# Patient Record
Sex: FEMALE | Race: WHITE | NOT HISPANIC OR LATINO | Employment: UNEMPLOYED | URBAN - METROPOLITAN AREA
[De-identification: names, ages, dates, MRNs, and addresses within clinical notes are randomized per-mention and may not be internally consistent; named-entity substitution may affect disease eponyms.]

---

## 2017-02-13 ENCOUNTER — TRANSFERRED RECORDS (OUTPATIENT)
Dept: CARDIOLOGY | Facility: CLINIC | Age: 52
End: 2017-02-13

## 2017-02-14 ENCOUNTER — TRANSFERRED RECORDS (OUTPATIENT)
Dept: CARDIOLOGY | Facility: CLINIC | Age: 52
End: 2017-02-14

## 2017-03-27 ENCOUNTER — OFFICE VISIT (OUTPATIENT)
Dept: FAMILY MEDICINE | Facility: CLINIC | Age: 52
End: 2017-03-27
Payer: MEDICAID

## 2017-03-27 VITALS
OXYGEN SATURATION: 98 % | WEIGHT: 231.1 LBS | BODY MASS INDEX: 34.13 KG/M2 | TEMPERATURE: 97.8 F | SYSTOLIC BLOOD PRESSURE: 120 MMHG | HEART RATE: 68 BPM | DIASTOLIC BLOOD PRESSURE: 76 MMHG

## 2017-03-27 DIAGNOSIS — Z12.11 SCREENING FOR COLON CANCER: ICD-10-CM

## 2017-03-27 DIAGNOSIS — I50.9 ACUTE CONGESTIVE HEART FAILURE, UNSPECIFIED CONGESTIVE HEART FAILURE TYPE: Primary | ICD-10-CM

## 2017-03-27 DIAGNOSIS — Z12.31 ENCOUNTER FOR SCREENING MAMMOGRAM FOR BREAST CANCER: ICD-10-CM

## 2017-03-27 DIAGNOSIS — G47.33 OBSTRUCTIVE SLEEP APNEA SYNDROME: ICD-10-CM

## 2017-03-27 DIAGNOSIS — L98.9 CRACKING SKIN: ICD-10-CM

## 2017-03-27 DIAGNOSIS — I10 ESSENTIAL HYPERTENSION: ICD-10-CM

## 2017-03-27 PROBLEM — Z95.810 AUTOMATIC IMPLANTABLE CARDIOVERTER-DEFIBRILLATOR IN SITU: Status: ACTIVE | Noted: 2017-03-27

## 2017-03-27 PROBLEM — F31.9 BIPOLAR AFFECTIVE DISORDER (H): Status: ACTIVE | Noted: 2017-02-26

## 2017-03-27 PROBLEM — R73.9 BLOOD GLUCOSE ELEVATED: Status: ACTIVE | Noted: 2017-03-27

## 2017-03-27 PROBLEM — J44.9 CHRONIC OBSTRUCTIVE PULMONARY DISEASE (H): Status: ACTIVE | Noted: 2017-02-26

## 2017-03-27 PROBLEM — Z95.0 CARDIAC PACEMAKER IN SITU: Status: ACTIVE | Noted: 2017-03-27

## 2017-03-27 PROBLEM — S37.009A INJURY OF KIDNEY: Status: ACTIVE | Noted: 2017-03-27

## 2017-03-27 PROCEDURE — 80053 COMPREHEN METABOLIC PANEL: CPT | Performed by: PHYSICIAN ASSISTANT

## 2017-03-27 PROCEDURE — 36415 COLL VENOUS BLD VENIPUNCTURE: CPT | Performed by: PHYSICIAN ASSISTANT

## 2017-03-27 PROCEDURE — 99204 OFFICE O/P NEW MOD 45 MIN: CPT | Performed by: PHYSICIAN ASSISTANT

## 2017-03-27 RX ORDER — FLUTICASONE PROPIONATE 110 UG/1
2 AEROSOL, METERED RESPIRATORY (INHALATION) 2 TIMES DAILY
COMMUNITY
End: 2017-03-27

## 2017-03-27 RX ORDER — TRIAMCINOLONE ACETONIDE 1 MG/ML
LOTION TOPICAL 2 TIMES DAILY
Qty: 60 ML | Refills: 0 | Status: SHIPPED | OUTPATIENT
Start: 2017-03-27 | End: 2017-06-01

## 2017-03-27 RX ORDER — LISINOPRIL 10 MG/1
10 TABLET ORAL 2 TIMES DAILY
Qty: 60 TABLET | Refills: 2 | Status: SHIPPED | OUTPATIENT
Start: 2017-03-27 | End: 2017-06-01

## 2017-03-27 RX ORDER — ALBUTEROL SULFATE 90 UG/1
2 AEROSOL, METERED RESPIRATORY (INHALATION) EVERY 4 HOURS PRN
COMMUNITY
End: 2017-06-01

## 2017-03-27 RX ORDER — METOPROLOL SUCCINATE 25 MG/1
25 TABLET, EXTENDED RELEASE ORAL DAILY
Qty: 90 TABLET | Refills: 0 | COMMUNITY
Start: 2017-03-27 | End: 2017-05-11

## 2017-03-27 RX ORDER — FUROSEMIDE 20 MG
40 TABLET ORAL EVERY MORNING
Qty: 90 TABLET | Refills: 1 | Status: SHIPPED | OUTPATIENT
Start: 2017-03-27 | End: 2017-06-01

## 2017-03-27 RX ORDER — TRIAMCINOLONE ACETONIDE 1 MG/ML
LOTION TOPICAL 2 TIMES DAILY
COMMUNITY
End: 2017-03-27

## 2017-03-27 RX ORDER — SPIRONOLACTONE 25 MG/1
25 TABLET ORAL EVERY MORNING
Qty: 90 TABLET | Refills: 1 | Status: SHIPPED | OUTPATIENT
Start: 2017-03-27 | End: 2017-06-01

## 2017-03-27 NOTE — LETTER
[facility]  [address] [facility] [address]  Phone: [phone]    March 27, 2017        Gloria Guzman  [address] [address] [address]          To whom it may concern:    RE: Gloria Guzman    Patient was seen and treated today at our clinic. She is new to our clinic. I have reviewed her recent medical history which did cause hospitalization from 2/27/17 through 3/3/17. She is currently in the process of trying to get her records from [facility].     Please contact me for questions or concerns.      Sincerely,        Hugo Wells PA-C

## 2017-03-27 NOTE — MR AVS SNAPSHOT
After Visit Summary   3/27/2017    Gloria Guzman    MRN: [id number]           Patient Information     Date Of Birth          1965        Visit Information        Provider Department      3/27/2017 2:40 PM Hugo Wells PA-C [facility]        Today's Diagnoses     Acute congestive heart failure, unspecified congestive heart failure type (H)    -  1    Essential hypertension        Obstructive sleep apnea syndrome        Encounter for screening mammogram for breast cancer        Screening for colon cancer        Cracking skin           Follow-ups after your visit        Additional Services     CARDIOLOGY EVAL ADULT REFERRAL       Your provider has referred you to:  [facility]: [facility] [phone]   [email or web address]    Please be aware that coverage of these services is subject to the terms and limitations of your health insurance plan.  Call member services at your health plan with any benefit or coverage questions.      Type of Referral:  New Cardiology Consult    Timeframe requested:  Routine    Please bring the following to your appointment:  >>   Any x-rays, CTs or MRIs which have been performed.  Contact the facility where they were done to arrange for  prior to your scheduled appointment.    >>   List of current medications  >>   This referral request   >>   Any documents/labs given to you for this referral            Follow-Up with [facility]           GASTROENTEROLOGY ADULT REF PROCEDURE ONLY       Last Lab Result: Creatinine (mg/dL)       Date                     Value                 11/14/2010               0.69             ----------  Body mass index is 34.13 kg/(m^2).     Needed:  No  Language:  English    Patient will be contacted to schedule procedure.     Please be aware that coverage of these services is subject to the terms and limitations of  your health insurance plan.  Call member services at your health plan with any benefit or coverage questions.  Any procedures must be performed at a [facility] facility OR coordinated by your clinic's referral office.    Please bring the following with you to your appointment:    (1) Any X-Rays, CTs or MRIs which have been performed.  Contact the facility where they were done to arrange for  prior to your scheduled appointment.    (2) List of current medications   (3) This referral request   (4) Any documents/labs given to you for this referral            SLEEP EVALUATION & MANAGEMENT REFERRAL - ADULT       Please be aware that coverage of these services is subject to the terms and limitations of your health insurance plan.  Call member services at your health plan with any benefit or coverage questions.      Please bring the following to your appointment:    >>   List of current medications   >>   This referral request   >>   Any documents/labs given to you for this referral    [facility] [phone] (Age 18 and up)                  Future tests that were ordered for you today     Open Future Orders        Priority Expected Expires Ordered    Follow-Up with [facility] Routine  3/27/2018 3/27/2017    *MA Screening Digital Bilateral Routine  3/27/2018 3/27/2017    SLEEP EVALUATION & MANAGEMENT REFERRAL - ADULT Routine  3/27/2018 3/27/2017            Who to contact     If you have questions or need follow up information about today's clinic visit or your schedule please contact [facility] directly at [phone].  Normal or non-critical lab and imaging results will be communicated to you by MyChart, letter or phone within 4 business days after the clinic has received the results. If you do not hear from us within 7 days, please contact the clinic through MyChart or phone. If you have a critical or abnormal lab result, we will notify you by phone as soon as  "possible.  Submit refill requests through [email or web address] or call your pharmacy and they will forward the refill request to us. Please allow 3 business days for your refill to be completed.          Additional Information About Your Visit        [email or web address] Information     [email or web address] lets you send messages to your doctor, view your test results, renew your prescriptions, schedule appointments and more. To sign up, go to [email or web address] . Click on \"Log in\" on the left side of the screen, which will take you to the Welcome page. Then click on \"Sign up Now\" on the right side of the page.     You will be asked to enter the access code listed below, as well as some personal information. Please follow the directions to create your username and password.     Your access code is: [email or web address]  Expires: 2017  3:35 PM     Your access code will  in 90 days. If you need help or a new code, please call your [place] clinic or [phone].        Care EveryWhere ID     This is your Care EveryWhere ID. This could be used by other organizations to access your [place] medical records  [id number]        Your Vitals Were     Pulse Temperature Pulse Oximetry BMI (Body Mass Index)          68 97.8  F (36.6  C) (Oral) 98% 34.13 kg/m2         Blood Pressure from Last 3 Encounters:   17 120/76   10/17/13 164/90    Weight from Last 3 Encounters:   17 231 lb 1.6 oz (104.8 kg)   10/17/13 196 lb (88.9 kg)              We Performed the Following     CARDIOLOGY EVAL ADULT REFERRAL     Comprehensive metabolic panel     GASTROENTEROLOGY ADULT REF PROCEDURE ONLY          Today's Medication Changes          These changes are accurate as of: 3/27/17  3:35 PM.  If you have any questions, ask your nurse or doctor.               These medicines have changed or have updated prescriptions.        Dose/Directions    furosemide 20 MG tablet   Commonly known as:  LASIX   This may have changed:  medication strength   Used for:  Acute " congestive heart failure, unspecified congestive heart failure type (H)   Changed by:  Hugo Wells PA-C        Dose:  40 mg   Take 2 tablets (40 mg) by mouth every morning Reported on 3/27/2017   Quantity:  90 tablet   Refills:  1       lisinopril 10 MG tablet   Commonly known as:  PRINIVIL/ZESTRIL   This may have changed:  medication strength   Used for:  Essential hypertension   Changed by:  Hugo Wells PA-C        Dose:  10 mg   Take 1 tablet (10 mg) by mouth 2 times daily Reported on 3/27/2017   Quantity:  60 tablet   Refills:  2       metoprolol 25 MG 24 hr tablet   Commonly known as:  TOPROL-XL   Indication:  3 tabs twice per day   This may have changed:    - medication strength  - when to take this   Used for:  Essential hypertension, Acute congestive heart failure, unspecified congestive heart failure type (H)   Changed by:  Hugo Wells PA-C        Dose:  25 mg   Take 1 tablet (25 mg) by mouth daily Reported on 3/27/2017   Quantity:  90 tablet   Refills:  0         Stop taking these medicines if you haven't already. Please contact your care team if you have questions.     fluticasone 110 MCG/ACT Inhaler   Commonly known as:  FLOVENT HFA   Stopped by:  Hugo Wells PA-C                Where to get your medicines      These medications were sent to [facility] [address] [place] [address] [facility] AT [place] [address] & [facility]  [address] [facility] [address]     Phone:  [phone]     furosemide 20 MG tablet    lisinopril 10 MG tablet    spironolactone 25 MG tablet    triamcinolone 0.1 % lotion                Primary Care Provider Office Phone # Fax #    Hugo Wells PA-C [phone]       [facility] [address]  [facility] [address]        Thank you!     Thank you for choosing [facility]  for your care. Our goal is always to provide you with excellent care. Hearing back  from our patients is one way we can continue to improve our services. Please take a few minutes to complete the written survey that you may receive in the mail after your visit with us. Thank you!             Your Updated Medication List - Protect others around you: Learn how to safely use, store and throw away your medicines at www.disposemymeds.org.          This list is accurate as of: 3/27/17  3:35 PM.  Always use your most recent med list.                   Brand Name Dispense Instructions for use    albuterol 108 (90 BASE) MCG/ACT Inhaler    PROAIR HFA/PROVENTIL HFA/VENTOLIN HFA     Inhale 2 puffs into the lungs every 4 hours as needed for shortness of breath / dyspnea or wheezing Reported on 3/27/2017       AMOXICILLIN PO      Take 500 mg by mouth as needed (4 capsule 1 hour prior to procedure) Reported on 3/27/2017       furosemide 20 MG tablet    LASIX    90 tablet    Take 2 tablets (40 mg) by mouth every morning Reported on 3/27/2017       IBUPROFEN PO      Take 800 mg by mouth as needed for moderate pain Reported on 3/27/2017       lisinopril 10 MG tablet    PRINIVIL/ZESTRIL    60 tablet    Take 1 tablet (10 mg) by mouth 2 times daily Reported on 3/27/2017       metoprolol 25 MG 24 hr tablet    TOPROL-XL    90 tablet    Take 1 tablet (25 mg) by mouth daily Reported on 3/27/2017       oxyCODONE 15 MG IR tablet    ROXICODONE     Take by mouth every 4 hours as needed Reported on 3/27/2017       spironolactone 25 MG tablet    ALDACTONE    90 tablet    Take 1 tablet (25 mg) by mouth every morning Reported on 3/27/2017       triamcinolone 0.1 % lotion    KENALOG    60 mL    Apply topically 2 times daily Reported on 3/27/2017

## 2017-03-27 NOTE — NURSING NOTE
"Chief Complaint   Patient presents with     Recheck Medication       Initial /76  Pulse 68  Temp 97.8  F (36.6  C) (Oral)  Wt 231 lb 1.6 oz (104.8 kg)  SpO2 98%  BMI 34.13 kg/m2 Estimated body mass index is 34.13 kg/(m^2) as calculated from the following:    Height as of 10/17/13: 5' 9\" (1.753 m).    Weight as of this encounter: 231 lb 1.6 oz (104.8 kg).  Medication Reconciliation: complete   Liborio Elkins CMA        "

## 2017-03-27 NOTE — PROGRESS NOTES
SUBJECTIVE:                                                    Gloria Guzman is a 51 year old female who presents to clinic today for the following health issues:    Medication Followup of Ibuprofen, albuterol, amoxicillin, lisinopril, spironolactone, triamcinolone, metoprolol, furosemide,     Taking Medication as prescribed: yes    Side Effects:  None    Medication Helping Symptoms:  yes     -Patient presents to establish care  -New to this provider and this clinic    -Recently hospitalized at [facility] for acute on chronic CHF with a LVEF of 40%  -She is s/p pacemaker, ICD (1 lead) and believes it is recommended she get a 3 lead placed in the future  -Has diagnosis of MARV, does not utilize any CPAP  -She has additional comorbidities that include COPD, htn, bipolar and tobacco use  -She is on lisinopril, toprol xl, spironolactone and lasix for control  -[facility] recommended monitoring CHF remotely with SMILE vest vs cardioMEMS   -she has not followed with heart failure bridge clinic as recommended  -She was d/c'd on 3/2/17  -Since then she has had no concerns, felt well  -Here for medication refill    Problem list and histories reviewed & adjusted, as indicated.  Additional history: as documented    Patient Active Problem List   Diagnosis     Acute congestive heart failure (H)     Bipolar affective disorder (H)     Chronic obstructive pulmonary disease (H)     Hypertension     Obstructive sleep apnea syndrome     Arthralgia of shoulder     Injury of kidney     Past Surgical History:   Procedure Laterality Date     BACK SURGERY        SECTION       GALLBLADDER SURGERY       HERNIA REPAIR       JOINT REPLACEMTN, KNEE RT/LT  11/10    Joint Replacement knee LT     SURGICAL PATHOLOGY EXAM       TONSILLECTOMY         Social History   Substance Use Topics     Smoking status: Current Some Day Smoker     Smokeless tobacco: Never Used     Alcohol use No     Family History   Problem Relation Age  of Onset     Breast Cancer Maternal Grandmother      Breast Cancer Maternal Aunt      CANCER Father 42     lung      CANCER Maternal Grandfather      lung      CANCER Other      maternal cousin lung      Gallbladder Disease Mother      Gallbladder Disease Sister            Reviewed and updated as needed this visit by clinical staff  Tobacco  Allergies  Med Hx  Surg Hx  Fam Hx  Soc Hx      Reviewed and updated as needed this visit by Provider         ROS:  Constitutional, HEENT, cardiovascular, pulmonary, gi and gu systems are negative, except as otherwise noted.    OBJECTIVE:                                                    /76  Pulse 68  Temp 97.8  F (36.6  C) (Oral)  Wt 231 lb 1.6 oz (104.8 kg)  SpO2 98%  BMI 34.13 kg/m2  Body mass index is 34.13 kg/(m^2).  GENERAL: healthy, alert and no distress  NECK: no adenopathy and or distended venous return  RESP: stunted expiration, lungs clear to auscultation - no rales, rhonchi or wheezes  CV: regular rates and rhythm, normal S1 S2, no S3 or S4 and no murmur, click or rub  MS: no peripheral edema    Diagnostic Test Results:  Results for orders placed or performed in visit on 03/27/17   Comprehensive metabolic panel   Result Value Ref Range    Sodium 140 133 - 144 mmol/L    Potassium 4.5 3.4 - 5.3 mmol/L    Chloride 106 94 - 109 mmol/L    Carbon Dioxide 27 20 - 32 mmol/L    Anion Gap 7 3 - 14 mmol/L    Glucose 95 70 - 99 mg/dL    Urea Nitrogen 28 7 - 30 mg/dL    Creatinine 1.23 (H) 0.52 - 1.04 mg/dL    GFR Estimate 46 (L) >60 mL/min/1.7m2    GFR Estimate If Black 55 (L) >60 mL/min/1.7m2    Calcium 9.6 8.5 - 10.1 mg/dL    Bilirubin Total 0.4 0.2 - 1.3 mg/dL    Albumin 3.8 3.4 - 5.0 g/dL    Protein Total 8.0 6.8 - 8.8 g/dL    Alkaline Phosphatase 67 40 - 150 U/L    ALT 30 0 - 50 U/L    AST 20 0 - 45 U/L        ASSESSMENT/PLAN:                                                    1. Acute congestive heart failure, unspecified congestive heart failure type  (H)  Recurrent hx. She has not followed up with [facility] heart failure bridge clinic which is concerning. Reviewed the importance of this, and the risks. She has been weighing herself fortunately and has not noted changes. I will refill medications today to hopefully help prevent any break in therapy but she will absolutely need to establish with our cardiology group if going to be in [facility] network. I would also like her to work with [facility] clinic. She sounds motivated to do so today, and I have confirmed # to reach her at and added them to the file. Offered Care Coordination, for which she is a prime candidate but she declined - continue to recommend. Finally, reviewed with patient the risks she faces if continued non-compliance given her numerous hospitalizations. She acknowledged this and appears more motivated today.  - Comprehensive metabolic panel  - CARDIOLOGY EVAL ADULT REFERRAL  - metoprolol (TOPROL-XL) 25 MG 24 hr tablet; Take 1 tablet (25 mg) by mouth daily Reported on 3/27/2017  Dispense: 90 tablet; Refill: 0  - furosemide (LASIX) 20 MG tablet; Take 2 tablets (40 mg) by mouth every morning Reported on 3/27/2017  Dispense: 90 tablet; Refill: 1  - spironolactone (ALDACTONE) 25 MG tablet; Take 1 tablet (25 mg) by mouth every morning Reported on 3/27/2017  Dispense: 90 tablet; Refill: 1  - Follow-Up with [facility]; Future    2. Essential hypertension  Controlled today. Will need close monitoring of labs. I will refill today to hopefully help any breaks in therapy.   - CARDIOLOGY EVAL ADULT REFERRAL  - metoprolol (TOPROL-XL) 25 MG 24 hr tablet; Take 1 tablet (25 mg) by mouth daily Reported on 3/27/2017  Dispense: 90 tablet; Refill: 0  - lisinopril (PRINIVIL/ZESTRIL) 10 MG tablet; Take 1 tablet (10 mg) by mouth 2 times daily Reported on 3/27/2017  Dispense: 60 tablet; Refill: 2    3. Obstructive sleep apnea syndrome  Reviewed importance of control and impact on heart.   - SLEEP EVALUATION & MANAGEMENT REFERRAL -  ADULT; Future    4. Encounter for screening mammogram for breast cancer  - *MA Screening Digital Bilateral; Future    5. Screening for colon cancer  - GASTROENTEROLOGY ADULT REF PROCEDURE ONLY    6. Cracking skin  - triamcinolone (KENALOG) 0.1 % lotion; Apply topically 2 times daily Reported on 3/27/2017  Dispense: 60 mL; Refill: 0    Hugo Wells PA-C  [facility]

## 2017-03-28 LAB
ALBUMIN SERPL-MCNC: 3.8 G/DL (ref 3.4–5)
ALP SERPL-CCNC: 67 U/L (ref 40–150)
ALT SERPL W P-5'-P-CCNC: 30 U/L (ref 0–50)
ANION GAP SERPL CALCULATED.3IONS-SCNC: 7 MMOL/L (ref 3–14)
AST SERPL W P-5'-P-CCNC: 20 U/L (ref 0–45)
BILIRUB SERPL-MCNC: 0.4 MG/DL (ref 0.2–1.3)
BUN SERPL-MCNC: 28 MG/DL (ref 7–30)
CALCIUM SERPL-MCNC: 9.6 MG/DL (ref 8.5–10.1)
CHLORIDE SERPL-SCNC: 106 MMOL/L (ref 94–109)
CO2 SERPL-SCNC: 27 MMOL/L (ref 20–32)
CREAT SERPL-MCNC: 1.23 MG/DL (ref 0.52–1.04)
GFR SERPL CREATININE-BSD FRML MDRD: 46 ML/MIN/1.7M2
GLUCOSE SERPL-MCNC: 95 MG/DL (ref 70–99)
POTASSIUM SERPL-SCNC: 4.5 MMOL/L (ref 3.4–5.3)
PROT SERPL-MCNC: 8 G/DL (ref 6.8–8.8)
SODIUM SERPL-SCNC: 140 MMOL/L (ref 133–144)

## 2017-04-11 ENCOUNTER — TELEPHONE (OUTPATIENT)
Dept: GASTROENTEROLOGY | Facility: CLINIC | Age: 52
End: 2017-04-11

## 2017-04-11 NOTE — TELEPHONE ENCOUNTER
Third attempt to reach patient to schedule Colonoscopy. Not Scheduled at [facility].

## 2017-05-02 ENCOUNTER — PRE VISIT (OUTPATIENT)
Dept: CARDIOLOGY | Facility: CLINIC | Age: 52
End: 2017-05-02

## 2017-05-02 NOTE — TELEPHONE ENCOUNTER
Records requested from [facility] in preparation for OV w/ Dr. Coles 5/3/17. ZION Bosch RN

## 2017-05-03 ENCOUNTER — TELEPHONE (OUTPATIENT)
Dept: CARDIOLOGY | Facility: CLINIC | Age: 52
End: 2017-05-03
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
## 2017-05-05 ENCOUNTER — TELEPHONE (OUTPATIENT)
Dept: FAMILY MEDICINE | Facility: CLINIC | Age: 52
End: 2017-05-05

## 2017-05-05 NOTE — LETTER
May 5, 2017      Gloria HANSEN Megan  [address] [address] [address]        Dear Ms. Olivalaisha HANSEN Megan,    Our records show that you are currently due for screening for colon cancer and breast cancer screening.    Please call our clinic at [phone] to have these orders placed and we can assist you in scheduling these appointments.    You are also due for cervical cancer screening, please call our office at [phone] to make this appointment.    If you have already had these completed please contact our clinic so we can update your chart.     If you have further questions or concerns please feel free to contact us by calling our clinic at [phone].     Sincerely,     [facility]

## 2017-05-05 NOTE — TELEPHONE ENCOUNTER
Panel Management Review      Patient has the following on her problem list: None      Composite cancer screening  Chart review shows that this patient is due/due soon for the following Pap Smear, Mammogram and Colonoscopy  Summary:    Patient is due/failing the following:   COLONOSCOPY, MAMMOGRAM and PAP    Action needed:   Patient needs office visit for Pap/Colon and Mammo.    Type of outreach:    Sent letter.    Questions for provider review:    None                                                                                                                                    Liborio Elkins [facility]       Chart routed to Care Team .

## 2017-05-11 ENCOUNTER — OFFICE VISIT (OUTPATIENT)
Dept: CARDIOLOGY | Facility: CLINIC | Age: 52
End: 2017-05-11
Attending: PHYSICIAN ASSISTANT
Payer: MEDICAID

## 2017-05-11 VITALS
BODY MASS INDEX: 33.92 KG/M2 | WEIGHT: 229 LBS | HEART RATE: 80 BPM | DIASTOLIC BLOOD PRESSURE: 86 MMHG | HEIGHT: 69 IN | SYSTOLIC BLOOD PRESSURE: 120 MMHG

## 2017-05-11 DIAGNOSIS — I50.22 CHRONIC SYSTOLIC CONGESTIVE HEART FAILURE (H): Primary | ICD-10-CM

## 2017-05-11 DIAGNOSIS — Z95.810 ICD (IMPLANTABLE CARDIOVERTER-DEFIBRILLATOR) IN PLACE: ICD-10-CM

## 2017-05-11 DIAGNOSIS — I50.9 ACUTE CONGESTIVE HEART FAILURE, UNSPECIFIED CONGESTIVE HEART FAILURE TYPE: ICD-10-CM

## 2017-05-11 PROCEDURE — 99204 OFFICE O/P NEW MOD 45 MIN: CPT | Mod: 25 | Performed by: INTERNAL MEDICINE

## 2017-05-11 PROCEDURE — 99406 BEHAV CHNG SMOKING 3-10 MIN: CPT | Performed by: INTERNAL MEDICINE

## 2017-05-11 PROCEDURE — 93000 ELECTROCARDIOGRAM COMPLETE: CPT | Performed by: INTERNAL MEDICINE

## 2017-05-11 RX ORDER — CLONAZEPAM 0.5 MG/1
0.5 TABLET ORAL 2 TIMES DAILY PRN
COMMUNITY
End: 2019-02-02

## 2017-05-11 RX ORDER — METOPROLOL SUCCINATE 50 MG/1
50 TABLET, EXTENDED RELEASE ORAL DAILY
Qty: 90 TABLET | Refills: 3 | Status: SHIPPED | OUTPATIENT
Start: 2017-05-11 | End: 2017-11-15

## 2017-05-11 NOTE — LETTER
5/11/2017    Hugo eWlls PA-C  [address] [place] [address]    RE: Gloria Guzman       Dear Colleague,    I had the pleasure of seeing Gloria Guzman in the [facility].    REASON FOR CLINIC VISIT:  Establish cardiology care.      Ms. Guzman is a pleasant 51-year-old female with history of nonischemic cardiomyopathy which according to patient was diagnosed about 3 years ago, status post ICD for primary sudden cardiac death prevention, bipolar disorder, COPD, tobacco abuse, obstructive sleep apnea, hypertension and chronic kidney disease stage III.        Today, the patient is coming to establish cardiology care.  She is accompanied by a friend of hers.  In terms of cardiac history, I was able to obtain some history from Care Everywhere.  It looks like patient was diagnosed with cardiomyopathy, probably 3 years ago.  She had apparently normal coronary angiogram in 2014.  I see there is a cardiac MRI from 01/2015 that showed LVEF of 19%, severely enlarged LV 7.4 cm end-diastolic diameter, severely reduced RV systolic function with RVEF of 24% and moderately dilated RV.  There was no evidence of iron overload and on delayed enhancement, there was a focal area of hyper-enhancement seen in inferior septum at RV insertion point, sometimes seen in dilated cardiomyopathy.        Earlier this year, patient was admitted at [facility] in end of February with acute decompensated congestive heart failure.  It looks like she underwent about 30 pounds of diuresis.  Her discharge weight was 228 pounds.  It was felt that the reason for her decompensation was probably under diuresis on an outpatient basis.  She also has device interrogation at that time that showed a few episodes of NSVT, otherwise no shocks were reported.  She also had an echocardiogram at that time that showed LVEF of 40% with mild to moderate increase in LV size.  RV was mildly enlarged with  borderline reduced RV systolic function, moderately enlarged left atrium.  No significant valvular disease was seen.  LV end-diastolic dimension was measured at 5.9 cm.        The patient is today coming to clinic to establish cardiology care.  In the past, she has been seen at [facility].  The patient tells me that it has been several months, if not more than a year that she has seen a cardiologist last time.  She tells me that she is under a lot of social stress as she is going through a divorce and her ex- has sold her home pacemaker interrogation device.  She lives in [place] but is planning to move to [place].        Her weight today is 229 pounds- she take 60 mg of Lasix every morning.  Additionally, she is on 25 mg daily of Toprol-XL, 10 mg b.i.d. lisinopril and 25 mg daily of spironolactone.  I had a BMP from end of March that shows creatinine of 1.23, which looks like a stable creatinine for her.  It was around 1.25 at discharge from hospitalization.  Potassium was 4.5.        The patient tells me that she does not do any dedicated exercise, but she is up on her feet. She also has a 9-month-old dog and takes it for walks and with none of the physical activity she has noticed chest discomfort.  If she exerts hard, she does get short of breath which is stable and nonprogressive.  No orthopnea, no PND.  She does acknowledge that she has not been careful with salt intake, although she does not add salt per se to her food but she consumes canned foods.  She does not have a weighing scale.        Unfortunately, she has a longstanding smoking history since she was age 16 and she used to smoke 1-2 packs per day, but is now down to 5-10 cigarettes and very emphatically told me right away that she is not at all interested in quitting.  She has also been diagnosed with sleep apnea but tells me that she feels very claustrophobic with the facial mask and is not on any treatment for that.  No alcohol  consumption.  She does use marijuana.  There is no family history of known cardiomyopathy in first-degree relatives.      PHYSICAL EXAMINATION:   VITAL SIGNS:  Blood pressure 120/86, heart rate 80 and regular, weight 229 pounds, BMI 33.8.   GENERAL:  The patient appears pleasant, comfortable.   NECK:  Normal JVP, no bruit.   CARDIOVASCULAR SYSTEM:  S1, S2 normal, no murmur, rub or gallop.   RESPIRATORY SYSTEM:  Decreased bilateral air entry, no crackles.   ABDOMEN:  Soft, nontender.   EXTREMITIES:  No pitting pedal edema.   NEUROLOGICAL:  Alert, oriented x3.   PSYCHIATRIC:  Normal affect.   SKIN:  No obvious rash.   HEENT:  No pallor or icterus.      EKG done in the clinic was personally reviewed by me shows sinus rhythm with PVCs and septal Q-waves.     Outpatient Encounter Prescriptions as of 5/11/2017   Medication Sig Dispense Refill     clonazePAM (KLONOPIN) 0.5 MG tablet Take 0.5 mg by mouth 2 times daily as needed for anxiety       metoprolol (TOPROL-XL) 50 MG 24 hr tablet Take 1 tablet (50 mg) by mouth daily 90 tablet 3     IBUPROFEN PO Take 800 mg by mouth as needed for moderate pain Reported on 3/27/2017       [DISCONTINUED] AMOXICILLIN PO Take 500 mg by mouth as needed (4 capsule 1 hour prior to procedure) Reported on 3/27/2017       [DISCONTINUED] albuterol (PROAIR HFA/PROVENTIL HFA/VENTOLIN HFA) 108 (90 BASE) MCG/ACT Inhaler Inhale 2 puffs into the lungs every 4 hours as needed for shortness of breath / dyspnea or wheezing Reported on 3/27/2017       [DISCONTINUED] triamcinolone (KENALOG) 0.1 % lotion Apply topically 2 times daily Reported on 3/27/2017 60 mL 0     [DISCONTINUED] lisinopril (PRINIVIL/ZESTRIL) 10 MG tablet Take 1 tablet (10 mg) by mouth 2 times daily Reported on 3/27/2017 60 tablet 2     [DISCONTINUED] furosemide (LASIX) 20 MG tablet Take 2 tablets (40 mg) by mouth every morning Reported on 3/27/2017 90 tablet 1     [DISCONTINUED] spironolactone (ALDACTONE) 25 MG tablet Take 1 tablet (25  mg) by mouth every morning Reported on 3/27/2017 90 tablet 1     oxyCODONE (ROXICODONE) 15 MG immediate release tablet Take by mouth every 4 hours as needed Reported on 3/27/2017       [DISCONTINUED] metoprolol (TOPROL-XL) 25 MG 24 hr tablet Take 25 mg by mouth daily Reported on 3/27/2017 90 tablet 0     No facility-administered encounter medications on file as of 5/11/2017.       IMPRESSION AND PLAN:    A very delightful 51-year-old female with nonischemic cardiomyopathy with LVEF around 19% back in 2015, now improved to 40%.  Initially LV was severely dilated, but now with mildly dilated with borderline reduced RV systolic function now, but initially was severely reduced.  Other comorbidities of hypertension, COPD, tobacco abuse, obstructive sleep apnea, not on treatment, obesity.     Overall, cardiovascular status-wise patient appears compensated and euvolemic.  She is on appropriate cardiomyopathy medication of long-acting beta blocker, Toprol-XL, lisinopril, spironolactone and Lasix.  I recommend that we can increase Toprol-XL to 50 mg daily.     I had a long discussion with the patient regarding the importance of lifestyle changes and dietary changes, especially watching salt intake and not to consume more than 2 grams of sodium a day.  I also recommended her to have a weighing scale and weigh herself daily and if she notices more than 3 pounds of weight gain within a day to take an extra dose of 40 mg of Lasix that day.  I also recommend her to get enrolled in our Device Clinic.     I also talked at length about the importance of tobacco cessation and after a long discussion and initial reluctance, the patient did agree to try to quit tobacco.  I offered her nicotine replacement therapy with patches, which she declined.  She had I think tried Chantix in the past and does not want to try it again.  The patient tells me that she will probably try E-cigarettes to help quit tobacco.     At this time, given the  LVEF 40% and narrow QRS, I do not think she qualifies or needs CRT.     I am setting up a followup in about a month with an ODIN with the goal of further increase in beta blocker/lisinopril as tolerated.   1.  Nonischemic cardiomyopathy with initial LVEF on cardiac MRI is 19%, now improved to 40%, initially severely enlarged LV, but now mildly enlarged.  Clinically appears euvolemic and well compensated, NYHA class II, stage C.   2.  Tobacco abuse, unfortunately ongoing.   3.  COPD.   4.  Obstructive sleep apnea, not on treatment.   5.  Obesity.   6.  Hypertension.   7.  Chronic kidney disease, stage III.      RECOMMENDATIONS:   1.  Increase Toprol-XL to 50 mg daily.   2.  Continue lisinopril 10 mg b.i.d., spironolactone, Lasix.   3.  Salt restriction, daily weights.   4.  Enroll in Device Clinic.   5.  Tobacco cessation.   6.  Follow up with ODIN in 1 month with BMP.      Fifty-five minutes of total time was spent with more than 50% counseling and coordination of care.     Again, thank you for allowing me to participate in the care of your patient.      Sincerely,    Sharif Coles MD     [facility]

## 2017-05-11 NOTE — PROGRESS NOTES
HPI and Plan:   See dictation(#[id number])    Orders Placed This Encounter   Procedures     Basic metabolic panel     Follow-Up with Device Clinic     Follow-Up with Cardiac Advanced Practice Provider     EKG 12-lead complete w/read - Clinics       Orders Placed This Encounter   Medications     clonazePAM (KLONOPIN) 0.5 MG tablet     Sig: Take 0.5 mg by mouth 2 times daily as needed for anxiety     metoprolol (TOPROL-XL) 50 MG 24 hr tablet     Sig: Take 1 tablet (50 mg) by mouth daily     Dispense:  90 tablet     Refill:  3       Medications Discontinued During This Encounter   Medication Reason     metoprolol (TOPROL-XL) 25 MG 24 hr tablet          Encounter Diagnoses   Name Primary?     Acute congestive heart failure, unspecified congestive heart failure type (H)      Chronic systolic congestive heart failure (H) Yes     ICD (implantable cardioverter-defibrillator) in place        CURRENT MEDICATIONS:  Current Outpatient Prescriptions   Medication Sig Dispense Refill     clonazePAM (KLONOPIN) 0.5 MG tablet Take 0.5 mg by mouth 2 times daily as needed for anxiety       metoprolol (TOPROL-XL) 50 MG 24 hr tablet Take 1 tablet (50 mg) by mouth daily 90 tablet 3     AMOXICILLIN PO Take 500 mg by mouth as needed (4 capsule 1 hour prior to procedure) Reported on 3/27/2017       albuterol (PROAIR HFA/PROVENTIL HFA/VENTOLIN HFA) 108 (90 BASE) MCG/ACT Inhaler Inhale 2 puffs into the lungs every 4 hours as needed for shortness of breath / dyspnea or wheezing Reported on 3/27/2017       IBUPROFEN PO Take 800 mg by mouth as needed for moderate pain Reported on 3/27/2017       triamcinolone (KENALOG) 0.1 % lotion Apply topically 2 times daily Reported on 3/27/2017 60 mL 0     lisinopril (PRINIVIL/ZESTRIL) 10 MG tablet Take 1 tablet (10 mg) by mouth 2 times daily Reported on 3/27/2017 60 tablet 2     furosemide (LASIX) 20 MG tablet Take 2 tablets (40 mg) by mouth every morning Reported on 3/27/2017 90 tablet 1     spironolactone  (ALDACTONE) 25 MG tablet Take 1 tablet (25 mg) by mouth every morning Reported on 3/27/2017 90 tablet 1     oxyCODONE (ROXICODONE) 15 MG immediate release tablet Take by mouth every 4 hours as needed Reported on 3/27/2017       [DISCONTINUED] metoprolol (TOPROL-XL) 25 MG 24 hr tablet Take 25 mg by mouth daily Reported on 3/27/2017 90 tablet 0       ALLERGIES     Allergies   Allergen Reactions     Morphine Sulfate Itching       PAST MEDICAL HISTORY:  Past Medical History:   Diagnosis Date     Sleep apnea        PAST SURGICAL HISTORY:  Past Surgical History:   Procedure Laterality Date     BACK SURGERY        SECTION       GALLBLADDER SURGERY       HERNIA REPAIR       JOINT REPLACEMTN, KNEE RT/LT  11/10    Joint Replacement knee LT     SURGICAL PATHOLOGY EXAM       TONSILLECTOMY         FAMILY HISTORY:  Family History   Problem Relation Age of Onset     Breast Cancer Maternal Grandmother      Breast Cancer Maternal Aunt      CANCER Father 42     lung      CANCER Maternal Grandfather      lung      CANCER Other      maternal cousin lung      Gallbladder Disease Mother      Gallbladder Disease Sister        SOCIAL HISTORY:  Social History     Social History     Marital status: Single     Spouse name: N/A     Number of children: N/A     Years of education: N/A     Social History Main Topics     Smoking status: Current Some Day Smoker     Smokeless tobacco: Never Used     Alcohol use No     Drug use: No     Sexual activity: Not Asked     Other Topics Concern     None     Social History Narrative       Review of Systems:  Skin:  Negative       Eyes:  Negative      ENT:  Negative      Respiratory:  Positive for dyspnea on exertion stairs    Cardiovascular:    Positive for;palpitations;edema chest tightness on occ  Gastroenterology: Negative      Genitourinary:  Negative      Musculoskeletal:  Negative      Neurologic:  Negative      Psychiatric:  Positive for anxiety    Heme/Lymph/Imm:  Negative      Endocrine:   "Negative        Physical Exam:  Vitals: /86  Pulse 80  Ht 1.753 m (5' 9\")  Wt 103.9 kg (229 lb)  BMI 33.82 kg/m2    Constitutional:           Skin:           Head:           Eyes:           ENT:           Neck:           Chest:             Cardiac:                    Abdomen:           Vascular:                                          Extremities and Back:                 Neurological:                 CC  ARIANA BabbC  [facility]  [address] [place] [address]                  "

## 2017-05-11 NOTE — MR AVS SNAPSHOT
After Visit Summary   5/11/2017    Gloria Guzman    MRN: [id number]           Patient Information     Date Of Birth          1965        Visit Information        Provider Department      5/11/2017 2:15 PM Sharif Coles MD [facility]        Today's Diagnoses     Chronic systolic congestive heart failure (H)    -  1    Acute congestive heart failure, unspecified congestive heart failure type (H)        ICD (implantable cardioverter-defibrillator) in place           Follow-ups after your visit        Additional Services     Follow-Up with Device Clinic           Follow-Up with Cardiac Advanced Practice Provider                 Future tests that were ordered for you today     Open Future Orders        Priority Expected Expires Ordered    Basic metabolic panel Routine 6/10/2017 5/11/2018 5/11/2017    Follow-Up with Cardiac Advanced Practice Provider Routine 6/10/2017 5/11/2018 5/11/2017    Follow-Up with Device Clinic Routine 5/11/2017 5/11/2018 5/11/2017            Who to contact     If you have questions or need follow up information about today's clinic visit or your schedule please contact [facility] directly at [phone].  Normal or non-critical lab and imaging results will be communicated to you by [employer], letter or phone within 4 business days after the clinic has received the results. If you do not hear from us within 7 days, please contact the clinic through [phone] or phone. If you have a critical or abnormal lab result, we will notify you by phone as soon as possible.  Submit refill requests through [employer] or call your pharmacy and they will forward the refill request to us. Please allow 3 business days for your refill to be completed.          Additional Information About Your Visit        [employer] Information     [employer] lets you send messages to your doctor, view your test results, renew your  "prescriptions, schedule appointments and more. To sign up, go to [email or web address] . Click on \"Log in\" on the left side of the screen, which will take you to the Welcome page. Then click on \"Sign up Now\" on the right side of the page.     You will be asked to enter the access code listed below, as well as some personal information. Please follow the directions to create your username and password.     Your access code is: [email or web address]  Expires: 2017  3:35 PM     Your access code will  in 90 days. If you need help or a new code, please call your [place] clinic or [phone].        Care EveryWhere ID     This is your Care EveryWhere ID. This could be used by other organizations to access your [place] medical records  [id number]        Your Vitals Were     Pulse Height BMI (Body Mass Index)             80 1.753 m (5' 9\") 33.82 kg/m2          Blood Pressure from Last 3 Encounters:   17 120/86   17 120/76   10/17/13 164/90    Weight from Last 3 Encounters:   17 103.9 kg (229 lb)   17 104.8 kg (231 lb 1.6 oz)   10/17/13 88.9 kg (196 lb)              We Performed the Following     EKG 12-lead complete w/read - Clinics     Follow-Up with [facility]          Today's Medication Changes          These changes are accurate as of: 17  3:04 PM.  If you have any questions, ask your nurse or doctor.               These medicines have changed or have updated prescriptions.        Dose/Directions    metoprolol 50 MG 24 hr tablet   Commonly known as:  TOPROL-XL   This may have changed:    - medication strength  - how much to take  - additional instructions   Used for:  Chronic systolic congestive heart failure (H)   Changed by:  Sharif Coles MD        Dose:  50 mg   Take 1 tablet (50 mg) by mouth daily   Quantity:  90 tablet   Refills:  3            Where to get your medicines      These medications were sent to [place] [address] [place] [address] [facility] [address]     Phone:  [phone]     metoprolol 50 MG 24 hr tablet                Primary Care Provider    Physician No Ref-Primary       No address on file        Thank you!     Thank you for choosing [facility] [place]  for your care. Our goal is always to provide you with excellent care. Hearing back from our patients is one way we can continue to improve our services. Please take a few minutes to complete the written survey that you may receive in the mail after your visit with us. Thank you!             Your Updated Medication List - Protect others around you: Learn how to safely use, store and throw away your medicines at www.disposemymeds.org.          This list is accurate as of: 5/11/17  3:04 PM.  Always use your most recent med list.                   Brand Name Dispense Instructions for use    albuterol 108 (90 BASE) MCG/ACT Inhaler    PROAIR HFA/PROVENTIL HFA/VENTOLIN HFA     Inhale 2 puffs into the lungs every 4 hours as needed for shortness of breath / dyspnea or wheezing Reported on 3/27/2017       AMOXICILLIN PO      Take 500 mg by mouth as needed (4 capsule 1 hour prior to procedure) Reported on 3/27/2017       furosemide 20 MG tablet    LASIX    90 tablet    Take 2 tablets (40 mg) by mouth every morning Reported on 3/27/2017       IBUPROFEN PO      Take 800 mg by mouth as needed for moderate pain Reported on 3/27/2017       klonoPIN 0.5 MG tablet   Generic drug:  clonazePAM      Take 0.5 mg by mouth 2 times daily as needed for anxiety       lisinopril 10 MG tablet    PRINIVIL/ZESTRIL    60 tablet    Take 1 tablet (10 mg) by mouth 2 times daily Reported on 3/27/2017       metoprolol 50 MG 24 hr tablet    TOPROL-XL    90 tablet    Take 1 tablet (50 mg) by mouth daily       oxyCODONE 15 MG IR tablet    ROXICODONE     Take by mouth every 4 hours as needed Reported on 3/27/2017       spironolactone 25 MG tablet    ALDACTONE    90 tablet    Take  1 tablet (25 mg) by mouth every morning Reported on 3/27/2017       triamcinolone 0.1 % lotion    KENALOG    60 mL    Apply topically 2 times daily Reported on 3/27/2017

## 2017-05-12 NOTE — PROGRESS NOTES
REASON FOR CLINIC VISIT:  Establish cardiology care.      HISTORY OF PRESENT ILLNESS:  Ms. Guzman is a pleasant 51-year-old female with history of nonischemic cardiomyopathy which according to patient was diagnosed about 3 years ago, status post ICD for primary sudden cardiac death prevention, bipolar disorder, COPD, tobacco abuse, obstructive sleep apnea, hypertension and chronic kidney disease stage III.        Today, the patient is coming to establish cardiology care.  She is accompanied by a friend of hers.  In terms of cardiac history, I was able to obtain some history from Care Everywhere.  It looks like patient was diagnosed with cardiomyopathy, probably 3 years ago.  She had apparently normal coronary angiogram in 2014.  I see there is a cardiac MRI from 01/2015 that showed LVEF of 19%, severely enlarged LV 7.4 cm end-diastolic diameter, severely reduced RV systolic function with RVEF of 24% and moderately dilated RV.  There was no evidence of iron overload and on delayed enhancement, there was a focal area of hyper-enhancement seen in inferior septum at RV insertion point, sometimes seen in dilated cardiomyopathy.        Earlier this year, patient was admitted at [facility] in end of February with acute decompensated congestive heart failure.  It looks like she underwent about 30 pounds of diuresis.  Her discharge weight was 228 pounds.  It was felt that the reason for her decompensation was probably under diuresis on an outpatient basis.  She also has device interrogation at that time that showed a few episodes of NSVT, otherwise no shocks were reported.  She also had an echocardiogram at that time that showed LVEF of 40% with mild to moderate increase in LV size.  RV was mildly enlarged with borderline reduced RV systolic function, moderately enlarged left atrium.  No significant valvular disease was seen.  LV end-diastolic dimension was measured at 5.9 cm.        The patient is today  coming to clinic to establish cardiology care.  In the past, she has been seen at [facility].  The patient tells me that it has been several months, if not more than a year that she has seen a cardiologist last time.  She tells me that she is under a lot of social stress as she is going through a divorce and her ex- has sold her home pacemaker interrogation device.  She lives in [place] but is planning to move to [place].        Her weight today is 229 pounds- she take 60 mg of Lasix every morning.  Additionally, she is on 25 mg daily of Toprol-XL, 10 mg b.i.d. lisinopril and 25 mg daily of spironolactone.  I had a BMP from end of March that shows creatinine of 1.23, which looks like a stable creatinine for her.  It was around 1.25 at discharge from hospitalization.  Potassium was 4.5.        The patient tells me that she does not do any dedicated exercise, but she is up on her feet. She also has a 9-month-old dog and takes it for walks and with none of the physical activity she has noticed chest discomfort.  If she exerts hard, she does get short of breath which is stable and nonprogressive.  No orthopnea, no PND.  She does acknowledge that she has not been careful with salt intake, although she does not add salt per se to her food but she consumes canned foods.  She does not have a weighing scale.        Unfortunately, she has a longstanding smoking history since she was age 16 and she used to smoke 1-2 packs per day, but is now down to 5-10 cigarettes and very emphatically told me right away that she is not at all interested in quitting.  She has also been diagnosed with sleep apnea but tells me that she feels very claustrophobic with the facial mask and is not on any treatment for that.  No alcohol consumption.  She does use marijuana.  There is no family history of known cardiomyopathy in first-degree relatives.      PHYSICAL EXAMINATION:   VITAL SIGNS:  Blood pressure 120/86, heart rate 80  and regular, weight 229 pounds, BMI 33.8.   GENERAL:  The patient appears pleasant, comfortable.   NECK:  Normal JVP, no bruit.   CARDIOVASCULAR SYSTEM:  S1, S2 normal, no murmur, rub or gallop.   RESPIRATORY SYSTEM:  Decreased bilateral air entry, no crackles.   ABDOMEN:  Soft, nontender.   EXTREMITIES:  No pitting pedal edema.   NEUROLOGICAL:  Alert, oriented x3.   PSYCHIATRIC:  Normal affect.   SKIN:  No obvious rash.   HEENT:  No pallor or icterus.      EKG done in the clinic was personally reviewed by me shows sinus rhythm with PVCs and septal Q-waves.      IMPRESSION AND PLAN:    A very delightful 51-year-old female with nonischemic cardiomyopathy with LVEF around 19% back in 2015, now improved to 40%.  Initially LV was severely dilated, but now with mildly dilated with borderline reduced RV systolic function now, but initially was severely reduced.  Other comorbidities of hypertension, COPD, tobacco abuse, obstructive sleep apnea, not on treatment, obesity.     Overall, cardiovascular status-wise patient appears compensated and euvolemic.  She is on appropriate cardiomyopathy medication of long-acting beta blocker, Toprol-XL, lisinopril, spironolactone and Lasix.  I recommend that we can increase Toprol-XL to 50 mg daily.     I had a long discussion with the patient regarding the importance of lifestyle changes and dietary changes, especially watching salt intake and not to consume more than 2 grams of sodium a day.  I also recommended her to have a weighing scale and weigh herself daily and if she notices more than 3 pounds of weight gain within a day to take an extra dose of 40 mg of Lasix that day.  I also recommend her to get enrolled in our Device Clinic.     I also talked at length about the importance of tobacco cessation and after a long discussion and initial reluctance, the patient did agree to try to quit tobacco.  I offered her nicotine replacement therapy with patches, which she declined.  She  had I think tried Chantix in the past and does not want to try it again.  The patient tells me that she will probably try E-cigarettes to help quit tobacco.     At this time, given the LVEF 40% and narrow QRS, I do not think she qualifies or needs CRT.     I am setting up a followup in about a month with an ODIN with the goal of further increase in beta blocker/lisinopril as tolerated.   1.  Nonischemic cardiomyopathy with initial LVEF on cardiac MRI is 19%, now improved to 40%, initially severely enlarged LV, but now mildly enlarged.  Clinically appears euvolemic and well compensated, NYHA class II, stage C.   2.  Tobacco abuse, unfortunately ongoing.   3.  COPD.   4.  Obstructive sleep apnea, not on treatment.   5.  Obesity.   6.  Hypertension.   7.  Chronic kidney disease, stage III.      RECOMMENDATIONS:   1.  Increase Toprol-XL to 50 mg daily.   2.  Continue lisinopril 10 mg b.i.d., spironolactone, Lasix.   3.  Salt restriction, daily weights.   4.  Enroll in Device Clinic.   5.  Tobacco cessation.   6.  Follow up with ODIN in 1 month with BMP.      Fifty-five minutes of total time was spent with more than 50% counseling and coordination of care.         TONY KAY MD             D: 2017 15:22   T: 2017 13:29   MT: LISSET      Name:     DAVID WRIGHT   MRN:      [id number]        Account:      [id number]   :      1965           Service Date: 2017      Document: [id number]

## 2017-05-16 DIAGNOSIS — Z95.0 CARDIAC PACEMAKER IN SITU: Primary | ICD-10-CM

## 2017-05-16 RX ORDER — AMOXICILLIN 500 MG/1
2000 CAPSULE ORAL ONCE
Qty: 4 CAPSULE | Refills: 0 | Status: SHIPPED | OUTPATIENT
Start: 2017-05-16 | End: 2017-05-16

## 2017-05-16 NOTE — TELEPHONE ENCOUNTER
Patient calls.  Has a dental cleaning scheduled for tomorrow morning-states she may have a tooth pulled-asking to fill amoxicillin.    Has seen Fuentes Perez in  once in March.  Not prescribed by [facility] primary care provider in the past, unable to do RN protocol.    Huddled with Dr. Ballard due to time of day, ok to send prescription based on chart review,  Med sent.    Flavia Hall, RN  Message handled by Nurse Triage.

## 2017-05-19 ENCOUNTER — TELEPHONE (OUTPATIENT)
Dept: PEDIATRICS | Facility: CLINIC | Age: 52
End: 2017-05-19

## 2017-05-19 DIAGNOSIS — Z95.0 CARDIAC PACEMAKER IN SITU: Primary | ICD-10-CM

## 2017-05-19 RX ORDER — AMOXICILLIN 500 MG/1
2000 CAPSULE ORAL ONCE
Qty: 4 CAPSULE | Refills: 1 | Status: SHIPPED | OUTPATIENT
Start: 2017-05-19 | End: 2017-05-19

## 2017-05-19 NOTE — TELEPHONE ENCOUNTER
Patient is calling to ask for 2 additional refills of the Amoxicillin for dental procedures.   She got the first one, but will be needing 2 additional procedures, so needs more.  Please send these in if ok.  (signed by Dr. Ballard this week x 1)  Sue Rojas, RN  Triage Nurse

## 2017-05-19 NOTE — TELEPHONE ENCOUNTER
Pt's family member(male) calling to notify that pt is complaining of chest pain, tightness, severe SOB, lethargic, pale, trouble with breathing. All sx's started about an hour ago. Advised him to call 911 to get an ambulance to take her to ER. He agrees to call 911 right away.    Juani, RN  Triage Nurse

## 2017-06-01 ENCOUNTER — OFFICE VISIT (OUTPATIENT)
Dept: FAMILY MEDICINE | Facility: CLINIC | Age: 52
End: 2017-06-01
Payer: MEDICAID

## 2017-06-01 VITALS
SYSTOLIC BLOOD PRESSURE: 124 MMHG | WEIGHT: 229.8 LBS | TEMPERATURE: 97.5 F | DIASTOLIC BLOOD PRESSURE: 78 MMHG | HEART RATE: 73 BPM | HEIGHT: 69 IN | BODY MASS INDEX: 34.04 KG/M2 | OXYGEN SATURATION: 98 %

## 2017-06-01 DIAGNOSIS — L98.9 SKIN LESION: ICD-10-CM

## 2017-06-01 DIAGNOSIS — J44.9 CHRONIC OBSTRUCTIVE PULMONARY DISEASE, UNSPECIFIED COPD TYPE (H): Primary | ICD-10-CM

## 2017-06-01 DIAGNOSIS — Z12.11 SCREENING FOR COLON CANCER: ICD-10-CM

## 2017-06-01 DIAGNOSIS — L98.9 CRACKING SKIN: ICD-10-CM

## 2017-06-01 DIAGNOSIS — I10 ESSENTIAL HYPERTENSION: ICD-10-CM

## 2017-06-01 DIAGNOSIS — I50.9 ACUTE CONGESTIVE HEART FAILURE, UNSPECIFIED CONGESTIVE HEART FAILURE TYPE: ICD-10-CM

## 2017-06-01 PROCEDURE — 99214 OFFICE O/P EST MOD 30 MIN: CPT | Performed by: PHYSICIAN ASSISTANT

## 2017-06-01 RX ORDER — LISINOPRIL 10 MG/1
10 TABLET ORAL 2 TIMES DAILY
Qty: 180 TABLET | Refills: 1 | Status: SHIPPED | OUTPATIENT
Start: 2017-06-01 | End: 2017-11-15

## 2017-06-01 RX ORDER — ALBUTEROL SULFATE 90 UG/1
2 AEROSOL, METERED RESPIRATORY (INHALATION) EVERY 4 HOURS PRN
Qty: 1 INHALER | Refills: 2 | Status: SHIPPED | OUTPATIENT
Start: 2017-06-01 | End: 2018-01-25

## 2017-06-01 RX ORDER — TRIAMCINOLONE ACETONIDE 1 MG/ML
LOTION TOPICAL 2 TIMES DAILY
Qty: 60 ML | Refills: 1 | Status: SHIPPED | OUTPATIENT
Start: 2017-06-01 | End: 2018-01-25

## 2017-06-01 RX ORDER — FUROSEMIDE 20 MG
40 TABLET ORAL EVERY MORNING
Qty: 90 TABLET | Refills: 1 | Status: SHIPPED | OUTPATIENT
Start: 2017-06-01 | End: 2017-11-15

## 2017-06-01 RX ORDER — SPIRONOLACTONE 25 MG/1
25 TABLET ORAL EVERY MORNING
Qty: 90 TABLET | Refills: 1 | Status: SHIPPED | OUTPATIENT
Start: 2017-06-01 | End: 2017-11-15

## 2017-06-01 NOTE — PROGRESS NOTES
"  SUBJECTIVE:                                                    Gloria Guzman is a 51 year old female who presents to clinic today for the following health issues:    Medication Followup of  Albuterol, triamcinolone, lisinopril, furosemide spironolactone    Taking Medication as prescribed: yes    Side Effects:  None    Medication Helping Symptoms:  yes     -Patient presents for update/refills of medications  -Since her last visit she has seen cardiology, has an appt with [facility]   -Unfortunately she continues to smoke, but otherwise feels ok   -sometimes feels like she is \"overloaded with water, can be out of  breath\"  -She had her toprol increased to two tabs daily - feeling more fatigued since then  -She takes albuterol to aide in her breathing   -this is effective for short periods  -She has weighed herself daily; no gross fluctuation in weight  -Is trying to watch her salt intake   -she admits this has been difficult for her   -diet previously was quite subpar    -she has been unable to follow up with colonoscopy or mammogram, has not contacted sleep center    -she is also concerned about a lesion noted to her left frontal hairline, just over the left ear. She notes this lesion has gotten larger, is itchy and more bothersome    Problem list and histories reviewed & adjusted, as indicated.  Additional history: as documented    Patient Active Problem List   Diagnosis     Acute congestive heart failure (H)     Bipolar affective disorder (H)     Chronic obstructive pulmonary disease (H)     Hypertension     Obstructive sleep apnea syndrome     Arthralgia of shoulder     Injury of kidney     Cardiac pacemaker in situ     Automatic implantable cardioverter-defibrillator in situ     Blood glucose elevated     Past Surgical History:   Procedure Laterality Date     BACK SURGERY        SECTION       GALLBLADDER SURGERY       HERNIA REPAIR       JOINT REPLACEMTN, KNEE RT/LT  11/10    Joint Replacement knee " "LT     SURGICAL PATHOLOGY EXAM       TONSILLECTOMY         Social History   Substance Use Topics     Smoking status: Current Some Day Smoker     Smokeless tobacco: Never Used     Alcohol use No     Family History   Problem Relation Age of Onset     Breast Cancer Maternal Grandmother      Breast Cancer Maternal Aunt      CANCER Father 42     lung      CANCER Maternal Grandfather      lung      CANCER Other      maternal cousin lung      Gallbladder Disease Mother      Gallbladder Disease Sister            Reviewed and updated as needed this visit by clinical staff  Tobacco  Allergies  Med Hx  Surg Hx  Fam Hx  Soc Hx      Reviewed and updated as needed this visit by Provider         ROS:  Constitutional, HEENT, cardiovascular, pulmonary, gi and gu systems are negative, except as otherwise noted.    OBJECTIVE:                                                    /78  Pulse 73  Temp 97.5  F (36.4  C) (Oral)  Ht 5' 9\" (1.753 m)  Wt 229 lb 12.8 oz (104.2 kg)  SpO2 98%  BMI 33.94 kg/m2  Body mass index is 33.94 kg/(m^2).  GENERAL: healthy, alert and no distress  RESP: lungs clear to auscultation - no rales, rhonchi or wheezes  CV: regular rates and rhythm, normal S1 S2, no S3 or S4 and no murmur, click or rub  MS: no peripheral edema  PSYCH: affect normal/bright  SKIN: there is a larger, macular lesion of multi-brown coloration over the fronto-temporal (left) aspect of her scalp; along the heels are areas of dry, cracked and peeling skin    Diagnostic Test Results:  none      ASSESSMENT/PLAN:                                                    1. Cracking skin  Refilling. Productive so far  - triamcinolone (KENALOG) 0.1 % lotion; Apply topically 2 times daily  Dispense: 60 mL; Refill: 1    2. Acute congestive heart failure, unspecified congestive heart failure type (H)  To continue with cardiology/[facility]. She has been working on salt intake, a challenge for her. Smoking is as below. Her weight has been stable " and she hasnt needed increased dosing of lasix. For now, she has had some symptoms with the increased toprol but I expect these to improve over time with adherence. Continue present management. And follow up with [facility] later this month.  - spironolactone (ALDACTONE) 25 MG tablet; Take 1 tablet (25 mg) by mouth every morning Reported on 3/27/2017  Dispense: 90 tablet; Refill: 1  - furosemide (LASIX) 20 MG tablet; Take 2 tablets (40 mg) by mouth every morning Reported on 3/27/2017  Dispense: 90 tablet; Refill: 1    3. Essential hypertension  Controlled today. Continue present management.   - lisinopril (PRINIVIL/ZESTRIL) 10 MG tablet; Take 1 tablet (10 mg) by mouth 2 times daily Reported on 3/27/2017  Dispense: 180 tablet; Refill: 1    4. Chronic obstructive pulmonary disease, unspecified COPD type (H)  Refilling rescue - recommended we start getting her on some type of daily inhalation medication. She is hesitant today. We also reviewed the desperate need for smoking cessation. She is going to try e-cig and cold tky vs nicotine replacement.   - albuterol (PROAIR HFA/PROVENTIL HFA/VENTOLIN HFA) 108 (90 BASE) MCG/ACT Inhaler; Inhale 2 puffs into the lungs every 4 hours as needed for shortness of breath / dyspnea or wheezing Reported on 3/27/2017  Dispense: 1 Inhaler; Refill: 2    5. Screening for colon cancer  - GASTROENTEROLOGY ADULT REF PROCEDURE ONLY    6. Skin lesion  Given appearance, location and reported size change will have her follow up closely with derm  - DERMATOLOGY REFERRAL    Hugo Wells PA-C  [facility]

## 2017-06-01 NOTE — MR AVS SNAPSHOT
After Visit Summary   6/1/2017    Gloria Guzman    MRN: [id number]           Patient Information     Date Of Birth          1965        Visit Information        Provider Department      6/1/2017 1:00 PM Hugo Wells PA-C [facility]        Today's Diagnoses     Chronic obstructive pulmonary disease, unspecified COPD type (H)    -  1    Cracking skin        Acute congestive heart failure, unspecified congestive heart failure type (H)        Essential hypertension        Screening for colon cancer          Care Instructions    To schedule your mammogram at any of the [place] locations, call [phone].    [facility] [phone] (Age 18 and up)          Follow-ups after your visit        Additional Services     GASTROENTEROLOGY ADULT REF PROCEDURE ONLY       Last Lab Result: Creatinine (mg/dL)       Date                     Value                 03/27/2017               1.23 (H)         ----------  Body mass index is 33.94 kg/(m^2).     Needed:  No  Language:  English    Patient will be contacted to schedule procedure.     Please be aware that coverage of these services is subject to the terms and limitations of your health insurance plan.  Call member services at your health plan with any benefit or coverage questions.  Any procedures must be performed at a [place] facility OR coordinated by your clinic's referral office.    Please bring the following with you to your appointment:    (1) Any X-Rays, CTs or MRIs which have been performed.  Contact the facility where they were done to arrange for  prior to your scheduled appointment.    (2) List of current medications   (3) This referral request   (4) Any documents/labs given to you for this referral                  Your next 10 appointments already scheduled     Jun 07, 2017  9:20 AM CDT   LAB with [facility]   [facility] [place] ([facility]  "Clinics)    [address] [place], [place] [address]  [facility] [address] [phone]           Patient must bring picture ID.  Patient should be prepared to give a urine specimen  Please do not eat 10-12 hours before your appointment if you are coming in fasting for labs on lipids, cholesterol, or glucose (sugar).  Pregnant women should follow their Care Team instructions. Water with medications is okay. Do not drink coffee or other fluids.   If you have concerns about taking  your medications, please ask at office or if scheduling via Drill Cycle, send a message by clicking on Secure Messaging, Message Your Care Team.            Jun 07, 2017 10:10 AM CDT   [facility] Enrollment with ELLI Greer CNP   [facility] AT [place] ([facility] Clinics)    [address] [place] [id number] [phone]              Who to contact     If you have questions or need follow up information about today's clinic visit or your schedule please contact [facility] directly at [phone].  Normal or non-critical lab and imaging results will be communicated to you by Good [employer], letter or phone within 4 business days after the clinic has received the results. If you do not hear from us within 7 days, please contact the clinic through [employer] or phone. If you have a critical or abnormal lab result, we will notify you by phone as soon as possible.  Submit refill requests through Dr[employer] Cycle or call your pharmacy and they will forward the refill request to us. Please allow 3 business days for your refill to be completed.          Additional Information About Your Visit        Drill Cycle Information     Drill Cycle lets you send messages to your doctor, view your test results, renew your prescriptions, schedule appointments and more. To sign up, go to [email or web address] . Click on \"Log in\" on the left side of the screen, which will take you to the Welcome page. Then click on " "\"Sign up Now\" on the right side of the page.     You will be asked to enter the access code listed below, as well as some personal information. Please follow the directions to create your username and password.     Your access code is: [email or web address]  Expires: 2017  3:35 PM     Your access code will  in 90 days. If you need help or a new code, please call your [place] clinic or [phone].        Care EveryWhere ID     This is your Care EveryWhere ID. This could be used by other organizations to access your [place] medical records  [id number]        Your Vitals Were     Pulse Temperature Height Pulse Oximetry BMI (Body Mass Index)       73 97.5  F (36.4  C) (Oral) 5' 9\" (1.753 m) 98% 33.94 kg/m2        Blood Pressure from Last 3 Encounters:   17 124/78   17 120/86   17 120/76    Weight from Last 3 Encounters:   17 229 lb 12.8 oz (104.2 kg)   17 229 lb (103.9 kg)   17 231 lb 1.6 oz (104.8 kg)              We Performed the Following     GASTROENTEROLOGY ADULT REF PROCEDURE ONLY          Today's Medication Changes          These changes are accurate as of: 17  1:42 PM.  If you have any questions, ask your nurse or doctor.               These medicines have changed or have updated prescriptions.        Dose/Directions    triamcinolone 0.1 % lotion   Commonly known as:  KENALOG   This may have changed:  additional instructions   Used for:  Cracking skin   Changed by:  Hugo Wells PA-C        Apply topically 2 times daily   Quantity:  60 mL   Refills:  1            Where to get your medicines      These medications were sent to [facility] - [address]  [address] [address] [address]     Phone:  [phone]     albuterol 108 (90 BASE) MCG/ACT Inhaler    furosemide 20 MG tablet    lisinopril 10 MG tablet    spironolactone 25 MG tablet    triamcinolone 0.1 % lotion                Primary Care Provider    " Physician No Ref-Primary       No address on file        Thank you!     Thank you for choosing [facility]  for your care. Our goal is always to provide you with excellent care. Hearing back from our patients is one way we can continue to improve our services. Please take a few minutes to complete the written survey that you may receive in the mail after your visit with us. Thank you!             Your Updated Medication List - Protect others around you: Learn how to safely use, store and throw away your medicines at www.disposemymeds.org.          This list is accurate as of: 6/1/17  1:42 PM.  Always use your most recent med list.                   Brand Name Dispense Instructions for use    albuterol 108 (90 BASE) MCG/ACT Inhaler    PROAIR HFA/PROVENTIL HFA/VENTOLIN HFA    1 Inhaler    Inhale 2 puffs into the lungs every 4 hours as needed for shortness of breath / dyspnea or wheezing Reported on 3/27/2017       furosemide 20 MG tablet    LASIX    90 tablet    Take 2 tablets (40 mg) by mouth every morning Reported on 3/27/2017       IBUPROFEN PO      Take 800 mg by mouth as needed for moderate pain Reported on 3/27/2017       klonoPIN 0.5 MG tablet   Generic drug:  clonazePAM      Take 0.5 mg by mouth 2 times daily as needed for anxiety       lisinopril 10 MG tablet    PRINIVIL/ZESTRIL    180 tablet    Take 1 tablet (10 mg) by mouth 2 times daily Reported on 3/27/2017       metoprolol 50 MG 24 hr tablet    TOPROL-XL    90 tablet    Take 1 tablet (50 mg) by mouth daily       oxyCODONE 15 MG IR tablet    ROXICODONE     Take by mouth every 4 hours as needed Reported on 3/27/2017       spironolactone 25 MG tablet    ALDACTONE    90 tablet    Take 1 tablet (25 mg) by mouth every morning Reported on 3/27/2017       triamcinolone 0.1 % lotion    KENALOG    60 mL    Apply topically 2 times daily

## 2017-06-01 NOTE — NURSING NOTE
"Chief Complaint   Patient presents with     Recheck Medication       Initial /78  Pulse 73  Temp 97.5  F (36.4  C) (Oral)  Ht 5' 9\" (1.753 m)  Wt 229 lb 12.8 oz (104.2 kg)  SpO2 98%  BMI 33.94 kg/m2 Estimated body mass index is 33.94 kg/(m^2) as calculated from the following:    Height as of this encounter: 5' 9\" (1.753 m).    Weight as of this encounter: 229 lb 12.8 oz (104.2 kg).  Medication Reconciliation: complete   Liborio Elkins CMA        "

## 2017-06-01 NOTE — PATIENT INSTRUCTIONS
To schedule your mammogram at any of the [place] locations, call [phone].    [facility] [phone] (Age 18 and up)

## 2017-06-01 NOTE — LETTER
[facility]  [address] [address]  Phone: [phone]    June 1, 2017        Gloria Guzman  [address] [address]          To whom it may concern:    RE: Gloria Guzman    Patient was seen and treated today at our clinic. She has been placed on a severe salt restriction diet by her cardiologist (no more than 2g) daily. She also needs to limit packaged/canned foods and stick to heart healthy meals including high in fruits and vegetables. She is also in need of follow up with the cardiology [facility], needs to complete a colonoscopy, needs to get a Pap smear done and needs to have an appointment with the sleep study to investigate sleep apnea.    Please contact me for questions or concerns.      Sincerely,        Hugo Wells PA-C

## 2017-06-10 ENCOUNTER — HEALTH MAINTENANCE LETTER (OUTPATIENT)
Age: 52
End: 2017-06-10

## 2017-07-10 DIAGNOSIS — J44.9 CHRONIC OBSTRUCTIVE PULMONARY DISEASE, UNSPECIFIED COPD TYPE (H): ICD-10-CM

## 2017-07-11 NOTE — TELEPHONE ENCOUNTER
albuterol (PROAIR HFA/PROVENTIL HFA/VENTOLIN HFA) 108 (90 BASE) MCG/ACT Inhaler       Last Written Prescription Date: 6/1/17  Last Fill Quantity: 1, # refills: 2    Last Office Visit with G, P or [facility] prescribing provider:  6/1/17   Future Office Visit:       Date of Last Asthma Action Plan Letter:   There are no preventive care reminders to display for this patient.   Asthma Control Test: No flowsheet data found.    Date of Last Spirometry Test:   No results found for this or any previous visit.

## 2017-07-13 RX ORDER — ALBUTEROL SULFATE 90 UG/1
AEROSOL, METERED RESPIRATORY (INHALATION)
Qty: 8.5 G | Refills: 0 | OUTPATIENT
Start: 2017-07-13

## 2017-07-13 NOTE — TELEPHONE ENCOUNTER
"[facility] but this refill request is from [facility]. [facility], notified by them that pt picked up 1 inhaler from them then requested to transfer rx to [facility]. So, [facility] transferred the rest of the 2 inhalers to [facility] in [place] on 06/21/17.     Called [facility], they did receive the transferred rx for 2 inhalers on 6/21, but that rx \"got closed\" in pt's chart, unable to use that rx. Pharmacist couldn't tell who closed the rx for what reason. So, provided verbal order for 1 inhaler with 1 extra refill to pharmacist.     Notes from 06/01/17:  Chronic obstructive pulmonary disease, unspecified COPD type (H)  Refilling rescue - recommended we start getting her on some type of daily inhalation medication. She is hesitant today. We also reviewed the desperate need for smoking cessation. She is going to try e-cig and cold tky vs nicotine replacement.   - albuterol (PROAIR HFA/PROVENTIL HFA/VENTOLIN HFA) 108 (90 BASE) MCG/ACT Inhaler; Inhale 2 puffs into the lungs every 4 hours as needed for shortness of breath / dyspnea or wheezing Reported on 3/27/2017  Dispense: 1 Inhaler; Refill: 2    Juani RN  Triage Nurse          "

## 2017-08-08 DIAGNOSIS — I50.9 ACUTE CONGESTIVE HEART FAILURE, UNSPECIFIED CONGESTIVE HEART FAILURE TYPE: ICD-10-CM

## 2017-08-08 DIAGNOSIS — I10 ESSENTIAL HYPERTENSION: ICD-10-CM

## 2017-08-08 NOTE — TELEPHONE ENCOUNTER
Written 6/1/17    Disp Refills Start End LUIS ENRIQUE   lisinopril (PRINIVIL/ZESTRIL) 10 MG tablet 180 tablet 1 6/1/2017          furosemide (LASIX) 20 MG tablet      Last Written Prescription Date: 6/1/17  Last Fill Quantity: 90, # refills: 1  Last Office Visit with G, [facility] or [facility] prescribing provider: 6/1/17       Potassium   Date Value Ref Range Status   03/27/2017 4.5 3.4 - 5.3 mmol/L Final     Creatinine   Date Value Ref Range Status   03/27/2017 1.23 (H) 0.52 - 1.04 mg/dL Final     BP Readings from Last 3 Encounters:   06/01/17 124/78   05/11/17 120/86   03/27/17 120/76

## 2017-08-09 RX ORDER — LISINOPRIL 10 MG/1
TABLET ORAL
Qty: 60 TABLET | Refills: 0 | OUTPATIENT
Start: 2017-08-09

## 2017-08-09 RX ORDER — FUROSEMIDE 20 MG
TABLET ORAL
Qty: 90 TABLET | Refills: 0 | OUTPATIENT
Start: 2017-08-09

## 2017-08-11 ENCOUNTER — DOCUMENTATION ONLY (OUTPATIENT)
Dept: FAMILY MEDICINE | Facility: CLINIC | Age: 52
End: 2017-08-11

## 2017-08-11 NOTE — PROGRESS NOTES
Panel Management Review      Patient has the following on her problem list: None      Composite cancer screening  Chart review shows that this patient is due/due soon for the following Mammogram and Colonoscopy  Summary:    Patient is due/failing the following:   COLONOSCOPY and MAMMOGRAM    Action needed:   Patient needs office visit for Mammo/Colon.    Type of outreach:    Sent [employer] message.    Questions for provider review:    None                                                                                                                                    Liborio Elkins [facility]       Chart routed to Care Team .

## 2017-08-15 ENCOUNTER — DOCUMENTATION ONLY (OUTPATIENT)
Dept: CARDIOLOGY | Facility: CLINIC | Age: 52
End: 2017-08-15

## 2017-08-16 ENCOUNTER — TRANSFERRED RECORDS (OUTPATIENT)
Dept: HEALTH INFORMATION MANAGEMENT | Facility: CLINIC | Age: 52
End: 2017-08-16

## 2017-08-16 LAB
CHOLEST SERPL-MCNC: 156 MG/DL (ref 100–199)
HDLC SERPL-MCNC: 42 MG/DL
LDLC SERPL CALC-MCNC: 98 MG/DL
NONHDLC SERPL-MCNC: 114 MG/DL
TRIGL SERPL-MCNC: 80 MG/DL

## 2017-09-25 ENCOUNTER — TRANSFERRED RECORDS (OUTPATIENT)
Dept: HEALTH INFORMATION MANAGEMENT | Facility: CLINIC | Age: 52
End: 2017-09-25

## 2017-10-09 ENCOUNTER — TELEPHONE (OUTPATIENT)
Dept: CARDIOLOGY | Facility: CLINIC | Age: 52
End: 2017-10-09

## 2017-10-09 NOTE — TELEPHONE ENCOUNTER
[facility] is [facility]. Has an ICD/ is scheduled for OV with Alexandra Leslie CNP/CORE on 10-10-17, lab; device check scheduled for 10-12-17   Nurse states pt complained about a shocking sensation and is wondering if this is an emergent situation. She is doing well soon after.  She is going to court today. As long as she is doing well with no further episodes (unable to determine if it was a CV from device), she is OK. If this repeats, recommended they call 911.  OV for device check changed to 10-10-17 @ 4490 to establish in the device clinic. SueLangenbrunnerRN

## 2017-11-15 ENCOUNTER — OFFICE VISIT (OUTPATIENT)
Dept: FAMILY MEDICINE | Facility: CLINIC | Age: 52
End: 2017-11-15
Payer: MEDICAID

## 2017-11-15 VITALS
WEIGHT: 242.5 LBS | DIASTOLIC BLOOD PRESSURE: 100 MMHG | SYSTOLIC BLOOD PRESSURE: 168 MMHG | OXYGEN SATURATION: 100 % | BODY MASS INDEX: 35.81 KG/M2 | TEMPERATURE: 97.9 F | HEART RATE: 76 BPM

## 2017-11-15 DIAGNOSIS — I10 ESSENTIAL HYPERTENSION: ICD-10-CM

## 2017-11-15 DIAGNOSIS — I50.9 ACUTE CONGESTIVE HEART FAILURE, UNSPECIFIED CONGESTIVE HEART FAILURE TYPE: ICD-10-CM

## 2017-11-15 DIAGNOSIS — I50.22 CHRONIC SYSTOLIC CONGESTIVE HEART FAILURE (H): ICD-10-CM

## 2017-11-15 PROCEDURE — 93000 ELECTROCARDIOGRAM COMPLETE: CPT | Performed by: NURSE PRACTITIONER

## 2017-11-15 PROCEDURE — 99214 OFFICE O/P EST MOD 30 MIN: CPT | Performed by: NURSE PRACTITIONER

## 2017-11-15 RX ORDER — FUROSEMIDE 20 MG
40 TABLET ORAL EVERY MORNING
Qty: 60 TABLET | Refills: 0 | Status: SHIPPED | OUTPATIENT
Start: 2017-11-15 | End: 2018-01-25

## 2017-11-15 RX ORDER — LISINOPRIL 10 MG/1
10 TABLET ORAL 2 TIMES DAILY
Qty: 180 TABLET | Refills: 1 | Status: CANCELLED | OUTPATIENT
Start: 2017-11-15

## 2017-11-15 RX ORDER — SPIRONOLACTONE 25 MG/1
25 TABLET ORAL EVERY MORNING
Qty: 30 TABLET | Refills: 0 | Status: SHIPPED | OUTPATIENT
Start: 2017-11-15 | End: 2018-01-25

## 2017-11-15 RX ORDER — METOPROLOL SUCCINATE 50 MG/1
50 TABLET, EXTENDED RELEASE ORAL DAILY
Qty: 90 TABLET | Refills: 3 | Status: CANCELLED | OUTPATIENT
Start: 2017-11-15

## 2017-11-15 RX ORDER — METOPROLOL SUCCINATE 50 MG/1
50 TABLET, EXTENDED RELEASE ORAL DAILY
Qty: 30 TABLET | Refills: 0 | Status: SHIPPED | OUTPATIENT
Start: 2017-11-15 | End: 2018-01-25

## 2017-11-15 RX ORDER — FUROSEMIDE 20 MG
40 TABLET ORAL EVERY MORNING
Qty: 90 TABLET | Refills: 1 | Status: CANCELLED | OUTPATIENT
Start: 2017-11-15

## 2017-11-15 RX ORDER — LISINOPRIL 10 MG/1
10 TABLET ORAL 2 TIMES DAILY
Qty: 60 TABLET | Refills: 0 | Status: SHIPPED | OUTPATIENT
Start: 2017-11-15 | End: 2018-01-25

## 2017-11-15 NOTE — PROGRESS NOTES
"  SUBJECTIVE:   Gloria Guzman is a 51 year old female who presents to clinic today for the following health issues:      Medication Followup of all medication    Taking Medication as prescribed: Hasn't had medication for a few weeks    Side Effects:  None    Medication Helping Symptoms:  yes     Pt presents with requests for medication refills.  She has been out of medications for the past 3 weeks and symptomatic for the past 1 week.  She has a hx of nonischemic cardiomyopathy dx about 3 years ago, status post ICD.  She has had many social stressors which she reports have kept her from refilling her medications or seeking follow up.  She was last seen by cardiology in May of this year.  She was stable on her medications at that time.  She was seen at an outside hospital in September of this year for unrelated symptoms.  She reports sob today which she attributes to fluid overload after running out of her medications.  She denies any chest pain or palpitations.  She was not following a low salt diet, eating fast food.  She now has a home and feels she can start seeking regular care.  She did not think she needed to go to the ED today and refuses to be evaluated there.  She \"knows her body\" and if she needed to go she would've gone.    Problem list and histories reviewed & adjusted, as indicated.  Additional history: as documented    Patient Active Problem List   Diagnosis     Bipolar affective disorder (H)     Chronic obstructive pulmonary disease (H)     Hypertension     Obstructive sleep apnea syndrome     Arthralgia of shoulder     Injury of kidney     Cardiac pacemaker in situ     Automatic implantable cardioverter-defibrillator in situ     Blood glucose elevated     Nonischemic cardiomyopathy (H)     Chronic systolic CHF (congestive heart failure) (H)     Past Surgical History:   Procedure Laterality Date     BACK SURGERY        SECTION       GALLBLADDER SURGERY       HERNIA REPAIR       JOINT " REPLACEMTN, KNEE RT/LT  11/10    Joint Replacement knee LT     SURGICAL PATHOLOGY EXAM       TONSILLECTOMY         Social History   Substance Use Topics     Smoking status: Current Some Day Smoker     Smokeless tobacco: Never Used     Alcohol use No     Family History   Problem Relation Age of Onset     Breast Cancer Maternal Grandmother      Breast Cancer Maternal Aunt      CANCER Father 42     lung      CANCER Maternal Grandfather      lung      CANCER Other      maternal cousin lung      Gallbladder Disease Mother      Gallbladder Disease Sister              Reviewed and updated as needed this visit by clinical staff     Reviewed and updated as needed this visit by Provider         ROS:  SEE HPI.    OBJECTIVE:     BP (!) 168/100  Pulse 76  Temp 97.9  F (36.6  C) (Oral)  Wt 242 lb 8 oz (110 kg)  SpO2 100%  BMI 35.81 kg/m2  Body mass index is 35.81 kg/(m^2).  GENERAL: healthy, alert and no distress  NECK: no adenopathy, no asymmetry, masses, or scars and thyroid normal to palpation  RESP: lungs clear to auscultation - no rales, rhonchi or wheezes  CV: regular rate and rhythm, normal S1 S2, no S3 or S4, no murmur, click or rub, no peripheral edema and peripheral pulses strong  ABDOMEN: soft, nontender, no hepatosplenomegaly, no masses and bowel sounds normal  PSYCH: mentation appears normal, affect normal/bright    Diagnostic Test Results:  none     ASSESSMENT/PLAN:   1. Acute congestive heart failure, unspecified congestive heart failure type (H)  51 y.o. Female, hx of CHF, currently untreated due to social constraints.  I discussed with the pt in depth the risks of refusing treatment and eval in the ED where labs and other eval can be immediately reviewed.  She repeatedly refused to be seen in the ED.  I did talk with Dr. Coles, who did agree she was a good candidate for ED evaluation if it appeared she was symptomatic.    I explained this to her as well.  In the end, she verbalized understanding of the risks.   She agreed to do labs today, but even with repeated reminders, did not stop to have her lab work completed.  We did schedule her for follow up on Friday with her PCP for recheck as well.  She was agreeable to this.    She also stated that if she should note any worsening of symptoms, she would seek care in the ED.  - EKG 12-lead complete w/read - Clinics  - spironolactone (ALDACTONE) 25 MG tablet; Take 1 tablet (25 mg) by mouth every morning Reported on 3/27/2017  Dispense: 30 tablet; Refill: 0  - furosemide (LASIX) 20 MG tablet; Take 2 tablets (40 mg) by mouth every morning Reported on 3/27/2017  Dispense: 60 tablet; Refill: 0  - Comprehensive metabolic panel (BMP + Alb, Alk Phos, ALT, AST, Total. Bili, TP); Future    2. Essential hypertension  Poorly controlled today without medications.  Refilled now- of note, these refills were done assuming she would have a stat BMP completed this afternoon, which she did not.  I will try to reach her tomorrow to return for labs, but believe transportation will be an issue.  - lisinopril (PRINIVIL/ZESTRIL) 10 MG tablet; Take 1 tablet (10 mg) by mouth 2 times daily Reported on 3/27/2017  Dispense: 60 tablet; Refill: 0  - Comprehensive metabolic panel (BMP + Alb, Alk Phos, ALT, AST, Total. Bili, TP); Future    3. Chronic systolic congestive heart failure (H)  See above for current symptoms.  Previously stable on meds.    - metoprolol (TOPROL-XL) 50 MG 24 hr tablet; Take 1 tablet (50 mg) by mouth daily  Dispense: 30 tablet; Refill: 0  - Comprehensive metabolic panel (BMP + Alb, Alk Phos, ALT, AST, Total. Bili, TP); Future    ELLI Purdy Ra [facility]

## 2017-11-15 NOTE — MR AVS SNAPSHOT
After Visit Summary   11/15/2017    Gloria Guzman    MRN: [id number]           Patient Information     Date Of Birth          1965        Visit Information        Provider Department      11/15/2017 4:00 PM Jen Mcdermott Ra, APRN CNP [facility]        Today's Diagnoses     Acute congestive heart failure, unspecified congestive heart failure type (H)        Essential hypertension        Chronic systolic congestive heart failure (H)          Care Instructions    I have restarted your medications for you.  Remember, there is risk with this and I still recommend you be seen in the emergency room for labs and initial treatment.  Fuentes will see you back in 2 days for a recheck.  If symptoms change or worsen please call 9-1-1.          Follow-ups after your visit        Who to contact     If you have questions or need follow up information about today's clinic visit or your schedule please contact [facility] directly at [phone].  Normal or non-critical lab and imaging results will be communicated to you by [employer], letter or phone within 4 business days after the clinic has received the results. If you do not hear from us within 7 days, please contact the clinic through [employer] or phone. If you have a critical or abnormal lab result, we will notify you by phone as soon as possible.  Submit refill requests through [employer] or call your pharmacy and they will forward the refill request to us. Please allow 3 business days for your refill to be completed.          Additional Information About Your Visit        MyChart Information     [employer] gives you secure access to your electronic health record. If you see a primary care provider, you can also send messages to your care team and make appointments. If you have questions, please call your primary care clinic.  If you do not have a primary care provider, please call [phone] and they will assist you.        Care  EveryWhere ID     This is your Care EveryWhere ID. This could be used by other organizations to access your [place] medical records  [id number]        Your Vitals Were     Pulse Temperature Pulse Oximetry BMI (Body Mass Index)          76 97.9  F (36.6  C) (Oral) 100% 35.81 kg/m2         Blood Pressure from Last 3 Encounters:   11/15/17 (!) 168/100   06/01/17 124/78   05/11/17 120/86    Weight from Last 3 Encounters:   11/15/17 242 lb 8 oz (110 kg)   06/01/17 229 lb 12.8 oz (104.2 kg)   05/11/17 229 lb (103.9 kg)              We Performed the Following     Comprehensive metabolic panel (BMP + Alb, Alk Phos, ALT, AST, Total. Bili, TP)     EKG 12-lead complete w/read - Clinics          Where to get your medicines      [facility] [address] [place] [id number]    Hours:  24-hours Phone:  [phone]     furosemide 20 MG tablet    lisinopril 10 MG tablet    metoprolol 50 MG 24 hr tablet    spironolactone 25 MG tablet          Primary Care Provider Office Phone # Fax #    Hugo Wells PA-C [phone] [address] [facility] [address]        Equal Access to Services     [facility] FarmRICKY AH: Hadii aad ku hadasho Soomaali, waaxda luqadaha, qaybta kaalmada adeegyada, ashley villalba . So [facility] [phone].    ATENCIÓN: Si habla español, tiene a samano disposición servicios gratuitos de asistencia lingüística. Enrico al [phone].    We comply with applicable federal civil rights laws and [place] laws. We do not discriminate on the basis of race, color, national origin, age, disability, sex, sexual orientation, or gender identity.            Thank you!     Thank you for choosing [facility]  for your care. Our goal is always to provide you with excellent care. Hearing back from our patients is one way we can continue to  improve our services. Please take a few minutes to complete the written survey that you may receive in the mail after your visit with us. Thank you!             Your Updated Medication List - Protect others around you: Learn how to safely use, store and throw away your medicines at www.disposemymeds.org.          This list is accurate as of: 11/15/17  4:58 PM.  Always use your most recent med list.                   Brand Name Dispense Instructions for use Diagnosis    albuterol 108 (90 BASE) MCG/ACT Inhaler    PROAIR HFA/PROVENTIL HFA/VENTOLIN HFA    1 Inhaler    Inhale 2 puffs into the lungs every 4 hours as needed for shortness of breath / dyspnea or wheezing Reported on 3/27/2017    Chronic obstructive pulmonary disease, unspecified COPD type (H)       furosemide 20 MG tablet    LASIX    60 tablet    Take 2 tablets (40 mg) by mouth every morning Reported on 3/27/2017    Acute congestive heart failure, unspecified congestive heart failure type (H)       IBUPROFEN PO      Take 800 mg by mouth as needed for moderate pain Reported on 3/27/2017        klonoPIN 0.5 MG tablet   Generic drug:  clonazePAM      Take 0.5 mg by mouth 2 times daily as needed for anxiety        lisinopril 10 MG tablet    PRINIVIL/ZESTRIL    60 tablet    Take 1 tablet (10 mg) by mouth 2 times daily Reported on 3/27/2017    Essential hypertension       metoprolol 50 MG 24 hr tablet    TOPROL-XL    30 tablet    Take 1 tablet (50 mg) by mouth daily    Chronic systolic congestive heart failure (H)       oxyCODONE IR 15 MG tablet    ROXICODONE     Take by mouth every 4 hours as needed Reported on 3/27/2017        spironolactone 25 MG tablet    ALDACTONE    30 tablet    Take 1 tablet (25 mg) by mouth every morning Reported on 3/27/2017    Acute congestive heart failure, unspecified congestive heart failure type (H)       triamcinolone 0.1 % lotion    KENALOG    60 mL    Apply topically 2 times daily    Cracking skin

## 2017-11-15 NOTE — PATIENT INSTRUCTIONS
I have restarted your medications for you.  Remember, there is risk with this and I still recommend you be seen in the emergency room for labs and initial treatment.  Fuentes will see you back in 2 days for a recheck.  If symptoms change or worsen please call 9-1-1.

## 2017-11-15 NOTE — NURSING NOTE
"Chief Complaint   Patient presents with     Recheck Medication       Initial BP (!) 168/100  Pulse 76  Temp 97.9  F (36.6  C) (Oral)  Wt 242 lb 8 oz (110 kg)  SpO2 100%  BMI 35.81 kg/m2 Estimated body mass index is 35.81 kg/(m^2) as calculated from the following:    Height as of 6/1/17: 5' 9\" (1.753 m).    Weight as of this encounter: 242 lb 8 oz (110 kg).  Medication Reconciliation: complete   Jimena JAMES M.A.      "

## 2018-01-16 ENCOUNTER — TELEPHONE (OUTPATIENT)
Dept: FAMILY MEDICINE | Facility: CLINIC | Age: 53
End: 2018-01-16

## 2018-01-16 NOTE — TELEPHONE ENCOUNTER
"1/16/2018      Patient Communication Preferences indicate  Do not contact  and/or communication by \"Phone\" is not preferred. Call not required per Outreach team.          Outreach ,  Skyler Lozano    "

## 2018-01-24 NOTE — PROGRESS NOTES
"  SUBJECTIVE:   Gloria Guzman is a 52 year old female who presents to clinic today for the following health issues:    Chief Complaint   Patient presents with     [facility] Care     Health Maintenance     Mammo, Colon Cancer Screening, Tetanus, Lipid, CBC, Flu Shot, PAP, Hep C Screening, BMP, HF Action Plan      Letter Request     to get out of shelter       Patient presents to establish care and receive medication refills.  Patient was recently put out of her home by her ex  and would like to go into a shelter.  She has a h/o CHF, COPD, HTN, possibly depression.  Ex  has discarded all of her medications.    Problem list and histories reviewed & adjusted, as indicated.  Additional history: as documented    BP Readings from Last 3 Encounters:   01/25/18 (!) 146/96   11/15/17 (!) 168/100   06/01/17 124/78    Wt Readings from Last 3 Encounters:   01/25/18 242 lb 9.6 oz (110 kg)   11/15/17 242 lb 8 oz (110 kg)   06/01/17 229 lb 12.8 oz (104.2 kg)        Reviewed and updated as needed this visit by clinical staff  Tobacco  Allergies  Meds  Problems  Soc Hx      Reviewed and updated as needed this visit by Provider  Allergies  Meds  Problems         ROS:  Constitutional, HEENT, cardiovascular, pulmonary, gi and gu systems are negative, except as otherwise noted.    OBJECTIVE:     BP (!) 146/96  Pulse 89  Temp 97.4  F (36.3  C) (Oral)  Ht 5' 7.01\" (1.702 m)  Wt 242 lb 9.6 oz (110 kg)  SpO2 99%  BMI 37.99 kg/m2  Body mass index is 37.99 kg/(m^2).  GENERAL: healthy, alert and no distress  EYES: Eyes grossly normal to inspection, PERRL and conjunctivae and sclerae normal  HENT: ear canals and TM's normal, nose and mouth without ulcers or lesions  NECK: no adenopathy, no asymmetry, masses, or scars and thyroid normal to palpation  RESP: lungs clear to auscultation - no rales, rhonchi or wheezes  CV: regular rate and rhythm, normal S1 S2, no S3 or S4, no murmur, click or rub, no peripheral edema and " peripheral pulses strong  MS: no gross musculoskeletal defects noted, no edema  SKIN: no suspicious lesions or rashes  NEURO: Normal strength and tone, mentation intact and speech normal  PSYCH: mentation appears normal, affect normal/bright    ASSESSMENT/PLAN:     1. Acute congestive heart failure, unspecified congestive heart failure type (H)  - furosemide (LASIX) 20 MG tablet; Take 2 tablets (40 mg) by mouth every morning Reported on 3/27/2017  Dispense: 60 tablet; Refill: 3  - spironolactone (ALDACTONE) 25 MG tablet; Take 1 tablet (25 mg) by mouth every morning Reported on 3/27/2017  Dispense: 30 tablet; Refill: 3    2. Chronic obstructive pulmonary disease, unspecified COPD type (H)  - albuterol (PROAIR HFA/PROVENTIL HFA/VENTOLIN HFA) 108 (90 BASE) MCG/ACT Inhaler; Inhale 2 puffs into the lungs every 4 hours as needed for shortness of breath / dyspnea or wheezing Reported on 3/27/2017  Dispense: 1 Inhaler; Refill: 11    3. Chronic systolic congestive heart failure (H)  - metoprolol succinate (TOPROL-XL) 50 MG 24 hr tablet; Take 1 tablet (50 mg) by mouth daily  Dispense: 30 tablet; Refill: 3    4. Cracking skin  - triamcinolone (KENALOG) 0.1 % lotion; Apply topically 2 times daily  Dispense: 60 mL; Refill: 1    5. Essential hypertension  - lisinopril (PRINIVIL/ZESTRIL) 10 MG tablet; Take 1 tablet (10 mg) by mouth 2 times daily Reported on 3/27/2017  Dispense: 60 tablet; Refill: 3    6. Chronic bilateral low back pain, with sciatica presence unspecified  - ibuprofen (ADVIL/MOTRIN) 800 MG tablet; Take 1 tablet (800 mg) by mouth every 8 hours as needed for moderate pain  Dispense: 90 tablet; Refill: 1    7. Homeless  - CARE COORDINATION REFERRAL  - MENTAL HEALTH REFERRAL  - Adult; Outpatient Treatment; Individual/Couples/Family/Group Therapy/Health Psychology; [facility]: [facility] [phone]; We will contact you to schedule the appointment or please call with any questions    8. Stress  - MENTAL  HEALTH REFERRAL  - Adult; Outpatient Treatment; Individual/Couples/Family/Group Therapy/Health Psychology; G: [facility] [phone]; We will contact you to schedule the appointment or please call with any questions    Follow-up in 1 week for physical    Hudson Melendez MD  [facility]

## 2018-01-25 ENCOUNTER — OFFICE VISIT (OUTPATIENT)
Dept: FAMILY MEDICINE | Facility: CLINIC | Age: 53
End: 2018-01-25
Payer: MEDICAID

## 2018-01-25 VITALS
WEIGHT: 242.6 LBS | OXYGEN SATURATION: 99 % | DIASTOLIC BLOOD PRESSURE: 96 MMHG | HEIGHT: 67 IN | HEART RATE: 89 BPM | TEMPERATURE: 97.4 F | BODY MASS INDEX: 38.08 KG/M2 | SYSTOLIC BLOOD PRESSURE: 146 MMHG

## 2018-01-25 DIAGNOSIS — L98.9 CRACKING SKIN: ICD-10-CM

## 2018-01-25 DIAGNOSIS — I10 ESSENTIAL HYPERTENSION: ICD-10-CM

## 2018-01-25 DIAGNOSIS — J44.9 CHRONIC OBSTRUCTIVE PULMONARY DISEASE, UNSPECIFIED COPD TYPE (H): ICD-10-CM

## 2018-01-25 DIAGNOSIS — M54.5 CHRONIC BILATERAL LOW BACK PAIN, WITH SCIATICA PRESENCE UNSPECIFIED: Primary | ICD-10-CM

## 2018-01-25 DIAGNOSIS — F43.9 STRESS: ICD-10-CM

## 2018-01-25 DIAGNOSIS — Z59.00 HOMELESS: ICD-10-CM

## 2018-01-25 DIAGNOSIS — I50.9 ACUTE CONGESTIVE HEART FAILURE, UNSPECIFIED CONGESTIVE HEART FAILURE TYPE: ICD-10-CM

## 2018-01-25 DIAGNOSIS — I50.22 CHRONIC SYSTOLIC CONGESTIVE HEART FAILURE (H): ICD-10-CM

## 2018-01-25 DIAGNOSIS — G89.29 CHRONIC BILATERAL LOW BACK PAIN, WITH SCIATICA PRESENCE UNSPECIFIED: Primary | ICD-10-CM

## 2018-01-25 PROCEDURE — 99213 OFFICE O/P EST LOW 20 MIN: CPT | Performed by: FAMILY MEDICINE

## 2018-01-25 RX ORDER — LISINOPRIL 10 MG/1
10 TABLET ORAL 2 TIMES DAILY
Qty: 60 TABLET | Refills: 3 | Status: SHIPPED | OUTPATIENT
Start: 2018-01-25 | End: 2018-12-16

## 2018-01-25 RX ORDER — ALBUTEROL SULFATE 90 UG/1
2 AEROSOL, METERED RESPIRATORY (INHALATION) EVERY 4 HOURS PRN
Qty: 1 INHALER | Refills: 11 | Status: SHIPPED | OUTPATIENT
Start: 2018-01-25 | End: 2018-06-20

## 2018-01-25 RX ORDER — TRIAMCINOLONE ACETONIDE 1 MG/ML
LOTION TOPICAL 2 TIMES DAILY
Qty: 60 ML | Refills: 1 | Status: SHIPPED | OUTPATIENT
Start: 2018-01-25 | End: 2018-12-16

## 2018-01-25 RX ORDER — SPIRONOLACTONE 25 MG/1
25 TABLET ORAL EVERY MORNING
Qty: 30 TABLET | Refills: 3 | Status: SHIPPED | OUTPATIENT
Start: 2018-01-25 | End: 2018-12-16

## 2018-01-25 RX ORDER — METOPROLOL SUCCINATE 50 MG/1
50 TABLET, EXTENDED RELEASE ORAL DAILY
Qty: 30 TABLET | Refills: 3 | Status: SHIPPED | OUTPATIENT
Start: 2018-01-25 | End: 2018-12-16

## 2018-01-25 RX ORDER — IBUPROFEN 800 MG/1
800 TABLET, FILM COATED ORAL EVERY 8 HOURS PRN
Qty: 90 TABLET | Refills: 1 | Status: SHIPPED | OUTPATIENT
Start: 2018-01-25 | End: 2019-02-02

## 2018-01-25 RX ORDER — FUROSEMIDE 20 MG
40 TABLET ORAL EVERY MORNING
Qty: 60 TABLET | Refills: 3 | Status: SHIPPED | OUTPATIENT
Start: 2018-01-25 | End: 2018-12-16

## 2018-01-25 SDOH — ECONOMIC STABILITY - HOUSING INSECURITY: HOMELESSNESS UNSPECIFIED: Z59.00

## 2018-01-25 NOTE — PATIENT INSTRUCTIONS
[facility]    If you have any questions regarding to your visit please contact your care team:       Team Purple:   Clinic Hours Telephone Number   Dr. Velma Melendez   7am-7pm  Monday - Thursday   7am-5pm  Fridays  [phone]  (Appointment scheduling available 24/7)    Questions about your Visit?   Team Line:  [phone]   Urgent Care - [place] and [facility] - 11am-9pm Monday-Friday Saturday-Sunday- 9am-5pm   [place] - 5pm-9pm Monday-Friday Saturday-Sunday- 9am-5pm  [phone] - [facility]  [phone] - [place]       What options do I have for visits at the clinic other than the traditional office visit?  To expand how we care for you, many of our providers are utilizing electronic visits (e-visits) and telephone visits, when medically appropriate, for interactions with their patients rather than a visit in the clinic.   We also offer nurse visits for many medical concerns. Just like any other service, we will bill your insurance company for this type of visit based on time spent on the phone with your provider. Not all insurance companies cover these visits. Please check with your medical insurance if this type of visit is covered. You will be responsible for any charges that are not paid by your insurance.      E-visits via [employer]:  generally incur a $35.00 fee.  Telephone visits:  Time spent on the phone: *charged based on time that is spent on the phone in increments of 10 minutes. Estimated cost:   5-10 mins $30.00   11-20 mins. $59.00   21-30 mins. $85.00     Use [employer] (secure email communication and access to your chart) to send your primary care provider a message or make an appointment. Ask someone on your Team how to sign up for [employer].  For a Price Quote for your services, please call our Consumer Price Line at [phone].  As always, Thank you for trusting us with your health care  needs!    Marjorie Paredes MA

## 2018-01-25 NOTE — MR AVS SNAPSHOT
After Visit Summary   1/25/2018    Gloria Guzman    MRN: [id number]           Patient Information     Date Of Birth          1965        Visit Information        Provider Department      1/25/2018 11:00 AM Hudson Melendez MD [facility]        Today's Diagnoses     Chronic bilateral low back pain, with sciatica presence unspecified    -  1    Acute congestive heart failure, unspecified congestive heart failure type (H)        Chronic obstructive pulmonary disease, unspecified COPD type (H)        Chronic systolic congestive heart failure (H)        Cracking skin        Essential hypertension        Homeless        Stress          Care Instructions    [facility] [place]    If you have any questions regarding to your visit please contact your care team:       Team Purple:   Clinic Hours Telephone Number   Dr. Velma Melendez   7am-7pm  Monday - Thursday   7am-5pm  Fridays  [phone]  (Appointment scheduling available 24/7)    Questions about your Visit?   Team Line:  [phone]   Urgent Care - [place] and [place] - 11am-9pm Monday-Friday Saturday-Sunday- 9am-5pm   [place] - 5pm-9pm Monday-Friday Saturday-Sunday- 9am-5pm  [phone] - [place]   [phone] - [place]       What options do I have for visits at the clinic other than the traditional office visit?  To expand how we care for you, many of our providers are utilizing electronic visits (e-visits) and telephone visits, when medically appropriate, for interactions with their patients rather than a visit in the clinic.   We also offer nurse visits for many medical concerns. Just like any other service, we will bill your insurance company for this type of visit based on time spent on the phone with your provider. Not all insurance companies cover these visits. Please check with your medical insurance if this type of  visit is covered. You will be responsible for any charges that are not paid by your insurance.      E-visits via Leixirhart:  generally incur a $35.00 fee.  Telephone visits:  Time spent on the phone: *charged based on time that is spent on the phone in increments of 10 minutes. Estimated cost:   5-10 mins $30.00   11-20 mins. $59.00   21-30 mins. $85.00     Use Leixirhart (secure email communication and access to your chart) to send your primary care provider a message or make an appointment. Ask someone on your Team how to sign up for GonnaBe.  For a Price Quote for your services, please call our popexpert Line at [phone].  As always, Thank you for trusting us with your health care needs!    Marjorie Paredes MA            Follow-ups after your visit        Additional Services     CARE COORDINATION REFERRAL       Services are provided by a Care Coordinator for people with complex needs such as: medical, social, or financial troubles.  The Care Coordinator works with the patient and their Primary Care Provider to determine health goals, obtain resources, achieve outcomes, and develop care plans that help coordinate the patient's care.     Reason for Referral: Financial Support: Housing and Medication Affordability  and Resources for Transportation    Additional pertinent details:  Patient is recently homeless 2/2 divorce and needs assistance with transportation and housing.    Clinical Staff have discussed the Care Coordination Referral with the patient and/or caregiver: yes            MENTAL HEALTH REFERRAL  - Adult; Outpatient Treatment; Individual/Couples/Family/Group Therapy/Health Psychology; [facility]: [facility] [phone]; We will contact you to schedule the appointment or please call with any questions       All scheduling is subject to the client's specific insurance plan & benefits, provider/location availability, and provider clinical specialities.  Please arrive 15 minutes early for  "your first appointment and bring your completed paperwork.    Please be aware that coverage of these services is subject to the terms and limitations of your health insurance plan.  Call member services at your health plan with any benefit or coverage questions.                            Follow-up notes from your care team     Return in about 1 week (around 2/1/2018) for Physical Exam.      Who to contact     If you have questions or need follow up information about today's clinic visit or your schedule please contact [facility] directly at [phone].  Normal or non-critical lab and imaging results will be communicated to you by PlayHavenhart, letter or phone within 4 business days after the clinic has received the results. If you do not hear from us within 7 days, please contact the clinic through Bilims or phone. If you have a critical or abnormal lab result, we will notify you by phone as soon as possible.  Submit refill requests through Bilims or call your pharmacy and they will forward the refill request to us. Please allow 3 business days for your refill to be completed.          Additional Information About Your Visit        PlayHavenhart Information     Bilims gives you secure access to your electronic health record. If you see a primary care provider, you can also send messages to your care team and make appointments. If you have questions, please call your primary care clinic.  If you do not have a primary care provider, please call [phone] and they will assist you.        Care EveryWhere ID     This is your Care EveryWhere ID. This could be used by other organizations to access your [facility] medical records  [id number]        Your Vitals Were     Pulse Temperature Height Pulse Oximetry BMI (Body Mass Index)       89 97.4  F (36.3  C) (Oral) 5' 7.01\" (1.702 m) 99% 37.99 kg/m2        Blood Pressure from Last 3 Encounters:   01/25/18 (!) 146/96   11/15/17 (!) 168/100   06/01/17 124/78    " Weight from Last 3 Encounters:   01/25/18 242 lb 9.6 oz (110 kg)   11/15/17 242 lb 8 oz (110 kg)   06/01/17 229 lb 12.8 oz (104.2 kg)              We Performed the Following     CARE COORDINATION REFERRAL     MENTAL HEALTH REFERRAL  - Adult; Outpatient Treatment; Individual/Couples/Family/Group Therapy/Health Psychology; [facility]: [facility] [phone]; We will contact you to schedule the appointment or please call with any questions          Today's Medication Changes          These changes are accurate as of 1/25/18 12:08 PM.  If you have any questions, ask your nurse or doctor.               These medicines have changed or have updated prescriptions.        Dose/Directions    ibuprofen 800 MG tablet   Commonly known as:  ADVIL/MOTRIN   This may have changed:    - medication strength  - when to take this  - additional instructions   Used for:  Chronic bilateral low back pain, with sciatica presence unspecified   Changed by:  Hudson Hogan MD        Dose:  800 mg   Take 1 tablet (800 mg) by mouth every 8 hours as needed for moderate pain   Quantity:  90 tablet   Refills:  1            Where to get your medicines      These medications were sent to [facility] - [address]  [address], [place] [address]     Phone:  [phone]     albuterol 108 (90 BASE) MCG/ACT Inhaler    furosemide 20 MG tablet    ibuprofen 800 MG tablet    lisinopril 10 MG tablet    metoprolol succinate 50 MG 24 hr tablet    spironolactone 25 MG tablet    triamcinolone 0.1 % lotion                Primary Care Provider Office Phone # Fax #    Hugo Wells PA-C [phone] [address] [address]        Equal Access to Services     BEN WORKMAN : Hadii janet Durham, waaxda lualena, qaybta kaalmaxuan davidson, ashley parmar. So [facility] [phone].    ATENCIÓN: Cain ha  español, tiene a samano disposición servicios gratuitos de asistencia lingüística. Enrico mosley [phone].    We comply with applicable federal civil rights laws and [place] laws. We do not discriminate on the basis of race, color, national origin, age, disability, sex, sexual orientation, or gender identity.            Thank you!     Thank you for choosing [facility]  for your care. Our goal is always to provide you with excellent care. Hearing back from our patients is one way we can continue to improve our services. Please take a few minutes to complete the written survey that you may receive in the mail after your visit with us. Thank you!             Your Updated Medication List - Protect others around you: Learn how to safely use, store and throw away your medicines at www.disposemymeds.org.          This list is accurate as of 1/25/18 12:08 PM.  Always use your most recent med list.                   Brand Name Dispense Instructions for use Diagnosis    albuterol 108 (90 BASE) MCG/ACT Inhaler    PROAIR HFA/PROVENTIL HFA/VENTOLIN HFA    1 Inhaler    Inhale 2 puffs into the lungs every 4 hours as needed for shortness of breath / dyspnea or wheezing Reported on 3/27/2017    Chronic obstructive pulmonary disease, unspecified COPD type (H)       furosemide 20 MG tablet    LASIX    60 tablet    Take 2 tablets (40 mg) by mouth every morning Reported on 3/27/2017    Acute congestive heart failure, unspecified congestive heart failure type (H)       ibuprofen 800 MG tablet    ADVIL/MOTRIN    90 tablet    Take 1 tablet (800 mg) by mouth every 8 hours as needed for moderate pain    Chronic bilateral low back pain, with sciatica presence unspecified       klonoPIN 0.5 MG tablet   Generic drug:  clonazePAM      Take 0.5 mg by mouth 2 times daily as needed for anxiety        lisinopril 10 MG tablet    PRINIVIL/ZESTRIL    60 tablet    Take 1 tablet (10 mg) by mouth 2 times daily Reported on 3/27/2017    Essential  hypertension       metoprolol succinate 50 MG 24 hr tablet    TOPROL-XL    30 tablet    Take 1 tablet (50 mg) by mouth daily    Chronic systolic congestive heart failure (H)       oxyCODONE IR 15 MG tablet    ROXICODONE     Take by mouth every 4 hours as needed Reported on 3/27/2017        spironolactone 25 MG tablet    ALDACTONE    30 tablet    Take 1 tablet (25 mg) by mouth every morning Reported on 3/27/2017    Acute congestive heart failure, unspecified congestive heart failure type (H)       triamcinolone 0.1 % lotion    KENALOG    60 mL    Apply topically 2 times daily    Cracking skin

## 2018-01-30 ENCOUNTER — CARE COORDINATION (OUTPATIENT)
Dept: CARE COORDINATION | Facility: CLINIC | Age: 53
End: 2018-01-30

## 2018-01-30 NOTE — PROGRESS NOTES
Clinic Care Coordination Contact 1/30/18  [facility]/[place]-    Referral Source: PCP  Clinical Data: Care Coordinator Outreach to assist the pt with financial/housing/transportation resources  Outreach attempted x 1.  Left message on voicemail with call back information and requested return call.  Plan:  Care Coordinator will try to reach patient again in 3-5 business days.    I did send the pt a mychart message introducing myself and services.     Radha Johnston [facility]  Care Coordinator Social Work    [facility] [place]  [phone]  1/30/2018 9:23 AM

## 2018-02-07 ENCOUNTER — TELEPHONE (OUTPATIENT)
Dept: FAMILY MEDICINE | Facility: CLINIC | Age: 53
End: 2018-02-07

## 2018-02-07 DIAGNOSIS — Z29.89 NEED FOR SBE (SUBACUTE BACTERIAL ENDOCARDITIS) PROPHYLAXIS: Primary | ICD-10-CM

## 2018-02-07 NOTE — TELEPHONE ENCOUNTER
FYI: Patient has OV with Dr. Melendez tomorrow.     Patient is requesting antibiotic medication for dental appointments.     Please advise  Caitlyn Shields RN

## 2018-02-07 NOTE — TELEPHONE ENCOUNTER
Reason for Call:  Other prescription    Detailed comments: Patient states years ago she had knee surgery done and is requesting an antibiotic prior to 3 upcoming dental appointments. She would like a total of 6 pills for all 3 visits. Please call [phone] and ok to give verbal message to who answer's this line.     Phone Number Patient can be reached at: Other phone number: [phone]    Best Time: any    Can we leave a detailed message on this number? YES    Call taken on 2/7/2018 at 11:43 AM by Alice Morse

## 2018-02-08 ENCOUNTER — TRANSFERRED RECORDS (OUTPATIENT)
Dept: HEALTH INFORMATION MANAGEMENT | Facility: CLINIC | Age: 53
End: 2018-02-08

## 2018-02-08 RX ORDER — AMOXICILLIN 500 MG/1
2000 CAPSULE ORAL ONCE
Qty: 4 CAPSULE | Refills: 0 | Status: SHIPPED | OUTPATIENT
Start: 2018-02-08 | End: 2019-02-02

## 2018-02-08 NOTE — TELEPHONE ENCOUNTER
Patient requesting antibiotic prescription for dental visit today (she is at now possibly). last prescribed by Dr. Ballard for Cardiac pacemaker in situ on 5/19/17, per patient she was taking for knee surgery she had years ago.   Please advise on refill.   Nidhi Wall RN

## 2018-02-08 NOTE — TELEPHONE ENCOUNTER
Patient calling. The appointment was not cancelled. She has a dental appointment. Please call in asap. Her appointment is in an hour.

## 2018-02-08 NOTE — TELEPHONE ENCOUNTER
Reason for call:  Medication   If this is a refill request, has the caller requested the refill from the pharmacy already? Yes  Will the patient be using a [facility]? No  Name of the pharmacy and phone number for the current request: [facility] [address] [place], [place] - [address] AT [facility] [address]    Name of the medication requested: antibiotics for dental work see previous messages.    Other request: ASAP    Phone number to reach patient:  Cell number on file:    Telephone Information:   Mobile [phone]       Best Time:  ASAP    Can we leave a detailed message on this number?  YES

## 2018-02-12 NOTE — TELEPHONE ENCOUNTER
Detailed message left for patient with providers message as written.  Advised to call RN hotline with questions.   iNdhi Wall RN

## 2018-02-13 ENCOUNTER — CARE COORDINATION (OUTPATIENT)
Dept: CARE COORDINATION | Facility: CLINIC | Age: 53
End: 2018-02-13

## 2018-02-13 NOTE — PROGRESS NOTES
Clinic Care Coordination Contact 2/13/18  Care Team Conversations-SW    Received a phone call from the pt this am who states she is going through a divorce and is at risk of being homeless, she does not have transportation, no insurance, and is not employed as she is on SSDI. Pt states she has had MA in the past and for some reason she no longer has it. I encouraged her to contact [facility] [place] to inquire what the status of her insurance is.    Pt agrees to call this writer once she has connected with [facility].    Radha Johnston, [facility]  Care Coordinator Social Work    [facility] [place]  [phone]  2/13/2018 11:35 AM

## 2018-02-16 ENCOUNTER — OFFICE VISIT (OUTPATIENT)
Dept: FAMILY MEDICINE | Facility: CLINIC | Age: 53
End: 2018-02-16
Payer: MEDICAID

## 2018-02-16 VITALS
HEART RATE: 70 BPM | WEIGHT: 245 LBS | SYSTOLIC BLOOD PRESSURE: 132 MMHG | DIASTOLIC BLOOD PRESSURE: 86 MMHG | OXYGEN SATURATION: 97 % | BODY MASS INDEX: 39.37 KG/M2 | TEMPERATURE: 98.6 F | HEIGHT: 66 IN

## 2018-02-16 DIAGNOSIS — F31.9 BIPOLAR AFFECTIVE DISORDER, REMISSION STATUS UNSPECIFIED (H): ICD-10-CM

## 2018-02-16 DIAGNOSIS — Z12.31 VISIT FOR SCREENING MAMMOGRAM: ICD-10-CM

## 2018-02-16 DIAGNOSIS — Z12.4 SCREENING FOR MALIGNANT NEOPLASM OF CERVIX: ICD-10-CM

## 2018-02-16 DIAGNOSIS — Z11.59 NEED FOR HEPATITIS C SCREENING TEST: ICD-10-CM

## 2018-02-16 DIAGNOSIS — Z95.810 AUTOMATIC IMPLANTABLE CARDIOVERTER-DEFIBRILLATOR IN SITU: ICD-10-CM

## 2018-02-16 DIAGNOSIS — Z12.11 SCREEN FOR COLON CANCER: ICD-10-CM

## 2018-02-16 DIAGNOSIS — Z00.00 ROUTINE HISTORY AND PHYSICAL EXAMINATION OF ADULT: Primary | ICD-10-CM

## 2018-02-16 DIAGNOSIS — Z23 NEED FOR PROPHYLACTIC VACCINATION WITH TETANUS-DIPHTHERIA (TD): ICD-10-CM

## 2018-02-16 DIAGNOSIS — G89.29 CHRONIC BILATERAL LOW BACK PAIN, WITH SCIATICA PRESENCE UNSPECIFIED: ICD-10-CM

## 2018-02-16 DIAGNOSIS — M54.5 CHRONIC BILATERAL LOW BACK PAIN, WITH SCIATICA PRESENCE UNSPECIFIED: ICD-10-CM

## 2018-02-16 DIAGNOSIS — Z23 NEED FOR PROPHYLACTIC VACCINATION AND INOCULATION AGAINST INFLUENZA: ICD-10-CM

## 2018-02-16 DIAGNOSIS — Z95.0 CARDIAC PACEMAKER IN SITU: ICD-10-CM

## 2018-02-16 DIAGNOSIS — J44.9 CHRONIC OBSTRUCTIVE PULMONARY DISEASE, UNSPECIFIED COPD TYPE (H): ICD-10-CM

## 2018-02-16 PROCEDURE — 99396 PREV VISIT EST AGE 40-64: CPT | Performed by: FAMILY MEDICINE

## 2018-02-16 ASSESSMENT — PAIN SCALES - GENERAL: PAINLEVEL: EXTREME PAIN (9)

## 2018-02-16 NOTE — PATIENT INSTRUCTIONS
[facility]    If you have any questions regarding to your visit please contact your care team:       Team Purple:   Clinic Hours Telephone Number   Dr. Velma Melendez   7am-7pm  Monday - Thursday   7am-5pm  Fridays  [phone]  (Appointment scheduling available 24/7)    Questions about your Visit?   Team Line:  [phone]   Urgent Care - [place] and [facility] - 11am-9pm Monday-Friday Saturday-Sunday- 9am-5pm   [place] - 5pm-9pm Monday-Friday Saturday-Sunday- 9am-5pm  [phone] - [facility]  [phone] - [place]       What options do I have for visits at the clinic other than the traditional office visit?  To expand how we care for you, many of our providers are utilizing electronic visits (e-visits) and telephone visits, when medically appropriate, for interactions with their patients rather than a visit in the clinic.   We also offer nurse visits for many medical concerns. Just like any other service, we will bill your insurance company for this type of visit based on time spent on the phone with your provider. Not all insurance companies cover these visits. Please check with your medical insurance if this type of visit is covered. You will be responsible for any charges that are not paid by your insurance.      E-visits via Fundability:  generally incur a $35.00 fee.  Telephone visits:  Time spent on the phone: *charged based on time that is spent on the phone in increments of 10 minutes. Estimated cost:   5-10 mins $30.00   11-20 mins. $59.00   21-30 mins. $85.00     Use ideaTree - innovate | mentor | investhart (secure email communication and access to your chart) to send your primary care provider a message or make an appointment. Ask someone on your Team how to sign up for Fundability.  For a Price Quote for your services, please call our Consumer Price Line at [phone].  As always, Thank you for trusting us with your health care  needs!    Marjorie Paredes MA

## 2018-02-16 NOTE — Clinical Note
Please abstract the following data from this visit with this patient into the appropriate field in Epic:  Lipid panel completed 08/16/17 in care everywhere please abstract.

## 2018-02-16 NOTE — PROGRESS NOTES
SUBJECTIVE:   CC: Gloria Guzman is an 52 year old woman with Bipolar disorder, Cardiac defibrillator and Obesity who presents for preventive health visit.     Physical   Annual:     Getting at least 3 servings of Calcium per day::  NO    Bi-annual eye exam::  NO    Dental care twice a year::  NO    Sleep apnea or symptoms of sleep apnea::  Sleep apnea    Duration of exercise::  Less than 15 minutes    Taking medications regularly::  No    Additional concerns today::  YES            Concerns:   1. Chronic Back Pain: she is following with the Pain Clinic. She says still has pain and not able to do any exercise   2. She says no insurance and is expecting to be up in a few weeks and is paying cash today.    Smoking:   Has cut down from 1PPD to 1 pack every 2 weeks    Today's PHQ-2 Score:   PHQ-2 ( 1999 Pfizer) 2/16/2018   Q1: Little interest or pleasure in doing things 0   Q2: Feeling down, depressed or hopeless 1   PHQ-2 Score 1   Q1: Little interest or pleasure in doing things Not at all   Q2: Feeling down, depressed or hopeless Several days   PHQ-2 Score 1     Abuse: Current or Past(Physical, Sexual or Emotional)- No  Do you feel safe in your environment - Yes    Social History   Substance Use Topics     Smoking status: Current Some Day Smoker     Smokeless tobacco: Never Used     Alcohol use No     Alcohol Use 2/16/2018   If you drink alcohol, do you typically have greater than 3 drinks per day OR greater than 7 drinks per week?   No       Reviewed orders with patient.  Reviewed health maintenance and updated orders accordingly - Yes  Patient Active Problem List   Diagnosis     Bipolar affective disorder (H)     Chronic obstructive pulmonary disease (H)     Hypertension     Obstructive sleep apnea syndrome     Arthralgia of shoulder     Injury of kidney     Cardiac pacemaker in situ     Automatic implantable cardioverter-defibrillator in situ     Blood glucose elevated     Nonischemic cardiomyopathy (H)      "Chronic systolic CHF (congestive heart failure) (H)     Past Surgical History:   Procedure Laterality Date     BACK SURGERY        SECTION       GALLBLADDER SURGERY       HERNIA REPAIR       JOINT REPLACEMTN, KNEE RT/LT  11/10    Joint Replacement knee LT     SURGICAL PATHOLOGY EXAM       TONSILLECTOMY         Social History   Substance Use Topics     Smoking status: Current Some Day Smoker     Smokeless tobacco: Never Used     Alcohol use No     Family History   Problem Relation Age of Onset     Breast Cancer Maternal Grandmother      Breast Cancer Maternal Aunt      CANCER Father 42     lung      CANCER Maternal Grandfather      lung      CANCER Other      maternal cousin lung      Gallbladder Disease Mother      Gallbladder Disease Sister          Patient over age 50, mutual decision to screen reflected in health maintenance.    Pertinent mammograms are reviewed under the imaging tab.  History of abnormal Pap smear: NO - age 30-65 PAP every 5 years with negative HPV co-testing recommended    Reviewed and updated as needed this visit by clinical staff  Tobacco  Allergies  Meds         Review of Systems  C: NEGATIVE for fever, chills, change in weight  I: NEGATIVE for worrisome rashes, moles or lesions  E: NEGATIVE for vision changes or irritation  ENT: NEGATIVE for ear, mouth and throat problems  R: NEGATIVE for significant cough or SOB  B: NEGATIVE for masses, tenderness or discharge  CV: NEGATIVE for chest pain, palpitations or peripheral edema  GI: NEGATIVE for nausea, abdominal pain, heartburn, or change in bowel habits  : NEGATIVE for unusual urinary or vaginal symptoms. No vaginal bleeding.  M: POSITIVE for chronic low back pain.  N: NEGATIVE for weakness, dizziness or paresthesias  P: NEGATIVE for changes in mood or affect      OBJECTIVE:   /86  Pulse 70  Temp 98.6  F (37  C) (Oral)  Ht 5' 6.5\" (1.689 m)  Wt 245 lb (111.1 kg)  SpO2 97%  BMI 38.96 kg/m2  Physical Exam  GENERAL: " healthy, alert and no distress  EYES: Eyes grossly normal to inspection, PERRL and conjunctivae and sclerae normal  HENT: ear canals and TM's normal, nose and mouth without ulcers or lesions  NECK: no adenopathy and thyroid normal to palpation  RESP: lungs clear to auscultation - no rales, rhonchi or wheezes  BREAST: Deferred  CV: regular rate and rhythm, normal S1 S2, no S3 or S4, no murmur, click or rub, no peripheral edema   ABDOMEN: soft, obese, nontender, no masses and bowel sounds normal  MS: no gross musculoskeletal defects noted, legs puffy but no edema or tenderness.  SKIN: no suspicious lesions or rashes  NEURO: Normal strength and tone, mentation intact and speech normal  PSYCH: mentation appears a little erratic , affect normal.    ASSESSMENT/PLAN:   Gloria was seen today for physical and health maintenance.    Diagnoses and all orders for this visit:    Routine history and physical examination of adult        -   Discussed preventative health screening, she will have these completed once her insurance is out but will do blood work today.    Chronic bilateral low back pain, with sciatica presence unspecified       Following with the pain clinic.  -     CBC with platelets differential    BMI 38.0-38.9,adult       -    Counseled to make better food choices, exercise as tolerated, and lose weight.    Automatic implantable cardioverter-defibrillator in situ         Due to heart failure, stable at this time.  -     BASIC METABOLIC PANEL    Bipolar affective disorder, remission status unspecified (H)         -    Following with psychiatry    Chronic obstructive pulmonary disease, unspecified COPD type (H)         -     Using albuterol inhaler  Screen for colon cancer    Cardiac pacemaker in situ        -   For heart failure    Visit for screening mammogram  -     MA SCREENING DIGITAL BILAT - Future  (s+30); Future    Screening for malignant neoplasm of cervix    Need for hepatitis C screening test    Need for  "prophylactic vaccination and inoculation against influenza    Need for prophylactic vaccination with tetanus-diphtheria (TD)    Other orders  -     Cancel: Hepatitis C Screen Reflex to HCV RNA Quant and Genotype  -     Cancel: Lipid panel reflex to direct LDL Fasting    ; Future  -     Cancel: Fecal colorectal cancer screen FIT - Future (S+30); Future      COUNSELING:  Reviewed preventive health counseling, as reflected in patient instructions       Regular exercise       Healthy diet/nutrition       Immunizations    Declined: Influenza due to Concerns about side effects/safety               Colon cancer screening       Consider Hep C screening for patients born between 1945 and 1965         reports that she has been smoking.  She has never used smokeless tobacco.    Estimated body mass index is 38.96 kg/(m^2) as calculated from the following:    Height as of this encounter: 5' 6.5\" (1.689 m).    Weight as of this encounter: 245 lb (111.1 kg).   Weight management plan: Discussed healthy diet and exercise guidelines and patient will follow up in 12 months in clinic to re-evaluate.    Counseling Resources:  ATP IV Guidelines  Pooled Cohorts Equation Calculator  Breast Cancer Risk Calculator  FRAX Risk Assessment  ICSI Preventive Guidelines  Dietary Guidelines for Americans, 2010  USDA's MyPlate  ASA Prophylaxis  Lung CA Screening    Wayne Marie MD  [facility]  "

## 2018-02-16 NOTE — MR AVS SNAPSHOT
After Visit Summary   2/16/2018    Gloria Guzman    MRN: [id number]           Patient Information     Date Of Birth          1965        Visit Information        Provider Department      2/16/2018 11:40 AM Wayne Marie MD [facility]        Today's Diagnoses     Routine history and physical examination of adult    -  1    Chronic bilateral low back pain, with sciatica presence unspecified        BMI 38.0-38.9,adult        Automatic implantable cardioverter-defibrillator in situ        Bipolar affective disorder, remission status unspecified (H)        Chronic obstructive pulmonary disease, unspecified COPD type (H)        Screen for colon cancer        Cardiac pacemaker in situ        Visit for screening mammogram        Screening for malignant neoplasm of cervix        Need for hepatitis C screening test        Need for prophylactic vaccination and inoculation against influenza        Need for prophylactic vaccination with tetanus-diphtheria (TD)          Care Instructions    [facility]    If you have any questions regarding to your visit please contact your care team:       Team Purple:   Clinic Hours Telephone Number   Dr. Velma Melendez   7am-7pm  Monday - Thursday   7am-5pm  Fridays  [phone]  (Appointment scheduling available 24/7)    Questions about your Visit?   Team Line:  [phone]   Urgent Care - [place] - 11am-9pm Monday-Friday Saturday-Sunday- 9am-5pm   [place] - 5pm-9pm Monday-Friday Saturday-Sunday- 9am-5pm  [phone] - [place]   [phone] - [place]       What options do I have for visits at the clinic other than the traditional office visit?  To expand how we care for you, many of our providers are utilizing electronic visits (e-visits) and telephone visits, when medically appropriate, for interactions with their patients rather than  a visit in the clinic.   We also offer nurse visits for many medical concerns. Just like any other service, we will bill your insurance company for this type of visit based on time spent on the phone with your provider. Not all insurance companies cover these visits. Please check with your medical insurance if this type of visit is covered. You will be responsible for any charges that are not paid by your insurance.      E-visits via [employer]:  generally incur a $35.00 fee.  Telephone visits:  Time spent on the phone: *charged based on time that is spent on the phone in increments of 10 minutes. Estimated cost:   5-10 mins $30.00   11-20 mins. $59.00   21-30 mins. $85.00     Use [employer] (secure email communication and access to your chart) to send your primary care provider a message or make an appointment. Ask someone on your Team how to sign up for [employer].  For a Price Quote for your services, please call our Second Decimal Line at [phone].  As always, Thank you for trusting us with your health care needs!    Marjorie Paredes MA            Follow-ups after your visit        Future tests that were ordered for you today     Open Future Orders        Priority Expected Expires Ordered    MA SCREENING DIGITAL BILAT - Future  (s+30) Routine  2/14/2019 2/16/2018            Who to contact     If you have questions or need follow up information about today's clinic visit or your schedule please contact [facility] directly at [phone].  Normal or non-critical lab and imaging results will be communicated to you by [employer], letter or phone within 4 business days after the clinic has received the results. If you do not hear from us within 7 days, please contact the clinic through [employer] or phone. If you have a critical or abnormal lab result, we will notify you by phone as soon as possible.  Submit refill requests through [employer] or call your pharmacy and they will forward the refill request to us.  "Please allow 3 business days for your refill to be completed.          Additional Information About Your Visit        MyChart Information     [employer] gives you secure access to your electronic health record. If you see a primary care provider, you can also send messages to your care team and make appointments. If you have questions, please call your primary care clinic.  If you do not have a primary care provider, please call [phone] and they will assist you.        Care EveryWhere ID     This is your Care EveryWhere ID. This could be used by other organizations to access your [place] medical records  [id number]        Your Vitals Were     Pulse Temperature Height Pulse Oximetry BMI (Body Mass Index)       70 98.6  F (37  C) (Oral) 5' 6.5\" (1.689 m) 97% 38.96 kg/m2        Blood Pressure from Last 3 Encounters:   02/16/18 132/86   01/25/18 (!) 146/96   11/15/17 (!) 168/100    Weight from Last 3 Encounters:   02/16/18 245 lb (111.1 kg)   01/25/18 242 lb 9.6 oz (110 kg)   11/15/17 242 lb 8 oz (110 kg)              We Performed the Following     BASIC METABOLIC PANEL     CBC with platelets differential        Primary Care Provider Office Phone # Fax #    Hugo Wells PA-C [phone] [address] [facility] [address]        Equal Access to Services     BARBARA WORKMAN : Hadii aad ku hadasho Soomaali, waaxda luqadaha, qaybta kaalmada adeegyada, ashley villalba . So [facility] [phone].    ATENCIÓN: Si habla español, tiene a samano disposición servicios gratuitos de asistencia lingüística. Enrico al [phone].    We comply with applicable federal civil rights laws and [place] laws. We do not discriminate on the basis of race, color, national origin, age, disability, sex, sexual orientation, or gender identity.            Thank you!     Thank you for choosing [facility]  for your care. Our goal is always to provide you with excellent care. Hearing " back from our patients is one way we can continue to improve our services. Please take a few minutes to complete the written survey that you may receive in the mail after your visit with us. Thank you!             Your Updated Medication List - Protect others around you: Learn how to safely use, store and throw away your medicines at www.disposemymeds.org.          This list is accurate as of 2/16/18 12:55 PM.  Always use your most recent med list.                   Brand Name Dispense Instructions for use Diagnosis    albuterol 108 (90 BASE) MCG/ACT Inhaler    PROAIR HFA/PROVENTIL HFA/VENTOLIN HFA    1 Inhaler    Inhale 2 puffs into the lungs every 4 hours as needed for shortness of breath / dyspnea or wheezing Reported on 3/27/2017    Chronic obstructive pulmonary disease, unspecified COPD type (H)       furosemide 20 MG tablet    LASIX    60 tablet    Take 2 tablets (40 mg) by mouth every morning Reported on 3/27/2017    Acute congestive heart failure, unspecified congestive heart failure type (H)       ibuprofen 800 MG tablet    ADVIL/MOTRIN    90 tablet    Take 1 tablet (800 mg) by mouth every 8 hours as needed for moderate pain    Chronic bilateral low back pain, with sciatica presence unspecified       klonoPIN 0.5 MG tablet   Generic drug:  clonazePAM      Take 0.5 mg by mouth 2 times daily as needed for anxiety        lisinopril 10 MG tablet    PRINIVIL/ZESTRIL    60 tablet    Take 1 tablet (10 mg) by mouth 2 times daily Reported on 3/27/2017    Essential hypertension       metoprolol succinate 50 MG 24 hr tablet    TOPROL-XL    30 tablet    Take 1 tablet (50 mg) by mouth daily    Chronic systolic congestive heart failure (H)       oxyCODONE IR 15 MG tablet    ROXICODONE     Take by mouth every 4 hours as needed Reported on 3/27/2017        spironolactone 25 MG tablet    ALDACTONE    30 tablet    Take 1 tablet (25 mg) by mouth every morning Reported on 3/27/2017    Acute congestive heart failure,  unspecified congestive heart failure type (H)       triamcinolone 0.1 % lotion    KENALOG    60 mL    Apply topically 2 times daily    Cracking skin

## 2018-03-06 ENCOUNTER — TELEPHONE (OUTPATIENT)
Dept: FAMILY MEDICINE | Facility: CLINIC | Age: 53
End: 2018-03-06

## 2018-03-06 NOTE — PROGRESS NOTES
Clinic Care Coordination Contact 3/6/18  [facility]/Voicemail-    Referral Source: PCP  Clinical Data: Care Coordinator Outreach  Outreach attempted x 1.  Left message on voicemail with call back information and requested return call.  Plan:  Care Coordinator will try to reach patient again in 3-5 business days.    BRE Cline  Care Coordinator Social Work    [facility] [place] [facility] [place]  [phone]  3/6/2018 3:54 PM

## 2018-03-06 NOTE — TELEPHONE ENCOUNTER
Panel Management Review      Patient has the following on her problem list: None      Composite cancer screening  Chart review shows that this patient is due/due soon for the following Mammogram  Summary:    Patient is due/failing the following:   MAMMOGRAM    Action needed:   Patient needs office visit for mammogram.    Type of outreach:    Phone, spoke to patient.  Does not have insurance. She will follow up when she has insurance.    Questions for provider review:    None                                                                                                                                    Melia MAC (RT)RICKY     Chart routed to  .

## 2018-03-29 ENCOUNTER — PATIENT OUTREACH (OUTPATIENT)
Dept: CARE COORDINATION | Facility: CLINIC | Age: 53
End: 2018-03-29

## 2018-03-29 NOTE — PROGRESS NOTES
Clinic Care Coordination Contact 3/29/18  [facility]       Clinical Data: Care Coordinator Outreach  Outreach attempted x 2.  Could not leave message as her phone was out of order.   Plan: Care Coordinator send a [employer] message with care coordination introduction letter on 3/29/18. Care Coordinator will do no further outreaches at this time.    BRE Cline  Care Coordinator Social Work    [facility] [place] [facility] [place]  [phone]  3/29/2018 3:54 PM

## 2018-04-30 ENCOUNTER — TELEPHONE (OUTPATIENT)
Dept: FAMILY MEDICINE | Facility: CLINIC | Age: 53
End: 2018-04-30

## 2018-04-30 NOTE — TELEPHONE ENCOUNTER
Patient states that when she was in for her physical on 2/16/18 patient advised provider that her pain clinic wanted a CT scan done on her back for her back pain. No order seen in chart. Advised patient she may need to come back and see provider. Patient verbalized understanding.      Please advise.    Emerita Nicole RN - BC

## 2018-04-30 NOTE — TELEPHONE ENCOUNTER
Reason for Call:  Other call back    Detailed comments: Please contact patient in regards to orders for imaging; she thought she was supposed to have a MRI or CT scan for her back for her pain specialist. She has a pacemaker.    Phone Number Patient can be reached at: Other phone number:  [phone]*    Best Time: ASAP    Can we leave a detailed message on this number? YES    Call taken on 4/30/2018 at 4:26 PM by Laura Ortiz

## 2018-05-01 NOTE — TELEPHONE ENCOUNTER
Detailed VM left on patients phone regarding providers message below. Advised to call back with questions, [phone].    Caitlyn Shields RN

## 2018-05-01 NOTE — TELEPHONE ENCOUNTER
Patient following with pain clinic, since they are treating her for the pain they should order the CT/MRI to aid in the treatment if they think is necessary.

## 2018-05-23 ENCOUNTER — TELEPHONE (OUTPATIENT)
Dept: FAMILY MEDICINE | Facility: CLINIC | Age: 53
End: 2018-05-23

## 2018-05-23 NOTE — TELEPHONE ENCOUNTER
Reason for Call:  Other prescription    Detailed comments:  Patient calling. She has dental appointments coming up on Friday. She needs an antibiotic due to a knee replacement.     Call her and let know when done.     Phone Number Patient can be reached at: Home number on file [phone] (home)    Best Time:  Any     Can we leave a detailed message on this number? YES    Call taken on 5/23/2018 at 1:27 PM by Laura Olmstead

## 2018-05-24 NOTE — TELEPHONE ENCOUNTER
Reason for Call:  Other prescription    Detailed comments: patient calling to check the status of the medication request. Patient asking for 6 tabs. Patient will have a dental appt tomorrow, tues, and Wednesday. Please advise.    Phone Number Patient can be reached at: Other phone number:  [phone]    Best Time: any time    Can we leave a detailed message on this number? YES    Call taken on 5/24/2018 at 3:08 PM by Mandie Machuca

## 2018-05-25 ENCOUNTER — DOCUMENTATION ONLY (OUTPATIENT)
Dept: CARDIOLOGY | Facility: CLINIC | Age: 53
End: 2018-05-25

## 2018-05-25 NOTE — PROGRESS NOTES
"Patient lost to follow-up: Patient overdue for annual threshold. Sent out \"1 week letter\" today.     "

## 2018-05-29 RX ORDER — AMOXICILLIN 500 MG/1
2000 CAPSULE ORAL ONCE
Qty: 4 CAPSULE | Refills: 0 | Status: CANCELLED | OUTPATIENT
Start: 2018-05-29 | End: 2018-05-29

## 2018-06-27 ENCOUNTER — TELEPHONE (OUTPATIENT)
Dept: FAMILY MEDICINE | Facility: CLINIC | Age: 53
End: 2018-06-27

## 2018-06-27 NOTE — TELEPHONE ENCOUNTER
"Called patient as she indicated when attempting to make request to be seen patient states she is \"not doing well\".     Patient states she did not make any appointments except the one scheduled tomorrow with [facility]. Patient is wondering if ex- is accessing her My Chart - she will change her password.    Pt is coming in for medication f/u, blood work, and to discuss her water weight in feet, legs and belly. She is not having any more difficulty with breathing than she usually does. Patient is not in any immediate distress.     Keri SALGADO, Triage RN        "

## 2018-06-28 DIAGNOSIS — J44.9 CHRONIC OBSTRUCTIVE PULMONARY DISEASE, UNSPECIFIED COPD TYPE (H): ICD-10-CM

## 2018-06-28 RX ORDER — ALBUTEROL SULFATE 90 UG/1
AEROSOL, METERED RESPIRATORY (INHALATION)
Qty: 8.5 G | Refills: 3 | Status: SHIPPED | OUTPATIENT
Start: 2018-06-28 | End: 2018-12-16

## 2018-07-29 ENCOUNTER — E-VISIT (OUTPATIENT)
Dept: FAMILY MEDICINE | Facility: CLINIC | Age: 53
End: 2018-07-29

## 2018-07-29 DIAGNOSIS — Z53.9 ERRONEOUS ENCOUNTER--DISREGARD: Primary | ICD-10-CM

## 2018-07-30 ENCOUNTER — TELEPHONE (OUTPATIENT)
Dept: FAMILY MEDICINE | Facility: CLINIC | Age: 53
End: 2018-07-30

## 2018-07-30 DIAGNOSIS — J44.9 CHRONIC OBSTRUCTIVE PULMONARY DISEASE (H): Primary | ICD-10-CM

## 2018-07-30 DIAGNOSIS — F31.9 BIPOLAR AFFECTIVE DISORDER (H): ICD-10-CM

## 2018-07-30 NOTE — TELEPHONE ENCOUNTER
I am unable to close this without the patient being charged.    I do see I can route as CC'ed chart to another provider, routed to Dr Marie.    Alexandra Garcia RN  [facility]

## 2018-07-30 NOTE — PATIENT INSTRUCTIONS
Her request cannot be handled with an E visit.  She is has medical conditions and taking medications that require regular blood work. Her last kidney function over 1 year ago showed that her renal function was declining, rechecks have been skipped (and she is taking medications that I will consider high risk for her kidneys).      Creatinine 1.23 (H) 0.52 - 1.04 mg/dL Final 03/27/2017  3:48 PM 63   GFR Estimate 46 (L) >60 mL/min/1.7m2 Final 03/27/2017  3:48 PM 63   Comment:   Non  GFR Calc   GFR Estimate If Black 55 (L) >60 mL/min/1.7m2 Final 03/27/2017  3:48 PM 63     Check if care coordination can help her to enable her get blood work.

## 2018-07-30 NOTE — TELEPHONE ENCOUNTER
"  Appears patient has scheduled with a number of providers and then does not come in.    She was seen by Dr. Marie for routine visit 2/16/18.    Planned to route to Dr. Marie to address patient's MYChart request for \"E-Visit\" to refill her meds.    However, I don't see a way to route it.    I sent response to patient advising she request E-visit with Dr. Marie instead as Susanne has never seen her.  Plan to close E-visit without charge.    Alexandra Garcia RN  [facility]        "

## 2018-07-30 NOTE — TELEPHONE ENCOUNTER
Per E-visit documentation care coordination referral placed- patient does not have insurance and is requesting E-visit however she needs a visit in clinic. Patient notified via [employer] message in E-visit.   Nidhi Wall RN

## 2018-08-06 ENCOUNTER — PATIENT OUTREACH (OUTPATIENT)
Dept: CARE COORDINATION | Facility: CLINIC | Age: 53
End: 2018-08-06

## 2018-08-06 NOTE — LETTER
Health Care Home - Access Care Plan    About Me  Patient Name:  Gloria Guzman    YOB: 1965  Age:                             52 year old   Martina MRN:            [id number] Telephone Information:     Home Phone [phone]   Mobile [phone]   Home Phone Not on file.       Address:    [address] [place] [address] Email address:  [email or web address]      Emergency Contact(s)  Name Relationship Lgl Grd Work Phone Home Phone Mobile Phone   1. SANTIAGO RAMEY* Daughter  [phone]   2. NSC     [phone]              Health Maintenance:      My Access Plan  Medical Emergency 911   Questions or concerns during clinic hours Primary Clinic Line, I will call the clinic directly: [facility] [phone]   24 Hour Appointment Line [phone] or  [phone] [place] [phone]) (toll free)   24 Hour Nurse Line [phone] (toll free)   Questions or concerns outside clinic hours 24 Hour Appointment Line, I will call the after-hours on-call line:   [facility] [phone] or [phone]) (toll-free)   Preferred Urgent Care     Preferred Hospital     Preferred Pharmacy [facility] [address] [place] [address] [facility] [place] [address] & [facility]     Behavioral Health Crisis Line The National Suicide Prevention Lifeline at 1-187.356.9914 or 914     My Care Team Members  Patient Care Team       Relationship Specialty Notifications Start End    Hugo Wells PA-C PCP - General Physician Assistant  7/3/18     Phone: [phone] Fax: [phone]         [address] [address] [facility] [address]           My Medical and Care Information  Problem List   Patient Active Problem List   Diagnosis     Bipolar affective disorder (H)     Chronic obstructive pulmonary disease (H)     Hypertension     Obstructive sleep apnea syndrome     Arthralgia of shoulder     Injury of kidney     Cardiac  pacemaker in situ     Automatic implantable cardioverter-defibrillator in situ     Blood glucose elevated     Nonischemic cardiomyopathy (H)     Chronic systolic CHF (congestive heart failure) (H)      Current Medications and Allergies:  See printed Medication Report

## 2018-08-06 NOTE — LETTER
Dear Gloria,    I am a clinic care coordinator who works with Hugo Bobbyeleuterio Wells. I recently tried to call and was unable to reach you. I wanted to introduce myself and provide you with my contact information so that you can call me with questions or concerns about your health care. Below is a description of clinic care coordination and how I can further assist you.     The clinic care coordinator is a registered nurse and/or  who understand the health care system. The goal of clinic care coordination is to help you manage your health and improve access to the [facility] system in the most efficient manner. The registered nurse can assist you in meeting your health care goals by providing education, coordinating services, and strengthening the communication among your providers. The  can assist you with financial, behavioral, psychosocial, chemical dependency, counseling, and/or psychiatric resources.    Please feel free to contact me at [phone], with any questions or concerns. We at [facility] are focused on providing you with the highest-quality healthcare experience possible and that all starts with you.     Sincerely,     Radha Johnston [facility]   Clinic Care Coordinator  [phone]    Enclosed: I have enclosed a copy of a 24 Hour Access Plan. This has helpful phone numbers for you to call when needed. Please keep this in an easy to access place to use as needed.

## 2018-08-06 NOTE — PROGRESS NOTES
Clinic Care Coordination Contact 8/6/18  [facility]/Voicemail     Clinical Data: Care Coordinator Outreach to offer financial and insurance resources.   Outreach attempted x 1.  Left message on voicemail with call back information and requested return call.  Plan:  Care Coordinator will try to reach patient again in 3-5 business days.    BRE Cline  Care Coordinator Social Work    [facility] [place] [facility] [place]  [phone]  8/6/2018 4:52 PM

## 2018-08-10 NOTE — PROGRESS NOTES
Clinic Care Coordination Contact 8/10/18  [facility]       Clinical Data: Care Coordinator Outreach  Outreach attempted x 2. Phone not accepting calls at this time.  Plan: Care Coordinator mailed out care coordination introduction letter on 8/10/18. Care Coordinator will do no further outreaches at this time.    BRE Cline  Care Coordinator Social Work    [facility] [place] [facility] [place]  [phone]  8/10/2018 1:12 PM

## 2018-09-20 ENCOUNTER — TELEPHONE (OUTPATIENT)
Dept: FAMILY MEDICINE | Facility: CLINIC | Age: 53
End: 2018-09-20

## 2018-09-20 NOTE — TELEPHONE ENCOUNTER
Pierce from [facility] called. Requesting current med list. Nurse intake had been incomplete due to patient not remembering medications or when last taken. Current medications were due for renewal 5/2018.    Huddled with DN.    Patient is at facility for at least 2 months. Current /106. Patient has pace maker. Unknown if patient current with meds, so Pierce will contact on call physician at facility for instructions. Did give dosages on HTN and cardiac meds so physician will have that information.    Karol Farias RN

## 2018-11-08 ENCOUNTER — RADIANT APPOINTMENT (OUTPATIENT)
Dept: GENERAL RADIOLOGY | Facility: CLINIC | Age: 53
End: 2018-11-08
Attending: FAMILY MEDICINE
Payer: MEDICAID

## 2018-11-08 ENCOUNTER — OFFICE VISIT (OUTPATIENT)
Dept: FAMILY MEDICINE | Facility: CLINIC | Age: 53
End: 2018-11-08
Payer: MEDICAID

## 2018-11-08 VITALS
SYSTOLIC BLOOD PRESSURE: 164 MMHG | BODY MASS INDEX: 41.02 KG/M2 | DIASTOLIC BLOOD PRESSURE: 86 MMHG | TEMPERATURE: 97 F | WEIGHT: 258 LBS | OXYGEN SATURATION: 98 % | HEART RATE: 79 BPM

## 2018-11-08 DIAGNOSIS — M54.41 CHRONIC BILATERAL LOW BACK PAIN WITH BILATERAL SCIATICA: ICD-10-CM

## 2018-11-08 DIAGNOSIS — G89.29 CHRONIC BILATERAL LOW BACK PAIN WITH BILATERAL SCIATICA: Primary | ICD-10-CM

## 2018-11-08 DIAGNOSIS — K04.7 TOOTH ABSCESS: ICD-10-CM

## 2018-11-08 DIAGNOSIS — M54.41 CHRONIC BILATERAL LOW BACK PAIN WITH BILATERAL SCIATICA: Primary | ICD-10-CM

## 2018-11-08 DIAGNOSIS — M54.42 CHRONIC BILATERAL LOW BACK PAIN WITH BILATERAL SCIATICA: Primary | ICD-10-CM

## 2018-11-08 DIAGNOSIS — M54.42 CHRONIC BILATERAL LOW BACK PAIN WITH BILATERAL SCIATICA: ICD-10-CM

## 2018-11-08 DIAGNOSIS — K08.89 TOOTH PAIN: ICD-10-CM

## 2018-11-08 DIAGNOSIS — G89.29 CHRONIC BILATERAL LOW BACK PAIN WITH BILATERAL SCIATICA: ICD-10-CM

## 2018-11-08 PROCEDURE — 99214 OFFICE O/P EST MOD 30 MIN: CPT | Performed by: FAMILY MEDICINE

## 2018-11-08 PROCEDURE — 72100 X-RAY EXAM L-S SPINE 2/3 VWS: CPT | Mod: FY

## 2018-11-08 RX ORDER — CLINDAMYCIN HCL 150 MG
300 CAPSULE ORAL 3 TIMES DAILY
Qty: 60 CAPSULE | Refills: 0 | Status: SHIPPED | OUTPATIENT
Start: 2018-11-08 | End: 2019-02-02

## 2018-11-08 NOTE — MR AVS SNAPSHOT
After Visit Summary   11/8/2018    Gloria Guzman    MRN: [id number]           Patient Information     Date Of Birth          1965        Visit Information        Provider Department      11/8/2018 6:20 PM Riccardo Talley MD [facility]        Today's Diagnoses     Chronic bilateral low back pain with bilateral sciatica    -  1       Follow-ups after your visit        Follow-up notes from your care team     Return if symptoms worsen or fail to improve.      Future tests that were ordered for you today     Open Future Orders        Priority Expected Expires Ordered    XR Lumbar Spine 2/3 Views Routine 11/8/2018 11/8/2019 11/8/2018            Who to contact     If you have questions or need follow up information about today's clinic visit or your schedule please contact [facility] directly at [phone].  Normal or non-critical lab and imaging results will be communicated to you by MyChart, letter or phone within 4 business days after the clinic has received the results. If you do not hear from us within 7 days, please contact the clinic through [employer] or phone. If you have a critical or abnormal lab result, we will notify you by phone as soon as possible.  Submit refill requests through [facility] or call your pharmacy and they will forward the refill request to us. Please allow 3 business days for your refill to be completed.          Additional Information About Your Visit        MyChart Information     [facility] [facility] gives you secure access to your electronic health record. If you see a primary care provider, you can also send messages to your care team and make appointments. If you have questions, please call your primary care clinic.  If you do not have a primary care provider, please call [phone] and they will assist you.        Care EveryWhere ID     This is your Care EveryWhere ID. This could be used by other organizations to access your [place]  medical records  [id number]        Your Vitals Were     Pulse Temperature Pulse Oximetry BMI (Body Mass Index)          79 97  F (36.1  C) (Oral) 98% 41.02 kg/m2         Blood Pressure from Last 3 Encounters:   11/08/18 164/86   02/16/18 132/86   01/25/18 (!) 146/96    Weight from Last 3 Encounters:   11/08/18 258 lb (117 kg)   02/16/18 245 lb (111.1 kg)   01/25/18 242 lb 9.6 oz (110 kg)                 Today's Medication Changes          These changes are accurate as of 11/8/18  6:31 PM.  If you have any questions, ask your nurse or doctor.               Start taking these medicines.        Dose/Directions    clindamycin 150 MG capsule   Commonly known as:  CLEOCIN   Used for:  Chronic bilateral low back pain with bilateral sciatica   Started by:  Riccardo Talley MD        Dose:  300 mg   Take 2 capsules (300 mg) by mouth 3 times daily for 10 days   Quantity:  60 capsule   Refills:  0            Where to get your medicines      These medications were sent to [facility] [address] [facility] [address] AT [facility] [address]  [address] [facility] [address]     Phone:  [phone]     clindamycin 150 MG capsule                Primary Care Provider Office Phone # Fax #    Hugo Wells PA-C [phone] [address] [facility] [address]        Equal Access to Services     BEN WORKMAN AH: Hadii janet larseno Sodavida, waaxda luqadaha, qaybta kaalmada adeegyada, waxshanna janki holguin adeyuli parmar. So [facility] [phone].    ATENCIÓN: Si habla español, tiene a samano disposición servicios gratuitos de asistencia lingüística. Llame al [phone].    We comply with applicable federal civil rights laws and [place] laws. We do not discriminate on the basis of race, color, national origin, age, disability, sex, sexual orientation, or gender identity.            Thank you!     Thank you for choosing [facility]  for your  care. Our goal is always to provide you with excellent care. Hearing back from our patients is one way we can continue to improve our services. Please take a few minutes to complete the written survey that you may receive in the mail after your visit with us. Thank you!             Your Updated Medication List - Protect others around you: Learn how to safely use, store and throw away your medicines at www.disposemymeds.org.          This list is accurate as of 11/8/18  6:31 PM.  Always use your most recent med list.                   Brand Name Dispense Instructions for use Diagnosis    albuterol 108 (90 Base) MCG/ACT inhaler    PROAIR HFA    8.5 g    INHALE 2 PUFFS BY MOUTH EVERY 4 HOURS AS NEEDED FOR SHORTNESS OF BREATH AND WHEEZING    Chronic obstructive pulmonary disease, unspecified COPD type (H)       clindamycin 150 MG capsule    CLEOCIN    60 capsule    Take 2 capsules (300 mg) by mouth 3 times daily for 10 days    Chronic bilateral low back pain with bilateral sciatica       furosemide 20 MG tablet    LASIX    60 tablet    Take 2 tablets (40 mg) by mouth every morning Reported on 3/27/2017    Acute congestive heart failure, unspecified congestive heart failure type       ibuprofen 800 MG tablet    ADVIL/MOTRIN    90 tablet    Take 1 tablet (800 mg) by mouth every 8 hours as needed for moderate pain    Chronic bilateral low back pain, with sciatica presence unspecified       klonoPIN 0.5 MG tablet   Generic drug:  clonazePAM      Take 0.5 mg by mouth 2 times daily as needed for anxiety        lisinopril 10 MG tablet    PRINIVIL/ZESTRIL    60 tablet    Take 1 tablet (10 mg) by mouth 2 times daily Reported on 3/27/2017    Essential hypertension       metoprolol succinate 50 MG 24 hr tablet    TOPROL-XL    30 tablet    Take 1 tablet (50 mg) by mouth daily    Chronic systolic congestive heart failure (H)       oxyCODONE IR 15 MG tablet    ROXICODONE     Take by mouth every 4 hours as needed Reported on 3/27/2017         spironolactone 25 MG tablet    ALDACTONE    30 tablet    Take 1 tablet (25 mg) by mouth every morning Reported on 3/27/2017    Acute congestive heart failure, unspecified congestive heart failure type       triamcinolone 0.1 % lotion    KENALOG    60 mL    Apply topically 2 times daily    Cracking skin

## 2018-11-09 NOTE — PROGRESS NOTES
SUBJECTIVE:                                                    Gloria Guzman is a 52 year old female who presents to clinic today for the following health issues:  Patient comes in today reports she broke 1 of her teeth,  left lower area and now she is having pain, more swollen.  She reports she asked her dentist and he recomended her to get antibiotic and started before she could be seen next week.    She denies any fever has no chills no night sweats.    She also reports chronic low back pain she had surgery many years ago.  She does go to a pain clinic where she gets her pain medication.    She reports for the past few weeks she has been having increased pain in her lower back.  She has no weakness denies any numbness or tingling in her lower extremity.  She has no loss of urine or stool.  She reports her pain is been Costacurta, and have imaging to her back.    Denies any trauma or injury she has no fever no chills.  Problem list and histories reviewed & adjusted, as indicated.  Additional history: as documented    Patient Active Problem List   Diagnosis     Bipolar affective disorder (H)     Chronic obstructive pulmonary disease (H)     Hypertension     Obstructive sleep apnea syndrome     Arthralgia of shoulder     Injury of kidney     Cardiac pacemaker in situ     Automatic implantable cardioverter-defibrillator in situ     Blood glucose elevated     Nonischemic cardiomyopathy (H)     Chronic systolic CHF (congestive heart failure) (H)     Past Surgical History:   Procedure Laterality Date     BACK SURGERY        SECTION       GALLBLADDER SURGERY       HERNIA REPAIR       JOINT REPLACEMTN, KNEE RT/LT  11/10    Joint Replacement knee LT     SURGICAL PATHOLOGY EXAM       TONSILLECTOMY         Social History   Substance Use Topics     Smoking status: Current Some Day Smoker     Smokeless tobacco: Never Used     Alcohol use No     Family History   Problem Relation Age of Onset     Breast Cancer  Maternal Grandmother      Breast Cancer Maternal Aunt      Cancer Father 42     lung      Cancer Maternal Grandfather      lung      Cancer Other      maternal cousin lung      Gallbladder Disease Mother      Gallbladder Disease Sister          Current Outpatient Prescriptions   Medication Sig Dispense Refill     albuterol (PROAIR HFA) 108 (90 Base) MCG/ACT Inhaler INHALE 2 PUFFS BY MOUTH EVERY 4 HOURS AS NEEDED FOR SHORTNESS OF BREATH AND WHEEZING 8.5 g 3     clindamycin (CLEOCIN) 150 MG capsule Take 2 capsules (300 mg) by mouth 3 times daily for 10 days 60 capsule 0     clonazePAM (KLONOPIN) 0.5 MG tablet Take 0.5 mg by mouth 2 times daily as needed for anxiety       furosemide (LASIX) 20 MG tablet Take 2 tablets (40 mg) by mouth every morning Reported on 3/27/2017 60 tablet 3     ibuprofen (ADVIL/MOTRIN) 800 MG tablet Take 1 tablet (800 mg) by mouth every 8 hours as needed for moderate pain 90 tablet 1     lisinopril (PRINIVIL/ZESTRIL) 10 MG tablet Take 1 tablet (10 mg) by mouth 2 times daily Reported on 3/27/2017 60 tablet 3     metoprolol succinate (TOPROL-XL) 50 MG 24 hr tablet Take 1 tablet (50 mg) by mouth daily 30 tablet 3     oxyCODONE (ROXICODONE) 15 MG immediate release tablet Take by mouth every 4 hours as needed Reported on 3/27/2017       spironolactone (ALDACTONE) 25 MG tablet Take 1 tablet (25 mg) by mouth every morning Reported on 3/27/2017 30 tablet 3     triamcinolone (KENALOG) 0.1 % lotion Apply topically 2 times daily 60 mL 1     Allergies   Allergen Reactions     Cefaclor Rash     Other reaction(s): *Unknown     Morphine Hives and Itching     Morphine Sulfate Itching     Olanzapine Other (See Comments)     Varenicline Other (See Comments)       ROS:  Constitutional, HEENT, cardiovascular, pulmonary, gi and gu systems are negative, except as otherwise noted.    OBJECTIVE:     /86 (BP Location: Right arm, Patient Position: Chair, Cuff Size: Adult Large)  Pulse 79  Temp 97  F (36.1  C)  (Oral)  Wt 258 lb (117 kg)  SpO2 98%  BMI 41.02 kg/m2  Body mass index is 41.02 kg/(m^2).  GENERAL: healthy, alert and no distress  HENT: oropharynx clear, oral mucous membranes moist and left lower tooth, broken tooth, with inflamed and swelling gums.  BACK: no CVA tenderness, no paralumbar tenderness  Comprehensive back pain exam:  Tenderness of lower lumbar region, Pain limits the following motions: full flexion and extension, Lower extremity strength functional and equal on both sides and Straight leg raise negative bilaterally  PSYCH: mentation appears normal, affect normal/bright    Diagnostic Test Results:  XR lumbar: pending    ASSESSMENT/PLAN:       ICD-10-CM    1. Chronic bilateral low back pain with bilateral sciatica M54.42 XR Lumbar Spine 2/3 Views    M54.41 clindamycin (CLEOCIN) 150 MG capsule    G89.29    2. Tooth pain K08.89    3. Tooth abscess K04.7 XR Lumbar Spine 2/3 Views     clindamycin (CLEOCIN) 150 MG capsule   For her tooth pain and abscess she was given a prescription for clindamycin use with food.  I did discuss with her possible side effect of the medication.  She also was encouraged to take a probiotic if she starts having diarrhea.    She will follow-up with a dentist next week.    2.  Chronic low back pain with increased pain for the past few weeks with no other findings.  We will obtain x-ray to her lumbar region.    Regular exercise  See Patient Instructions  There are no Patient Instructions on file for this visit.    Riccardo Talley MD  [facility]

## 2018-11-16 ENCOUNTER — TELEPHONE (OUTPATIENT)
Dept: FAMILY MEDICINE | Facility: CLINIC | Age: 53
End: 2018-11-16

## 2018-11-16 NOTE — TELEPHONE ENCOUNTER
Reason for Call:  Other Please fax XRAY to pain clinic from 11/8/18.     Detailed comments: Patient was seen for Xray on 11/8/18 and just called the pain clinic where she is headed and they did not receive this and she can not be seen without this xray and her appointment is shortly.    Phone Number Patient can be reached at: Home number on file [phone] (home)    Best Time: ASAP    Can we leave a detailed message on this number? YES     The pain clinic's #is [phone] on [facility]       Thank you!  Constance BOBO  Patient Representative  [facility]    Call taken on 11/16/2018 at 3:10 PM by Constance Foote

## 2018-12-03 ENCOUNTER — TELEPHONE (OUTPATIENT)
Dept: FAMILY MEDICINE | Facility: CLINIC | Age: 53
End: 2018-12-03

## 2018-12-03 NOTE — LETTER
December 3, 2018          Gloria Guzman,  [address] [facility] [address]        Dear Gloria Guzman      Monitoring and managing your preventative and chronic health conditions are very important to us. Our records indicate that you have not scheduled for Appointment with your provider, Pap Smear, Mammogram, Colonoscopy, Fasting blood work and Tdap  which was recommended by Wayne Arriaga.      If you have received your health care elsewhere, please call the clinic so the information can be documented in your chart.    Please call [phone] or message us through your [employer] account to schedule an appointment or provide information for your chart.     Feel free to contact us if you have any questions or concerns!    I look forward to seeing you and working with you on your health care needs.     Sincerely,       Wayne Marie/ Osmin Armenta

## 2018-12-04 NOTE — TELEPHONE ENCOUNTER
Panel Management Review    Patient has the following on her problem list:     Hypertension   Last three blood pressure readings:  BP Readings from Last 3 Encounters:   11/08/18 164/86   02/16/18 132/86   01/25/18 (!) 146/96     Blood pressure: FAILED    HTN Guidelines:  Age 18-59 BP range:  Less than 140/90  Age 60-85 with Diabetes:  Less than 140/90  Age 60-85 without Diabetes:  less than 150/90     COPD  Diagnosis date: 12/14/2012   Is this diagnosis new within the last year?   NO   Was spirometry completed?  NO      Composite cancer screening  Chart review shows that this patient is due/due soon for the following Pap Smear, Mammogram and Colonoscopy  Summary:    Patient is due/failing the following:   BP CHECK, COLONOSCOPY, FOLLOW UP, LDL, MAMMOGRAM, PAP and SPIROMETRY    Action needed:   Routed to provider for review.    Type of outreach:    Sent letter.    Questions for provider review:    None                                                                                                                                  Osimn Armenta     Chart routed to Care Team

## 2018-12-16 ENCOUNTER — MYC REFILL (OUTPATIENT)
Dept: FAMILY MEDICINE | Facility: CLINIC | Age: 53
End: 2018-12-16

## 2018-12-16 DIAGNOSIS — I50.9 ACUTE CONGESTIVE HEART FAILURE, UNSPECIFIED CONGESTIVE HEART FAILURE TYPE: ICD-10-CM

## 2018-12-16 DIAGNOSIS — I50.22 CHRONIC SYSTOLIC CONGESTIVE HEART FAILURE (H): ICD-10-CM

## 2018-12-16 DIAGNOSIS — I10 ESSENTIAL HYPERTENSION: ICD-10-CM

## 2018-12-16 DIAGNOSIS — J44.9 CHRONIC OBSTRUCTIVE PULMONARY DISEASE, UNSPECIFIED COPD TYPE (H): ICD-10-CM

## 2018-12-16 RX ORDER — LISINOPRIL 10 MG/1
10 TABLET ORAL 2 TIMES DAILY
Qty: 60 TABLET | Refills: 3 | Status: CANCELLED | OUTPATIENT
Start: 2018-12-16

## 2018-12-16 RX ORDER — METOPROLOL SUCCINATE 50 MG/1
50 TABLET, EXTENDED RELEASE ORAL DAILY
Qty: 30 TABLET | Refills: 3 | Status: CANCELLED | OUTPATIENT
Start: 2018-12-16

## 2018-12-16 RX ORDER — SPIRONOLACTONE 25 MG/1
25 TABLET ORAL EVERY MORNING
Qty: 30 TABLET | Refills: 3 | Status: CANCELLED | OUTPATIENT
Start: 2018-12-16

## 2018-12-16 RX ORDER — FUROSEMIDE 20 MG
40 TABLET ORAL EVERY MORNING
Qty: 60 TABLET | Refills: 3 | Status: CANCELLED | OUTPATIENT
Start: 2018-12-16

## 2018-12-17 RX ORDER — FUROSEMIDE 20 MG
40 TABLET ORAL EVERY MORNING
Qty: 60 TABLET | Refills: 3 | Status: CANCELLED | OUTPATIENT
Start: 2018-12-17

## 2018-12-18 RX ORDER — ALBUTEROL SULFATE 90 UG/1
AEROSOL, METERED RESPIRATORY (INHALATION)
Qty: 8.5 G | Refills: 0 | Status: ON HOLD | OUTPATIENT
Start: 2018-12-18 | End: 2019-02-08

## 2018-12-18 NOTE — TELEPHONE ENCOUNTER
"Albuterol  LRF 6/28/18  LOV 6/1/17 with Fuentes Wells - has no showed several times    Requested Prescriptions   Pending Prescriptions Disp Refills     albuterol (PROAIR HFA) 108 (90 Base) MCG/ACT inhaler 8.5 g 3     Sig: INHALE 2 PUFFS BY MOUTH EVERY 4 HOURS AS NEEDED FOR SHORTNESS OF BREATH AND WHEEZING    Asthma Maintenance Inhalers - Anticholinergics Failed - 12/18/2018  2:34 PM       Failed - Recent (12 mo) or future (30 days) visit within the authorizing provider's specialty    Patient had office visit in the last 12 months or has a visit in the next 30 days with authorizing provider or within the authorizing provider's specialty.  See \"Patient Info\" tab in inbasket, or \"Choose Columns\" in Meds & Orders section of the refill encounter.             Passed - Patient is age 12 years or older        Pt has no showed Fuentes Wells several times, lov was 6/1/17, will forward to Dr. Marie at [facility] to see if he will fill this for the pt.    "

## 2018-12-26 ENCOUNTER — TELEPHONE (OUTPATIENT)
Dept: FAMILY MEDICINE | Facility: CLINIC | Age: 53
End: 2018-12-26

## 2018-12-26 NOTE — LETTER
December 26, 2018          Gloria Guzman,  [address] [facility] [address]          Dear Gloria Guzman      Monitoring and managing your preventative and chronic health conditions are very important to us. Our records indicate that you have not scheduled for a Physical, Pap Smear, Mammogram and Colonoscopy  which was recommended by Dr. Marie      If you have received your health care elsewhere, please call the clinic so the information can be documented in your chart.    Please call [phone] or message us through your [employer] account to schedule an appointment or provide information for your chart.     Feel free to contact us if you have any questions or concerns!    I look forward to seeing you and working with you on your health care needs.     Sincerely,       Your [place] Care Team/HV

## 2018-12-26 NOTE — TELEPHONE ENCOUNTER
Panel Management Review      Patient has the following on her problem list:     Hypertension   Last three blood pressure readings:  BP Readings from Last 3 Encounters:   11/08/18 164/86   02/16/18 132/86   01/25/18 (!) 146/96     Blood pressure: Passed    HTN Guidelines:  Age 18-59 BP range:  Less than 140/90  Age 60-85 with Diabetes:  Less than 140/90  Age 60-85 without Diabetes:  less than 150/90      Composite cancer screening  Chart review shows that this patient is due/due soon for the following Pap Smear, Mammogram and Colonoscopy  Summary:    Patient is due/failing the following:   COLONOSCOPY, MAMMOGRAM and PAP    Action needed:   Patient needs office visit for Physical. and Patient needs referral/order: Mammo and colonoscopy    Type of outreach:    Sent letter.    Questions for provider review:    None                                                                                                                                    Yazmin Garcia CMA on 12/26/2018 at 11:29 AM       Chart routed to NONE .

## 2019-01-18 ENCOUNTER — TELEPHONE (OUTPATIENT)
Dept: FAMILY MEDICINE | Facility: CLINIC | Age: 54
End: 2019-01-18

## 2019-01-18 NOTE — LETTER
January 18, 2019          Gloria Guzman,  [address] [facility] [address]        Dear Gloria Guzman      Monitoring and managing your preventative and chronic health conditions are very important to us. Our records indicate that you have not scheduled for Appointment with your provider, Annual Female Exam, Pap Smear, Mammogram, Colonoscopy and Fasting blood work  which was recommended by Wayne Arriaga.      If you have received your health care elsewhere, please call the clinic so the information can be documented in your chart.    Please call [phone] or message us through your [employer] account to schedule an appointment or provide information for your chart.     Feel free to contact us if you have any questions or concerns!    I look forward to seeing you and working with you on your health care needs.     Sincerely,       Your [facility] Team/ Osmin Armenta

## 2019-01-18 NOTE — TELEPHONE ENCOUNTER
Panel Management Review    Patient has the following on her problem list: None      Composite cancer screening  Chart review shows that this patient is due/due soon for the following Pap Smear, Mammogram and Colonoscopy  Summary:    Patient is due/failing the following:   COLONOSCOPY, FOLLOW UP, LDL, MAMMOGRAM, PAP and PHYSICAL    Action needed:   Routed to provider for review.    Type of outreach:    Sent letter.    Questions for provider review:    None                                                                                                                            Osmin Armenta     Chart routed to Care Team.

## 2019-02-02 ENCOUNTER — HOSPITAL ENCOUNTER (INPATIENT)
Facility: CLINIC | Age: 54
LOS: 6 days | Discharge: HOME | End: 2019-02-08
Attending: FAMILY MEDICINE | Admitting: INTERNAL MEDICINE
Payer: COMMERCIAL

## 2019-02-02 ENCOUNTER — APPOINTMENT (OUTPATIENT)
Dept: GENERAL RADIOLOGY | Facility: CLINIC | Age: 54
End: 2019-02-02
Payer: COMMERCIAL

## 2019-02-02 DIAGNOSIS — I10 ESSENTIAL HYPERTENSION: ICD-10-CM

## 2019-02-02 DIAGNOSIS — I50.23 ACUTE ON CHRONIC SYSTOLIC CONGESTIVE HEART FAILURE (H): ICD-10-CM

## 2019-02-02 DIAGNOSIS — I50.22 CHRONIC SYSTOLIC CONGESTIVE HEART FAILURE (H): ICD-10-CM

## 2019-02-02 DIAGNOSIS — J44.9 CHRONIC OBSTRUCTIVE PULMONARY DISEASE, UNSPECIFIED COPD TYPE (H): ICD-10-CM

## 2019-02-02 DIAGNOSIS — R06.02 SOB (SHORTNESS OF BREATH): ICD-10-CM

## 2019-02-02 DIAGNOSIS — G89.4 CHRONIC PAIN SYNDROME: Primary | ICD-10-CM

## 2019-02-02 PROBLEM — I50.9 CHF (CONGESTIVE HEART FAILURE) (H): Status: ACTIVE | Noted: 2019-02-02

## 2019-02-02 LAB
ALBUMIN SERPL-MCNC: 3.3 G/DL (ref 3.4–5)
ALP SERPL-CCNC: 94 U/L (ref 40–150)
ALT SERPL W P-5'-P-CCNC: 43 U/L (ref 0–50)
ANION GAP SERPL CALCULATED.3IONS-SCNC: 9 MMOL/L (ref 3–14)
APTT PPP: 27 SEC (ref 22–37)
AST SERPL W P-5'-P-CCNC: 32 U/L (ref 0–45)
BASOPHILS # BLD AUTO: 0 10E9/L (ref 0–0.2)
BASOPHILS NFR BLD AUTO: 0.3 %
BILIRUB SERPL-MCNC: 0.8 MG/DL (ref 0.2–1.3)
BUN SERPL-MCNC: 41 MG/DL (ref 7–30)
CALCIUM SERPL-MCNC: 8.8 MG/DL (ref 8.5–10.1)
CHLORIDE SERPL-SCNC: 107 MMOL/L (ref 94–109)
CO2 SERPL-SCNC: 24 MMOL/L (ref 20–32)
CREAT SERPL-MCNC: 1.18 MG/DL (ref 0.52–1.04)
D DIMER PPP FEU-MCNC: 0.5 UG/ML FEU (ref 0–0.5)
DIFFERENTIAL METHOD BLD: ABNORMAL
EOSINOPHIL # BLD AUTO: 0.1 10E9/L (ref 0–0.7)
EOSINOPHIL NFR BLD AUTO: 0.5 %
ERYTHROCYTE [DISTWIDTH] IN BLOOD BY AUTOMATED COUNT: 14.1 % (ref 10–15)
GFR SERPL CREATININE-BSD FRML MDRD: 52 ML/MIN/{1.73_M2}
GLUCOSE SERPL-MCNC: 129 MG/DL (ref 70–99)
HCT VFR BLD AUTO: 39.3 % (ref 35–47)
HGB BLD-MCNC: 12 G/DL (ref 11.7–15.7)
IMM GRANULOCYTES # BLD: 0 10E9/L (ref 0–0.4)
IMM GRANULOCYTES NFR BLD: 0.3 %
INR PPP: 1.36 (ref 0.86–1.14)
LACTATE BLD-SCNC: 1.7 MMOL/L (ref 0.7–2)
LYMPHOCYTES # BLD AUTO: 1.9 10E9/L (ref 0.8–5.3)
LYMPHOCYTES NFR BLD AUTO: 15.6 %
MCH RBC QN AUTO: 26.3 PG (ref 26.5–33)
MCHC RBC AUTO-ENTMCNC: 30.5 G/DL (ref 31.5–36.5)
MCV RBC AUTO: 86 FL (ref 78–100)
MONOCYTES # BLD AUTO: 0.9 10E9/L (ref 0–1.3)
MONOCYTES NFR BLD AUTO: 7.5 %
NEUTROPHILS # BLD AUTO: 9 10E9/L (ref 1.6–8.3)
NEUTROPHILS NFR BLD AUTO: 75.8 %
NRBC # BLD AUTO: 0 10*3/UL
NRBC BLD AUTO-RTO: 0 /100
NT-PROBNP SERPL-MCNC: ABNORMAL PG/ML (ref 0–900)
PLATELET # BLD AUTO: 322 10E9/L (ref 150–450)
POTASSIUM SERPL-SCNC: 4.3 MMOL/L (ref 3.4–5.3)
PROT SERPL-MCNC: 7.8 G/DL (ref 6.8–8.8)
RBC # BLD AUTO: 4.57 10E12/L (ref 3.8–5.2)
SODIUM SERPL-SCNC: 140 MMOL/L (ref 133–144)
TROPONIN I SERPL-MCNC: 0.02 UG/L (ref 0–0.04)
TROPONIN I SERPL-MCNC: 0.05 UG/L (ref 0–0.04)
WBC # BLD AUTO: 11.9 10E9/L (ref 4–11)

## 2019-02-02 PROCEDURE — 85730 THROMBOPLASTIN TIME PARTIAL: CPT | Performed by: FAMILY MEDICINE

## 2019-02-02 PROCEDURE — 83605 ASSAY OF LACTIC ACID: CPT

## 2019-02-02 PROCEDURE — 84484 ASSAY OF TROPONIN QUANT: CPT | Performed by: STUDENT IN AN ORGANIZED HEALTH CARE EDUCATION/TRAINING PROGRAM

## 2019-02-02 PROCEDURE — 85379 FIBRIN DEGRADATION QUANT: CPT | Performed by: FAMILY MEDICINE

## 2019-02-02 PROCEDURE — 21400000 ZZH R&B CCU UMMC

## 2019-02-02 PROCEDURE — 25000132 ZZH RX MED GY IP 250 OP 250 PS 637: Performed by: STUDENT IN AN ORGANIZED HEALTH CARE EDUCATION/TRAINING PROGRAM

## 2019-02-02 PROCEDURE — 85610 PROTHROMBIN TIME: CPT | Performed by: FAMILY MEDICINE

## 2019-02-02 PROCEDURE — 25000128 H RX IP 250 OP 636: Performed by: STUDENT IN AN ORGANIZED HEALTH CARE EDUCATION/TRAINING PROGRAM

## 2019-02-02 PROCEDURE — 93005 ELECTROCARDIOGRAM TRACING: CPT | Performed by: FAMILY MEDICINE

## 2019-02-02 PROCEDURE — 99223 1ST HOSP IP/OBS HIGH 75: CPT | Mod: AI | Performed by: INTERNAL MEDICINE

## 2019-02-02 PROCEDURE — 83880 ASSAY OF NATRIURETIC PEPTIDE: CPT | Performed by: FAMILY MEDICINE

## 2019-02-02 PROCEDURE — 93010 ELECTROCARDIOGRAM REPORT: CPT | Mod: Z6 | Performed by: FAMILY MEDICINE

## 2019-02-02 PROCEDURE — 83605 ASSAY OF LACTIC ACID: CPT | Performed by: INTERNAL MEDICINE

## 2019-02-02 PROCEDURE — 93010 ELECTROCARDIOGRAM REPORT: CPT | Performed by: INTERNAL MEDICINE

## 2019-02-02 PROCEDURE — 99285 EMERGENCY DEPT VISIT HI MDM: CPT | Mod: 25 | Performed by: FAMILY MEDICINE

## 2019-02-02 PROCEDURE — 40000802 ZZH SITE CHECK

## 2019-02-02 PROCEDURE — 85025 COMPLETE CBC W/AUTO DIFF WBC: CPT | Performed by: FAMILY MEDICINE

## 2019-02-02 PROCEDURE — 40000141 ZZH STATISTIC PERIPHERAL IV START W/O US GUIDANCE

## 2019-02-02 PROCEDURE — 84484 ASSAY OF TROPONIN QUANT: CPT | Performed by: FAMILY MEDICINE

## 2019-02-02 PROCEDURE — 80053 COMPREHEN METABOLIC PANEL: CPT | Performed by: FAMILY MEDICINE

## 2019-02-02 PROCEDURE — 25000125 ZZHC RX 250: Performed by: STUDENT IN AN ORGANIZED HEALTH CARE EDUCATION/TRAINING PROGRAM

## 2019-02-02 PROCEDURE — 93005 ELECTROCARDIOGRAM TRACING: CPT

## 2019-02-02 PROCEDURE — 71046 X-RAY EXAM CHEST 2 VIEWS: CPT

## 2019-02-02 RX ORDER — ACETAMINOPHEN 325 MG/1
650 TABLET ORAL EVERY 4 HOURS PRN
Status: DISCONTINUED | OUTPATIENT
Start: 2019-02-02 | End: 2019-02-08 | Stop reason: HOSPADM

## 2019-02-02 RX ORDER — LISINOPRIL 20 MG/1
40 TABLET ORAL DAILY
Status: DISCONTINUED | OUTPATIENT
Start: 2019-02-02 | End: 2019-02-02

## 2019-02-02 RX ORDER — METOPROLOL SUCCINATE 50 MG/1
50 TABLET, EXTENDED RELEASE ORAL DAILY
Status: DISCONTINUED | OUTPATIENT
Start: 2019-02-02 | End: 2019-02-02

## 2019-02-02 RX ORDER — METOPROLOL SUCCINATE 50 MG/1
50 TABLET, EXTENDED RELEASE ORAL DAILY
Status: DISCONTINUED | OUTPATIENT
Start: 2019-02-03 | End: 2019-02-03

## 2019-02-02 RX ORDER — LISINOPRIL 20 MG/1
20 TABLET ORAL DAILY
Status: DISCONTINUED | OUTPATIENT
Start: 2019-02-02 | End: 2019-02-08 | Stop reason: HOSPADM

## 2019-02-02 RX ORDER — IBUPROFEN 200 MG
TABLET ORAL
COMMUNITY
End: 2019-02-02

## 2019-02-02 RX ORDER — ALBUTEROL SULFATE 90 UG/1
2 AEROSOL, METERED RESPIRATORY (INHALATION) EVERY 4 HOURS PRN
Status: DISCONTINUED | OUTPATIENT
Start: 2019-02-02 | End: 2019-02-08 | Stop reason: HOSPADM

## 2019-02-02 RX ORDER — ONDANSETRON 2 MG/ML
4 INJECTION INTRAMUSCULAR; INTRAVENOUS EVERY 6 HOURS PRN
Status: DISCONTINUED | OUTPATIENT
Start: 2019-02-02 | End: 2019-02-08 | Stop reason: HOSPADM

## 2019-02-02 RX ORDER — FUROSEMIDE 40 MG
40 TABLET ORAL 2 TIMES DAILY
Status: ON HOLD | COMMUNITY
End: 2019-02-08

## 2019-02-02 RX ORDER — GABAPENTIN 400 MG/1
400 CAPSULE ORAL 3 TIMES DAILY PRN
Status: ON HOLD | COMMUNITY
End: 2019-02-08

## 2019-02-02 RX ORDER — FUROSEMIDE 10 MG/ML
40 INJECTION INTRAMUSCULAR; INTRAVENOUS 2 TIMES DAILY WITH MEALS
Status: DISCONTINUED | OUTPATIENT
Start: 2019-02-02 | End: 2019-02-03

## 2019-02-02 RX ORDER — LISINOPRIL 20 MG/1
20 TABLET ORAL DAILY
Status: DISCONTINUED | OUTPATIENT
Start: 2019-02-02 | End: 2019-02-02

## 2019-02-02 RX ORDER — LISINOPRIL 10 MG/1
10 TABLET ORAL 2 TIMES DAILY
Status: DISCONTINUED | OUTPATIENT
Start: 2019-02-02 | End: 2019-02-02

## 2019-02-02 RX ORDER — SPIRONOLACTONE 25 MG/1
25 TABLET ORAL EVERY MORNING
Status: DISCONTINUED | OUTPATIENT
Start: 2019-02-03 | End: 2019-02-08 | Stop reason: HOSPADM

## 2019-02-02 RX ORDER — OXYCODONE HYDROCHLORIDE 15 MG/1
15 TABLET ORAL 3 TIMES DAILY PRN
Status: ON HOLD | COMMUNITY
End: 2019-02-08

## 2019-02-02 RX ADMIN — FUROSEMIDE 40 MG: 10 INJECTION, SOLUTION INTRAVENOUS at 21:06

## 2019-02-02 RX ADMIN — ACETAMINOPHEN 650 MG: 325 TABLET, FILM COATED ORAL at 20:58

## 2019-02-02 RX ADMIN — LISINOPRIL 20 MG: 20 TABLET ORAL at 18:07

## 2019-02-02 RX ADMIN — FUROSEMIDE 40 MG: 10 INJECTION, SOLUTION INTRAVENOUS at 15:58

## 2019-02-02 RX ADMIN — LIDOCAINE HYDROCHLORIDE 30 ML: 20 SOLUTION ORAL; TOPICAL at 22:01

## 2019-02-02 RX ADMIN — ONDANSETRON HYDROCHLORIDE 4 MG: 2 INJECTION INTRAMUSCULAR; INTRAVENOUS at 20:12

## 2019-02-02 RX ADMIN — METOPROLOL SUCCINATE 50 MG: 50 TABLET, EXTENDED RELEASE ORAL at 15:58

## 2019-02-02 ASSESSMENT — ACTIVITIES OF DAILY LIVING (ADL)
NUMBER_OF_TIMES_PATIENT_HAS_FALLEN_WITHIN_LAST_SIX_MONTHS: 2
RETIRED_EATING: 0-->INDEPENDENT
COGNITION: 0 - NO COGNITION ISSUES REPORTED
FALL_HISTORY_WITHIN_LAST_SIX_MONTHS: YES
WHICH_OF_THE_ABOVE_FUNCTIONAL_RISKS_HAD_A_RECENT_ONSET_OR_CHANGE?: FALL HISTORY
SWALLOWING: 0-->SWALLOWS FOODS/LIQUIDS WITHOUT DIFFICULTY
TRANSFERRING: 0-->INDEPENDENT
DRESS: 0-->INDEPENDENT
RETIRED_COMMUNICATION: 0-->UNDERSTANDS/COMMUNICATES WITHOUT DIFFICULTY
BATHING: 0-->INDEPENDENT
ADLS_ACUITY_SCORE: 17
TOILETING: 0-->INDEPENDENT
AMBULATION: 0-->INDEPENDENT
ADLS_ACUITY_SCORE: 12

## 2019-02-02 ASSESSMENT — PAIN DESCRIPTION - DESCRIPTORS
DESCRIPTORS: HEADACHE
DESCRIPTORS: SHARP

## 2019-02-02 ASSESSMENT — ENCOUNTER SYMPTOMS
SHORTNESS OF BREATH: 1
CHEST TIGHTNESS: 1
FACIAL SWELLING: 1

## 2019-02-02 NOTE — PHARMACY-ADMISSION MEDICATION HISTORY
"Admission medication history for the February 2, 2019 admission is complete.     Interview sources:  Patient, Care Everywhere- [facility] ([phone])    Reliability of source:  Good- patient was a moderate historian of her medications stating medication name, dosing instructions, and approximate last dose.  Patient unsure of medication strength.    Medication compliance: Moderate- patient reports missing a dose of her medications \"a lot\", approximately 2-3 times per week.     Changes made to PTA medication list (reason)  Added: The patient is currently taking the following medications not previously listed:  Gabapentin 400 mg capsule  Oxycodone 15 mg IR tablet    Deleted: The patient is no longer taking the following medications:  Triamcinolone 0.1% external lotion -apply topically 2 times daily    Changed: Per Care Everywhere, the following medication have been updated to include changes in medication strength:  Furosemide 20 mg tablet - take 2 tablets (40 mg) by mouth daily -------changed to------>  Furosemide 40 mg tablet - take 1 tablet (40 mg) by mouth two times daily.       Medication history information:   This medication history was completed at the patients bedside in the Adult Emergency Department ([facility]).  Patient reports a medication allergy to cefaclor (rash), morphine (itching), olanzapine (feel funny), varenicline (unknown).  Patient was recently discharged from [facility] following hospital admission 12/25/18 - 12/27/18.  Pharmacy discharge note reports patient had not filled her cardiac medication in approximately one year.     The patient was provided the following new prescriptions at discharge from [facility]:   Furosemide 40 mg tablet -- take 1 tablet (40 mg) by mouth two times daily  Spironolactone 25 mg tablet -- take 1 tablet (25 mg) by mouth daily  Metoprolol succinate 50 mg SR tablet -- take 1 tablet (50 mg) by mouth daily  Nicotine 2 mg gum -- chew 2-5 pieces of gum " daily as needed for craving  Lisinopril 20 mg tablet -- take 1 tablet (20 mg) by mouth daily    Per [facility], patient recently filled the following medications:  Gabapentin 400 mg tablet -- take 1 tablet by mouth three times daily as needed------last filled a quantity of 90 tablets on 1/11/19  Oxycodone 15 mg IR tablet --take 1 tablet by mouth three times daily as needed -----last filled a quantity of 105 tablets on 1/11/19  Furosemide 20 mg tablet -- take 1 tablet by mouth twice daily ----------------------------last filled a quantity of 60 tablets on 12/25/18  Spironolactone 25 mg tablet -- take 1 tablet by mouth daily -------------------------------last filled a quantity of 30 tablets on 12/25/18  Metoprolol succinate 50 mg SR tablet -- take 1 tablet by mouth daily  -------------------never picked up  Lisinopril 10 mg tablet -- take 1 tablet by mouth two times daily--------------------------last filled 11/2017    Patient states she has medications from both [facility] and her discharge from [facility] at home.  Per patient, she is going through a divorce and her  has been taking her medications away from her.  Patient states she has been taking medications from both sets of prescriptions at home, depending on which medication bottles she has access to.      --Total daily dose of medication for spironolactone, metoprolol, and lisinopril is the same for the prescriptions provided by [facility] and [facility].  Dosing instructions between the two pharmacies differ:  EX.  Lisinopril 10 mg tablet - take 1 tablet by mouth twice daily  versus Lisinopril 20 mg tablet - take 1 tablet by mouth daily.  --For the furosemide, total daily dose of furosemide differs between [facility] (40 mg/day) and [facility] (80 mg/day). Per patient, she has been taking 40 mg of furosemide daily in the morning.       Additionally:  --Patient reports she has not taken any prescription or OTC medication today.   --Patient  states she is not using smoking cessation products at home.  Per patient, she is currently smoking 1 pack of cigarettes per week.   --Patient currently takes ibuprofen 200 mg tablets at home.  Patient reports she takes 2957-1315 mg by mouth 1-2 times daily as needed, approximately 4-5 times per week.    The patient denies currently taking any additional prescription, OTC or herbal medications at home.     Prior to Admission medications    Medication Sig Last Dose Taking? Auth Provider   furosemide (LASIX) 40 MG tablet Take 40 mg by mouth 2 times daily 2/1/2019 at AM Yes Unknown, Entered By History   lisinopril (PRINIVIL/ZESTRIL) 10 MG tablet Take 1 tablet (10 mg) by mouth 2 times daily Reported on 3/27/2017 2/1/2019 at PM Yes Hudson Hogan MD   metoprolol succinate ER (TOPROL-XL) 50 MG 24 hr tablet Take 1 tablet (50 mg) by mouth daily 2/1/2019 at AM Yes Hudson Hogan MD   oxyCODONE IR (ROXICODONE) 15 MG tablet Take 15 mg by mouth 3 times daily as needed for severe pain Past Week at PRN Yes Unknown, Entered By History   spironolactone (ALDACTONE) 25 MG tablet Take 1 tablet (25 mg) by mouth every morning Reported on 3/27/2017 2/1/2019 at AM Yes Hudson Hogan MD   albuterol (PROAIR HFA) 108 (90 Base) MCG/ACT inhaler INHALE 2 PUFFS BY MOUTH EVERY 4 HOURS AS NEEDED FOR SHORTNESS OF BREATH AND WHEEZING More than a month at Not using.  Velma Nguyen MD   gabapentin (NEURONTIN) 400 MG capsule Take 400 mg by mouth 3 times daily as needed More than a month at PRN  Unknown, Entered By History       Time spent: 75 minutes    Medication history completed by: Indigo Packer, Pharmacy Intern

## 2019-02-02 NOTE — H&P
[facility], [place]    History and Physical - Cardiology 1 Service        Date of Admission:  2/2/2019    Assessment & Plan    Gloria Guzman is a 53 y.o. female with a history of chronic systolic CHF, NICM s/p ICD, hypertension, bipolar disorder, COPD, chronic back pain, nicotine use disorder, MARV (does not use CPAP) who was admitted on 2/2/2019 after presenting to the [facility] [place] [place] ED with shortness of breath.    # Acute on chronic congestive heart failure  # Nonischemic cardiomyopathy EF 30-35% s/p ICD  Had similar admission to [facility] on 12/26/2018 for being off medications for ~ 1 month (Lasix 40 mg daily, lisinopril, spironolactone, Lopressor). Discharged after diuresis. Since then, she has been compliant with medications, however, presented again with SOB and orthopnea. D-dimer 0.5. BNP 48022 (2984 on 12/2018), troponin 0.046-> 0.024. EKG sinus, PVCs, nonspecific T wave changes. Appears fluid overloaded on exam (JVD+, bilateral leg swelling+, orthopnea, weight gain). 270 lb today (258 lb in 11/2018). No clear DW. (229 lb in 2017). Last echocardiogram 12/2018 with EF 30-35%. Most likely SOB is secondary to CHF exacerbation due to likely diet non compliance. PE is not likely.  -Telemetry  -IV Lasix 40 mg BID (hold PTA lasix 40 mg PO BID)  -Daily weight, Strict I&Os  -2 g sodium, 2 L fluid restriction  -Continue PTA  Lisinopril 20 mg, spironolactone 25 mg, and metoprolol succinate 50 mg qday, will titrate up    # COPD   No wheezing per exam  - Continue PTA inhalers albuterol    # Lumbago  Assessment: History of prior lumbar fusion  -Tylenol    # Bipolar disorder   No medication    # Nicotine use disorder   Smokes 2 cigarettes/day  - Nicotien patch    # MARV (obstructive sleep apnea)   Not on CPAP    Living situation prior to this admission: Getting divorce and living in a car. Reported she rented a room.     Diet: Fluid restriction 2000 ML FLUID  2 Gram Sodium Diet    DVT Prophylaxis:  Pneumatic Compression Devices  Knott Catheter: not present  Code Status: FULL    Disposition Plan   Expected discharge: 2 - 3 days, recommended to prior living arrangement once adequate diuresis.  Entered: Richelle Hilliard MD 02/02/2019, 5:34 PM     The patient's care will be discussed in AM with  Attending Physician, Dr. Meraz.    Richelle Hilliard MD  Cardiology 1 Service  [facility], [place]  Pager: [phone]  ______________________________________________________________________    Chief Complaint   SOB    History is obtained from the patient    History of Present Illness    Golria Guzman is a 53 y.o. female with a history of chronic systolic CHF, non- ischemic cardiomyopathy, hypertension, bipolar disorder, COPD, chronic back pain, nicotine use disorder, MARV (does not use CPAP) who presented to ED on 2/2/19.    The patient is currently going through a divorce and has lost her health insurance. She reports that she had not been able to take any of her medications for over one month prior to previous admission (12/2018). She was hospitalized to [facility] and diuresed with Lasix 40 mg IV BID with good response. About the last 1-2 weeks, she had worsening shortness of breath, chest tightness (feels like she cannot inhale enough air), orthopnea, and productive cough with white sputum. She also reports facial swelling and abdominal tightness. Her symptoms continued to persist which prompted a visit to the [facility] ED.     In the ED, BP was 122/95, HR 75, on room air. EKG showed sinus rhythm with PVC, nonspecific T wave abnormality. Labs were remarkable for BNP 15480, troponin 0.046, and D-dimer 0.5. Admitted for further evaluation and treatment.     On admission, She reported constipation (last BM was 2 days ago). She has not been urinating as much as she usually does. She otherwise denies any fever, chill, headache, dizziness, palpitation, chest pain, abdominal pain, vomiting, melena,  hematochezia, hematemesis, dysphagia, recent fall, upper and lower extremity weakness/numbness, or syncopal episode. She reports normal oral intake. No weight loss. Recent admission to [facility] 18-    Review of Systems    The 10 point Review of Systems is negative other than noted in the HPI or here.     Past Medical History    I have reviewed this patient's medical history and updated it with pertinent information if needed.   Past Medical History:   Diagnosis Date     Chronic systolic CHF (congestive heart failure) (H)      Nonischemic cardiomyopathy (H)     EF 19% by CMRI     Sleep apnea       Past Surgical History   I have reviewed this patient's surgical history and updated it with pertinent information if needed.  Past Surgical History:   Procedure Laterality Date     BACK SURGERY        SECTION       GALLBLADDER SURGERY       HERNIA REPAIR       JOINT REPLACEMTN, KNEE RT/LT  11/10    Joint Replacement knee LT     SURGICAL PATHOLOGY EXAM       TONSILLECTOMY        Social History   I have reviewed this patient's social history and updated it with pertinent information if needed. Gloria Guzman  reports that she has been smoking.  she has never used smokeless tobacco. She reports that she does not drink alcohol or use drugs.    Family History   I have reviewed this patient's family history and updated it with pertinent information if needed.   Family History   Problem Relation Age of Onset     Breast Cancer Maternal Grandmother      Breast Cancer Maternal Aunt      Cancer Father 42        lung      Cancer Maternal Grandfather         lung      Cancer Other         maternal cousin lung      Gallbladder Disease Mother      Gallbladder Disease Sister      Prior to Admission Medications   Prior to Admission Medications   Prescriptions Last Dose Informant Patient Reported? Taking?   albuterol (PROAIR HFA) 108 (90 Base) MCG/ACT inhaler More than a month at Not using.  No No   Sig: INHALE 2 PUFFS BY MOUTH  EVERY 4 HOURS AS NEEDED FOR SHORTNESS OF BREATH AND WHEEZING   furosemide (LASIX) 40 MG tablet 2/1/2019 at AM  Yes Yes   Sig: Take 40 mg by mouth 2 times daily   gabapentin (NEURONTIN) 400 MG capsule More than a month at PRN  Yes No   Sig: Take 400 mg by mouth 3 times daily as needed   lisinopril (PRINIVIL/ZESTRIL) 10 MG tablet 2/1/2019 at PM  No Yes   Sig: Take 1 tablet (10 mg) by mouth 2 times daily Reported on 3/27/2017   metoprolol succinate ER (TOPROL-XL) 50 MG 24 hr tablet 2/1/2019 at AM  No Yes   Sig: Take 1 tablet (50 mg) by mouth daily   oxyCODONE IR (ROXICODONE) 15 MG tablet Past Week at PRN  Yes Yes   Sig: Take 15 mg by mouth 3 times daily as needed for severe pain   spironolactone (ALDACTONE) 25 MG tablet 2/1/2019 at AM  No Yes   Sig: Take 1 tablet (25 mg) by mouth every morning Reported on 3/27/2017      Facility-Administered Medications: None     Allergies   Allergies   Allergen Reactions     Cefaclor Rash     Other reaction(s): *Unknown     Morphine Hives and Itching     Morphine Sulfate Itching     Olanzapine Other (See Comments)     Varenicline Other (See Comments)     Physical Exam   Vital Signs: Temp: 97.4  F (36.3  C) Temp src: Oral BP: (!) 148/104 Pulse: 100 Heart Rate: 100 Resp: 20 SpO2: 100 % O2 Device: Nasal cannula Oxygen Delivery: 3 LPM  Weight: 270 lbs 0 oz  General: Awake, alert, in non-obvious distress. Sitting up in bed.   Neck: Supple, JVD+. No carotid bruits.   Lungs: Clear to auscultation bilaterally. No crackles.   Cardiovascular: Pulse of maximal impulse not displaced. S1--S2. No S3 or S4. No murmurs, clicks or gallops.   Abdomen: Soft, non-tender, no organomegaly or ascites.  : No CVA tenderness  Extremities: no cyanosis, clubbing. Bilateral edema.     Data   Data reviewed today: I reviewed all medications, new labs and imaging results over the last 24 hours. I personally reviewed the EKG tracing showing sinus rhythm. No ST-T changes. Non specific T wave changes.    CXR  ICD  placed, cardiomegaly    TTE 12/2018  Final Impressions:   1. Moderately increased LV size, mildly increased wall thickness, moderately severely reduced global systolic function with an estimated EF of 30 - 35%.   2. Moderately enlarged left atrium.   3. Moderately enlarged right atrium.   4. Grade 2 pattern of LV diastolic filling.   5. Right ventricular cavity size is mildly enlarged, global systolic RV function is mildly reduced.   6. The mitral valve is normal, mild mitral regurgitation.   7. Increased left atrial pressure.   8. Mildly increased estimated pulmonary pressures by tricuspid regurgitation velocity and right atrial pressure (38 mmHg plus RAP).    Angiogram: none    MPI: no ischemia (2017)    ICD 2015  DEVICE DATA   Medtronic: Model Evera XT VR [id number] Implant Date   1/30/2015  LEAD DATA    RV Lead:  Medtronic: Model [id number] Implant Date   1/30/2015    Recent Labs   Lab 02/02/19  1500 02/02/19  1031   WBC  --  11.9*   HGB  --  12.0   MCV  --  86   PLT  --  322   INR  --  1.36*   NA  --  140   POTASSIUM  --  4.3   CHLORIDE  --  107   CO2  --  24   BUN  --  41*   CR  --  1.18*   ANIONGAP  --  9   MADDISON  --  8.8   GLC  --  129*   ALBUMIN  --  3.3*   PROTTOTAL  --  7.8   BILITOTAL  --  0.8   ALKPHOS  --  94   ALT  --  43   AST  --  32   TROPI 0.024 0.046*     Recent Results (from the past 24 hour(s))   XR Chest 2 Views    Narrative    CHEST TWO VIEWS   2/2/2019 11:15 AM     HISTORY: Shortness of breath.    COMPARISON: None.      Impression    IMPRESSION: Mild cardiomegaly. Unipolar implanted cardiac device in  the usual position. Pulmonary vasculature does not appear distended.  No focal infiltrates or evidence of pleural fluid.    MILES CANO MD

## 2019-02-02 NOTE — ED NOTES
IV placed and labs drawn. Pt c/o pain in the feet. Took socks off and found that the pt has redness to both feet and pt has been homeless. Provider notified of possible frost bite to feet.

## 2019-02-02 NOTE — ED NOTES
[facility], [place]   ED Nurse to Floor Handoff     Gloria Guzman is a 53 year old female who speaks English and lives alone,  in a shelter  They arrived in the ED by car from home    ED Chief Complaint: Shortness of Breath (Feels she's gained water weight due to CHF,  difficulty with breathing)    ED Dx;   Final diagnoses:   Acute on chronic systolic congestive heart failure (H)   Chronic obstructive pulmonary disease, unspecified COPD type (H)   SOB (shortness of breath)         Needed?: No    Allergies:   Allergies   Allergen Reactions     Cefaclor Rash     Other reaction(s): *Unknown     Morphine Hives and Itching     Morphine Sulfate Itching     Olanzapine Other (See Comments)     Varenicline Other (See Comments)   .  Past Medical Hx:   Past Medical History:   Diagnosis Date     Chronic systolic CHF (congestive heart failure) (H)      Nonischemic cardiomyopathy (H)     EF 19% by CMRI     Sleep apnea       Baseline Mental status: WDL  Current Mental Status changes: at basesline    Infection present or suspected this encounter: no  Sepsis suspected: No  Isolation type: No active isolations     Activity level - Baseline/Home:  Independent  Activity Level - Current:   Independent    Bariatric equipment needed?: No    In the ED these meds were given: Medications - No data to display    Drips running?  No    Home pump  No    Current LDAs       Labs results:   Labs Ordered and Resulted from Time of ED Arrival Up to the Time of Departure from the ED   CBC WITH PLATELETS DIFFERENTIAL - Abnormal; Notable for the following components:       Result Value    WBC 11.9 (*)     MCH 26.3 (*)     MCHC 30.5 (*)     Absolute Neutrophil 9.0 (*)     All other components within normal limits   COMPREHENSIVE METABOLIC PANEL - Abnormal; Notable for the following components:    Glucose 129 (*)     Urea Nitrogen 41 (*)     Creatinine 1.18 (*)     GFR Estimate 52 (*)     GFR Estimate If Black 60  (*)     Albumin 3.3 (*)     All other components within normal limits   TROPONIN I - Abnormal; Notable for the following components:    Troponin I ES 0.046 (*)     All other components within normal limits   NT PROBNP INPATIENT - Abnormal; Notable for the following components:    N-Terminal Pro BNP Inpatient 18,264 (*)     All other components within normal limits   INR - Abnormal; Notable for the following components:    INR 1.36 (*)     All other components within normal limits   D DIMER QUANTITATIVE   PARTIAL THROMBOPLASTIN TIME   TROPONIN I   MAY SALINE LOCK IV       Imaging Studies:   Recent Results (from the past 24 hour(s))   XR Chest 2 Views    Narrative    CHEST TWO VIEWS   2/2/2019 11:15 AM     HISTORY: Shortness of breath.    COMPARISON: None.      Impression    IMPRESSION: Mild cardiomegaly. Unipolar implanted cardiac device in  the usual position. Pulmonary vasculature does not appear distended.  No focal infiltrates or evidence of pleural fluid.    MILES CANO MD       Recent vital signs:   BP (!) 122/95   Temp 97.5  F (36.4  C) (Oral)   Resp 20   Wt 122.5 kg (270 lb)   SpO2 100%   BMI 42.93 kg/m      Cardiac Rhythm: Normal Sinus  Pt needs tele? Yes  Skin/wound Issues: None    Code Status: Full Code    Pain control: good    Nausea control: good    Abnormal labs/tests/findings requiring intervention: bnp 18,264    Family present during ED course? No   Family Comments/Social Situation comments: Pt states that she is homeless    Tasks needing completion: None    Hudson Abarca RN  asc-- ed  [phone] [place] [facility]  [phone] [facility] ED

## 2019-02-02 NOTE — ED PROVIDER NOTES
History     Chief Complaint   Patient presents with     Shortness of Breath     Feels she's gained water weight due to CHF,  difficulty with breathing     The history is provided by the patient.     Gloria Guzman is a 53 year old female with a history of chronic systolic CHF, ischemic cardiomyopathy, hypertension, bipolar disorder, COPD, chronic back pain, nicotine use disorder, MARV (does not use CPAP) who was recently admitted to [facility] on 12/26/2018 due to one week of shortness of breath, wheezing, chest tightness and dry cough after not being able to take her home medications for one month. She presents to the ED with complaints of difficulty breathing and increased edema for 2 weeks.     Today, her symptoms have worsened. The patient states her heart medications and Lasix is not working. She states she is retaining fluid and is not urinating as often. She states her face, tongue, throat, stomach is swollen. She denies chest pain, however, reports chest tightness. She denies history of asthma. The patient has a history of homelessness.    During her admission on 12/26/18, she had a negative chest x-ray and a chest CT that showed no evidence of PE or pneumonia. She also had an ECHO, results below.    ECHO, 12/26/18:  Final Impressions:   1. Moderately increased LV size, mildly increased wall thickness, moderately severely reduced global systolic function with an estimated EF of 30 - 35%.   2. Moderately enlarged left atrium.   3. Moderately enlarged right atrium.   4. Grade 2 pattern of LV diastolic filling.   5. Right ventricular cavity size is mildly enlarged, global systolic RV function is mildly reduced.   6. The mitral valve is normal, mild mitral regurgitation.   7. Increased left atrial pressure.   8. Mildly increased estimated pulmonary pressures by tricuspid regurgitation velocity and right atrial pressure (38 mmHg plus RAP).        I have reviewed the Medications, Allergies, Past Medical and Surgical  History, and Social History in the [employer] system.  Past Medical History:   Diagnosis Date     Chronic systolic CHF (congestive heart failure) (H)      Nonischemic cardiomyopathy (H)     EF 19% by CMRI     Sleep apnea        Past Surgical History:   Procedure Laterality Date     BACK SURGERY        SECTION       GALLBLADDER SURGERY       HERNIA REPAIR       JOINT REPLACEMTN, KNEE RT/LT  11/10    Joint Replacement knee LT     SURGICAL PATHOLOGY EXAM       TONSILLECTOMY         Family History   Problem Relation Age of Onset     Breast Cancer Maternal Grandmother      Breast Cancer Maternal Aunt      Cancer Father 42        lung      Cancer Maternal Grandfather         lung      Cancer Other         maternal cousin lung      Gallbladder Disease Mother      Gallbladder Disease Sister        Social History     Tobacco Use     Smoking status: Current Some Day Smoker     Smokeless tobacco: Never Used   Substance Use Topics     Alcohol use: No       Current Facility-Administered Medications   Medication     acetaminophen (TYLENOL) tablet 650 mg     albuterol (PROAIR HFA/PROVENTIL HFA/VENTOLIN HFA) 108 (90 Base) MCG/ACT inhaler 2 puff     Continuing ACE inhibitor/ARB/ARNI from home medication list OR ACE inhibitor/ARB/ARNI order already placed during this visit     Continuing beta blocker from home medication list OR beta blocker order already placed during this visit     furosemide (LASIX) injection 40 mg     gabapentin (NEURONTIN) capsule 400 mg     HOLD nitroGLYcerin IF     lisinopril (PRINIVIL/ZESTRIL) tablet 20 mg     metoprolol succinate ER (TOPROL-XL) 24 hr tablet 75 mg     ondansetron (ZOFRAN) injection 4 mg     spironolactone (ALDACTONE) tablet 25 mg        Allergies   Allergen Reactions     Cefaclor Rash     Other reaction(s): *Unknown     Morphine Hives and Itching     Morphine Sulfate Itching     Olanzapine Other (See Comments)     Varenicline Other (See Comments)       Review of Systems   HENT: Positive for  facial swelling.    Respiratory: Positive for chest tightness and shortness of breath.    Cardiovascular: Positive for leg swelling. Negative for chest pain.   Genitourinary: Positive for decreased urine volume.   All other systems reviewed and are negative.      Physical Exam   BP: (!) 122/95  Pulse: 94  Heart Rate: 93  Temp: 97.5  F (36.4  C)  Resp: 20  Weight: 122.5 kg (270 lb)  SpO2: 100 %      Physical Exam   Constitutional: She appears distressed.   HENT:   Head: Atraumatic.   Mouth/Throat: Oropharynx is clear and moist. No oropharyngeal exudate.   Eyes: Pupils are equal, round, and reactive to light. No scleral icterus.   Neck: Normal range of motion. No tracheal deviation present.   Cardiovascular: Normal heart sounds and intact distal pulses.   No murmur heard.  Pulmonary/Chest: No respiratory distress. She has rhonchi in the right lower field and the left lower field.   Abdominal: Soft. Bowel sounds are normal. There is no tenderness.   Musculoskeletal: She exhibits no edema or tenderness.   Lymphadenopathy:     She has no cervical adenopathy.   Skin: Skin is warm. No rash noted. She is not diaphoretic.       ED Course        Procedures         EKG Interpretation:      Interpreted by Carlos Lord  Time reviewed: on arrival  Symptoms at time of EKG: sob   Rhythm: normal sinus   Rate: normal  Axis: normal  Ectopy: none  Conduction: normal  ST Segments/ T Waves: No ST-T wave changes  Q Waves: none  Comparison to prior: Unchanged    Clinical Impression: normal EKG          Critical Care time:  none             Labs Ordered and Resulted from Time of ED Arrival Up to the Time of Departure from the ED   CBC WITH PLATELETS DIFFERENTIAL - Abnormal; Notable for the following components:       Result Value    WBC 11.9 (*)     MCH 26.3 (*)     MCHC 30.5 (*)     Absolute Neutrophil 9.0 (*)     All other components within normal limits   COMPREHENSIVE METABOLIC PANEL - Abnormal; Notable for the following  components:    Glucose 129 (*)     Urea Nitrogen 41 (*)     Creatinine 1.18 (*)     GFR Estimate 52 (*)     GFR Estimate If Black 60 (*)     Albumin 3.3 (*)     All other components within normal limits   TROPONIN I - Abnormal; Notable for the following components:    Troponin I ES 0.046 (*)     All other components within normal limits   NT PROBNP INPATIENT - Abnormal; Notable for the following components:    N-Terminal Pro BNP Inpatient 18,264 (*)     All other components within normal limits   INR - Abnormal; Notable for the following components:    INR 1.36 (*)     All other components within normal limits   D DIMER QUANTITATIVE   PARTIAL THROMBOPLASTIN TIME   TROPONIN I   MAY SALINE LOCK IV            Assessments & Plan (with Medical Decision Making)         I have reviewed the nursing notes.    I have reviewed the findings, diagnosis, plan and need for follow up with the patient.    Patient with acute on chronic systolic congestive heart failure with history of COPD as well increasing shortness of breath I discussed the case at length with the cardiology staff and at this time will be admitting to cardiology service to help manage and sort out what the cause of her respiratory symptoms there was not any apparent evidence of infection on chest x-ray she does not have systemic signs of infection however this must be considered as well I note a significantly elevated BNP and also a slightly elevated creatinine so she will be somewhat difficult to diuresis without causing further renal problems.    Final diagnoses:   Acute on chronic systolic congestive heart failure (H)   Chronic obstructive pulmonary disease, unspecified COPD type (H)   SOB (shortness of breath)     IFlor, am serving as a trained medical scribe to document services personally performed by Carlos Lord MD, based on the provider's statements to me.   ICarlos MD, was physically present and have reviewed and verified  the accuracy of this note documented by Flor Menjivar.    2/2/2019   [facility], [place], EMERGENCY DEPARTMENT     Carlos Lord MD  02/03/19 2279

## 2019-02-02 NOTE — PROGRESS NOTES
"Pt admitted to 6C from ER for CHF exacerbation. Pt reports she feels she is \"30 pounds full of fluid\"; primarily in \"her abdomen and face/neck\".  PMHx includes ICM, HTN, bipolar, COPD, MARV, current smoker, chronic backpain.  Pt states she has left her spouse and is currently living in her car. She reports bottom of her feet are \"very painful, cracked and red\" from possible frostbite due to extreme temps recently. Pt noted to have a couple cracks in bilat heels; R>L; skin cool temp and pink-reddish coloring. Sween cream applied and heels lifted off mattress.   Pt states she has not taken any medication & that \"her  threw her meds away\". Social work consult placed.   Appears to have laryngitis and reports her voice has been quite hoarse for about a week. Harsh, nonproductive cough.  Plan for diuresis and monitor labs.  "

## 2019-02-03 LAB
ANION GAP SERPL CALCULATED.3IONS-SCNC: 10 MMOL/L (ref 3–14)
BUN SERPL-MCNC: 42 MG/DL (ref 7–30)
CALCIUM SERPL-MCNC: 8.4 MG/DL (ref 8.5–10.1)
CHLORIDE SERPL-SCNC: 105 MMOL/L (ref 94–109)
CHOLEST SERPL-MCNC: 72 MG/DL
CO2 SERPL-SCNC: 23 MMOL/L (ref 20–32)
CREAT SERPL-MCNC: 1.14 MG/DL (ref 0.52–1.04)
GFR SERPL CREATININE-BSD FRML MDRD: 54 ML/MIN/{1.73_M2}
GLUCOSE SERPL-MCNC: 94 MG/DL (ref 70–99)
HDLC SERPL-MCNC: 24 MG/DL
INTERPRETATION ECG - MUSE: NORMAL
LDLC SERPL CALC-MCNC: 35 MG/DL
MAGNESIUM SERPL-MCNC: 2.2 MG/DL (ref 1.6–2.3)
NONHDLC SERPL-MCNC: 48 MG/DL
POTASSIUM SERPL-SCNC: 3.7 MMOL/L (ref 3.4–5.3)
SODIUM SERPL-SCNC: 139 MMOL/L (ref 133–144)
TRIGL SERPL-MCNC: 62 MG/DL
URATE SERPL-MCNC: 9.2 MG/DL (ref 2.6–6)

## 2019-02-03 PROCEDURE — 25000125 ZZHC RX 250: Performed by: STUDENT IN AN ORGANIZED HEALTH CARE EDUCATION/TRAINING PROGRAM

## 2019-02-03 PROCEDURE — 25000128 H RX IP 250 OP 636: Performed by: STUDENT IN AN ORGANIZED HEALTH CARE EDUCATION/TRAINING PROGRAM

## 2019-02-03 PROCEDURE — 25000132 ZZH RX MED GY IP 250 OP 250 PS 637: Performed by: STUDENT IN AN ORGANIZED HEALTH CARE EDUCATION/TRAINING PROGRAM

## 2019-02-03 PROCEDURE — 80061 LIPID PANEL: CPT | Performed by: STUDENT IN AN ORGANIZED HEALTH CARE EDUCATION/TRAINING PROGRAM

## 2019-02-03 PROCEDURE — 99222 1ST HOSP IP/OBS MODERATE 55: CPT | Mod: GC | Performed by: INTERNAL MEDICINE

## 2019-02-03 PROCEDURE — 21400000 ZZH R&B CCU UMMC

## 2019-02-03 PROCEDURE — 84550 ASSAY OF BLOOD/URIC ACID: CPT | Performed by: STUDENT IN AN ORGANIZED HEALTH CARE EDUCATION/TRAINING PROGRAM

## 2019-02-03 PROCEDURE — 36415 COLL VENOUS BLD VENIPUNCTURE: CPT | Performed by: STUDENT IN AN ORGANIZED HEALTH CARE EDUCATION/TRAINING PROGRAM

## 2019-02-03 PROCEDURE — 40000556 ZZH STATISTIC PERIPHERAL IV START W US GUIDANCE

## 2019-02-03 PROCEDURE — 80048 BASIC METABOLIC PNL TOTAL CA: CPT | Performed by: STUDENT IN AN ORGANIZED HEALTH CARE EDUCATION/TRAINING PROGRAM

## 2019-02-03 PROCEDURE — 83735 ASSAY OF MAGNESIUM: CPT | Performed by: STUDENT IN AN ORGANIZED HEALTH CARE EDUCATION/TRAINING PROGRAM

## 2019-02-03 RX ORDER — FUROSEMIDE 10 MG/ML
40 INJECTION INTRAMUSCULAR; INTRAVENOUS ONCE
Status: COMPLETED | OUTPATIENT
Start: 2019-02-03 | End: 2019-02-03

## 2019-02-03 RX ORDER — FUROSEMIDE 10 MG/ML
40 INJECTION INTRAMUSCULAR; INTRAVENOUS
Status: DISCONTINUED | OUTPATIENT
Start: 2019-02-03 | End: 2019-02-05

## 2019-02-03 RX ADMIN — FUROSEMIDE 40 MG: 10 INJECTION, SOLUTION INTRAVENOUS at 16:17

## 2019-02-03 RX ADMIN — LISINOPRIL 20 MG: 20 TABLET ORAL at 08:35

## 2019-02-03 RX ADMIN — FUROSEMIDE 40 MG: 10 INJECTION, SOLUTION INTRAVENOUS at 08:35

## 2019-02-03 RX ADMIN — LIDOCAINE HYDROCHLORIDE 30 ML: 20 SOLUTION ORAL; TOPICAL at 17:07

## 2019-02-03 RX ADMIN — ONDANSETRON HYDROCHLORIDE 4 MG: 2 INJECTION INTRAMUSCULAR; INTRAVENOUS at 11:45

## 2019-02-03 RX ADMIN — METOPROLOL SUCCINATE 75 MG: 50 TABLET, EXTENDED RELEASE ORAL at 08:35

## 2019-02-03 RX ADMIN — FUROSEMIDE 40 MG: 10 INJECTION, SOLUTION INTRAVENOUS at 18:54

## 2019-02-03 RX ADMIN — SPIRONOLACTONE 25 MG: 25 TABLET ORAL at 08:35

## 2019-02-03 ASSESSMENT — ACTIVITIES OF DAILY LIVING (ADL)
ADLS_ACUITY_SCORE: 12
ADLS_ACUITY_SCORE: 13
ADLS_ACUITY_SCORE: 13
ADLS_ACUITY_SCORE: 12

## 2019-02-03 NOTE — PROGRESS NOTES
Cardiology 1 Progress Note ; February 3, 2019   Hospital Day #1; Gloria Guzman  ===================================================  Assessment & Plan ; Hospital Day #1  Gloria Guzman is a 53 y.o. female with a history of chronic systolic CHF, NICM s/p ICD, hypertension, bipolar disorder, COPD, chronic back pain, nicotine use disorder, MARV (does not use CPAP) who was admitted on 2/2/2019 with shortness of breath secondary to CHF exacerbation.    Changes today  Continue Lasix 40 mg IV BID today, might need additional dose  Increase metoprolol succinate 50 mg to 75 mg  Net neg - 1300   - 2 lb from yerterday    # Acute on chronic congestive heart failure  # Nonischemic cardiomyopathy (dilated cardiomyopathy) EF 30-35% s/p ICD  Had admission to [facility] on 12/26/2018 for CHF exacerbation due to being off medications for ~ 1 month (Lasix 40 mg daily, lisinopril, spironolactone, Lopressor). Discharged after diuresis. Since then, she has been compliant with medications, however, presented again with SOB and orthopnea. D-dimer 0.5. BNP 10271 (2984 on 12/2018), troponin 0.046-> 0.024. EKG sinus, PVCs, nonspecific T wave changes. Appears fluid overloaded on exam (JVD+, bilateral leg swelling+, orthopnea, weight gain). 270 lb today (258 lb in 11/2018). No clear DW. (229 lb in 2017). Last echocardiogram 12/2018 with EF 30-35%. Most likely SOB is secondary to CHF exacerbation. PE is not likely.   -Telemetry  -IV Lasix 40 mg BID (hold PTA lasix 40 mg PO BID)  -Daily weight, Strict I&Os  -2 g sodium, 2 L fluid restriction  -Continue PTA  Lisinopril 20 mg, spironolactone 25 mg, and metoprolol succinate 50 mg qday    # COPD   No wheezing per exam  - Continue PTA inhalers albuterol    # Lumbago  Assessment: History of prior lumbar fusion  -Tylenol    # Bipolar disorder   No medication    # Nicotine use disorder   Smokes 2 cigarettes/day  - Nicotine patch    # MARV (obstructive sleep apnea)   Not on CPAP    Diet: Fluid restriction 2000  ML FLUID  2 Gram Sodium Diet    DVT Prophylaxis: Pneumatic Compression Devices  Knott Catheter: not present  Code Status: FULL     Patient was seen and discussed with Dr. Meraz.  Richelle Hilliard MD PhD  Internal Medicine PGY-2  [phone]  =====================================================  Physical Examination  Objective:  /77 (BP Location: Right arm)   Pulse 72   Temp 98.3  F (36.8  C) (Oral)   Resp 18   Wt 121.7 kg (268 lb 4.8 oz)   SpO2 96%   BMI 42.66 kg/m    Temp (24hrs), Av.6  F (36.4  C), Min:96.5  F (35.8  C), Max:98.3  F (36.8  C)    General: Awake, alert, in non-obvious distress. Lying in bed.  Neck: Supple, JVD+. No carotid bruits.   Lungs: Clear to auscultation bilaterally. No crackles.   Cardiovascular: Pulse of maximal impulse not displaced. S1--S2. No S3 or S4. No murmurs, clicks or gallops.   Abdomen: Soft, non-tender, no organomegaly or ascites.  : No CVA tenderness  Extremities: no cyanosis, clubbing. Bilateral edema.   =====================================================  Interval history:  No acute events overnight  Feeling good.  ==================================================    Vitals:    19 0015 19 0420 19 0710 19 1100   BP: 116/87 120/77 108/73 98/71   BP Location: Right arm Right arm Right arm Right arm   Pulse:       Resp: 18 18 20 20   Temp: 98.1  F (36.7  C) 98.3  F (36.8  C) 97.4  F (36.3  C) 97.4  F (36.3  C)   TempSrc: Oral Oral Oral Oral   SpO2: 97% 96% 95% 94%   Weight:         Vitals:    19 0952 19 1432 19 1800   Weight: 122.5 kg (270 lb) 122.5 kg (270 lb) 121.7 kg (268 lb 4.8 oz)     I/O last 3 completed shifts:  In: 1250 [P.O.:1250]  Out: 2550 [Urine:1950; Other:600]    Labs:  CMP  Recent Labs   Lab 19  0646 19  1031    140   POTASSIUM 3.7 4.3   CHLORIDE 105 107   CO2 23 24   ANIONGAP 10 9   GLC 94 129*   BUN 42* 41*   CR 1.14* 1.18*   GFRESTIMATED 54* 52*   GFRESTBLACK 63 60*   MADDISON 8.4* 8.8   MAG  2.2  --    PROTTOTAL  --  7.8   ALBUMIN  --  3.3*   BILITOTAL  --  0.8   ALKPHOS  --  94   AST  --  32   ALT  --  43     CBC  Recent Labs   Lab 02/02/19  1031   WBC 11.9*   RBC 4.57   HGB 12.0   HCT 39.3   MCV 86   MCH 26.3*   MCHC 30.5*   RDW 14.1        INR  Recent Labs   Lab 02/02/19  1031   INR 1.36*     Lab Results   Component Value Date    TROPI 0.024 02/02/2019    TROPI 0.046 (H) 02/02/2019     Recent Labs   Lab Test 08/16/17   CHOL 156   HDL 42   LDL 98   TRIG 80     Intake/Output  Intake/Output Summary (Last 24 hours) at 2/3/2019 0707  Last data filed at 2/3/2019 0400  Gross per 24 hour   Intake 1250 ml   Output 2550 ml   Net -1300 ml

## 2019-02-03 NOTE — PLAN OF CARE
6C/OT: Cx. OT eval attempted. Pt was previously throwing up and still feels ill. Is requesting to be seen tomorrow. Will reschedule per POC.

## 2019-02-03 NOTE — PROGRESS NOTES
"Continues w/diuresis today. Lasix 40mg IV bid. Weight down approx 4 lbs. Only voiding fair amt from med.  Had sudden episode of large emesis again late morning, and again smaller emesis this meir. Pt reports \"it just hits me without warning\". Given zofran w/relief in a.m.  Reporting worsening heartburn sensation tonight similar to last evening & given GI cocktail again which had provided good relief yesterday.  Reluctant to participate in any activity other than getting up to commode as she reports her feet are still very tender & she \"wants to catch up on sleep\".   Declined repeated offers for assistance with shower, cares. She stated \"people will be bringing my shampoo and stuff\" but no visitors have been here yet today.  Needs reminders about fluid restrictions and encouraging popsicles when pt asks for cups of water.  Using aquaK heating pad for chronic backpain.    "

## 2019-02-03 NOTE — PLAN OF CARE
D: Pt admitted 2/2 with SOB, HF exacerbation. Hx NICM s/p ICD, HTN, bipolar disorder, COPD, chronic back pain, MARV.  I: Monitored vitals and assessed pt status. PRN tylenol, maalox, zofran. IV lasix 40mg x1.  A: A0x4. VSS on RA; pt occasionally wearing 2LPM for comfort, but sats in high 90s on RA. SR on tele. Tele tech reported few runs of alternating junctional escape bts and PVCs, longest 23 bts; cards cross-cover notified via text page. Pt endorsed nausea in evening after large episode of emesis, addressed with PRN med. Following emesis pt endorsed reflux, relieved by PRN med; stat EKG also obtained. Feet red/pink bilaterally with scabbed cracks on heels. Voiding adequately. BM x1. Up ind to bedside commode, SBA otherwise. Pt's friend Giuliano visited in evening with pt's permission, appeared supportive. Pt slept comfortably between cares, making needs known.  P: Continue to monitor and report changes/concerns to Cards 1.

## 2019-02-03 NOTE — CONSULTS
Electrophysiology Consultation Note   EP Attending: MD Bob.   Reason for consultation: ? Need for CRT.   Provider requesting consultation: .  Date of Service: 2/3/2019      HPI:   Gloria Guzman is a 53 y.o. female with a history of chronic systolic CHF, NICM s/p ICD, hypertension, bipolar disorder, COPD, chronic back pain, nicotine use disorder, MARV (does not use CPAP) who was admitted on 2019 after presenting to the [facility] ED with shortness of breath. She is noncompliant to fluid restriction and this might have been responsible for her decompensation.  She was admitted recently in 2018 for ADHF due to noncompliance with medications.  EP was consulted for opinion regarding need for CRT given her LV dysfunction.      Past Medical History:   Past Medical History:   Diagnosis Date     Chronic systolic CHF (congestive heart failure) (H)      Nonischemic cardiomyopathy (H)     EF 19% by CMRI     Sleep apnea      Past Surgical History:   Past Surgical History:   Procedure Laterality Date     BACK SURGERY        SECTION       GALLBLADDER SURGERY       HERNIA REPAIR       JOINT REPLACEMTN, KNEE RT/LT  11/10    Joint Replacement knee LT     SURGICAL PATHOLOGY EXAM       TONSILLECTOMY       Allergies: Per MAR     Allergies   Allergen Reactions     Cefaclor Rash     Other reaction(s): *Unknown     Morphine Hives and Itching     Morphine Sulfate Itching     Olanzapine Other (See Comments)     Varenicline Other (See Comments)     Medications:   Per MAR current outpatient cardiovascular medications include:   Medications Prior to Admission   Medication Sig Dispense Refill Last Dose     furosemide (LASIX) 40 MG tablet Take 40 mg by mouth 2 times daily   2019 at AM     lisinopril (PRINIVIL/ZESTRIL) 10 MG tablet Take 1 tablet (10 mg) by mouth 2 times daily Reported on 3/27/2017 60 tablet 0 2019 at PM     metoprolol succinate ER (TOPROL-XL) 50 MG 24 hr tablet Take 1 tablet (50 mg)  by mouth daily 30 tablet 0 2/1/2019 at AM     oxyCODONE IR (ROXICODONE) 15 MG tablet Take 15 mg by mouth 3 times daily as needed for severe pain   Past Week at PRN     spironolactone (ALDACTONE) 25 MG tablet Take 1 tablet (25 mg) by mouth every morning Reported on 3/27/2017 30 tablet 0 2/1/2019 at AM     albuterol (PROAIR HFA) 108 (90 Base) MCG/ACT inhaler INHALE 2 PUFFS BY MOUTH EVERY 4 HOURS AS NEEDED FOR SHORTNESS OF BREATH AND WHEEZING 8.5 g 0 More than a month at Not using.     gabapentin (NEURONTIN) 400 MG capsule Take 400 mg by mouth 3 times daily as needed   More than a month at PRN     No current outpatient medications on file.     Current Facility-Administered Medications   Medication Dose Route Frequency     furosemide  40 mg Intravenous BID w/meals     lisinopril  20 mg Oral Daily     metoprolol succinate ER  75 mg Oral Daily     spironolactone  25 mg Oral QAM     Family History:   Family History   Problem Relation Age of Onset     Breast Cancer Maternal Grandmother      Breast Cancer Maternal Aunt      Cancer Father 42        lung      Cancer Maternal Grandfather         lung      Cancer Other         maternal cousin lung      Gallbladder Disease Mother      Gallbladder Disease Sister      Social History:   Social History     Tobacco Use     Smoking status: Current Some Day Smoker     Smokeless tobacco: Never Used   Substance Use Topics     Alcohol use: No         Physical Examination:   VITALS: BP 98/71 (BP Location: Right arm)   Pulse 72   Temp 97.4  F (36.3  C) (Oral)   Resp 20   Wt 121.7 kg (268 lb 4.8 oz)   SpO2 94%   BMI 42.66 kg/m      GENERAL APPEARANCE: Alert, conscious and co-operative.  Neck: JVD present.  CHEST: Vesicular breath sounds, no crepitations.  CARDIOVASCULAR: S1S2, Reg, No m/r/g.   ABDOMEN: BS+, soft, No pulsatile masses or bruits.     Data:   Labs:  BMP  Recent Labs   Lab 02/03/19  0646 02/02/19  1031    140   POTASSIUM 3.7 4.3   CHLORIDE 105 107   MADDISON 8.4* 8.8   CO2  23 24   BUN 42* 41*   CR 1.14* 1.18*   GLC 94 129*     CBC  Recent Labs   Lab 02/02/19  1031   WBC 11.9*   RBC 4.57   HGB 12.0   HCT 39.3   MCV 86   MCH 26.3*   MCHC 30.5*   RDW 14.1        INR  Recent Labs   Lab 02/02/19  1031   INR 1.36*     No results found for: CKTOTAL, CKMB, TROPN  Cholesterol (mg/dL)   Date Value   02/03/2019 72   08/16/2017 156     HDL Cholesterol (mg/dL)   Date Value   02/03/2019 24 (L)   08/16/2017 42     LDL Cholesterol Calculated (mg/dL)   Date Value   02/03/2019 35   08/16/2017 98     EKG: Normal sinus rhythm with occasional PVCs  ECHO:   No results found for this or any previous visit (from the past 4320 hour(s)).    Assessment:   Gloria Guzman is a 53 y.o. female with a history of chronic systolic CHF, NICM s/p ICD, hypertension, bipolar disorder, COPD, chronic back pain, nicotine use disorder, MARV (does not use CPAP) who was admitted on 2/2/2019 after presenting to the [facility] ED with decompensated heart failure likely due to noncompliance with fluid restriction.   EP was consulted for opinion regarding candidacy for upgrade to CRT. Her EKG shows a normal sinus rhythm with normal QRS axis and a QRS duration of 100 msec - this suggests that she does not have considerable LV dyssynchrony and therefore will not benefit from CRT.  EP Recommendations:  1) Unlikely to benefit from CRT. Optimise heart failure treatment.    The patient states understanding and is agreeable with plan.   Thank you for allowing us to participate in the care of this patient.     The patient was discussed w/ Dr. Morales.  The above note reflects our joint plan.    Brett robb MD  EP fellow  [phone]    EP STAFF NOTE  Patient seen and examined and management plan personally reviewed with the patient. I agree with the note above by the CV/EP fellow.  Alexsander Morales MD [facility]  Cardiology - Electrophysiology

## 2019-02-03 NOTE — PROGRESS NOTES
CLINICAL NUTRITION SERVICES    Reason for Assessment:  Low-sodium (2 g/day) nutrition education, received consult.     Diet History:  Pt states she is aware of the need to limit her sodium intake. Has not had formal nutrition education. Does not add salt to her foods as a result. Per pt, liked to consume pickle juice in the past and is aware that it is high in sodium. Pt knows she is on a fluid restriction and states this is difficult for her as she generally consumes a lot of fluid.     Nutrition Diagnosis:  Food- and nutrition-related knowledge deficit r/t knowledge deficit of low-sodium diet AEB pt report of no previous formal low-sodium nutrition education.    Nutrition Prescription/Recs:  Continue low-sodium diet, fluid restriction.     Interventions:  Nutrition Education  1. Provided verbal instruction on a heart-healthy diet with emphasis on low-sodium meal planning. Gave ideas of high sodium foods/beverages to avoid and low-sodium foods to choose. Showed label reading. Explained fluid restriction and gave tips for dealing with thirst.   2. Provided the following handouts: How to Read Nutrition Labels, Low-Sodium Foods and Drinks, Tips for a Low-Sodium Diet, Seasoning Your Foods Without Adding Salt, and Managing Fluid Restriction.      Goals:    Pt will verbalize at least five high sodium foods and the importance of avoiding added salt to foods for cooking or seasoning foods.     Follow-up:   Patient to ask any further nutrition-related questions before discharge. In addition, pt may request outpatient RD appointment.     Karol Dave, MS, RD, LD, [facility]   Pgr: [phone]  Saturday/Sunday Pgr:  [phone]
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
## 2019-02-04 ENCOUNTER — APPOINTMENT (OUTPATIENT)
Dept: OCCUPATIONAL THERAPY | Facility: CLINIC | Age: 54
End: 2019-02-04
Payer: COMMERCIAL

## 2019-02-04 LAB
ANION GAP SERPL CALCULATED.3IONS-SCNC: 8 MMOL/L (ref 3–14)
BUN SERPL-MCNC: 41 MG/DL (ref 7–30)
CALCIUM SERPL-MCNC: 8.1 MG/DL (ref 8.5–10.1)
CHLORIDE SERPL-SCNC: 105 MMOL/L (ref 94–109)
CO2 SERPL-SCNC: 25 MMOL/L (ref 20–32)
CREAT SERPL-MCNC: 1.33 MG/DL (ref 0.52–1.04)
GFR SERPL CREATININE-BSD FRML MDRD: 45 ML/MIN/{1.73_M2}
GLUCOSE SERPL-MCNC: 129 MG/DL (ref 70–99)
INTERPRETATION ECG - MUSE: NORMAL
INTERPRETATION ECG - MUSE: NORMAL
MAGNESIUM SERPL-MCNC: 2.3 MG/DL (ref 1.6–2.3)
POTASSIUM SERPL-SCNC: 3.5 MMOL/L (ref 3.4–5.3)
SODIUM SERPL-SCNC: 138 MMOL/L (ref 133–144)

## 2019-02-04 PROCEDURE — 25000125 ZZHC RX 250: Performed by: STUDENT IN AN ORGANIZED HEALTH CARE EDUCATION/TRAINING PROGRAM

## 2019-02-04 PROCEDURE — 99207 ZZC NO CHARGE LOS: CPT | Performed by: INTERNAL MEDICINE

## 2019-02-04 PROCEDURE — 36415 COLL VENOUS BLD VENIPUNCTURE: CPT | Performed by: STUDENT IN AN ORGANIZED HEALTH CARE EDUCATION/TRAINING PROGRAM

## 2019-02-04 PROCEDURE — 25000132 ZZH RX MED GY IP 250 OP 250 PS 637: Performed by: STUDENT IN AN ORGANIZED HEALTH CARE EDUCATION/TRAINING PROGRAM

## 2019-02-04 PROCEDURE — 25000128 H RX IP 250 OP 636: Performed by: STUDENT IN AN ORGANIZED HEALTH CARE EDUCATION/TRAINING PROGRAM

## 2019-02-04 PROCEDURE — 97535 SELF CARE MNGMENT TRAINING: CPT | Mod: GO

## 2019-02-04 PROCEDURE — 97165 OT EVAL LOW COMPLEX 30 MIN: CPT | Mod: GO

## 2019-02-04 PROCEDURE — 83735 ASSAY OF MAGNESIUM: CPT | Performed by: STUDENT IN AN ORGANIZED HEALTH CARE EDUCATION/TRAINING PROGRAM

## 2019-02-04 PROCEDURE — 40000133 ZZH STATISTIC OT WARD VISIT

## 2019-02-04 PROCEDURE — 21400000 ZZH R&B CCU UMMC

## 2019-02-04 PROCEDURE — 25000132 ZZH RX MED GY IP 250 OP 250 PS 637: Performed by: HOSPITALIST

## 2019-02-04 PROCEDURE — 99233 SBSQ HOSP IP/OBS HIGH 50: CPT | Mod: GC | Performed by: INTERNAL MEDICINE

## 2019-02-04 PROCEDURE — 80048 BASIC METABOLIC PNL TOTAL CA: CPT | Performed by: STUDENT IN AN ORGANIZED HEALTH CARE EDUCATION/TRAINING PROGRAM

## 2019-02-04 RX ORDER — LANOLIN ALCOHOL/MO/W.PET/CERES
3 CREAM (GRAM) TOPICAL
Status: DISCONTINUED | OUTPATIENT
Start: 2019-02-04 | End: 2019-02-08 | Stop reason: HOSPADM

## 2019-02-04 RX ORDER — POTASSIUM CHLORIDE 1.5 G/1.58G
40 POWDER, FOR SOLUTION ORAL ONCE
Status: COMPLETED | OUTPATIENT
Start: 2019-02-04 | End: 2019-02-04

## 2019-02-04 RX ORDER — FUROSEMIDE 10 MG/ML
40 INJECTION INTRAMUSCULAR; INTRAVENOUS ONCE
Status: COMPLETED | OUTPATIENT
Start: 2019-02-04 | End: 2019-02-04

## 2019-02-04 RX ADMIN — LIDOCAINE HYDROCHLORIDE 5 ML: 20 SOLUTION ORAL; TOPICAL at 19:28

## 2019-02-04 RX ADMIN — FUROSEMIDE 40 MG: 10 INJECTION, SOLUTION INTRAVENOUS at 16:02

## 2019-02-04 RX ADMIN — METOPROLOL SUCCINATE 75 MG: 50 TABLET, EXTENDED RELEASE ORAL at 07:38

## 2019-02-04 RX ADMIN — LIDOCAINE HYDROCHLORIDE 30 ML: 20 SOLUTION ORAL; TOPICAL at 13:21

## 2019-02-04 RX ADMIN — ACETAMINOPHEN 650 MG: 325 TABLET, FILM COATED ORAL at 02:25

## 2019-02-04 RX ADMIN — FUROSEMIDE 40 MG: 10 INJECTION, SOLUTION INTRAVENOUS at 07:37

## 2019-02-04 RX ADMIN — FUROSEMIDE 40 MG: 10 INJECTION, SOLUTION INTRAVENOUS at 18:33

## 2019-02-04 RX ADMIN — LISINOPRIL 20 MG: 20 TABLET ORAL at 07:38

## 2019-02-04 RX ADMIN — POTASSIUM CHLORIDE 40 MEQ: 1.5 POWDER, FOR SOLUTION ORAL at 10:35

## 2019-02-04 RX ADMIN — SPIRONOLACTONE 25 MG: 25 TABLET ORAL at 07:39

## 2019-02-04 RX ADMIN — MELATONIN 3 MG: 3 TAB ORAL at 22:40

## 2019-02-04 ASSESSMENT — ACTIVITIES OF DAILY LIVING (ADL)
IADL_COMMENTS: PT WAS PREVIOUSLY INDEPENDENT WITH IADL COMPLETION
ADLS_ACUITY_SCORE: 13
ADLS_ACUITY_SCORE: 13
ADLS_ACUITY_SCORE: 12
ADLS_ACUITY_SCORE: 13
ADLS_ACUITY_SCORE: 13
PREVIOUS_RESPONSIBILITIES: DRIVING;FINANCES;MEDICATION MANAGEMENT;SHOPPING;LAUNDRY;HOUSEKEEPING;MEAL PREP
ADLS_ACUITY_SCORE: 13

## 2019-02-04 ASSESSMENT — PAIN DESCRIPTION - DESCRIPTORS
DESCRIPTORS: CONSTANT
DESCRIPTORS: ACHING

## 2019-02-04 NOTE — PLAN OF CARE
D: Pt admitted 2/2 with SOB, HF exacerbation. Hx NICM s/p ICD, HTN, bipolar disorder, COPD, chronic back pain, MARV.  I: Monitored vitals and assessed pt status. IV lasix 40mg x1 with fair uop. PRN tylenol.  A: A0x4. VSS on RA. SB/SR on tele. Afebrile. Generalized aching addressed with PRN med and heating pad with relief reported. Pt denied nausea, heartburn, dizziness, SOB overnight. 2L FR reinforced. Up ind to bedside commode. Pt has been tearful intermittently. Pt slept between cares, making needs known.  P: Continue to monitor and report changes/concerns to Cards 1.

## 2019-02-04 NOTE — PROGRESS NOTES
Attending Addendum:    I have seen and examined patient with Dr. Brown and agree with his assessment/plans below.    Briefly, 52 yo with ischemic cardiomyopathy, ICD, recently admitted to [facility] with CHF exacerbation. Had not been taking medications, has been living in her car,  ?substance abuse. Admitted with shortness of breath. Weight 263 lbs, discharge weight from [facility] ~ 250 lbs.   Echo 12/2018: EF 30-35%.  Meds: lasiv 40 mg IV bid, lisinopril 20 every day, metoprolol succinate 75 every day, spironolactone 25 every day  Labs: cr 1.33 (up from 1.14), troponins 0.046 > 0.024,, BNP 18,000  EKG: sinus with PACs, PRWP, no ischemic changes  CXR: CM, single chamber ICD left  On Exam: /70, HR 53, she is lying in bed, flat, comfortable, RRR, no significant edema    Assessment/plans:  Acute on chronic systolic CHF. Likely from medical noncompliance. Will resume all meds, continue to diuresis with IV lasix 40 bid, monitor daily weights, follow creatinine. Would aim for 10 lbs off. Social work needed.    Jessiac Lynch MD        [facility], [place]    Progress Note - Cardiology 1 Service        Date of Admission:  2/2/2019    Assessment & Plan   Gloria Guzman is a 54 y/o female with a history of chronic systolic CHF, NICM s/p ICD, hypertension, bipolar disorder, COPD, chronic back pain, nicotine use disorder, MARV (does not use CPAP) who was admitted on 2/2/2019 with shortness of breath secondary to CHF exacerbation.     Changes today  - Continue Lasix 40 mg IV BID today  - Net neg - 2595cc  - Weight down to 263lbs (presumptive dry weight is 250lbs per Care Everywhere records)  - SW consult - patient would like treatment for chemical dependency (methamphetamines)         -- No insurance on file, [facility] to discuss with patient         -- Wants Rule 25 and discharge to an inpatient CD facility if possible  - Potassium supplementation    # Acute on chronic congestive heart  failure  # Nonischemic cardiomyopathy (dilated cardiomyopathy) EF 30-35% s/p ICD  Had admission to [facility] on 12/26/2018 for CHF exacerbation due to being off medications for ~ 1 month (Lasix 40 mg daily, lisinopril, spironolactone, Lopressor). Discharged after diuresis. Since then, she has been compliant with medications, however, presented again with SOB and orthopnea. D-dimer 0.5. BNP 67209 (2984 on 12/2018), troponin 0.046-> 0.024. EKG sinus, PVCs, nonspecific T wave changes. Appears fluid overloaded on exam (JVD+, bilateral leg swelling+, orthopnea, weight gain). 270 lb today (258 lb in 11/2018). No clear DW. (229 lb in 2017). Last echocardiogram 12/2018 with EF 30-35%. Most likely SOB is secondary to CHF exacerbation. PE is not likely.   - Telemetry  - IV Lasix 40 mg BID (hold PTA lasix 40 mg PO BID)  - Daily weight, Strict I&Os  - 2 g sodium, 2 L fluid restriction  - Continue PTA  Lisinopril 20 mg, spironolactone 25 mg, and metoprolol succinate 75 mg (up from 50 mg) qday    # COPD   No wheezing per exam  - Continue PTA inhalers albuterol    # Lumbago  Assessment: History of prior lumbar fusion  - Tylenol    # Bipolar disorder   No medication    # Nicotine use disorder   Smokes 2 cigarettes/day  - Nicotine patch    # MARV (obstructive sleep apnea)   Not on CPAP     Diet: Fluid restriction 2000 ML FLUID  2 Gram Sodium Diet    DVT Prophylaxis: Pneumatic Compression Devices  Knott Catheter: not present  Code Status: FULL    Disposition Plan   Expected discharge: 2 - 3 days, recommended to chemical dependency program if able once adequate diuresis performed and patient's symptoms improve with weight decrease close to dry weight.  Entered: Akash Brown 02/04/2019, 11:16 AM       The patient's care was discussed with the Attending Physician, Dr. Jessica Lynch.    Akash Brown  Cardiology 1 Service  [facility], [place]  Pager: [phone]  Please see sticky note for cross cover  information  ______________________________________________________________________    Interval History   SPRING; feels slightly better but with some persisting SOB  Nursing notes and interval data reviewed    Data reviewed today: I reviewed all medications, new labs and imaging results over the last 24 hours.    Physical Exam   Vital Signs: Temp: 97.5  F (36.4  C) Temp src: Oral BP: (!) 124/95   Heart Rate: 86 Resp: 20 SpO2: 97 % O2 Device: None (Room air)    Weight: 263 lbs 9.6 oz    General: WD/WN F. AAOx4, NAD. Lying in bed.  HEENT: NC/AT, EOMI, OP clear, uvula midline, No cervical LAD  Neck: Supple, JVD+. No carotid bruits.   CV: RRR, S1--S2. No S3 or S4. No murmurs, clicks or gallops.   Pulm: CTAB with slight bilateral basilar crackle   Abdomen: Soft, non-tender, no organomegaly or ascites.  : No CVA tenderness  MSK: MAR, no cyanosis, clubbing. Trace peripheral ext edema  Neuro: CNII-XII intact bilaterally  Psych: Appropriate mood and affect     Data   Recent Labs   Lab 02/04/19  0608 02/03/19  0646 02/02/19  1500 02/02/19  1031   WBC  --   --   --  11.9*   HGB  --   --   --  12.0   MCV  --   --   --  86   PLT  --   --   --  322   INR  --   --   --  1.36*    139  --  140   POTASSIUM 3.5 3.7  --  4.3   CHLORIDE 105 105  --  107   CO2 25 23  --  24   BUN 41* 42*  --  41*   CR 1.33* 1.14*  --  1.18*   ANIONGAP 8 10  --  9   MADDISON 8.1* 8.4*  --  8.8   * 94  --  129*   ALBUMIN  --   --   --  3.3*   PROTTOTAL  --   --   --  7.8   BILITOTAL  --   --   --  0.8   ALKPHOS  --   --   --  94   ALT  --   --   --  43   AST  --   --   --  32   TROPI  --   --  0.024 0.046*     No results found for this or any previous visit (from the past 24 hour(s)).

## 2019-02-04 NOTE — PLAN OF CARE
OT 6C  Discharge Planner OT   Patient plan for discharge: pt hopeful to discharge to a treatment facility  Current status: Eval complete, treatment indicated. SBA supine<>EOB. SBA sit<>stand. SBA donning bilateral socks while seated EOB. Gave pt CR folder and provided education on contents. Pt declining further therapy or ambulation today stating she wants to wait until she gets her shoes.   Barriers to return to prior living situation: fatigue, SOB, deconditioning, acute medical needs  Recommendations for discharge: Return to prior living arrangement with OP CR  Rationale for recommendations: Pt is primarily independent with ADL completion, however would benefit from continued skilled therapy to increase activity tolerance and exercise endurance.       Entered by: Neha Santana 02/04/2019 10:41 AM
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
## 2019-02-04 NOTE — PROGRESS NOTES
02/04/19 1000   Quick Adds   Type of Visit Initial Occupational Therapy Evaluation   Living Environment   Lives With alone   Living Arrangements homeless   Home Accessibility no concerns   Transportation Anticipated car, drives self   Living Environment Comment Pt getting a divorce from her spouse and has been living in her car.    Self-Care   Usual Activity Tolerance moderate   Current Activity Tolerance fair   Regular Exercise No   Equipment Currently Used at Home none   Activity/Exercise/Self-Care Comment Pt was previously independent with ADL completion   Functional Level   Ambulation 0-->independent   Transferring 0-->independent   Toileting 0-->independent   Bathing 0-->independent   Dressing 0-->independent   Eating 0-->independent   Communication 0-->understands/communicates without difficulty   Cognition 0 - no cognition issues reported   Fall history within last six months no   Number of times patient has fallen within last six months 0   Which of the above functional risks had a recent onset or change? ambulation       Present no   Language english   General Information   Onset of Illness/Injury or Date of Surgery - Date 02/02/19   Referring Physician Yamileth Hilliard MD   Patient/Family Goals Statement To go to a treatment facility   Additional Occupational Profile Info/Pertinent History of Current Problem Gloria Guzman is a 53 y.o. female with a history of chronic systolic CHF, NICM s/p ICD, hypertension, bipolar disorder, COPD, chronic back pain, nicotine use disorder, MARV (does not use CPAP) who was admitted on 2/2/2019 after presenting [facility] the [facility] ED with shortness of breath.   Heart Disease Risk Factors Lack of physical activity;Overweight;Stress;Medical history   General Observations Pt is pleasant and agreeable to therapy   General Info Comments Activityl up ad lesly   Cognitive Status Examination   Orientation orientation to person, place and time   Level of  Consciousness alert   Follows Commands (Cognition) WNL   Memory intact   Attention No deficits were identified   Organization/Problem Solving No deficits were identified   Executive Function No deficits were identified   Cognitive Comment Pt is alert, oriented and generally appropriate in conversation. Will continue to monitor   Visual Perception   Visual Perception No deficits were identified   Sensory Examination   Sensory Comments Pt has bilateral feet numbness and tingling   Integumentary/Edema   Integumentary/Edema Comments Pt has edema in abdomin and face   Range of Motion (ROM)   ROM Comment BUE ROM WFL   Strength   Strength Comments BUE grossly 5/5, however generalized weakness   Hand Strength   Hand Strength Comments Bilateral  strength WNL   Mobility   Bed Mobility Bed mobility skill: Supine to sit;Bed mobility skill: Sit to supine   Bed Mobility Skill: Sit to Supine   Level of [place]: Sit/Supine stand-by assist   Physical Assist/Nonphysical Assist: Sit/Supine supervision   Bed Mobility Skill: Supine to Sit   Level of Wetzel: Supine/Sit stand-by assist   Physical Assist/Nonphysical Assist: Supine/Sit supervision   Transfer Skill: Sit to Stand   Level of Wetzel: Sit/Stand stand-by assist   Physical Assist/Nonphysical Assist: Sit/Stand supervision   Transfer Skill: Sit to Stand full weight-bearing   Lower Body Dressing   Level of Wetzel: Dress Lower Body stand-by assist   Physical Assist/Nonphysical Assist: Dress Lower Body supervision   Instrumental Activities of Daily Living (IADL)   Previous Responsibilities driving;finances;medication management;shopping;laundry;housekeeping;meal prep   IADL Comments Pt was previously independent with IADL completion   Activities of Daily Living Analysis   Impairments Contributing to Impaired Activities of Daily Living strength decreased   General Therapy Interventions   Planned Therapy Interventions ADL retraining;IADL  "retraining;strengthening;home program guidelines;progressive activity/exercise   Clinical Impression   Criteria for Skilled Therapeutic Interventions Met yes, treatment indicated   OT Diagnosis decreased activity tolerance and exercise endurance   Influenced by the following impairments weakness, SOB, fatigue, deconditioning   Assessment of Occupational Performance 1-3 Performance Deficits   Identified Performance Deficits functional mobility, shower, dressing, g/h   Clinical Decision Making (Complexity) Low complexity   Therapy Frequency 5 times/wk   Predicted Duration of Therapy Intervention (days/wks) 2/15/19   Anticipated Equipment Needs at Discharge other (see comments)  (TBD)   Anticipated Discharge Disposition Home   Risks and Benefits of Treatment have been explained. Yes   Patient, Family & other staff in agreement with plan of care Yes   [employer] \"6 Clicks\"   2016, Trustees of [employer], under license to [employer].  All rights reserved.   6 Clicks Short Forms Daily Activity Inpatient Short Form   [employer]  \"6 Clicks\" Daily Activity Inpatient Short Form   1. Putting on and taking off regular lower body clothing? 4 - None   2. Bathing (including washing, rinsing, drying)? 3 - A Little   3. Toileting, which includes using toilet, bedpan or urinal? 4 - None   4. Putting on and taking off regular upper body clothing? 4 - None   5. Taking care of personal grooming such as brushing teeth? 4 - None   6. Eating meals? 4 - None   Daily Activity Raw Score (Score out of 24.Lower scores equate to lower levels of function) 23   Total Evaluation Time   Total Evaluation Time (Minutes) 5     "

## 2019-02-04 NOTE — PLAN OF CARE
D: Pt admitted 2/2 with SOB, HF exacerbation. Hx NICM s/p ICD, HTN, bipolar disorder, COPD, chronic back pain, MARV.  I: Monitored vitals and assessed pt status. IV lasix 40mg x1 with fair uop. Pt c/o mouth soreness (on side of tongue and hoarse voice--team updated, they think this will improve with diuresis. Pt also c/o heartburn, MD notified--waiting for PRN order.   A: A0x4. VSS on RA-- 2L O2 NC placed for pt comfort. SB/SR on tele.  Pt denied nausea, heartburn, dizziness, SOB overnight. 2L FR reinforced. Up ind to bedside commode. Pt has been tearful intermittently. Pt took shower today, worked with therapy (although wants to wait for shoes from friend before ambulating outside room).  P: Continue to monitor and report changes/concerns to Cards 1.

## 2019-02-05 LAB
ANION GAP SERPL CALCULATED.3IONS-SCNC: 10 MMOL/L (ref 3–14)
ANION GAP SERPL CALCULATED.3IONS-SCNC: 7 MMOL/L (ref 3–14)
BUN SERPL-MCNC: 36 MG/DL (ref 7–30)
BUN SERPL-MCNC: 39 MG/DL (ref 7–30)
CALCIUM SERPL-MCNC: 8.3 MG/DL (ref 8.5–10.1)
CALCIUM SERPL-MCNC: 8.6 MG/DL (ref 8.5–10.1)
CHLORIDE SERPL-SCNC: 103 MMOL/L (ref 94–109)
CHLORIDE SERPL-SCNC: 105 MMOL/L (ref 94–109)
CO2 SERPL-SCNC: 24 MMOL/L (ref 20–32)
CO2 SERPL-SCNC: 30 MMOL/L (ref 20–32)
CREAT SERPL-MCNC: 1.17 MG/DL (ref 0.52–1.04)
CREAT SERPL-MCNC: 1.18 MG/DL (ref 0.52–1.04)
ERYTHROCYTE [DISTWIDTH] IN BLOOD BY AUTOMATED COUNT: 14 % (ref 10–15)
FLUAV+FLUBV RNA SPEC QL NAA+PROBE: NEGATIVE
FLUAV+FLUBV RNA SPEC QL NAA+PROBE: NEGATIVE
GFR SERPL CREATININE-BSD FRML MDRD: 52 ML/MIN/{1.73_M2}
GFR SERPL CREATININE-BSD FRML MDRD: 52 ML/MIN/{1.73_M2}
GLUCOSE SERPL-MCNC: 113 MG/DL (ref 70–99)
GLUCOSE SERPL-MCNC: 97 MG/DL (ref 70–99)
HCT VFR BLD AUTO: 42.4 % (ref 35–47)
HGB BLD-MCNC: 12.8 G/DL (ref 11.7–15.7)
MAGNESIUM SERPL-MCNC: 2 MG/DL (ref 1.6–2.3)
MAGNESIUM SERPL-MCNC: 2.3 MG/DL (ref 1.6–2.3)
MCH RBC QN AUTO: 26.2 PG (ref 26.5–33)
MCHC RBC AUTO-ENTMCNC: 30.2 G/DL (ref 31.5–36.5)
MCV RBC AUTO: 87 FL (ref 78–100)
PLATELET # BLD AUTO: 287 10E9/L (ref 150–450)
POTASSIUM SERPL-SCNC: 3.7 MMOL/L (ref 3.4–5.3)
POTASSIUM SERPL-SCNC: 3.9 MMOL/L (ref 3.4–5.3)
RBC # BLD AUTO: 4.88 10E12/L (ref 3.8–5.2)
RSV RNA SPEC NAA+PROBE: NEGATIVE
SODIUM SERPL-SCNC: 139 MMOL/L (ref 133–144)
SODIUM SERPL-SCNC: 140 MMOL/L (ref 133–144)
SPECIMEN SOURCE: NORMAL
WBC # BLD AUTO: 10.3 10E9/L (ref 4–11)

## 2019-02-05 PROCEDURE — 83735 ASSAY OF MAGNESIUM: CPT | Performed by: HOSPITALIST

## 2019-02-05 PROCEDURE — 21400000 ZZH R&B CCU UMMC

## 2019-02-05 PROCEDURE — 25000132 ZZH RX MED GY IP 250 OP 250 PS 637: Performed by: HOSPITALIST

## 2019-02-05 PROCEDURE — 25000128 H RX IP 250 OP 636: Performed by: HOSPITALIST

## 2019-02-05 PROCEDURE — 25000128 H RX IP 250 OP 636: Performed by: STUDENT IN AN ORGANIZED HEALTH CARE EDUCATION/TRAINING PROGRAM

## 2019-02-05 PROCEDURE — 25000132 ZZH RX MED GY IP 250 OP 250 PS 637: Performed by: STUDENT IN AN ORGANIZED HEALTH CARE EDUCATION/TRAINING PROGRAM

## 2019-02-05 PROCEDURE — 25000125 ZZHC RX 250: Performed by: STUDENT IN AN ORGANIZED HEALTH CARE EDUCATION/TRAINING PROGRAM

## 2019-02-05 PROCEDURE — 36415 COLL VENOUS BLD VENIPUNCTURE: CPT | Performed by: STUDENT IN AN ORGANIZED HEALTH CARE EDUCATION/TRAINING PROGRAM

## 2019-02-05 PROCEDURE — 87631 RESP VIRUS 3-5 TARGETS: CPT | Performed by: INTERNAL MEDICINE

## 2019-02-05 PROCEDURE — 99233 SBSQ HOSP IP/OBS HIGH 50: CPT | Mod: GC | Performed by: INTERNAL MEDICINE

## 2019-02-05 PROCEDURE — 85027 COMPLETE CBC AUTOMATED: CPT | Performed by: STUDENT IN AN ORGANIZED HEALTH CARE EDUCATION/TRAINING PROGRAM

## 2019-02-05 PROCEDURE — 83735 ASSAY OF MAGNESIUM: CPT | Performed by: STUDENT IN AN ORGANIZED HEALTH CARE EDUCATION/TRAINING PROGRAM

## 2019-02-05 PROCEDURE — 80048 BASIC METABOLIC PNL TOTAL CA: CPT | Performed by: STUDENT IN AN ORGANIZED HEALTH CARE EDUCATION/TRAINING PROGRAM

## 2019-02-05 PROCEDURE — 80048 BASIC METABOLIC PNL TOTAL CA: CPT | Performed by: HOSPITALIST

## 2019-02-05 PROCEDURE — 36415 COLL VENOUS BLD VENIPUNCTURE: CPT | Performed by: HOSPITALIST

## 2019-02-05 RX ORDER — POTASSIUM CHLORIDE 1.5 G/1.58G
20-40 POWDER, FOR SOLUTION ORAL
Status: DISCONTINUED | OUTPATIENT
Start: 2019-02-05 | End: 2019-02-08 | Stop reason: HOSPADM

## 2019-02-05 RX ORDER — POTASSIUM CHLORIDE 29.8 MG/ML
20 INJECTION INTRAVENOUS
Status: DISCONTINUED | OUTPATIENT
Start: 2019-02-05 | End: 2019-02-08 | Stop reason: HOSPADM

## 2019-02-05 RX ORDER — POTASSIUM CL/LIDO/0.9 % NACL 10MEQ/0.1L
10 INTRAVENOUS SOLUTION, PIGGYBACK (ML) INTRAVENOUS
Status: DISCONTINUED | OUTPATIENT
Start: 2019-02-05 | End: 2019-02-08 | Stop reason: HOSPADM

## 2019-02-05 RX ORDER — FUROSEMIDE 10 MG/ML
80 INJECTION INTRAMUSCULAR; INTRAVENOUS
Status: DISCONTINUED | OUTPATIENT
Start: 2019-02-05 | End: 2019-02-08

## 2019-02-05 RX ORDER — MAGNESIUM SULFATE HEPTAHYDRATE 40 MG/ML
4 INJECTION, SOLUTION INTRAVENOUS EVERY 4 HOURS PRN
Status: DISCONTINUED | OUTPATIENT
Start: 2019-02-05 | End: 2019-02-08 | Stop reason: HOSPADM

## 2019-02-05 RX ORDER — MAGNESIUM SULFATE HEPTAHYDRATE 40 MG/ML
2 INJECTION, SOLUTION INTRAVENOUS DAILY PRN
Status: DISCONTINUED | OUTPATIENT
Start: 2019-02-05 | End: 2019-02-08 | Stop reason: HOSPADM

## 2019-02-05 RX ORDER — POTASSIUM CHLORIDE 750 MG/1
20-40 TABLET, EXTENDED RELEASE ORAL
Status: DISCONTINUED | OUTPATIENT
Start: 2019-02-05 | End: 2019-02-08 | Stop reason: HOSPADM

## 2019-02-05 RX ORDER — FUROSEMIDE 10 MG/ML
40 INJECTION INTRAMUSCULAR; INTRAVENOUS ONCE
Status: COMPLETED | OUTPATIENT
Start: 2019-02-05 | End: 2019-02-05

## 2019-02-05 RX ORDER — POTASSIUM CHLORIDE 7.45 MG/ML
10 INJECTION INTRAVENOUS
Status: DISCONTINUED | OUTPATIENT
Start: 2019-02-05 | End: 2019-02-08 | Stop reason: HOSPADM

## 2019-02-05 RX ADMIN — FUROSEMIDE 80 MG: 10 INJECTION, SOLUTION INTRAVENOUS at 18:17

## 2019-02-05 RX ADMIN — ACETAMINOPHEN 650 MG: 325 TABLET, FILM COATED ORAL at 01:55

## 2019-02-05 RX ADMIN — FUROSEMIDE 40 MG: 10 INJECTION, SOLUTION INTRAVENOUS at 08:02

## 2019-02-05 RX ADMIN — LISINOPRIL 20 MG: 20 TABLET ORAL at 08:08

## 2019-02-05 RX ADMIN — POTASSIUM CHLORIDE 20 MEQ: 750 TABLET, EXTENDED RELEASE ORAL at 20:24

## 2019-02-05 RX ADMIN — LIDOCAINE HYDROCHLORIDE 5 ML: 20 SOLUTION ORAL; TOPICAL at 02:00

## 2019-02-05 RX ADMIN — BENZOCAINE AND MENTHOL 1 LOZENGE: 15; 3.6 LOZENGE ORAL at 16:41

## 2019-02-05 RX ADMIN — POTASSIUM CHLORIDE 20 MEQ: 750 TABLET, EXTENDED RELEASE ORAL at 11:10

## 2019-02-05 RX ADMIN — FUROSEMIDE 80 MG: 10 INJECTION, SOLUTION INTRAVENOUS at 13:06

## 2019-02-05 RX ADMIN — FUROSEMIDE 40 MG: 10 INJECTION, SOLUTION INTRAVENOUS at 10:37

## 2019-02-05 RX ADMIN — BENZOCAINE AND MENTHOL 1 LOZENGE: 15; 3.6 LOZENGE ORAL at 08:58

## 2019-02-05 RX ADMIN — SPIRONOLACTONE 25 MG: 25 TABLET ORAL at 08:08

## 2019-02-05 RX ADMIN — MAGNESIUM SULFATE HEPTAHYDRATE 2 G: 40 INJECTION, SOLUTION INTRAVENOUS at 20:24

## 2019-02-05 RX ADMIN — ONDANSETRON HYDROCHLORIDE 4 MG: 2 INJECTION INTRAMUSCULAR; INTRAVENOUS at 08:58

## 2019-02-05 RX ADMIN — METOPROLOL SUCCINATE 75 MG: 50 TABLET, EXTENDED RELEASE ORAL at 08:08

## 2019-02-05 RX ADMIN — ALBUTEROL SULFATE 2 PUFF: 90 AEROSOL, METERED RESPIRATORY (INHALATION) at 08:47

## 2019-02-05 RX ADMIN — ACETAMINOPHEN 650 MG: 325 TABLET, FILM COATED ORAL at 18:26

## 2019-02-05 RX ADMIN — ALBUTEROL SULFATE 2 PUFF: 90 AEROSOL, METERED RESPIRATORY (INHALATION) at 13:14

## 2019-02-05 RX ADMIN — BENZOCAINE AND MENTHOL 1 LOZENGE: 15; 3.6 LOZENGE ORAL at 13:14

## 2019-02-05 ASSESSMENT — PAIN DESCRIPTION - DESCRIPTORS: DESCRIPTORS: SORE

## 2019-02-05 ASSESSMENT — ACTIVITIES OF DAILY LIVING (ADL)
ADLS_ACUITY_SCORE: 12
ADLS_ACUITY_SCORE: 13

## 2019-02-05 NOTE — PROVIDER NOTIFICATION
"Pt c/o painful, bilateral tounge sores. Verbalized she has been \"waiting all day\" for something for the pain. Called, Cards 1 MD, ascom [phone]. Await orders.  "
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
## 2019-02-05 NOTE — PLAN OF CARE
D-Pt with history of systolic CHF, NICM admitted on 02/02/19 with shortness of breath. BNP on admission 18,264. Frequent cough. Reports shortness of breath. Became frustrated around 2200. Verbalizes concern about her dog (someone is taking care of him), frustration with being in the hospital. Requesting ambien.  I-Diuresing with IV lasix. Received extra dose this shift per order. Obtained order for melatonin from cardiology cross cover MD, Cecilia Bowling. Provided lavender essential oil inhaler to promote relaxation.  A-Voided 250cc since receiving extra dose of lasix. Total uo this shift 750 ml.  P-Continue with current poc. Monitor fluid status and labs.

## 2019-02-05 NOTE — PLAN OF CARE
OT 6C. Cancel. Upon AM attempt, pt declining therapy stating she isn't feeling well and has been nauseas. Will reschedule per POC.

## 2019-02-05 NOTE — PLAN OF CARE
D/I/A:  Pt here with HF exacerbation, hx NICM s/p ICD, HTN, bipolar, COPD, chronic back pain, MARV, meth use.  Pt with c/o mouth and generalized pain overnight-tylenol and lidocaine mouth swabs with some relief.  Intermittent wheezes.    P:  Discharge pending fluid status.  SW following to help with placement in an inpt treatment facility.

## 2019-02-06 ENCOUNTER — APPOINTMENT (OUTPATIENT)
Dept: OCCUPATIONAL THERAPY | Facility: CLINIC | Age: 54
End: 2019-02-06
Payer: COMMERCIAL

## 2019-02-06 LAB
ANION GAP SERPL CALCULATED.3IONS-SCNC: 8 MMOL/L (ref 3–14)
ANION GAP SERPL CALCULATED.3IONS-SCNC: 8 MMOL/L (ref 3–14)
BUN SERPL-MCNC: 31 MG/DL (ref 7–30)
BUN SERPL-MCNC: 31 MG/DL (ref 7–30)
CALCIUM SERPL-MCNC: 8 MG/DL (ref 8.5–10.1)
CALCIUM SERPL-MCNC: 8.4 MG/DL (ref 8.5–10.1)
CHLORIDE SERPL-SCNC: 101 MMOL/L (ref 94–109)
CHLORIDE SERPL-SCNC: 103 MMOL/L (ref 94–109)
CO2 SERPL-SCNC: 28 MMOL/L (ref 20–32)
CO2 SERPL-SCNC: 30 MMOL/L (ref 20–32)
CREAT SERPL-MCNC: 1.11 MG/DL (ref 0.52–1.04)
CREAT SERPL-MCNC: 1.13 MG/DL (ref 0.52–1.04)
GFR SERPL CREATININE-BSD FRML MDRD: 55 ML/MIN/{1.73_M2}
GFR SERPL CREATININE-BSD FRML MDRD: 56 ML/MIN/{1.73_M2}
GLUCOSE SERPL-MCNC: 114 MG/DL (ref 70–99)
GLUCOSE SERPL-MCNC: 114 MG/DL (ref 70–99)
MAGNESIUM SERPL-MCNC: 2.4 MG/DL (ref 1.6–2.3)
POTASSIUM SERPL-SCNC: 3.4 MMOL/L (ref 3.4–5.3)
POTASSIUM SERPL-SCNC: 4.1 MMOL/L (ref 3.4–5.3)
SODIUM SERPL-SCNC: 139 MMOL/L (ref 133–144)
SODIUM SERPL-SCNC: 140 MMOL/L (ref 133–144)

## 2019-02-06 PROCEDURE — 25000132 ZZH RX MED GY IP 250 OP 250 PS 637: Performed by: STUDENT IN AN ORGANIZED HEALTH CARE EDUCATION/TRAINING PROGRAM

## 2019-02-06 PROCEDURE — 83735 ASSAY OF MAGNESIUM: CPT | Performed by: STUDENT IN AN ORGANIZED HEALTH CARE EDUCATION/TRAINING PROGRAM

## 2019-02-06 PROCEDURE — 97110 THERAPEUTIC EXERCISES: CPT | Mod: GO

## 2019-02-06 PROCEDURE — 80048 BASIC METABOLIC PNL TOTAL CA: CPT | Performed by: STUDENT IN AN ORGANIZED HEALTH CARE EDUCATION/TRAINING PROGRAM

## 2019-02-06 PROCEDURE — 25000128 H RX IP 250 OP 636: Performed by: HOSPITALIST

## 2019-02-06 PROCEDURE — 21400000 ZZH R&B CCU UMMC

## 2019-02-06 PROCEDURE — 25000128 H RX IP 250 OP 636: Performed by: STUDENT IN AN ORGANIZED HEALTH CARE EDUCATION/TRAINING PROGRAM

## 2019-02-06 PROCEDURE — 25000132 ZZH RX MED GY IP 250 OP 250 PS 637: Performed by: HOSPITALIST

## 2019-02-06 PROCEDURE — 40000133 ZZH STATISTIC OT WARD VISIT

## 2019-02-06 PROCEDURE — 36415 COLL VENOUS BLD VENIPUNCTURE: CPT | Performed by: HOSPITALIST

## 2019-02-06 PROCEDURE — 99233 SBSQ HOSP IP/OBS HIGH 50: CPT | Mod: GC | Performed by: INTERNAL MEDICINE

## 2019-02-06 PROCEDURE — 80048 BASIC METABOLIC PNL TOTAL CA: CPT | Performed by: HOSPITALIST

## 2019-02-06 PROCEDURE — 97535 SELF CARE MNGMENT TRAINING: CPT | Mod: GO

## 2019-02-06 PROCEDURE — 36415 COLL VENOUS BLD VENIPUNCTURE: CPT | Performed by: STUDENT IN AN ORGANIZED HEALTH CARE EDUCATION/TRAINING PROGRAM

## 2019-02-06 RX ADMIN — FUROSEMIDE 80 MG: 10 INJECTION, SOLUTION INTRAVENOUS at 08:28

## 2019-02-06 RX ADMIN — ACETAMINOPHEN 650 MG: 325 TABLET, FILM COATED ORAL at 18:36

## 2019-02-06 RX ADMIN — FUROSEMIDE 80 MG: 10 INJECTION, SOLUTION INTRAVENOUS at 13:02

## 2019-02-06 RX ADMIN — MELATONIN 3 MG: 3 TAB ORAL at 20:10

## 2019-02-06 RX ADMIN — LISINOPRIL 20 MG: 20 TABLET ORAL at 08:28

## 2019-02-06 RX ADMIN — ONDANSETRON HYDROCHLORIDE 4 MG: 2 INJECTION INTRAMUSCULAR; INTRAVENOUS at 11:37

## 2019-02-06 RX ADMIN — SPIRONOLACTONE 25 MG: 25 TABLET ORAL at 08:28

## 2019-02-06 RX ADMIN — METOPROLOL SUCCINATE 75 MG: 50 TABLET, EXTENDED RELEASE ORAL at 08:28

## 2019-02-06 RX ADMIN — POTASSIUM CHLORIDE 20 MEQ: 750 TABLET, EXTENDED RELEASE ORAL at 08:29

## 2019-02-06 RX ADMIN — FUROSEMIDE 80 MG: 10 INJECTION, SOLUTION INTRAVENOUS at 17:21

## 2019-02-06 ASSESSMENT — ACTIVITIES OF DAILY LIVING (ADL)
ADLS_ACUITY_SCORE: 12

## 2019-02-06 NOTE — PLAN OF CARE
D: Admitted 2/2 for HF exacerbation. PMH: NICM s/p ICD, HTN, bipolar, COPD, chronic back pain, meth/nicotene abuse, MARV    I: Monitored vitals and assessed patient status. Continues to diurese on 80 mg IV lasix TID.  PRN: tylenol    A: VSS. A0x4. SR/terese. Afebrile. Urinating adequately. Up independently.    P: Anticipate discharge to chem dep treatment when medically stable. Continue to assess and monitor, notify Cards 1 with concerns.    Hours cared for: [phone]

## 2019-02-06 NOTE — PLAN OF CARE
VSS on RA. Denies pain. 1x episode emesis after eating lunch. Labile behavior (agitated, sob, itchiness, tearful and yells out she wants to leave, pleasant to this RN throughout and apologizing for mood, which has been short lasted), patient describes episodes as panic attacks, team notified. Continues with IV diuresing (lasix) with good UO. Cards 1 primary, will continue POC.

## 2019-02-06 NOTE — PLAN OF CARE
Vss.  Monitor shows sb-sr.  Voiding well.  Weight down this am.  Slept.  Droplet precautions discontinued as rsv and flu were negative.  Will monitor and notify md of changes or issues.

## 2019-02-06 NOTE — PLAN OF CARE
Discharge Planner OT   Patient plan for discharge: Not discussed this session.  Current status: Pt supine inclined in bed upon arrival. Pt required SBA and vc's for functional transfers and ambulation ~650ft with few standing rest breaks. Pt completed 12 minutes of Nustep exercise with fair tolerance. VSS.   Barriers to return to prior living situation: Decreased endurance/strength, Fatigue, SOB.   Recommendations for discharge: home/chem dep. with OP CR  Rationale for recommendations: Pt will benefit from continued therapy to maximize cardiovascular endurance/strength.        Entered by: Akash Levine 02/06/2019 4:53 PM

## 2019-02-06 NOTE — PROGRESS NOTES
"Social Work Services Progress Note     Hospital Day: 3  Date of Initial Social Work Evaluation:  2/3/19  Collaborated with:  [facility] team     Data: Pt is a 53 year old female being followed by [facility] for discharge planning to inpatient chemical health treatment.     Intervention: SW received call [facility] team did not get pt's referral for placement.  SW re-sent referral packet for a mobile assessment.  SW will also try to assist with starting chemical health placement referrals.  Determination will be dependent on assessment outcome.    Addendum: ELMER received a call from  Don.  Don states that pt does not qualify at this time for a chemical health assessment as she looks \"too complex\" medically and her psychosocial situation is a concern for getting the screening done.  ELMER plans to call [facility] to appeal this decision as pt is medically stable and warrants a mobile assessment.     - Referrals in Process:    [facility] Rule 25/Chemical Health assessment  PH: [phone]  F: [phone]     Assessment: Pt in agreement with a chemical health assessment.     Plan:    Anticipated Disposition: Facility:  Rehabilitation facility for chemical health as indicated by .    Barriers to d/c plan: medical stability, chemical health assessment.    Follow Up: SW to follow for discharge planning.     MARVA Romero, APSW  6C Unit   Phone: [phone]  Pager: [phone]  Unit: [phone]      "
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
## 2019-02-07 ENCOUNTER — APPOINTMENT (OUTPATIENT)
Dept: OCCUPATIONAL THERAPY | Facility: CLINIC | Age: 54
End: 2019-02-07
Payer: COMMERCIAL

## 2019-02-07 LAB
ANION GAP SERPL CALCULATED.3IONS-SCNC: 5 MMOL/L (ref 3–14)
ANION GAP SERPL CALCULATED.3IONS-SCNC: 7 MMOL/L (ref 3–14)
BUN SERPL-MCNC: 31 MG/DL (ref 7–30)
BUN SERPL-MCNC: 34 MG/DL (ref 7–30)
CALCIUM SERPL-MCNC: 8.4 MG/DL (ref 8.5–10.1)
CALCIUM SERPL-MCNC: 8.8 MG/DL (ref 8.5–10.1)
CHLORIDE SERPL-SCNC: 101 MMOL/L (ref 94–109)
CHLORIDE SERPL-SCNC: 103 MMOL/L (ref 94–109)
CO2 SERPL-SCNC: 32 MMOL/L (ref 20–32)
CO2 SERPL-SCNC: 32 MMOL/L (ref 20–32)
CREAT SERPL-MCNC: 1.08 MG/DL (ref 0.52–1.04)
CREAT SERPL-MCNC: 1.12 MG/DL (ref 0.52–1.04)
GFR SERPL CREATININE-BSD FRML MDRD: 55 ML/MIN/{1.73_M2}
GFR SERPL CREATININE-BSD FRML MDRD: 58 ML/MIN/{1.73_M2}
GLUCOSE SERPL-MCNC: 106 MG/DL (ref 70–99)
GLUCOSE SERPL-MCNC: 122 MG/DL (ref 70–99)
MAGNESIUM SERPL-MCNC: 2.2 MG/DL (ref 1.6–2.3)
POTASSIUM SERPL-SCNC: 3.6 MMOL/L (ref 3.4–5.3)
POTASSIUM SERPL-SCNC: 3.6 MMOL/L (ref 3.4–5.3)
SODIUM SERPL-SCNC: 139 MMOL/L (ref 133–144)
SODIUM SERPL-SCNC: 141 MMOL/L (ref 133–144)

## 2019-02-07 PROCEDURE — 21400000 ZZH R&B CCU UMMC

## 2019-02-07 PROCEDURE — 97110 THERAPEUTIC EXERCISES: CPT | Mod: GO

## 2019-02-07 PROCEDURE — 36415 COLL VENOUS BLD VENIPUNCTURE: CPT | Performed by: STUDENT IN AN ORGANIZED HEALTH CARE EDUCATION/TRAINING PROGRAM

## 2019-02-07 PROCEDURE — 40000133 ZZH STATISTIC OT WARD VISIT

## 2019-02-07 PROCEDURE — 25000132 ZZH RX MED GY IP 250 OP 250 PS 637: Performed by: STUDENT IN AN ORGANIZED HEALTH CARE EDUCATION/TRAINING PROGRAM

## 2019-02-07 PROCEDURE — 80048 BASIC METABOLIC PNL TOTAL CA: CPT | Performed by: HOSPITALIST

## 2019-02-07 PROCEDURE — 25000128 H RX IP 250 OP 636: Performed by: HOSPITALIST

## 2019-02-07 PROCEDURE — 80048 BASIC METABOLIC PNL TOTAL CA: CPT | Performed by: STUDENT IN AN ORGANIZED HEALTH CARE EDUCATION/TRAINING PROGRAM

## 2019-02-07 PROCEDURE — 83735 ASSAY OF MAGNESIUM: CPT | Performed by: STUDENT IN AN ORGANIZED HEALTH CARE EDUCATION/TRAINING PROGRAM

## 2019-02-07 PROCEDURE — 25000132 ZZH RX MED GY IP 250 OP 250 PS 637: Performed by: HOSPITALIST

## 2019-02-07 PROCEDURE — 99233 SBSQ HOSP IP/OBS HIGH 50: CPT | Mod: GC | Performed by: INTERNAL MEDICINE

## 2019-02-07 RX ADMIN — SPIRONOLACTONE 25 MG: 25 TABLET ORAL at 09:31

## 2019-02-07 RX ADMIN — FUROSEMIDE 80 MG: 10 INJECTION, SOLUTION INTRAVENOUS at 09:30

## 2019-02-07 RX ADMIN — POTASSIUM CHLORIDE 20 MEQ: 750 TABLET, EXTENDED RELEASE ORAL at 20:08

## 2019-02-07 RX ADMIN — POTASSIUM CHLORIDE 20 MEQ: 750 TABLET, EXTENDED RELEASE ORAL at 09:32

## 2019-02-07 RX ADMIN — FUROSEMIDE 80 MG: 10 INJECTION, SOLUTION INTRAVENOUS at 18:35

## 2019-02-07 RX ADMIN — SALINE NASAL SPRAY 1 SPRAY: 1.5 SOLUTION NASAL at 20:08

## 2019-02-07 RX ADMIN — SALINE NASAL SPRAY 1 SPRAY: 1.5 SOLUTION NASAL at 14:14

## 2019-02-07 RX ADMIN — SALINE NASAL SPRAY 1 SPRAY: 1.5 SOLUTION NASAL at 18:35

## 2019-02-07 RX ADMIN — MELATONIN 3 MG: 3 TAB ORAL at 21:49

## 2019-02-07 RX ADMIN — LISINOPRIL 20 MG: 20 TABLET ORAL at 09:33

## 2019-02-07 RX ADMIN — METOPROLOL SUCCINATE 75 MG: 50 TABLET, EXTENDED RELEASE ORAL at 09:32

## 2019-02-07 RX ADMIN — FUROSEMIDE 80 MG: 10 INJECTION, SOLUTION INTRAVENOUS at 11:37

## 2019-02-07 ASSESSMENT — ACTIVITIES OF DAILY LIVING (ADL)
ADLS_ACUITY_SCORE: 13
ADLS_ACUITY_SCORE: 12
ADLS_ACUITY_SCORE: 13
ADLS_ACUITY_SCORE: 13

## 2019-02-07 ASSESSMENT — PAIN DESCRIPTION - DESCRIPTORS
DESCRIPTORS: ACHING;SORE
DESCRIPTORS: ACHING;SORE
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
## 2019-02-07 NOTE — PLAN OF CARE
AVSS.  A&Ox4.  Denies discomfort/SOB.  Up independently to void(see I&O).  SB/SR.  Son at bedside.  Pt stable, slept in NAD.  Continue to follow fluid status/response to diuretics/labs.  Anticipate discharge to Rehab/treatment facility once placement determined.  SW following.  Notify Cards 1 team with any issues/concerns.

## 2019-02-07 NOTE — PLAN OF CARE
"OT/6C:   Discharge Planner OT   Patient plan for discharge: Did not discuss.   Current status: Patient ambulating ~650 feet around unit with SBA. Reporting increased pain with ambulation due to \"cracks\" on heels. Attempting Nu-step to facilitate increased aerobic activity tolerance, but patient becoming impatient with inability to find comfortable position for heels and requesting to discontinue. Pre-activity: /92, HR 63. Post-activity: /79, HR 64.   Barriers to return to prior living situation: Medical Status, Activity Tolerance  Recommendations for discharge: Home/Chem dependency with OP CR.   Rationale for recommendations: Patient would benefit from continued inpatient therapies to progress aerobic activity tolerance for ADLs.        Entered by: Maria Alejandra Uriostegui 02/07/2019 4:13 PM       "

## 2019-02-08 VITALS
WEIGHT: 250.1 LBS | OXYGEN SATURATION: 97 % | TEMPERATURE: 97.6 F | RESPIRATION RATE: 18 BRPM | BODY MASS INDEX: 39.77 KG/M2 | SYSTOLIC BLOOD PRESSURE: 108 MMHG | DIASTOLIC BLOOD PRESSURE: 75 MMHG | HEART RATE: 72 BPM

## 2019-02-08 PROBLEM — G89.29 CHRONIC PAIN: Status: ACTIVE | Noted: 2019-02-08

## 2019-02-08 LAB
ANION GAP SERPL CALCULATED.3IONS-SCNC: 6 MMOL/L (ref 3–14)
BUN SERPL-MCNC: 31 MG/DL (ref 7–30)
CALCIUM SERPL-MCNC: 9.2 MG/DL (ref 8.5–10.1)
CHLORIDE SERPL-SCNC: 102 MMOL/L (ref 94–109)
CO2 SERPL-SCNC: 31 MMOL/L (ref 20–32)
CREAT SERPL-MCNC: 1.15 MG/DL (ref 0.52–1.04)
GFR SERPL CREATININE-BSD FRML MDRD: 53 ML/MIN/{1.73_M2}
GLUCOSE SERPL-MCNC: 113 MG/DL (ref 70–99)
MAGNESIUM SERPL-MCNC: 2.4 MG/DL (ref 1.6–2.3)
POTASSIUM SERPL-SCNC: 4 MMOL/L (ref 3.4–5.3)
SODIUM SERPL-SCNC: 139 MMOL/L (ref 133–144)

## 2019-02-08 PROCEDURE — 80048 BASIC METABOLIC PNL TOTAL CA: CPT | Performed by: STUDENT IN AN ORGANIZED HEALTH CARE EDUCATION/TRAINING PROGRAM

## 2019-02-08 PROCEDURE — 83735 ASSAY OF MAGNESIUM: CPT | Performed by: STUDENT IN AN ORGANIZED HEALTH CARE EDUCATION/TRAINING PROGRAM

## 2019-02-08 PROCEDURE — 25000132 ZZH RX MED GY IP 250 OP 250 PS 637: Performed by: PHYSICIAN ASSISTANT

## 2019-02-08 PROCEDURE — 99238 HOSP IP/OBS DSCHRG MGMT 30/<: CPT | Mod: GC | Performed by: INTERNAL MEDICINE

## 2019-02-08 PROCEDURE — 25000132 ZZH RX MED GY IP 250 OP 250 PS 637: Performed by: STUDENT IN AN ORGANIZED HEALTH CARE EDUCATION/TRAINING PROGRAM

## 2019-02-08 PROCEDURE — 36415 COLL VENOUS BLD VENIPUNCTURE: CPT | Performed by: STUDENT IN AN ORGANIZED HEALTH CARE EDUCATION/TRAINING PROGRAM

## 2019-02-08 PROCEDURE — 25000128 H RX IP 250 OP 636: Performed by: HOSPITALIST

## 2019-02-08 RX ORDER — SPIRONOLACTONE 25 MG/1
25 TABLET ORAL EVERY MORNING
Qty: 30 TABLET | Refills: 1 | Status: SHIPPED | OUTPATIENT
Start: 2019-02-09 | End: 2019-06-13

## 2019-02-08 RX ORDER — ALBUTEROL SULFATE 90 UG/1
AEROSOL, METERED RESPIRATORY (INHALATION)
Qty: 8.5 G | Refills: 1 | Status: SHIPPED | OUTPATIENT
Start: 2019-02-08 | End: 2019-05-07

## 2019-02-08 RX ORDER — FUROSEMIDE 40 MG
40 TABLET ORAL
Qty: 60 TABLET | Refills: 1 | Status: SHIPPED | OUTPATIENT
Start: 2019-02-08 | End: 2019-02-27

## 2019-02-08 RX ORDER — METOPROLOL SUCCINATE 25 MG/1
75 TABLET, EXTENDED RELEASE ORAL DAILY
Qty: 90 TABLET | Refills: 1 | Status: SHIPPED | OUTPATIENT
Start: 2019-02-09 | End: 2019-06-10

## 2019-02-08 RX ORDER — FUROSEMIDE 40 MG
40 TABLET ORAL
Status: DISCONTINUED | OUTPATIENT
Start: 2019-02-08 | End: 2019-02-08 | Stop reason: HOSPADM

## 2019-02-08 RX ORDER — LISINOPRIL 20 MG/1
20 TABLET ORAL DAILY
Qty: 30 TABLET | Refills: 1 | Status: SHIPPED | OUTPATIENT
Start: 2019-02-09 | End: 2019-07-26

## 2019-02-08 RX ORDER — GABAPENTIN 400 MG/1
400 CAPSULE ORAL 3 TIMES DAILY PRN
Qty: 30 CAPSULE | Refills: 1 | Status: SHIPPED | OUTPATIENT
Start: 2019-02-08 | End: 2019-02-27

## 2019-02-08 RX ADMIN — SPIRONOLACTONE 25 MG: 25 TABLET ORAL at 09:12

## 2019-02-08 RX ADMIN — FUROSEMIDE 40 MG: 40 TABLET ORAL at 10:30

## 2019-02-08 RX ADMIN — FUROSEMIDE 80 MG: 10 INJECTION, SOLUTION INTRAVENOUS at 09:12

## 2019-02-08 RX ADMIN — METOPROLOL SUCCINATE 75 MG: 50 TABLET, EXTENDED RELEASE ORAL at 09:12

## 2019-02-08 RX ADMIN — LISINOPRIL 20 MG: 20 TABLET ORAL at 09:12

## 2019-02-08 ASSESSMENT — ACTIVITIES OF DAILY LIVING (ADL)
ADLS_ACUITY_SCORE: 13
ADLS_ACUITY_SCORE: 12

## 2019-02-08 NOTE — PLAN OF CARE
"D/A/I:  Patient A&O x4, denied palpitations, dizziness, and nausea.  Had MARQUEZ, lung sounds clear to auscultation, no abnormal heart sounds noted.  Had hoarse voice and wanted fluids, was reminded of fluid restriction and given indication of allowance periodically during day.  Saline nasal spray ordered and used by patient to decrease dry sinuses.  Had +1 edema in ankles, was given scheduled furosemide.  Patient had significant urine output, see I&O flowsheet.  Had cracks in skin, see PCS, utilized Sween cream to aid in moisturization.  Patient refused to wear identification and allergy bracelets, stated that they itched her skin and she would take them off immediately if they were placed on her.  In sinus rhythm with occasional PACs and PVCs, HR 60s-70s, SBP 120s-130s, SaO2 97-98% on room air.  Potassium replaced per protocol.  Reported chronic low back pain, but declined interventions.  Ambulated in room independently with steady gait.  Son stayed in room, at times sleeping in same bed as patient.  Son took patient off unit several times during shift, admitted that patient smoked during that time, usually \"only a few puffs, then she puts out the cigarette.\"  Offered patient nicotine gum and/or patches, which she declined.  She stated that she is slowly smoking less and less.    P:  Continue to monitor pain, VS, heart rhythm, skin integrity, fluid status, cardiac and respiratory status.  Notify care team of changes in patient condition or other concerns.  Expected to discharge to [facility] rehabilitation facility once placement is confirmed.  "

## 2019-02-08 NOTE — PROGRESS NOTES
DISCHARGE                     2/8/19  ----------------------------------------------------------------------------  Discharged to: home  Via: Automobile  Accompanied by: significant other    Discharge Instructions: Reviewed post-hospital discharge diet, activity, and medication instructions, as well as reasons to contact MD (ie. Shortness of breath, chest pain, etc). Pt verbalized understanding.    Prescriptions: Rx's were filled at [facility], per pt's request; medication list reviewed, questions answered, list sent with pt.    Follow Up Appointments: Patient aware of upcoming appointment with [facility] on 2/12/19 at 10 am, as set up by ELMER, for assessment for chemical dependency treatment, which patient is motivated to take part in. Patient also has a follow up appointment with the pain clinic later today.    Belongings: All sent with pt  IV: removed  Telemetry: taken off    Pt verbalized understanding of discharge instructions and all questions were answered. Patient is ready for discharge.    Discharge Paperwork: Signed, copied, and sent home with patient.       Radha Jurado RN  Cardiology

## 2019-02-08 NOTE — PLAN OF CARE
[id number]: VSS on RA. Admitted for CHF exacerbation.   Neuro: A&Ox4. No deficits.  Cardiac: NSR, HR 60s. Denies chest pain.  Resp: RA, denies SOB.  GI/: Voiding adequate amounts, No BM.  Diet/Appetite: 2gm Na diet with 2L FR, education given on evenings. Good appetite.  Skin: No deficits.  Access: L) PIV SL  Drains: none  Activity: up ad lesly  Pain: denies    Will continue with plan of care and notify MD of any changes.

## 2019-02-08 NOTE — CONSULTS
"53 year old female presenting with CHF exacerbation. CORE consult requested. Gloria is currently admitted with CHF    Gloria was provided with a CHF book.  I reviewed the importance of daily weights, 2 gm sodium diet, 2L fluid restriction and compliance with medications upon hospital discharge.  CORE contact phone numbers provided and patient is encouraged to call with any questions or concerns, including any weight gain or loss of 2 or more pounds in 24 hours or 5 or more pounds in 1 week.      Appointment made in CORE clinic on  at 4:30.  I will follow-up with the patient at that time, sooner if needed.  Thank you for the consult.    Tamika Doyle RN  Cardiology Care Coordinator - [facility]  Questions and [phone]  First press #1 for the [place] and then press #3 for \"Medical Advise\" to reach us Cardiology Nurses.   "
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
## 2019-02-08 NOTE — DISCHARGE INSTRUCTIONS
You are scheduled for a chemical health assessment at [facility] on Tuesday February 12th at 10:00 am.  Please check in at [address] [facility] [address] [place] [address].

## 2019-02-08 NOTE — PROGRESS NOTES
"Social Work Services Progress Note     Hospital Day: 5  Date of Initial Social Work Evaluation:  2/3/19  Collaborated with:  [facility] team - Mobile  [place].     Data: Pt is a 53 year old female being followed by SW for discharge planning to her car as pt is needing to discharge today.     Intervention: SW received message from nursing team that the pt is needing to discharge today as she has a pain clinic appointment and a social work appointment for her SSDI application.      SW called [place] mobile  to discuss pt's denial for the assessment.  SW was told the 's review yesterday was incorrect and they will be following up with pt to ensure she gets an assessment and access to treatment.  Director confirmed that because pt admits to use, there is no \"threshold\" or \"frequency of use\" that bars a person from receiving an assessment for treatment.    SW made an appointment for pt with [facility] on Tuesday February 12th at 10:00 am.  SW apologized to pt that her assessment was delayed.  Pt tearful and thankful for getting the referral as she was afraid she would not get help.     - Referrals in Process:    [facility] Rule 25/Chemical Health assessment  PH: [phone]  F: [phone]     Assessment: Pt very anxious and tearful when told she was getting an appointment with [facility].  Pt was anxious about not getting help and was thankful for getting an outpatient appointment so quickly.  Pt states she is very motivated for treatment and has a friend who will bring her to her assessment appointment.  Appointment was added to pt's discharge orders.     Plan:    Anticipated Disposition: Home with services    Barriers to d/c plan: None    Follow Up: No other SW needs identified.  Will send hand off to PCP clinic to ensure pt had access to chemical health services.     MARVA Romero, APSW  [address] Unit   Phone: [phone]  Pager: " [phone]  Unit: [phone]

## 2019-02-08 NOTE — PLAN OF CARE
Occupational Therapy Discharge Summary    Reason for therapy discharge:    Discharged to home with outpatient therapy.    Progress towards therapy goal(s). [facility] for goal details.  Goals partially met.  Barriers to achieving goals:   discharge from facility.    Therapy recommendation(s):    Continued therapy is recommended.  Rationale/Recommendations:  Recommend OP CR Phase II to increase activity tolerance and exercise endurance.

## 2019-02-11 ENCOUNTER — PATIENT OUTREACH (OUTPATIENT)
Dept: CARE COORDINATION | Facility: CLINIC | Age: 54
End: 2019-02-11

## 2019-02-11 ENCOUNTER — DOCUMENTATION ONLY (OUTPATIENT)
Dept: CARE COORDINATION | Facility: CLINIC | Age: 54
End: 2019-02-11

## 2019-02-11 ASSESSMENT — ACTIVITIES OF DAILY LIVING (ADL): DEPENDENT_IADLS:: INDEPENDENT
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
## 2019-02-12 ENCOUNTER — TELEPHONE (OUTPATIENT)
Dept: FAMILY MEDICINE | Facility: CLINIC | Age: 54
End: 2019-02-12

## 2019-02-22 DIAGNOSIS — I42.8 NONISCHEMIC CARDIOMYOPATHY (H): Primary | ICD-10-CM

## 2019-02-24 ASSESSMENT — ENCOUNTER SYMPTOMS
COUGH: 1
COUGH DISTURBING SLEEP: 1
POLYPHAGIA: 0
SYNCOPE: 0
CHILLS: 1
DECREASED APPETITE: 0
SPUTUM PRODUCTION: 1
HYPERTENSION: 1
MUSCLE WEAKNESS: 0
BACK PAIN: 1
HOARSE VOICE: 1
SINUS PAIN: 1
HEMOPTYSIS: 0
HYPOTENSION: 0
MYALGIAS: 1
SLEEP DISTURBANCES DUE TO BREATHING: 0
MUSCLE CRAMPS: 1
ARTHRALGIAS: 0
NECK MASS: 0
STIFFNESS: 0
WEIGHT LOSS: 0
SNORES LOUDLY: 0
POSTURAL DYSPNEA: 1
LEG PAIN: 0
SHORTNESS OF BREATH: 1
FATIGUE: 1
SORE THROAT: 0
HALLUCINATIONS: 0
WEIGHT GAIN: 1
DYSPNEA ON EXERTION: 0
ORTHOPNEA: 1
SINUS CONGESTION: 1
EXERCISE INTOLERANCE: 0
ALTERED TEMPERATURE REGULATION: 1
SMELL DISTURBANCE: 0
LIGHT-HEADEDNESS: 0
INCREASED ENERGY: 1
TASTE DISTURBANCE: 0
NIGHT SWEATS: 1
PALPITATIONS: 1
WHEEZING: 0
JOINT SWELLING: 0
FEVER: 0
NECK PAIN: 1
TROUBLE SWALLOWING: 0
POLYDIPSIA: 1

## 2019-02-26 ENCOUNTER — TELEPHONE (OUTPATIENT)
Dept: FAMILY MEDICINE | Facility: CLINIC | Age: 54
End: 2019-02-26

## 2019-02-26 NOTE — TELEPHONE ENCOUNTER
Panel Management Review      Patient has the following on her problem list:     Hypertension   Last three blood pressure readings:  BP Readings from Last 3 Encounters:   02/08/19 108/75   11/08/18 164/86   02/16/18 132/86     Blood pressure: Passed    HTN Guidelines:  Age 18-59 BP range:  Less than 140/90  Age 60-85 with Diabetes:  Less than 140/90  Age 60-85 without Diabetes:  less than 150/90      Composite cancer screening  Chart review shows that this patient is due/due soon for the following Pap Smear, Mammogram and Colonoscopy  Summary:    Patient is due/failing the following:   COLONOSCOPY, MAMMOGRAM, PAP and PHYSICAL    Action needed:   Patient needs office visit for physical.    Type of outreach:    Sent [employer] message.    Questions for provider review:    None                                                                                                                                    Shireen Pickett MA       Chart routed to none .

## 2019-02-27 ENCOUNTER — OFFICE VISIT (OUTPATIENT)
Dept: CARDIOLOGY | Facility: CLINIC | Age: 54
End: 2019-02-27
Attending: NURSE PRACTITIONER
Payer: COMMERCIAL

## 2019-02-27 VITALS
DIASTOLIC BLOOD PRESSURE: 79 MMHG | HEIGHT: 69 IN | SYSTOLIC BLOOD PRESSURE: 121 MMHG | HEART RATE: 70 BPM | WEIGHT: 256.4 LBS | OXYGEN SATURATION: 98 % | BODY MASS INDEX: 37.98 KG/M2

## 2019-02-27 DIAGNOSIS — Z91.148 NONCOMPLIANCE WITH MEDICATION REGIMEN: ICD-10-CM

## 2019-02-27 DIAGNOSIS — F19.10 POLYSUBSTANCE ABUSE (H): ICD-10-CM

## 2019-02-27 DIAGNOSIS — Z91.119 DIETARY NONCOMPLIANCE: ICD-10-CM

## 2019-02-27 DIAGNOSIS — I42.8 NONISCHEMIC CARDIOMYOPATHY (H): ICD-10-CM

## 2019-02-27 DIAGNOSIS — M54.9 CHRONIC BILATERAL BACK PAIN, UNSPECIFIED BACK LOCATION: ICD-10-CM

## 2019-02-27 DIAGNOSIS — F31.12 BIPOLAR AFFECTIVE DISORDER, CURRENTLY MANIC, MODERATE (H): ICD-10-CM

## 2019-02-27 DIAGNOSIS — I50.23 ACUTE ON CHRONIC SYSTOLIC CONGESTIVE HEART FAILURE (H): Primary | ICD-10-CM

## 2019-02-27 DIAGNOSIS — G47.33 OSA (OBSTRUCTIVE SLEEP APNEA): ICD-10-CM

## 2019-02-27 DIAGNOSIS — I42.8 NICM (NONISCHEMIC CARDIOMYOPATHY) (H): ICD-10-CM

## 2019-02-27 DIAGNOSIS — G89.29 CHRONIC BILATERAL BACK PAIN, UNSPECIFIED BACK LOCATION: ICD-10-CM

## 2019-02-27 LAB
ANION GAP SERPL CALCULATED.3IONS-SCNC: 9 MMOL/L (ref 3–14)
BUN SERPL-MCNC: 69 MG/DL (ref 7–30)
CALCIUM SERPL-MCNC: 8.8 MG/DL (ref 8.5–10.1)
CHLORIDE SERPL-SCNC: 107 MMOL/L (ref 94–109)
CO2 SERPL-SCNC: 21 MMOL/L (ref 20–32)
CREAT SERPL-MCNC: 1.57 MG/DL (ref 0.52–1.04)
GFR SERPL CREATININE-BSD FRML MDRD: 37 ML/MIN/{1.73_M2}
GLUCOSE SERPL-MCNC: 94 MG/DL (ref 70–99)
NT-PROBNP SERPL-MCNC: 6585 PG/ML (ref 0–125)
POTASSIUM SERPL-SCNC: 4.6 MMOL/L (ref 3.4–5.3)
SODIUM SERPL-SCNC: 137 MMOL/L (ref 133–144)

## 2019-02-27 PROCEDURE — 36415 COLL VENOUS BLD VENIPUNCTURE: CPT | Performed by: NURSE PRACTITIONER

## 2019-02-27 PROCEDURE — G0463 HOSPITAL OUTPT CLINIC VISIT: HCPCS | Mod: ZF

## 2019-02-27 PROCEDURE — 83880 ASSAY OF NATRIURETIC PEPTIDE: CPT | Performed by: NURSE PRACTITIONER

## 2019-02-27 PROCEDURE — 80048 BASIC METABOLIC PNL TOTAL CA: CPT | Performed by: NURSE PRACTITIONER

## 2019-02-27 PROCEDURE — 99215 OFFICE O/P EST HI 40 MIN: CPT | Mod: ZP | Performed by: NURSE PRACTITIONER

## 2019-02-27 PROCEDURE — 25000132 ZZH RX MED GY IP 250 OP 250 PS 637: Mod: ZF | Performed by: NURSE PRACTITIONER

## 2019-02-27 RX ORDER — TORSEMIDE 20 MG/1
20 TABLET ORAL 2 TIMES DAILY
Qty: 60 TABLET | Refills: 3 | Status: SHIPPED | OUTPATIENT
Start: 2019-02-27 | End: 2019-10-27

## 2019-02-27 RX ORDER — METOLAZONE 2.5 MG/1
2.5 TABLET ORAL ONCE
Status: COMPLETED | OUTPATIENT
Start: 2019-02-27 | End: 2019-02-27

## 2019-02-27 RX ADMIN — METOLAZONE 2.5 MG: 2.5 TABLET ORAL at 17:28

## 2019-02-27 ASSESSMENT — MIFFLIN-ST. JEOR: SCORE: 1822.4

## 2019-02-27 ASSESSMENT — PAIN SCALES - GENERAL: PAINLEVEL: EXTREME PAIN (8)

## 2019-02-27 NOTE — NURSING NOTE
Diet: Patient instructed regarding a heart healthy diet, including discussion of reduced fat and sodium intake. Patient demonstrated understanding of this information and agreed to call with further questions or concerns.  Labs: Patient was given results of the laboratory testing obtained today. Patient was instructed to return for the next laboratory testing in 2 weeks. Patient demonstrated understanding of this information and agreed to call with further questions or concerns.   New Medication: Patient was educated regarding newly prescribed medication, including discussion of  the indication, administration, side effects, and when to report to MD or RN. Patient demonstrated understanding of this information and agreed to call with further questions or concerns.  Return Appointment: Patient given instructions regarding scheduling next clinic visit. Patient demonstrated understanding of this information and agreed to call with further questions or concerns.  Medication Change: Patient was educated regarding prescribed medication change, including discussion of the indication, administration, side effects, and when to report to MD or RN. Patient demonstrated understanding of this information and agreed to call with further questions or concerns.  Patient stated she understood all health information given and agreed to call with further questions or concerns.   Patient was given 2.5 mg Metolazone in clinic. We reviewed indications and side effects.   Brittany Guillen RN

## 2019-02-27 NOTE — PROGRESS NOTES
HPI: Gloria is a 53 year old female with a past medical history of NICM with an EF of 30-35%, HTN, bipolar disorder, multiple hospitalizations for acute decompensated HF due to medication noncompliance, (last hospitalization at [facility] in December), nicotine abuse, marijuana abuse, COPD, chronic back pain, and MARV (noncompliant with CPAP), who returns for a post hospitalization clinic visit.     She was hospitalized from 2/2/19-2/8/19 for an acute heart failure exacerbation.  She was diuresed with IV Lasix three times daily.  She lost 17 lbs. Admit weight was 270 lbs.  Her discharge weight was 253 lbs.     Since her hospitalization, she feels better, but is starting to retain fluid.  She is able to walk 1/2 block before getting SOB. She doesn't feel that her lasix is working anymore.  She is noticing more abdominal distension and isn't urinating as much.  She isn't following her dietary or fluid restrictions.  She doesn't have a scale and hasn't been weighing herself.  She knows she has gained weight because her pants are tight. I asked what her weights were prior, and she emphatically stated that we are not to discuss her weights out loud. She is extremely overwhelmed and was tearful in clinic because she can't understand why her heart is bad, per her report.     She denies SOB at rest.  She has been taking Ibuprofen by the handfuls due to uncontrolled back pain.  Her primary MD retired, and she hasn't established care with a new doctor.  She doesn't want narcotics for her pain and doesn't want to have another surgery but admits that she can't live with this pain anymore.     Currently, she admits to liking all drugs, but only smoking cigarettes and marijuana.  She denies alcohol abuse.  When I asked her if she was doing meth, she admitted she likes meth and cocaine, but wasn't using currently.  She then asked if I knew of anyone who was selling cocaine she could buy from, for which I replied no.    She is not  being treated for her bipolar disorder because she doesn't like how she feels on drugs.  She feels emotionless and numb, which she doesn't care for  She admits that she has been manic lately and participates in repetitious activity like painting to help her focus and calm down.     She doesn't like hospitals and states that she won't come to frequent appointments and would like to streamline her care to a couple of providers, or she will be noncompliant and not show up.     PAST MEDICAL HISTORY:  Past Medical History:   Diagnosis Date     Bipolar affective disorder, current episode moderate (H)      Chronic back pain      Chronic systolic CHF (congestive heart failure) (H)      COPD (chronic obstructive pulmonary disease) (H)      ETOH abuse      HTN (hypertension)      Marijuana abuse      Nonischemic cardiomyopathy (H)     EF 19% by CMRI     Obesity      Polysubstance abuse (H)     tobacco, previous cocaine, meth, and alcohol, and marijuana     Sleep apnea      Tobacco abuse        FAMILY HISTORY:  Family History   Problem Relation Age of Onset     Breast Cancer Maternal Grandmother      Breast Cancer Maternal Aunt      Cancer Father 42        lung      Cancer Maternal Grandfather         lung      Cancer Other         maternal cousin lung      Gallbladder Disease Mother      Gallbladder Disease Sister        SOCIAL HISTORY:  Social History     Socioeconomic History     Marital status: Legally      Spouse name:  Not on file     Number of children: Not on file     Years of education: Not on file     Highest education level: Not on file   Occupational History     Not on file   Social Needs     Financial resource strain: Not on file     Food insecurity:     Worry: Not on file     Inability: Not on file     Transportation needs:     Medical: Not on file     Non-medical: Not on file   Tobacco Use     Smoking status: Current Some Day Smoker     Smokeless tobacco: Never Used   Substance and Sexual Activity      Alcohol use: No     Drug use: No     Sexual activity: No   Lifestyle     Physical activity:     Days per week: Not on file     Minutes per session: Not on file     Stress: Not on file   Relationships     Social connections:     Talks on phone: Not on file     Gets together: Not on file     Attends Christian service: Not on file     Active member of club or organization: Not on file     Attends meetings of clubs or organizations: Not on file     Relationship status: Not on file     Intimate partner violence:     Fear of current or ex partner: Not on file     Emotionally abused: Not on file     Physically abused: Not on file     Forced sexual activity: Not on file   Other Topics Concern     Parent/sibling w/ CABG, MI or angioplasty before 65F 55M? Not Asked   Social History Narrative     Not on file       CURRENT MEDICATIONS:  Current Outpatient Medications   Medication Sig Dispense Refill     albuterol (PROAIR HFA) 108 (90 Base) MCG/ACT inhaler INHALE 2 PUFFS BY MOUTH EVERY 4 HOURS AS NEEDED FOR SHORTNESS OF BREATH AND WHEEZING 8.5 g 1     furosemide (LASIX) 40 MG tablet Take 1 tablet (40 mg) by mouth 2 times daily 60 tablet 1     gabapentin (NEURONTIN) 400 MG capsule Take 1 capsule (400 mg) by mouth 3 times daily as needed (pain) 30 capsule 1     lisinopril (PRINIVIL/ZESTRIL) 20 MG tablet Take 1 tablet (20 mg) by mouth daily 30 tablet 1     metoprolol succinate ER (TOPROL-XL) 25 MG 24 hr tablet Take 3 tablets (75 mg) by mouth daily 90 tablet 1     spironolactone (ALDACTONE) 25 MG tablet Take 1 tablet (25 mg) by mouth every morning 30 tablet 1       ROS:  Answers for HPI/ROS submitted by the patient on 2/24/2019   General Symptoms: Yes  Skin Symptoms: No  HENT Symptoms: Yes  EYE SYMPTOMS: No  HEART SYMPTOMS: Yes  LUNG SYMPTOMS: Yes  INTESTINAL SYMPTOMS: No  URINARY SYMPTOMS: No  GYNECOLOGIC SYMPTOMS: No  BREAST SYMPTOMS: No  SKELETAL SYMPTOMS: Yes  BLOOD SYMPTOMS: No  NERVOUS SYSTEM SYMPTOMS: No  MENTAL HEALTH  "SYMPTOMS: No  Fever: No  Loss of appetite: No  Weight loss: No  Weight gain: Yes  Fatigue: Yes  Night sweats: Yes  Chills: Yes  Increased stress: Yes  Excessive hunger: No  Excessive thirst: Yes  Feeling hot or cold when others believe the temperature is normal: Yes  Loss of height: No  Post-operative complications: No  Surgical site pain: No  Hallucinations: No  Change in or Loss of Energy: Yes  Hyperactivity: No  Confusion: No  Ear pain: Yes  Ear discharge: No  Hearing loss: No  Tinnitus: No  Nosebleeds: No  Congestion: Yes  Sinus pain: Yes  Trouble swallowing: No   Voice hoarseness: Yes  Mouth sores: Yes  Sore throat: No  Tooth pain: No  Gum tenderness: No  Bleeding gums: No  Change in taste: No  Change in sense of smell: No  Dry mouth: Yes  Hearing aid used: No  Neck lump: No  Cough: Yes  Sputum or phlegm: Yes  Coughing up blood: No  Difficulty breating or shortness of breath: Yes  Snoring: No  Wheezing: No  Difficulty breathing on exertion: No  Nighttime Cough: Yes  Difficulty breathing when lying flat: Yes  Chest pain or pressure: No  Fast or irregular heartbeat: Yes  Pain in legs with walking: No  Trouble breathing while lying down: Yes  Fingers or toes appear blue: No  High blood pressure: Yes  Low blood pressure: No  Fainting: No  Murmurs: No  Pacemaker: Yes  Varicose veins: No  Edema or swelling: No  Wake up at night with shortness of breath: No  Light-headedness: No  Exercise intolerance: No  Back pain: Yes  Muscle aches: Yes  Neck pain: Yes  Swollen joints: No  Joint pain: No  Bone pain: Yes  Muscle cramps: Yes  Muscle weakness: No  Joint stiffness: No  Bone fracture: No    EXAM:  /79 (BP Location: Right arm, Patient Position: Chair, Cuff Size: Adult Large)   Pulse 70   Ht 1.753 m (5' 9\")   Wt 116.3 kg (256 lb 6.4 oz)   SpO2 98%   BMI 37.86 kg/m  :  General: alert, teary eyed at times throughout interview.  Appears agitated.     HEENT: normocephalic, atraumatic, anicteric sclera, EOMI, mucosa " moist, no cyanosis.   Neck: neck supple.  No adenopathy, masses, or carotid bruits.  JVP 12-14 cm.   Heart: regular rhythm, normal S1/S2, no murmur, gallop, rub.  Precordium quiet with normal PMI.     Lungs: clear, no rales, ronchi, or wheezing.  No accessory muscle use, respirations unlabored.   Abdomen: Distended, non-tender, bowel sounds present, no hepatomegaly  Extremities: No pitting edema.   No cyanosis.  Extremities warm, 2+ pedal pulses.   Neurological: alert and oriented x 3.  Accelerated speech and affect consistent with manic phase.     Skin:  No ecchymoses, rashes, or clubbing.  Several tattoos noted.    Labs:  CBC RESULTS:  Lab Results   Component Value Date    WBC 10.3 02/05/2019    RBC 4.88 02/05/2019    HGB 12.8 02/05/2019    HCT 42.4 02/05/2019    MCV 87 02/05/2019    MCH 26.2 (L) 02/05/2019    MCHC 30.2 (L) 02/05/2019    RDW 14.0 02/05/2019     02/05/2019       CMP RESULTS:  Lab Results   Component Value Date     02/08/2019    POTASSIUM 4.0 02/08/2019    CHLORIDE 102 02/08/2019    CO2 31 02/08/2019    ANIONGAP 6 02/08/2019     (H) 02/08/2019    BUN 31 (H) 02/08/2019    CR 1.15 (H) 02/08/2019    GFRESTIMATED 53 (L) 02/08/2019    GFRESTBLACK 62 02/08/2019    MADDISON 9.2 02/08/2019    BILITOTAL 0.8 02/02/2019    ALBUMIN 3.3 (L) 02/02/2019    ALKPHOS 94 02/02/2019    ALT 43 02/02/2019    AST 32 02/02/2019        INR RESULTS:  Lab Results   Component Value Date    INR 1.36 (H) 02/02/2019       No components found for: CK  Lab Results   Component Value Date    MAG 2.4 (H) 02/08/2019     No results found for: NTBNP  @BRIEFLABR (dig)@    Most recent echocardiogram 12/26/18 [facility]:  Final Impressions:   1. Moderately increased LV size, mildly increased wall thickness, moderately severely reduced global systolic function with an estimated EF of 30 - 35%.   2. Moderately enlarged left atrium.   3. Moderately enlarged right atrium.   4. Grade 2 pattern of LV diastolic filling.   5. Right  ventricular cavity size is mildly enlarged, global systolic RV function is mildly reduced.   6. The mitral valve is normal, mild mitral regurgitation.   7. Increased left atrial pressure.   8. Mildly increased estimated pulmonary pressures by tricuspid regurgitation velocity and right atrial pressure (38 mmHg plus RAP).    Comparison  Compared to prior exam report of 2/27/17:  The left ventricular function has decreased. LV size is increased.    Assessment and Plan:    In summary, Gloria is a 53 year old female with NICM who appears hypervolemic today.  I have given her a dose of Metolazone 2.5 mg in addition to discontinuing her Lasix and starting Torsemide 20 mg twice daily.      We went through a typical day's diet which was high in salt.  (pancakes, guillermo, hot dogs, chips). She loves to eat guillermo daily and cooks most of her food in guillermo grease. I recommended a dietician referral which she declined. I recommended that she stay within her 2 Liters of fluid and 2000 mg of sodium and asked her to balance high sodium foods with lower sodium food options to start.      In regards to her back pain, I suggested she re-establish care with a primary MD.  I put in a referral for her.  She would like to consolidate her appointments as much as possible to ensure compliance.  She will repeat labs and return to [facility] within two weeks.  We will try and arrange her primary MD appointment at that time as well.    She has agreed to using [employer] to track her weights closely.  She is also requesting a handicap sticker, which I agreed to sign and give to her today.     1.  Chronic systolic heart failure secondary to NICM.  Stage C  NYHA Class IIIA  ACEi/ARB: yes, lisinopril 20 mg daily.   BB: yes, Toprol XL 75 mg daily.   Aldosterone antagonist: yes, spironolactone 25 mg daily.   SCD prophylaxis: ICD  Fluid status: hypervolemic  Anticoagulation: none.  Antiplatelet:  ASA dose   Sleep apnea: Documented MARV.  Refuses CPAP.  NSAID  use:  Contraindicated.  Avoid use. Discussed with patient at length today.  Remote Monitoring: set up with [facility]ocom once she has stable housing.     2.  Polysubstance abuse: Doesn't appear motivated to abstain at this time. Not using meth, cocaine, or alcohol.  Using tobacco and marijuana. I did review the effects meth, cocaine, and alcohol have on heart function.  I recommended refraining, but she wasn't interested in doing at this time.  It is unclear whether or not she enrolled in chem dep.  She had an appt scheduled for 2/12.  I didn't approach the subject today as I was trying to establish rapport with her, but will readdress at subsequent visits.     3.  HTN:  Controlled at 121/79.  Continue Lisinopril and Toprol XL.    4.  Uncontrolled back pain:  Using marijuana currently. Recommend to establish care with primary MD and consider pain clinic referral.     5. Bipolar Disorder:  Untreated.  Appears manic today, which she confirmed she was.  Recommend follow up with primary MD for symptom management.  She isn't interested in any prescription medications at this time as she likes herself this way. Boyfriend, on the other hand would prefer that she takes something to level her out.    5.  Medication and dietary noncompliance:  Reinforced importance of complying with recommendations and consequences of consuming over 2 gm of sodium daily.  Declined dietician consult.  Continue to assess at subsequent visit.  Try and simplify regimen to ensure adherence.     6.  Follow-up:  Establish care with Primary MD.  Consider pain referral.  Will defer to primary MD.  [facility] clinic in 1-2 weeks with labs prior.     >45 minutes spent face to face with patient with >50% in counseling, education, and coordination of care        Amber CALLOWAY, CNP  [facility]

## 2019-02-27 NOTE — PATIENT INSTRUCTIONS
"You were seen today in the Cardiovascular Clinic at the [facility].     Cardiology Providers you saw during your visit: Amber CALLOWAY CNP      Follow up and medication changes:    1. Schedule follow up to establish care with a new primary care doctor - call [phone] to schedule. Referral has been placed.    2. STOP taking Lasix.   3. START taking Torsemide 20 mg two times a day  4. Given one time dose of Metolazone 2.5 mg (power diuretic) in clinic.   5. We will set you up with a remote monitoring scale called [employer]  6. Return to clinic in 2 weeks.       Results for DAVID WRIGHT (MRN [id number]) as of 2019 17:04   Ref. Range 2019 16:20   Sodium Latest Ref Range: 133 - 144 mmol/L 137   Potassium Latest Ref Range: 3.4 - 5.3 mmol/L 4.6   Chloride Latest Ref Range: 94 - 109 mmol/L 107   Carbon Dioxide Latest Ref Range: 20 - 32 mmol/L 21   Urea Nitrogen Latest Ref Range: 7 - 30 mg/dL 69 (H)   Creatinine Latest Ref Range: 0.52 - 1.04 mg/dL 1.57 (H)   GFR Estimate Latest Ref Range: >60 mL/min/1.73_m2 37 (L)   GFR Estimate If Black Latest Ref Range: >60 mL/min/1.73_m2 43 (L)   Calcium Latest Ref Range: 8.5 - 10.1 mg/dL 8.8   Anion Gap Latest Ref Range: 3 - 14 mmol/L 9   N-Terminal Pro Bnp Latest Ref Range: 0 - 125 pg/mL 6,585 (H)   Glucose Latest Ref Range: 70 - 99 mg/dL 94         Please limit your fluid intake to 2 L (68 ounces) daily.  2 Liters a day = 8.5 cups, or 68 ounces.  Please limit your salt intake to 2 grams a day or less.     If you gain 2# in 24 hours or 5# in one week call Brittany Guillen RN so we can adjust your medications as needed over the phone.     Please feel free to call me with any questions or concerns.       Brittany Guillen RN CHFN  [facility]  Cardiology Care Coordinator-Heart Failure Clinic     Questions and [phone].   First press #1 for the University and then press #3 for \"Medical Questions\" to reach us " Cardiology Nurses.      On Call Cardiologist for after hours or on weekends: [phone]   option #4 and ask to speak to the on-call Cardiologist. Inform them you are a [facility]/heart failure patient at the [facility].           If you need a medication refill please contact your pharmacy.  Please allow 3 business days for your refill to be completed.  _______________________________________________________  [facility] Cardiomyopathy, Optimization, Rehabilitation, Education   The [facility]IN[facility] is a heart failure specialty clinic within the [facility] where you will work with specialized nurse practitioners dedicated to helping patients with heart failure carefully adjust medications, receive education, and learn who and when to call if symptoms develop. They specialize in helping you better understand your condition, slow the progression of your disease, improve the length and quality of your life, help you detect future heart problems before they become life threatening, and avoid hospitalizations.  As always, thank you for trusting us with your health care needs!

## 2019-02-27 NOTE — LETTER
2/27/2019      RE: Gloria Guzman  [address] [address]       Dear Colleague,    Thank you for the opportunity to participate in the care of your patient, Gloria Guzman, at the [facility]. Please see a copy of my visit note below.    HPI: Gloria is a 53 year old female with a past medical history of NICM with an EF of 30-35%, HTN, bipolar disorder, multiple hospitalizations for acute decompensated HF due to medication noncompliance, (last hospitalization at [facility] in December), nicotine abuse, marijuana abuse, COPD, chronic back pain, and MARV (noncompliant with CPAP), who returns for a post hospitalization clinic visit.     She was hospitalized from 2/2/19-2/8/19 for an acute heart failure exacerbation.  She was diuresed with IV Lasix three times daily.  She lost 17 lbs. Admit weight was 270 lbs.  Her discharge weight was 253 lbs.     Since her hospitalization, she feels better, but is starting to retain fluid.  She is able to walk 1/2 block before getting SOB. She doesn't feel that her lasix is working anymore.  She is noticing more abdominal distension and isn't urinating as much.  She isn't following her dietary or fluid restrictions.  She doesn't have a scale and hasn't been weighing herself.  She knows she has gained weight because her pants are tight. I asked what her weights were prior, and she emphatically stated that we are not to discuss her weights out loud. She is extremely overwhelmed and was tearful in clinic because she can't understand why her heart is bad, per her report.     She denies SOB at rest.  She has been taking Ibuprofen by the handfuls due to uncontrolled back pain.  Her primary MD retired, and she hasn't established care with a new doctor.  She doesn't want narcotics for her pain and doesn't want to have another surgery but admits that she can't live with this pain anymore.     Currently, she admits to  liking all drugs, but only smoking cigarettes and marijuana.  She denies alcohol abuse.  When I asked her if she was doing meth, she admitted she likes meth and cocaine, but wasn't using currently.  She then asked if I knew of anyone who was selling cocaine she could buy from, for which I replied no.    She is not being treated for her bipolar disorder because she doesn't like how she feels on drugs.  She feels emotionless and numb, which she doesn't care for  She admits that she has been manic lately and participates in repetitious activity like painting to help her focus and calm down.     She doesn't like hospitals and states that she won't come to frequent appointments and would like to streamline her care to a couple of providers, or she will be noncompliant and not show up.     PAST MEDICAL HISTORY:  Past Medical History:   Diagnosis Date     Bipolar affective disorder, current episode moderate (H)      Chronic back pain      Chronic systolic CHF (congestive heart failure) (H)      COPD (chronic obstructive pulmonary disease) (H)      ETOH abuse      HTN (hypertension)      Marijuana abuse      Nonischemic cardiomyopathy (H)     EF 19% by CMRI     Obesity      Polysubstance abuse (H)     tobacco, previous cocaine, meth, and alcohol, and marijuana     Sleep apnea      Tobacco abuse        FAMILY HISTORY:  Family History   Problem Relation Age of Onset     Breast Cancer Maternal Grandmother      Breast Cancer Maternal Aunt      Cancer Father 42        lung      Cancer Maternal Grandfather         lung      Cancer Other         maternal cousin lung      Gallbladder Disease Mother      Gallbladder Disease Sister        SOCIAL HISTORY:  Social History     Socioeconomic History     Marital status: Legally      Spouse name:  Not on file     Number of children: Not on file     Years of education: Not on file     Highest education level: Not on file   Occupational History     Not on file   Social Needs      Financial resource strain: Not on file     Food insecurity:     Worry: Not on file     Inability: Not on file     Transportation needs:     Medical: Not on file     Non-medical: Not on file   Tobacco Use     Smoking status: Current Some Day Smoker     Smokeless tobacco: Never Used   Substance and Sexual Activity     Alcohol use: No     Drug use: No     Sexual activity: No   Lifestyle     Physical activity:     Days per week: Not on file     Minutes per session: Not on file     Stress: Not on file   Relationships     Social connections:     Talks on phone: Not on file     Gets together: Not on file     Attends [employer]ism service: Not on file     Active member of club or organization: Not on file     Attends meetings of clubs or organizations: Not on file     Relationship status: Not on file     Intimate partner violence:     Fear of current or ex partner: Not on file     Emotionally abused: Not on file     Physically abused: Not on file     Forced sexual activity: Not on file   Other Topics Concern     Parent/sibling w/ CABG, MI or angioplasty before 65F 55M? Not Asked   Social History Narrative     Not on file       CURRENT MEDICATIONS:  Current Outpatient Medications   Medication Sig Dispense Refill     albuterol (PROAIR HFA) 108 (90 Base) MCG/ACT inhaler INHALE 2 PUFFS BY MOUTH EVERY 4 HOURS AS NEEDED FOR SHORTNESS OF BREATH AND WHEEZING 8.5 g 1     furosemide (LASIX) 40 MG tablet Take 1 tablet (40 mg) by mouth 2 times daily 60 tablet 1     gabapentin (NEURONTIN) 400 MG capsule Take 1 capsule (400 mg) by mouth 3 times daily as needed (pain) 30 capsule 1     lisinopril (PRINIVIL/ZESTRIL) 20 MG tablet Take 1 tablet (20 mg) by mouth daily 30 tablet 1     metoprolol succinate ER (TOPROL-XL) 25 MG 24 hr tablet Take 3 tablets (75 mg) by mouth daily 90 tablet 1     spironolactone (ALDACTONE) 25 MG tablet Take 1 tablet (25 mg) by mouth every morning 30 tablet 1       EXAM:  /79 (BP Location: Right arm, Patient  "Position: Chair, Cuff Size: Adult Large)   Pulse 70   Ht 1.753 m (5' 9\")   Wt 116.3 kg (256 lb 6.4 oz)   SpO2 98%   BMI 37.86 kg/m   :  General: alert, teary eyed at times throughout interview.  Appears agitated.     HEENT: normocephalic, atraumatic, anicteric sclera, EOMI, mucosa moist, no cyanosis.   Neck: neck supple.  No adenopathy, masses, or carotid bruits.  JVP 12-14 cm.   Heart: regular rhythm, normal S1/S2, no murmur, gallop, rub.  Precordium quiet with normal PMI.     Lungs: clear, no rales, ronchi, or wheezing.  No accessory muscle use, respirations unlabored.   Abdomen: Distended, non-tender, bowel sounds present, no hepatomegaly  Extremities: No pitting edema.   No cyanosis.  Extremities warm, 2+ pedal pulses.   Neurological: alert and oriented x 3.  Accelerated speech and affect consistent with manic phase.     Skin:  No ecchymoses, rashes, or clubbing.  Several tattoos noted.    Labs:  CBC RESULTS:  Lab Results   Component Value Date    WBC 10.3 02/05/2019    RBC 4.88 02/05/2019    HGB 12.8 02/05/2019    HCT 42.4 02/05/2019    MCV 87 02/05/2019    MCH 26.2 (L) 02/05/2019    MCHC 30.2 (L) 02/05/2019    RDW 14.0 02/05/2019     02/05/2019       CMP RESULTS:  Lab Results   Component Value Date     02/08/2019    POTASSIUM 4.0 02/08/2019    CHLORIDE 102 02/08/2019    CO2 31 02/08/2019    ANIONGAP 6 02/08/2019     (H) 02/08/2019    BUN 31 (H) 02/08/2019    CR 1.15 (H) 02/08/2019    GFRESTIMATED 53 (L) 02/08/2019    GFRESTBLACK 62 02/08/2019    MADDISON 9.2 02/08/2019    BILITOTAL 0.8 02/02/2019    ALBUMIN 3.3 (L) 02/02/2019    ALKPHOS 94 02/02/2019    ALT 43 02/02/2019    AST 32 02/02/2019        INR RESULTS:  Lab Results   Component Value Date    INR 1.36 (H) 02/02/2019       No components found for: CK  Lab Results   Component Value Date    MAG 2.4 (H) 02/08/2019     No results found for: SHALINI  @BHARGAV (dig)@    Most recent echocardiogram 12/26/18 [facility]:  Final Impressions:   1. " Moderately increased LV size, mildly increased wall thickness, moderately severely reduced global systolic function with an estimated EF of 30 - 35%.   2. Moderately enlarged left atrium.   3. Moderately enlarged right atrium.   4. Grade 2 pattern of LV diastolic filling.   5. Right ventricular cavity size is mildly enlarged, global systolic RV function is mildly reduced.   6. The mitral valve is normal, mild mitral regurgitation.   7. Increased left atrial pressure.   8. Mildly increased estimated pulmonary pressures by tricuspid regurgitation velocity and right atrial pressure (38 mmHg plus RAP).    Comparison  Compared to prior exam report of 2/27/17:  The left ventricular function has decreased. LV size is increased.    Assessment and Plan:    In summary, Gloria is a 53 year old female with NICM who appears hypervolemic today.  I have given her a dose of Metolazone 2.5 mg in addition to discontinuing her Lasix and starting Torsemide 20 mg twice daily.      We went through a typical day's diet which was high in salt.  (pancakes, guillermo, hot dogs, chips). She loves to eat guillermo daily and cooks most of her food in guillermo grease. I recommended a dietician referral which she declined. I recommended that she stay within her 2 Liters of fluid and 2000 mg of sodium and asked her to balance high sodium foods with lower sodium food options to start.      In regards to her back pain, I suggested she re-establish care with a primary MD.  I put in a referral for her.  She would like to consolidate her appointments as much as possible to ensure compliance.  She will repeat labs and return to [facility] within two weeks.  We will try and arrange her primary MD appointment at that time as well.    She has agreed to using [employer] to track her weights closely.  She is also requesting a handicap sticker, which I agreed to sign and give to her today.     1.  Chronic systolic heart failure secondary to NICM.  Stage C  NYHA Class  IIIA  ACEi/ARB: yes, lisinopril 20 mg daily.   BB: yes, Toprol XL 75 mg daily.   Aldosterone antagonist: yes, spironolactone 25 mg daily.   SCD prophylaxis: ICD  Fluid status: hypervolemic  Anticoagulation: none.  Antiplatelet:  ASA dose   Sleep apnea: Documented MARV.  Refuses CPAP.  NSAID use:  Contraindicated.  Avoid use. Discussed with patient at length today.  Remote Monitoring: set up with [facility] once she has stable housing.     2.  Polysubstance abuse: Doesn't appear motivated to abstain at this time. Not using meth, cocaine, or alcohol.  Using tobacco and marijuana. I did review the effects meth, cocaine, and alcohol have on heart function.  I recommended refraining, but she wasn't interested in doing at this time.  It is unclear whether or not she enrolled in [employer] dep.  She had an appt scheduled for 2/12.  I didn't approach the subject today as I was trying to establish rapport with her, but will readdress at subsequent visits.     3.  HTN:  Controlled at 121/79.  Continue Lisinopril and Toprol XL.    4.  Uncontrolled back pain:  Using marijuana currently. Recommend to establish care with primary MD and consider pain clinic referral.     5. Bipolar Disorder:  Untreated.  Appears manic today, which she confirmed she was.  Recommend follow up with primary MD for symptom management.  She isn't interested in any prescription medications at this time as she likes herself this way. Boyfriend, on the other hand would prefer that she takes something to level her out.    5.  Medication and dietary noncompliance:  Reinforced importance of complying with recommendations and consequences of consuming over 2 gm of sodium daily.  Declined dietician consult.  Continue to assess at subsequent visit.  Try and simplify regimen to ensure adherence.     6.  Follow-up:  Establish care with Primary MD.  Consider pain referral.  Will defer to primary MD.  [facility] clinic in 1-2 weeks with labs prior.     >45 minutes spent face to  face with patient with >50% in counseling, education, and coordination of care    Amber CALLOWAY, ROBIN  [facility]

## 2019-02-28 ENCOUNTER — HOSPITAL ENCOUNTER (EMERGENCY)
Facility: CLINIC | Age: 54
Discharge: HOME | End: 2019-02-28
Attending: EMERGENCY MEDICINE | Admitting: EMERGENCY MEDICINE
Payer: COMMERCIAL

## 2019-02-28 VITALS
WEIGHT: 256 LBS | BODY MASS INDEX: 38.8 KG/M2 | OXYGEN SATURATION: 98 % | HEART RATE: 69 BPM | DIASTOLIC BLOOD PRESSURE: 103 MMHG | HEIGHT: 68 IN | SYSTOLIC BLOOD PRESSURE: 163 MMHG

## 2019-02-28 LAB — INTERPRETATION ECG - MUSE: NORMAL

## 2019-02-28 PROCEDURE — 40000268 ZZH STATISTIC NO CHARGES: Performed by: EMERGENCY MEDICINE

## 2019-02-28 PROCEDURE — 93005 ELECTROCARDIOGRAM TRACING: CPT

## 2019-02-28 PROCEDURE — 99283 EMERGENCY DEPT VISIT LOW MDM: CPT

## 2019-02-28 ASSESSMENT — MIFFLIN-ST. JEOR: SCORE: 1804.71

## 2019-02-28 NOTE — ED TRIAGE NOTES
Pt. BIBA after being pulled over by  on way to hospital. New script for torsemide written today by [facility] team. Pt reports SOB, dyspnea, chest pressure, and incontinent after taking med.   Pt. Anxious and tearful upon arrival  Hr 80s   124 systolic   99% on RA   Expiratory wheezes noted  Recent admission for pneumonia   HX COPD and CHF

## 2019-02-28 NOTE — ED NOTES
Bed: ED03  Expected date:   Expected time:   Means of arrival:   Comments:  H 417  53F  Chest Pain/SOB

## 2019-02-28 NOTE — ED NOTES
"Pt. Eloped from ED around 0045. ED MD notified. Pt. Anxious, tearful, inconsolable, slurring words, and labile mood during encounter. Pt. Reports that she needs to go get her dog from the police and isnt sure where her \"friend of a friend\" is that drove her to the ED. I asked the patient to remain in the room while got the MD so we could check her out for her SOB and CP. When I returned to the room patient had left and was let out of the ED and ran out of the hospital per security. Pt. Did NOT have an IV access. [facility] police notified of situation and will follow up if needed.   "

## 2019-02-28 NOTE — ED PROVIDER NOTES
[facility]ERGENCY DEPARTMENT ([facility])  19    History     Chief Complaint   Patient presents with     Shortness of Breath     Chest Pain     HPI  Gloria Guzman is a 53 year old female with a past medical history significant for chronic systolic CHF, NICM s/p ICD, hypertension, bipolar disorder, COPD, chronic back pain, nicotine use disorder, MARV (does not use CPAP) who presents today to the Emergency Department with shortness of breath and chest pain.     Per chart review patient was last hospitalized here from 19-19 with shortness of breath secondary to CHF exacerbation. Patient was diagnosed with acute on chronic congestive heart failure and nonischemic cardiomyopathy. Patient was also scheduled for a chemical health assessment.    I have reviewed the Medications, Allergies, Past Medical and Surgical History, and Social History in the [employer]elligent [employer]print[employer] system.    PAST MEDICAL HISTORY:   Past Medical History:   Diagnosis Date     Anemia      Bipolar affective disorder, current episode moderate (H)      Chronic back pain      Chronic systolic CHF (congestive heart failure) (H)      COPD (chronic obstructive pulmonary disease) (H)      ETOH abuse      GERD (gastroesophageal reflux disease)      Homeless      HTN (hypertension)      Marijuana abuse      Nonischemic cardiomyopathy (H)     EF 19% by CMRI     Obesity      Polysubstance abuse (H)     tobacco, previous cocaine, meth, and alcohol, and marijuana     Sleep apnea      Tobacco abuse        PAST SURGICAL HISTORY:   Past Surgical History:   Procedure Laterality Date     BACK SURGERY        SECTION       GALLBLADDER SURGERY       HERNIA REPAIR       JOINT REPLACEMTN, KNEE RT/LT  11/10    Joint Replacement knee LT     SURGICAL PATHOLOGY EXAM       TONSILLECTOMY         FAMILY HISTORY:   Family History   Problem Relation Age of Onset     Breast Cancer Maternal Grandmother      Breast Cancer Maternal Aunt      Cancer Father 42        lung   "    Cancer Maternal Grandfather         lung      Cancer Other         maternal cousin lung      Gallbladder Disease Mother      Gallbladder Disease Sister        SOCIAL HISTORY:   Social History     Tobacco Use     Smoking status: Current Some Day Smoker     Packs/day: 0.50     Smokeless tobacco: Never Used   Substance Use Topics     Alcohol use: No         Review of Systems   unable    Physical Exam   BP: (!) 163/103  Pulse: 69  Temp: (Pt. refused)  Height: 172.7 cm (5' 8\")  Weight: 116.1 kg (256 lb)  SpO2: 99 %      unable    ED Course        Procedures         EKG at 21:42. Shortness of breath and chest pain at time of ECG.  ECG interpretted by Randy Velazquez MD within 10 minutes of production  NSR at 67 bpm. Left axis deviation.  Normal sinus rhythm. Septal infract, age undetermined. ST & T wave abnormality, consider anterolateral ischemia.  Interpretation: Abnormal ECG       Labs Ordered and Resulted from Time of ED Arrival Up to the Time of Departure from the ED - No data to display         Assessments & Plan (with Medical Decision Making)   Unfortunately, shortly after arrival, the patient eloped.  I never spoke or examined the patient     Per nursing she was feeling panicky and concerned that the police today had taken her dog.  She changed into her clothing and walked out of the ED unnoticed.  PD called to report this.  She had no IV in place.     I have reviewed the nursing notes.    I have reviewed the findings, diagnosis, plan and need for follow up with the patient.       Medication List      There are no discharge medications for this visit.         Final diagnoses:   None     Jonnie HILL, am serving as a trained medical scribe to document services personally performed by Randy Velazquez MD, based on the provider's statements to me.   Randy HILL MD, was physically present and have reviewed and verified the accuracy of this note documented by Jonnie Franklin.    2/28/2019   [facility], [place], EMERGENCY " DEPARTMENT     Marcus, Randy Jones MD  02/28/19 0109

## 2019-03-08 DIAGNOSIS — I50.22 CHRONIC SYSTOLIC CHF (CONGESTIVE HEART FAILURE) (H): Primary | ICD-10-CM

## 2019-03-11 ENCOUNTER — TELEPHONE (OUTPATIENT)
Dept: FAMILY MEDICINE | Facility: CLINIC | Age: 54
End: 2019-03-11

## 2019-03-11 NOTE — TELEPHONE ENCOUNTER
Panel Management Review      Patient has the following on her problem list:     Hypertension   Last three blood pressure readings:  BP Readings from Last 3 Encounters:   02/28/19 (!) 163/103   02/27/19 121/79   02/08/19 108/75     Blood pressure: MONITOR    HTN Guidelines:  Age 18-59 BP range:  Less than 140/90  Age 60-85 with Diabetes:  Less than 140/90  Age 60-85 without Diabetes:  less than 150/90      Composite cancer screening  Chart review shows that this patient is due/due soon for the following Pap Smear, Mammogram and Colonoscopy  Summary:    Patient is due/failing the following:   COLONOSCOPY, MAMMOGRAM and PAP    Action needed:   Patient needs office visit for PAP. and Patient needs referral/order: MAMMO and COLONOSCOPY    Type of outreach:    Sent [employer] message.    Questions for provider review:    None                                                                                                                                    Yazmin Garcia CMA on 3/11/2019 at 5:08 PM       Chart routed to none .

## 2019-04-09 ENCOUNTER — TRANSFERRED RECORDS (OUTPATIENT)
Dept: HEALTH INFORMATION MANAGEMENT | Facility: CLINIC | Age: 54
End: 2019-04-09

## 2019-04-09 ENCOUNTER — MEDICAL CORRESPONDENCE (OUTPATIENT)
Dept: HEALTH INFORMATION MANAGEMENT | Facility: CLINIC | Age: 54
End: 2019-04-09

## 2019-05-07 ENCOUNTER — BEH TREATMENT PLAN (OUTPATIENT)
Dept: BEHAVIORAL HEALTH | Facility: CLINIC | Age: 54
End: 2019-05-07
Attending: FAMILY MEDICINE

## 2019-05-07 ENCOUNTER — HOSPITAL ENCOUNTER (OUTPATIENT)
Dept: BEHAVIORAL HEALTH | Facility: CLINIC | Age: 54
End: 2019-05-07
Attending: FAMILY MEDICINE
Payer: COMMERCIAL

## 2019-05-07 VITALS
WEIGHT: 250 LBS | TEMPERATURE: 97.6 F | DIASTOLIC BLOOD PRESSURE: 99 MMHG | HEART RATE: 73 BPM | BODY MASS INDEX: 37.03 KG/M2 | HEIGHT: 69 IN | SYSTOLIC BLOOD PRESSURE: 126 MMHG

## 2019-05-07 DIAGNOSIS — F19.20 CHEMICAL DEPENDENCY (H): ICD-10-CM

## 2019-05-07 PROCEDURE — 40000007 ZZH STATISTIC ADULT CD FACE TO FACE-NO CHRG

## 2019-05-07 PROCEDURE — 10020000 ZZH LODGING PLUS FACILITY CHARGE ADULT

## 2019-05-07 RX ORDER — LANOLIN ALCOHOL/MO/W.PET/CERES
3 CREAM (GRAM) TOPICAL
COMMUNITY
End: 2020-03-27

## 2019-05-07 RX ORDER — FAMOTIDINE 10 MG
10 TABLET ORAL 2 TIMES DAILY PRN
COMMUNITY
End: 2019-05-17

## 2019-05-07 RX ORDER — OMEGA-3 FATTY ACIDS/FISH OIL 300-1000MG
200 CAPSULE ORAL EVERY 4 HOURS PRN
COMMUNITY
End: 2020-01-09

## 2019-05-07 RX ORDER — BENZOCAINE/MENTHOL 6 MG-10 MG
LOZENGE MUCOUS MEMBRANE 2 TIMES DAILY PRN
COMMUNITY

## 2019-05-07 RX ORDER — ACETAMINOPHEN 160 MG
TABLET,DISINTEGRATING ORAL PRN
COMMUNITY
End: 2019-05-17

## 2019-05-07 RX ORDER — AMITRIPTYLINE HYDROCHLORIDE 150 MG/1
150 TABLET ORAL AT BEDTIME
COMMUNITY
End: 2019-05-17

## 2019-05-07 RX ORDER — TETRAHYDROZOLINE HCL 0.05 %
1 DROPS OPHTHALMIC (EYE) 3 TIMES DAILY
COMMUNITY
End: 2020-03-27

## 2019-05-07 RX ORDER — MENTHOL
LOZENGE MUCOUS MEMBRANE DAILY PRN
COMMUNITY
End: 2020-03-27

## 2019-05-07 RX ORDER — ACETAMINOPHEN 325 MG/1
325-650 TABLET ORAL EVERY 4 HOURS PRN
COMMUNITY
End: 2020-01-09

## 2019-05-07 RX ORDER — FUROSEMIDE 40 MG
40 TABLET ORAL 2 TIMES DAILY
COMMUNITY
End: 2019-07-26

## 2019-05-07 RX ORDER — MULTIPLE VITAMINS W/ MINERALS TAB 9MG-400MCG
1 TAB ORAL DAILY
COMMUNITY
End: 2020-03-27

## 2019-05-07 RX ORDER — GABAPENTIN 400 MG/1
400 CAPSULE ORAL 3 TIMES DAILY
COMMUNITY
End: 2019-05-17

## 2019-05-07 RX ORDER — MAGNESIUM HYDROXIDE/ALUMINUM HYDROXICE/SIMETHICONE 120; 1200; 1200 MG/30ML; MG/30ML; MG/30ML
30 SUSPENSION ORAL EVERY 6 HOURS PRN
COMMUNITY
End: 2020-03-27

## 2019-05-07 RX ORDER — AMOXICILLIN 250 MG
2 CAPSULE ORAL DAILY PRN
COMMUNITY
End: 2020-03-27

## 2019-05-07 RX ORDER — LORATADINE 10 MG/1
10 TABLET ORAL DAILY PRN
COMMUNITY
End: 2020-03-27

## 2019-05-07 ASSESSMENT — ANXIETY QUESTIONNAIRES
2. NOT BEING ABLE TO STOP OR CONTROL WORRYING: NOT AT ALL
4. TROUBLE RELAXING: NOT AT ALL
3. WORRYING TOO MUCH ABOUT DIFFERENT THINGS: NOT AT ALL
6. BECOMING EASILY ANNOYED OR IRRITABLE: SEVERAL DAYS
5. BEING SO RESTLESS THAT IT IS HARD TO SIT STILL: NOT AT ALL
7. FEELING AFRAID AS IF SOMETHING AWFUL MIGHT HAPPEN: NOT AT ALL
GAD7 TOTAL SCORE: 1
1. FEELING NERVOUS, ANXIOUS, OR ON EDGE: NOT AT ALL

## 2019-05-07 ASSESSMENT — PATIENT HEALTH QUESTIONNAIRE - PHQ9: SUM OF ALL RESPONSES TO PHQ QUESTIONS 1-9: 2

## 2019-05-07 ASSESSMENT — PAIN SCALES - GENERAL: PAINLEVEL: MODERATE PAIN (4)

## 2019-05-07 ASSESSMENT — MIFFLIN-ST. JEOR: SCORE: 1793.37

## 2019-05-07 NOTE — PROGRESS NOTES
Name: Gloria HANSEN Winter  Date: 5/7/2019  Medical Record: [id number]    Envelope Number: [id number]    List of Contents (List each item separately in new row):   Aleve tabs; Metoprolol 50 mg tabs; Furosemide 20 mg tabs; 9 Advil-sinus tabs    Admission:  I am responsible for any personal items that are not sent to the safe or pharmacy.  [facility] is not responsible for loss, theft or damage of any property in my possession.      Patient Signature:  ___________________________________________       Date/Time:__________________________    Staff Signature: __________________________________       Date/Time:__________________________    2nd Staff person, if patient is unable/unwilling to sign:      __________________________________________________________       Date/Time: __________________________      Discharge:  [facility] has returned all of my personal belongings:    Patient Signature: ________________________________________     Date/Time: ____________________________________    Staff Signature: ______________________________________     Date/Time:_____________________________________
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
## 2019-05-07 NOTE — PROGRESS NOTES
Initial Services Plan        Service Initiation Date: 5/7/2019    Immediate health and/or safety concerns: No    Identify health and safety concern(s) below and include plan to address:    None Identified    Client issues to be addressed in the first treatment sessions:     Fear of being with strangers  Fear of adjusting to roommate or different environment  Fear of the unknown because she had never been through a substance abuse treatment program before   She would like to learn more about addiction  She would like to work on developing sober coping skills and long-term sober maintenance skills    Treatment suggestions for client during the time between intake (admit date) and completion of the individual treatment plan:     Look for a sober support network, i.e. 12 step, [facility], [facility], etc  Tour the treatment center or outpatient clinic  Introduce yourself to your treatment group. Spend time getting to know your peers  Review your patient or client handbook  Begin working on your treatment goal list    Completed by: GALEN Mena  Date completed: 5/7/2019 at 9:05 AM
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
## 2019-05-07 NOTE — PROGRESS NOTES
Name: Gloria HANSEN Winter  Date: 5/7/2019  Medical Record: [id number]    Envelope Number: [id number]    List of Contents (List each item separately in new row):   Ibuprofen 200mg     Admission:  I am responsible for any personal items that are not sent to the safe or pharmacy.  [facility] is not responsible for loss, theft or damage of any property in my possession.      Patient Signature:  ___________________________________________       Date/Time:__________________________    Staff Signature: __________________________________       Date/Time:__________________________    2nd Staff person, if patient is unable/unwilling to sign:      __________________________________________________________       Date/Time: __________________________      Discharge:  [facility] has returned all of my personal belongings:    Patient Signature: ________________________________________     Date/Time: ____________________________________    Staff Signature: ______________________________________     Date/Time:_____________________________________

## 2019-05-07 NOTE — PROGRESS NOTES
"[facility] Nursing Health Assessment      Vital signs:     BP (!) 126/99 (BP Location: Right arm)   Pulse 73   Temp 97.6  F (36.4  C)   Ht 1.753 m (5' 9\")   Wt 113.4 kg (250 lb)   BMI 36.92 kg/m        Direct admission    Counselor: Taryn  Drug of Choice: Methamphetamine  Last use: 5/3/2019  Home clinic/MD: [facility], Heart doctor, [facility]  Psychiatrist/therapist: none    Medical history/current conditions: Congestive heart failure Pacemaker, internal defibrillator, knee replacement, back surgery, degenerative disc syndrome, sleep apnea    H&P Screen:  H&P within the last 90 days: Yes.  Date: March 2019 Location: [facility]      Mental Health diagnosis: Bipolar, not currently taking any meds  Medication compliant?: yes  Recent sucidal thoughts? no     When? n/a  Current thought of self-harm? no    Plan? n/a    Pain assessment:   Pt. Experiencing pain at this time?  Yes.  Rating on 0-10 scale: (1-10 scale): 6.  Location: back pain  Chronic  Result of: Degenerative disc disease.       Nursing Assessment Summary:  Patient is on diuretics and can develop fluid overload.  Has required IV lasix at home clinic at times    On-going nursing intervention required?   No    Acute care visit recommended: no       "

## 2019-05-08 ENCOUNTER — HOSPITAL ENCOUNTER (OUTPATIENT)
Dept: BEHAVIORAL HEALTH | Facility: CLINIC | Age: 54
End: 2019-05-08
Attending: FAMILY MEDICINE
Payer: COMMERCIAL

## 2019-05-08 PROCEDURE — 10020000 ZZH LODGING PLUS FACILITY CHARGE ADULT

## 2019-05-08 PROCEDURE — H2035 A/D TX PROGRAM, PER HOUR: HCPCS | Mod: HQ

## 2019-05-08 ASSESSMENT — ANXIETY QUESTIONNAIRES: GAD7 TOTAL SCORE: 1

## 2019-05-08 NOTE — PROGRESS NOTES
Name: Gloria HANSEN Winter  Date: 5/8/2019  Medical Record: [id number]    Envelope Number: [id number]    List of Contents (List each item separately in new row):     Deep sea nasal moisturizing spray FOR DESTRUCTION     Admission:  I am responsible for any personal items that are not sent to the safe or pharmacy.  [facility] is not responsible for loss, theft or damage of any property in my possession.      Patient Signature:  ___________________________________________       Date/Time:__________________________    Staff Signature: __________________________________       Date/Time:__________________________    2nd Staff person, if patient is unable/unwilling to sign:      __________________________________________________________       Date/Time: __________________________      Discharge:  [facility] has returned all of my personal belongings:    Patient Signature: ________________________________________     Date/Time: ____________________________________    Staff Signature: ______________________________________     Date/Time:_____________________________________

## 2019-05-09 ENCOUNTER — HOSPITAL ENCOUNTER (OUTPATIENT)
Dept: BEHAVIORAL HEALTH | Facility: CLINIC | Age: 54
End: 2019-05-09
Attending: FAMILY MEDICINE
Payer: COMMERCIAL

## 2019-05-09 LAB — AMPHETAMINES UR QL SCN: POSITIVE

## 2019-05-09 PROCEDURE — 10020000 ZZH LODGING PLUS FACILITY CHARGE ADULT

## 2019-05-09 PROCEDURE — H2035 A/D TX PROGRAM, PER HOUR: HCPCS | Mod: HQ

## 2019-05-09 PROCEDURE — 80307 DRUG TEST PRSMV CHEM ANLYZR: CPT | Performed by: FAMILY MEDICINE

## 2019-05-09 PROCEDURE — H2035 A/D TX PROGRAM, PER HOUR: HCPCS
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
## 2019-05-10 ENCOUNTER — TELEPHONE (OUTPATIENT)
Dept: FAMILY MEDICINE | Facility: CLINIC | Age: 54
End: 2019-05-10

## 2019-05-10 ENCOUNTER — HOSPITAL ENCOUNTER (OUTPATIENT)
Dept: BEHAVIORAL HEALTH | Facility: CLINIC | Age: 54
End: 2019-05-10
Attending: FAMILY MEDICINE
Payer: COMMERCIAL

## 2019-05-10 PROCEDURE — 10020000 ZZH LODGING PLUS FACILITY CHARGE ADULT

## 2019-05-10 PROCEDURE — H2035 A/D TX PROGRAM, PER HOUR: HCPCS

## 2019-05-10 PROCEDURE — H2035 A/D TX PROGRAM, PER HOUR: HCPCS | Mod: HQ
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
## 2019-05-10 NOTE — TELEPHONE ENCOUNTER
Ok I will try to do these requests during her establish care appointment with me, will look then if it's addiction or pain she is needing or perhaps both.     Thanks  Sofia CALLOWAY CNP

## 2019-05-10 NOTE — TELEPHONE ENCOUNTER
Sofia,    It appears to be both from the conversation with Catalina  I believe we have had other pt that are primary here and in lodging plus  Not sure on that protocol    Maddison Hunt RN   [facility]

## 2019-05-10 NOTE — TELEPHONE ENCOUNTER
Just to clarify pt is needing addiction or pain referral? I believe Mary Mccallum is pain only, will route to her to verify this.     Also just to clarify, pt is establishing care here with [facility]? Otherwise people should be seen in [facility] if in LP until they are out.     Thanks  Sofia CALLOWAY CNP

## 2019-05-10 NOTE — PROGRESS NOTES
Patient:  Gloria Guzman    Date: May 10, 2019    Comprehensive Assessment UPDATE        Comprehensive Summary Update and Review  Counselor met with patient on 5/10/2019 and reviewed the Comprehensive Assessment.    The following updates have been made:    Dimension 2: Pt has change in current medical condition: pace maker, defibrillator, and water retention which can become dangerous for patient's health. Patient reports if this happens she needs to go to the ER. Patient reports that she needs to weigh herself daily due to this condition to ensure she is not retaining water. Patient reports that she is taking medications to manage this condition. Patient shared that she must limit her water intake and chews ice as a substitute.    Dimension 3: Patient reports that she is uncertain if she has Bipolar I or II, reports that she is not taking any medications for her mental health condition, and that she hossein with her mood swings by doing arts/crafts and baking. Patient reports that she has a service dog for emotional health that she would like to come visit in the hospital.     Dimension 6:  Pt's living situation has changed and patient is now living with her boyfriend who is 8 months sober.     Kelsey Potts MA, [facility]  5/10/2019

## 2019-05-11 ENCOUNTER — HOSPITAL ENCOUNTER (OUTPATIENT)
Dept: BEHAVIORAL HEALTH | Facility: CLINIC | Age: 54
End: 2019-05-11
Attending: FAMILY MEDICINE
Payer: COMMERCIAL

## 2019-05-11 PROCEDURE — H2035 A/D TX PROGRAM, PER HOUR: HCPCS

## 2019-05-11 PROCEDURE — 10020000 ZZH LODGING PLUS FACILITY CHARGE ADULT

## 2019-05-12 ENCOUNTER — HOSPITAL ENCOUNTER (OUTPATIENT)
Dept: BEHAVIORAL HEALTH | Facility: CLINIC | Age: 54
End: 2019-05-12
Attending: FAMILY MEDICINE
Payer: COMMERCIAL

## 2019-05-12 PROCEDURE — 10020000 ZZH LODGING PLUS FACILITY CHARGE ADULT

## 2019-05-12 PROCEDURE — H2035 A/D TX PROGRAM, PER HOUR: HCPCS

## 2019-05-13 ENCOUNTER — OFFICE VISIT (OUTPATIENT)
Dept: FAMILY MEDICINE | Facility: CLINIC | Age: 54
End: 2019-05-13
Payer: COMMERCIAL

## 2019-05-13 ENCOUNTER — HOSPITAL ENCOUNTER (OUTPATIENT)
Dept: BEHAVIORAL HEALTH | Facility: CLINIC | Age: 54
End: 2019-05-13
Attending: FAMILY MEDICINE
Payer: COMMERCIAL

## 2019-05-13 ENCOUNTER — OFFICE VISIT (OUTPATIENT)
Dept: ADDICTION MEDICINE | Facility: CLINIC | Age: 54
End: 2019-05-13
Payer: COMMERCIAL

## 2019-05-13 VITALS
TEMPERATURE: 97 F | HEART RATE: 85 BPM | OXYGEN SATURATION: 98 % | SYSTOLIC BLOOD PRESSURE: 138 MMHG | BODY MASS INDEX: 37.3 KG/M2 | RESPIRATION RATE: 26 BRPM | DIASTOLIC BLOOD PRESSURE: 84 MMHG | WEIGHT: 252.6 LBS

## 2019-05-13 VITALS
TEMPERATURE: 98.3 F | WEIGHT: 252 LBS | RESPIRATION RATE: 20 BRPM | HEART RATE: 84 BPM | DIASTOLIC BLOOD PRESSURE: 88 MMHG | SYSTOLIC BLOOD PRESSURE: 138 MMHG | BODY MASS INDEX: 37.21 KG/M2 | OXYGEN SATURATION: 98 %

## 2019-05-13 DIAGNOSIS — G89.29 CHRONIC MIDLINE LOW BACK PAIN WITH LEFT-SIDED SCIATICA: ICD-10-CM

## 2019-05-13 DIAGNOSIS — Z12.31 VISIT FOR SCREENING MAMMOGRAM: ICD-10-CM

## 2019-05-13 DIAGNOSIS — M54.42 CHRONIC MIDLINE LOW BACK PAIN WITH LEFT-SIDED SCIATICA: ICD-10-CM

## 2019-05-13 DIAGNOSIS — Z12.11 SCREEN FOR COLON CANCER: ICD-10-CM

## 2019-05-13 DIAGNOSIS — F11.20 UNCOMPLICATED OPIOID DEPENDENCE (H): Primary | ICD-10-CM

## 2019-05-13 DIAGNOSIS — E66.01 MORBID OBESITY (H): ICD-10-CM

## 2019-05-13 DIAGNOSIS — F19.20 CHEMICAL DEPENDENCY (H): Primary | ICD-10-CM

## 2019-05-13 DIAGNOSIS — M51.369 DDD (DEGENERATIVE DISC DISEASE), LUMBAR: ICD-10-CM

## 2019-05-13 DIAGNOSIS — Z79.899 ENCOUNTER FOR LONG-TERM (CURRENT) USE OF HIGH-RISK MEDICATION: ICD-10-CM

## 2019-05-13 PROBLEM — I16.0 HYPERTENSIVE URGENCY: Status: ACTIVE | Noted: 2017-08-15

## 2019-05-13 PROBLEM — D64.9 ANEMIA: Status: ACTIVE | Noted: 2018-12-26

## 2019-05-13 PROCEDURE — H2035 A/D TX PROGRAM, PER HOUR: HCPCS | Mod: HQ

## 2019-05-13 PROCEDURE — 10020000 ZZH LODGING PLUS FACILITY CHARGE ADULT

## 2019-05-13 PROCEDURE — 99214 OFFICE O/P EST MOD 30 MIN: CPT | Performed by: NURSE PRACTITIONER

## 2019-05-13 PROCEDURE — H2035 A/D TX PROGRAM, PER HOUR: HCPCS

## 2019-05-13 PROCEDURE — 99214 OFFICE O/P EST MOD 30 MIN: CPT | Performed by: ANESTHESIOLOGY

## 2019-05-13 RX ORDER — ACETAMINOPHEN 500 MG
1000 TABLET ORAL 3 TIMES DAILY
Qty: 90 TABLET | Refills: 0 | Status: SHIPPED | OUTPATIENT
Start: 2019-05-13 | End: 2020-01-20

## 2019-05-13 RX ORDER — ALBUTEROL SULFATE 90 UG/1
AEROSOL, METERED RESPIRATORY (INHALATION)
Refills: 0 | COMMUNITY
Start: 2018-05-22 | End: 2021-09-02

## 2019-05-13 RX ORDER — OXYCODONE HYDROCHLORIDE 15 MG/1
TABLET ORAL
Refills: 0 | COMMUNITY
Start: 2019-05-03 | End: 2019-05-17

## 2019-05-13 RX ORDER — LISINOPRIL 20 MG/1
TABLET ORAL
Refills: 0 | COMMUNITY
Start: 2019-04-29 | End: 2019-05-17

## 2019-05-13 RX ORDER — PREDNISONE 20 MG/1
40 TABLET ORAL DAILY
Qty: 10 TABLET | Refills: 0 | Status: SHIPPED | OUTPATIENT
Start: 2019-05-13 | End: 2019-07-26

## 2019-05-13 RX ORDER — BUPRENORPHINE AND NALOXONE 8; 2 MG/1; MG/1
1 FILM, SOLUBLE BUCCAL; SUBLINGUAL DAILY
Qty: 15 FILM | Refills: 0 | Status: SHIPPED | OUTPATIENT
Start: 2019-05-13 | End: 2019-07-26

## 2019-05-13 RX ORDER — GABAPENTIN 300 MG/1
CAPSULE ORAL
Refills: 4 | COMMUNITY
Start: 2018-08-03 | End: 2019-05-17

## 2019-05-13 RX ORDER — ACETAMINOPHEN 500 MG
1000 TABLET ORAL 3 TIMES DAILY
Qty: 90 TABLET | Refills: 0 | Status: SHIPPED | OUTPATIENT
Start: 2019-05-13 | End: 2019-05-13

## 2019-05-13 NOTE — PROGRESS NOTES
Met with pt regarding program attendance and speaking to staff. Pt is aware she is not to speak to staff in this manner and offered to apologize to staff. LP evening staff notified.    Met with pt re: wanting to leave after completing 21 days. She reports it is written in our literature. Pt was asked to provide document. Discussed her level of participation would determine type of discharge after 21 days. Pt given initial assignments, staff to complete tx plan.

## 2019-05-13 NOTE — TELEPHONE ENCOUNTER
Please let LP RN know of plan/ update:   you can let her know I referred to pain clinic, increased tylenol and three times a day scheduled  I referred to Ortho to decide on plan for further assessment and if pt should use walker vs other assist, she could walk on her own here but cane or walker might be helpful, also they will need to clear for PT to start either pool or pain PT.     Pt said RN needs to help her set up appointments, she also wants to get her colonoscopy and mammo done while here if possible, our coordinator to assist as needed-Paola said can help with this just SHAHZAD    Let me know if care coordination referral is needed     Thanks   Sofia CALLOWAY CNP

## 2019-05-13 NOTE — PROGRESS NOTES
"SUBJECTIVE:  Gloria Guzman, a 53 year old female, here today for an appointment to establish care and to discuss the following issues:    1. Pain -back and knees bilaterally  Previous surgeries, Bent titanium rods and screws in her back.   Pain 9/10, Hot all the time.   Tylenol  650 mg prn is not cutting it,   Has been on Oxy for 20 years   Being seen at [facility].  Wants to see if she can get something until she gets out of treatment.     Bent rods and was told needs repeat surgery to loosen screws, reports she \"first had surgery back in the day when they  you and I don't want any more, somehow the rds were bent even though titanium\"     Had Xrays not too long ago  pacemeayesenia and cleo, Cannot have MRI     Pain specialist stopped going to the one on [facility] where she was just getting opioids, although she did feel they helped. Recovery for meth, \"never on street corner getting more oxys or anything\" didn't feel she abused them, then reports she did still have pain while on these     LP RN requesting pt to start PT and get a walker since she is using her wheelchair to walk behind right now. Pt says she is able to walk okay but just helps her feel stable    LUMBAR SPINE TWO TO THREE VIEWS   11/8/2018 6:51 PM      HISTORY: Low back pain.     COMPARISON: None.     FINDINGS: Moderate lumbar curve concave to the left. Plates and  pedicle screws in place bilaterally from L4 through the sacrum and  artificial discs at L4-5 and lumbosacral levels. Moderate degenerative  narrowing of L2-3 and L3-4. Nothing clearly acute. Surgical clips  right upper quadrant.                                                                      IMPRESSION: Postoperative and degenerative changes in the lumbar spine  as described. Nothing acute.     COLTEN DOYLE MD    HPI:    Where was your previous physician's office?: [facility]  Physician's Name: Dr Saucedo  Last physical: A few months ago            Past " Medical History:   Diagnosis Date     Anemia      Arthritis      Bipolar affective disorder, current episode moderate (H)      Chronic back pain      Chronic systolic CHF (congestive heart failure) (H)      COPD (chronic obstructive pulmonary disease) (H)      COPD (chronic obstructive pulmonary disease) (H)      ETOH abuse      GERD (gastroesophageal reflux disease)      H/O heart failure      History of posttraumatic stress disorder (PTSD)      Homeless      HTN (hypertension)      Marijuana abuse      Nonischemic cardiomyopathy (H)     EF 19% by [facility]     Obesity      Polysubstance abuse (H)     tobacco, previous cocaine, meth, and alcohol, and marijuana     Sleep apnea      Tobacco abuse        Past Surgical History:   Procedure Laterality Date     BACK SURGERY       CARDIAC SURGERY        SECTION       GALLBLADDER SURGERY       HERNIA REPAIR       JOINT REPLACEMTN, KNEE RT/LT  11/10    Joint Replacement knee LT     SURGICAL PATHOLOGY EXAM       TONSILLECTOMY         Family History   Problem Relation Age of Onset     Breast Cancer Maternal Grandmother      Bipolar Disorder Maternal Grandmother      Substance Abuse Maternal Grandmother      Breast Cancer Maternal Aunt      Cancer Father 42        lung      Substance Abuse Father      Cancer Maternal Grandfather         lung      Bipolar Disorder Maternal Grandfather      Substance Abuse Maternal Grandfather      Cancer Other         maternal cousin lung      Gallbladder Disease Mother      Depression Mother      Bipolar Disorder Mother      Substance Abuse Mother      Gallbladder Disease Sister      Substance Abuse Sister      Substance Abuse Brother        Social History     Tobacco Use     Smoking status: Current Every Day Smoker     Packs/day: 0.50     Types: Cigarettes     Smokeless tobacco: Never Used   Substance Use Topics     Alcohol use: No       ROS:  Constitutional, skin, respiratory, cardiac, GI, , MSK, neuro, allergy, endo and psych  are  normal except as otherwise noted.     OBJECTIVE:    /84   Pulse 85   Temp 97  F (36.1  C) (Oral)   Resp 26   Wt 114.6 kg (252 lb 9.6 oz)   SpO2 98%   BMI 37.30 kg/m      EXAM:  GENERAL: healthy, obese, alert and no distress  NECK: no adenopathy, no asymmetry, masses, or scars and thyroid normal to palpation  RESP: lungs clear to auscultation - no rales, rhonchi or wheezes  CV: regular rate and rhythm, normal S1 S2, no S3 or S4, no murmur, click or rub, no peripheral edema and peripheral pulses strong  ABDOMEN: soft, nontender, no hepatosplenomegaly, no masses and bowel sounds normal  MS: walking with slightly antalgic gait, lower extremities inverted slightly on right and pt appears deconditioned, no masses of knees or effusion and stable joints slightly tender generlized with full rom, muslces appear atrophied quads and overall slightly weak  SKIN: no suspicious lesions or rashes  NEURO: Normal strength and tone, sensory exam grossly normal and mentation intact  Comprehensive back pain exam:  Tenderness of midline low back, paralumbar muslces bilaterally, and left ciataic, Range of motion not limited by pain, Unable to complete strength testing due to knee and balance issues, limited exam due to pain but able to do most things including walking , Lower extremity reflexes within normal limits bilaterally and Lower extremity sensation normal and equal on both sides   PSYCH: mentation appears normal, affect normal/bright    ASSESSMENT/PLAN:    ICD-10-CM    1. Chemical dependency (H) F19.20    2. DDD (degenerative disc disease), lumbar M51.36 ORTHO  REFERRAL     acetaminophen (TYLENOL) 500 MG tablet     DISCONTINUED: acetaminophen (TYLENOL) 500 MG tablet   3. Chronic midline low back pain with left-sided sciatica M54.42 ORTHO  REFERRAL    G89.29 predniSONE (DELTASONE) 20 MG tablet     acetaminophen (TYLENOL) 500 MG tablet     DISCONTINUED: acetaminophen (TYLENOL) 500 MG tablet   4. Morbid  obesity (H) E66.01    5. Screen for colon cancer Z12.11 GASTROENTEROLOGY ADULT REF PROCEDURE ONLY   6. Visit for screening mammogram Z12.31 MA SCREENING DIGITAL BILAT - Future  (s+30)    LP pt new to establish care today  Complex health history as noted above, here to discuss back and knee pain ongoing, history of arthritis and obesity with history of joint replacements and multiple back surgeries with hardware. Pt reports she at one point was told joseph and screws need to be fixed/adjusted and she is not wanting more surgery surgery.   Today increased tylenol to three times a day scheduled and pt has tolerated short course steroid burst will do pred, notify if any concerns    Pt in treatment for meth, previously on opioids, referred to pain clinic.     Referred to ortho to consult please, will need PT once Ortho clears pt to start this, pt will ask if walker would be best amb assist, will request care coordination to help with pt in LP needing specialty care    Return for health maintenance exam and come fasting. Paola coordinating with LP RN for scheduling of preventive colonoscopy and mammo    Consider [facility]       Sofia Posey APRN CNP

## 2019-05-13 NOTE — PROGRESS NOTES
Writer heard patient loudly yelling on the phone in women's lounge. As soon as writer walked into the lounge, patient hung up the phone. Patient became tearful and reported she was worried about her dog not being taken care of. Patient was asked to be mindful of her tone and volume while on the phone as it is disruptive to the unit. Patient apologized. Will continue to monitor for behaviors.    GALEN Atkinson

## 2019-05-13 NOTE — PROGRESS NOTES
[facility] Consultation    Date of visit: 5/13/2019    Reason for consultation:    Gloria Guzman is a 53 year old female who is seen in consultation today at the request of [facility] for addiction to OPIOIDS/ALCOHOL/METHAMPHETAMINE. This is a triage evaluation to determine if she is a candidate for medication assisted treatment and to make interim recommendations to the referring clinician.      Review of [place] Prescription Monitoring Program (): Today I have also reviewed the patient's history of controlled substance use, as provided by [place] licensed pharmacies and prescriber dispensers. YES,  Oxycodone and Gabapentin all prescribed by one provider over the last year.       SUBJECTIVE:                                                    Gloria Guzman is a 53 year old female who presents to clinic today for the following health issues:      - RULE 24 DONE 4/16/2019 - She is in treatment now for methamphetamine addiction and gets out on the 28th.  She plans to do intensive OP treatment after this.   - She lives with her boyfriend who has been clean for one year and will go back and live there.   - Chronic low back pain that radiates down the back of her left leg.  She has had surgery and has rods and hardware in her back. 15 years ago.  She has not seen a neurosurgeon/ortho surgeon for years.    - She is s/p left foot surgery in 2010 and has chronic pain in that foot  - She is s/p left knee TKA in 2013 and she now has pain in her right knee.  She does not have pain in the left now.   - Established care with Sofia Posey CNP  - Has been on Oxycodone for 20 years for chronic low back pain - prescribed by Alli Osborn at [facility] [place]. [facility] He is a pain doc (anesthesiologist).  She has been seeing him for 5 years.   - OXYCODONE 15MG 3-4 TIMES DAILY.  Highest dose was 15mg 6 times/day.  She has been off Oxycodone now for 9 days.  She had  withdrawal but no longer feels sick.   - She cannot take Gabapentin because she sleeps when she is on it. She has tried Elavil in the past and she does not tolerate this either.   - She has tried injections prior to her surgery but they were not helpful  - She cannot have an MRI because she has a pacemaker and defribrillator in place - 5 years.  She does not know when she last had a CT scan.   - She states she has never abused her opioids.       MEDICATIONS FOR PAIN:   GABAPENTIN 400MG TID  TYLENOL PRN  ADVIL PRN  MELATONIN 3MG at bedtime     Status since detox/subuxone initiation:      Patient has been: struggling.      Suboxone induction - TODAY   Intensity:     There has been: moderate craving.      Suboxone Dose: not on yet  Progression of Symptoms:     Cues to use and relapse triggers: none    Recovery program has been: active.  Accompanying Signs & Symptoms:    Side Effects: not on yet.   Sobriety:     Status: no use since last visit.     Drug Screen: obtained.    Precipitating factors:    Triggers have been: non-existent.   Alleviating factors:    Contact with sponsor has been: no sponsor.     Family and support system has been: neutral.       CD ASSESMENT:   DRUG USE HX: see RULE 25      HISTORY OF OVERDOSE-denies    OPIOID USE DISORDER - CRITERIA  she admits to taking larger amounts than initially intended  she admits to unsuccessful efforts to cut down or control use  she denies spending a great deal of time in activities necessary to obtain, use and recover from opioids  she denies cravings or a strong desire to use   shedenies failure to fulfill obligations at work, school or home  she denies continued use despite negative consequences  she denies giving up important activities to use  she denies use in situations in which it is physically dangerous  she admits to tolerance    Gloria Guzman has experienced the following withdrawal symptoms in the past:     sweating, shakes/tremors, agitation, problems  with sleep, muscle aches, sweating, nausea/vomitting, diarrhea, loss of appetite, and goose bumps.     Gloria Guzman currently denies any withdrawal symptoms    Gloria Guzman meets more than 2 of the above criteria thus meeting the diagnosis for Opioid Use Disorder - Mild    PMHX -CHF, obesity, COPD, defibrillator  FAMILY HISTORY -postitive for addiction  MENTAL HEALTH HISTORY -anxiety and depression    Problem list and histories reviewed & adjusted, as indicated.  Additional history: as documented    Patient Active Problem List   Diagnosis     Bipolar affective disorder (H)     Chronic obstructive pulmonary disease (H)     Hypertension     Obstructive sleep apnea syndrome     Arthralgia of shoulder     Injury of kidney     Cardiac pacemaker in situ     Automatic implantable cardioverter-defibrillator in situ     Blood glucose elevated     Nonischemic cardiomyopathy (H)     Chronic systolic CHF (congestive heart failure) (H)     CHF (congestive heart failure) (H)     Chronic pain     Chemical dependency (H)     Hypertensive urgency     Anemia     Obesity (BMI 35.0-39.9) with comorbidity (H)     DDD (degenerative disc disease), lumbar     Chronic midline low back pain with left-sided sciatica     Past Surgical History:   Procedure Laterality Date     BACK SURGERY       CARDIAC SURGERY        SECTION       GALLBLADDER SURGERY       HERNIA REPAIR       JOINT REPLACEMTN, KNEE RT/LT  11/10    Joint Replacement knee LT     SURGICAL PATHOLOGY EXAM       TONSILLECTOMY         Social History     Tobacco Use     Smoking status: Current Every Day Smoker     Packs/day: 0.50     Types: Cigarettes     Smokeless tobacco: Never Used   Substance Use Topics     Alcohol use: No     Family History   Problem Relation Age of Onset     Breast Cancer Maternal Grandmother      Bipolar Disorder Maternal Grandmother      Substance Abuse Maternal Grandmother      Breast Cancer Maternal Aunt      Cancer Father 42        lung       Substance Abuse Father      Cancer Maternal Grandfather         lung      Bipolar Disorder Maternal Grandfather      Substance Abuse Maternal Grandfather      Cancer Other         maternal cousin lung      Gallbladder Disease Mother      Depression Mother      Bipolar Disorder Mother      Substance Abuse Mother      Gallbladder Disease Sister      Substance Abuse Sister      Substance Abuse Brother          Current Outpatient Medications   Medication Sig Dispense Refill     acetaminophen (TYLENOL) 325 MG tablet Take 325-650 mg by mouth every 4 hours as needed for mild pain       acetaminophen (TYLENOL) 500 MG tablet Take 2 tablets (1,000 mg) by mouth 3 times daily 90 tablet 0     alum & mag hydroxide-simethicone (MYLANTA/MAALOX) 200-200-20 MG/5ML SUSP suspension Take 30 mLs by mouth every 6 hours as needed for indigestion       amitriptyline (ELAVIL) 150 MG tablet Take 150 mg by mouth At Bedtime       camphor-eucalyptus-menthol (VICKS VAPORUB) 4.73-1.2-2.6 % OINT ointment Apply topically daily as needed for cough       famotidine (PEPCID) 10 MG tablet Take 10 mg by mouth 2 times daily as needed       furosemide (LASIX) 40 MG tablet Take 40 mg by mouth 2 times daily       gabapentin (NEURONTIN) 300 MG capsule TK 1 C PO TID PRN P  4     gabapentin (NEURONTIN) 400 MG capsule Take 400 mg by mouth 3 times daily       guaiFENesin (ROBITUSSIN) 20 mg/mL SOLN solution Take 10 mLs by mouth every 4 hours as needed for cough       hydrocortisone (CORTAID) 1 % external cream Apply topically 2 times daily as needed       hydrogen peroxide 3 % solution Apply topically as needed for wound care Apply a small amount of product on the affected area 1 to 3 times a day       ibuprofen (ADVIL/MOTRIN) 200 MG capsule Take 200 mg by mouth every 4 hours as needed for fever       lisinopril (PRINIVIL/ZESTRIL) 20 MG tablet   0     lisinopril (PRINIVIL/ZESTRIL) 20 MG tablet Take 1 tablet (20 mg) by mouth daily 30 tablet 1     loratadine  (CLARITIN) 10 MG tablet Take 10 mg by mouth daily as needed for allergies       melatonin 3 MG tablet Take 3 mg by mouth nightly as needed for sleep       Menthol-Methyl Salicylate (ICY HOT BALM EXTRA STRENGTH EX)        metoprolol succinate ER (TOPROL-XL) 25 MG 24 hr tablet Take 3 tablets (75 mg) by mouth daily 90 tablet 1     multivitamin w/minerals (MULTI-VITAMIN) tablet Take 1 tablet by mouth daily       nicotine (NICORETTE) 2 MG gum use 2 to 5 pieces of gum per day as needed for cravings       oxyCODONE IR (ROXICODONE) 15 MG tablet TK 1 T PO 3-4 TIMES DAILY PRF PAIN  0     phenol-menthol (CEPASTAT) 14.5 MG lozenge Place 1 lozenge inside cheek every 2 hours as needed for moderate pain       predniSONE (DELTASONE) 20 MG tablet Take 40 mg by mouth daily for 5 days. 10 tablet 0     PROAIR  (90 Base) MCG/ACT inhaler INHALE 2 PUFFS BY MOUTH EVERY 4 HOURS AS NEEDED FOR SHORTNESS OF BREATH AND WHEEZING  0     senna-docusate (SENOKOT-S/PERICOLACE) 8.6-50 MG tablet Take 2 tablets by mouth daily as needed for constipation       sodium chloride (OCEAN) 0.65 % nasal spray Spray 1 spray into both nostrils daily as needed for congestion       spironolactone (ALDACTONE) 25 MG tablet Take 1 tablet (25 mg) by mouth every morning 30 tablet 1     tetrahydrozoline (VISINE) 0.05 % ophthalmic solution 1 drop 3 times daily       torsemide (DEMADEX) 20 MG tablet Take 1 tablet (20 mg) by mouth 2 times daily (Patient not taking: Reported on 5/7/2019) 60 tablet 3     Allergies   Allergen Reactions     Cefaclor Rash     Other reaction(s): *Unknown     Morphine Hives and Itching     Ibuprofen      Morphine Sulfate Itching     Olanzapine Other (See Comments)     Varenicline Other (See Comments)     Problem list, Medication list, Allergies, and Medical/Social/Surgical histories reviewed in EPIC and updated as appropriate.      OBJECTIVE:                                                    /88 (BP Location: Right arm, Patient  Position: Sitting, Cuff Size: Adult Regular)   Pulse 84   Temp 98.3  F (36.8  C) (Oral)   Resp 20   Wt 114.3 kg (252 lb)   SpO2 98%   BMI 37.21 kg/m    Body mass index is 37.21 kg/m .       ROS:  Constitutional, neuro, ENT, endocrine, pulmonary, cardiac, gastrointestinal, genitourinary, musculoskeletal, integument and psychiatric systems are negative, except as otherwise noted.    EXAM:  GENERAL APPEARANCE: healthy, alert and no distress  EYES: Eyes grossly normal to inspection, PERRL and no nystagmus   HENT: ear canals and TM's normal, nose and mouth without ulcers or lesions and normal cephalic/atraumatic  NECK: no adenopathy, thyromegaly or masses  RESP: lungs clear to auscultation - no rales, rhonchi or wheezes and no resp distress  CV: regular rates and rhythm, normal S1 S2, no S3 or S4 and no murmur, click or rub  ABDOMEN: soft, nontender, without hepatosplenomegaly or masses  MS: extremities normal- no gross deformities noted, gait normal, peripheral pulses normal and no edema  SKIN: no rashes, no jaundice, no obvious masses.   NEURO: Normal strength and tone, sensory exam grossly normal, no tremor  MENTAL STATUS EXAM:  Appearance/Behavior: No apparent distress and Neatly groomed  Speech: Normal  Mood/Affect: normal affect  Insight: Adequate    Results for orders placed or performed during the hospital encounter of 02/28/19   EKG 12-lead, tracing only   Result Value Ref Range    Interpretation ECG Click View Image link to view waveform and result        Diagnostic Test Results:  No results found for this or any previous visit (from the past 24 hour(s)).     ASSESSMENT:                                                    OPIOID USE DISORDER - SEVERE  METHAMPHETAMINE USE DISORDER  ALCOHOL USE DISORDER  NICOTINE USE DISORDER    ENCOUNTER FOR LONG TERM USE OF HIGH RISK MEDICATION - SUBOXONE MAINTENANCE -    High Risk Drug Monitoring?  YES   Drug being monitored: Suboxone    Reason for drug: Opioid Use  Disorder   What is being monitored?: Dosage, Cravings, Trigger, side effects - including sedation, signs of abuse/misuse and continued abstinence.          PLAN:                                                        ICD-10-CM    1. Uncomplicated opioid dependence (H) F11.20 buprenorphine HCl-naloxone HCl (SUBOXONE) 8-2 MG per film     Urine Drugs of Abuse Screen Panel 13   2. Encounter for long-term (current) use of high-risk medication Z79.899 buprenorphine HCl-naloxone HCl (SUBOXONE) 8-2 MG per film     Urine Drugs of Abuse Screen Panel 13         MEDICATIONS:   Orders Placed This Encounter   Medications     buprenorphine HCl-naloxone HCl (SUBOXONE) 8-2 MG per film     Sig: Place 1 Film under the tongue daily Cut into thirds and take TID  UOVE=[id number]     Dispense:  15 Film     Refill:  0          - START suboxone for chronic pain and addiction     FUTURE APPOINTMENTS:       - Follow-up visit - May 28th, 2019    COUNSELING done today:   Counseled the patient on the importance of having a recovery program in addition to Suboxone maintenance.  Also discussed having a sober support network, not isolating, being open, honest, and willing to change, avoiding triggers and managing cravings. In addition, she should abstain from alcohol, benzodiazepines, THC, opioids and other drugs of abuse. Risk of overdose following a period of abstinence due to decrease tolerance was discussed including risk of death. Risk of overdose if using Suboxone with other substances particuarly benzodiazepines/alcohol was reviewed.    Mary Mccallum MD  [facility]

## 2019-05-14 ENCOUNTER — TELEPHONE (OUTPATIENT)
Dept: BEHAVIORAL HEALTH | Facility: CLINIC | Age: 54
End: 2019-05-14

## 2019-05-14 ENCOUNTER — HOSPITAL ENCOUNTER (OUTPATIENT)
Dept: BEHAVIORAL HEALTH | Facility: CLINIC | Age: 54
End: 2019-05-14
Attending: FAMILY MEDICINE
Payer: COMMERCIAL

## 2019-05-14 DIAGNOSIS — F19.20 CHEMICAL DEPENDENCY (H): Primary | ICD-10-CM

## 2019-05-14 PROCEDURE — H2035 A/D TX PROGRAM, PER HOUR: HCPCS

## 2019-05-14 PROCEDURE — H2035 A/D TX PROGRAM, PER HOUR: HCPCS | Mod: HQ

## 2019-05-14 PROCEDURE — 10020000 ZZH LODGING PLUS FACILITY CHARGE ADULT

## 2019-05-14 RX ORDER — BUPRENORPHINE HYDROCHLORIDE AND NALOXONE HYDROCHLORIDE DIHYDRATE 8; 2 MG/1; MG/1
TABLET SUBLINGUAL
Qty: 21 TABLET | Refills: 0 | Status: SHIPPED | OUTPATIENT
Start: 2019-05-14 | End: 2019-07-26

## 2019-05-14 NOTE — TELEPHONE ENCOUNTER
Order placed for tabs instead of films and sent by eprescribe to the [facility]    Mary Mccallum MD

## 2019-05-14 NOTE — TELEPHONE ENCOUNTER
"Per [facility]:  \"Suboxone 8 mg film (1/3 film TID) order placed for pt 5/13/19. Insurance does not cover. they will only cover 8 mg SL tabs (not 2-0.5 tabs), but pt would have to take 4 times daily then. Please advse ASAP\"    Pharmacy selected  "

## 2019-05-14 NOTE — TELEPHONE ENCOUNTER
Routing to [facility]    See message from Sofia KEITA    Thank you  Maddison Hunt RN   [facility]
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
## 2019-05-15 ENCOUNTER — OFFICE VISIT (OUTPATIENT)
Dept: ORTHOPEDICS | Facility: CLINIC | Age: 54
End: 2019-05-15
Payer: COMMERCIAL

## 2019-05-15 ENCOUNTER — HOSPITAL ENCOUNTER (OUTPATIENT)
Dept: BEHAVIORAL HEALTH | Facility: CLINIC | Age: 54
End: 2019-05-15
Attending: FAMILY MEDICINE
Payer: COMMERCIAL

## 2019-05-15 ENCOUNTER — TELEPHONE (OUTPATIENT)
Dept: ORTHOPEDICS | Facility: CLINIC | Age: 54
End: 2019-05-15

## 2019-05-15 ENCOUNTER — TELEPHONE (OUTPATIENT)
Dept: BEHAVIORAL HEALTH | Facility: CLINIC | Age: 54
End: 2019-05-15

## 2019-05-15 VITALS
SYSTOLIC BLOOD PRESSURE: 136 MMHG | DIASTOLIC BLOOD PRESSURE: 81 MMHG | BODY MASS INDEX: 38.63 KG/M2 | WEIGHT: 254.9 LBS | HEIGHT: 68 IN

## 2019-05-15 DIAGNOSIS — R11.0 NAUSEA: Primary | ICD-10-CM

## 2019-05-15 DIAGNOSIS — M51.369 LUMBAR DEGENERATIVE DISC DISEASE: Primary | ICD-10-CM

## 2019-05-15 PROCEDURE — H2035 A/D TX PROGRAM, PER HOUR: HCPCS | Mod: HQ

## 2019-05-15 PROCEDURE — 10020000 ZZH LODGING PLUS FACILITY CHARGE ADULT

## 2019-05-15 RX ORDER — ONDANSETRON 4 MG/1
4 TABLET, ORALLY DISINTEGRATING ORAL EVERY 8 HOURS PRN
Qty: 20 TABLET | Refills: 0 | Status: SHIPPED | OUTPATIENT
Start: 2019-05-15 | End: 2019-05-24

## 2019-05-15 RX ORDER — LIDOCAINE 50 MG/G
1 PATCH TOPICAL EVERY 24 HOURS
Qty: 30 PATCH | Refills: 0 | Status: SHIPPED | OUTPATIENT
Start: 2019-05-15 | End: 2019-07-26

## 2019-05-15 ASSESSMENT — MIFFLIN-ST. JEOR: SCORE: 1799.72

## 2019-05-15 NOTE — TELEPHONE ENCOUNTER
The nausea should resolve with time but she can use Zofran until it does resolve.  I have sent a prescription for this to the [facility] pharmacy.     Mary Mccallum MD

## 2019-05-15 NOTE — LETTER
5/15/2019       RE: Gloria Guzman  [address] [address]     Dear Colleague,    Thank you for referring your patient, Gloria Guzman, to the [facility] SPORTS AND ORTHOP[facility]EDIC WALK IN CLINIC at [facility]. Please see a copy of my visit note below.         NEW PATIENT INTAKE QUESTIONNAIRE  SPORTS & ORTHOPEDIC WALK-IN 5/15/2019    Primary Care Physician: Dr. Posey    Where are the majority of your medical records?      Referred by Sofia Posey, IPC?    Low back left side and goes down the leg. Has rods and screws in back and bent rods and has been suggested another surgery but does not want to. Been ongoing for a while now. Looking for a cane or walker and water exercises and primary back doctor    Reason for Visit:    What part of your body is injured / painful?  bilateral low back    What caused the injury /pain? Unsure    How long ago did your injury occur or pain begin? Chronic    What are your most bothersome symptoms? Pain and walks sideways     How would you characterize your symptom? shooting and burning    What makes your symptoms better? Ice and Heat    What makes your symptoms worse? Standing, Walking and Movement    Have you been previously seen for this problem? No    Medical History:    Medical History: Pace maker and defibrillator     Have you had surgery on this body part before? Yes, 15 years ago     Medications: As listed     Allergies: Yes: as listed     Family History of Medical Problems:     Previous Surgeries: back surgery, heart surgery, total knee replacement, hernia surgery and gallbladder surgery     Social History:    Occupation: No on disability     Handedness: Right    Exercise: None    Review of Systems:    Have you recently had a a fever, chills, weight loss? Always hot     Do you have any vision problems? Suppose to wear glasses     Do you have any chest pain or edema? No    Do you have any shortness of breath or wheezing?   Always- from heart    Do you have stomach problems? No    Do you have any numbness or focal weakness? No    Do you have diabetes? No    Do you have problems with bleeding or clotting? No    Do you have an rashes or other skin lesions? No    Communication:    How did you hear about us? My doctor referred me, Dr. Posey and heart doctor is here    Who else should know about this visit?            CHIEF COMPLAINT:  Pain of the Lower Back    Referred by: ELLI De La Cruz, CNP    HISTORY OF PRESENT ILLNESS  Ms. Guzman is a pleasant 53 year old year old female who presents to clinic today with acute on chronic low back pain.  Gloria explains that she has a long history of lumbar degenerative disc disease s/p lumbar surgery 15 years ago with fusion hardware L3-L5.  Pain has been chronic but gradually worsening in the past several months.  Pain is persistent, with all positions but worse with prolonged static position.  Cannot sit or stand for prolonged periods. Relieved mildy by changing positions. Radiculopathy down left leg > right leg. Radiation to posterior thigh. Does not cross to tibia.  No weakness. No numbness/tingling.     Patient states she was told that she may need revision of her fusion surgery.  L3-L5 fusion 1999.     Treatment to date:  Physical therapy referral placed by FP - has not scheduled, would prefer pool therapy  Gabapentin - too drowsy, discontinued because of this.  Menthol Salicylate Balm  Opioids - now weaned off   Epidural injections prior to fusion surgery  Cannot have MRI due to recent ICD placement  No recent CT    Patient interested in assistive device for walking; rolling walker today.    Additional history: as documented    MEDICAL HISTORY  Patient Active Problem List   Diagnosis     Bipolar affective disorder (H)     Chronic obstructive pulmonary disease (H)     Hypertension     Obstructive sleep apnea syndrome     Arthralgia of shoulder     Injury of kidney     Cardiac pacemaker in  situ     Automatic implantable cardioverter-defibrillator in situ     Blood glucose elevated     Nonischemic cardiomyopathy (H)     Chronic systolic CHF (congestive heart failure) (H)     CHF (congestive heart failure) (H)     Chronic pain     Chemical dependency (H)     Hypertensive urgency     Anemia     Obesity (BMI 35.0-39.9) with comorbidity (H)     DDD (degenerative disc disease), lumbar     Chronic midline low back pain with left-sided sciatica       Current Outpatient Medications   Medication Sig Dispense Refill     acetaminophen (TYLENOL) 325 MG tablet Take 325-650 mg by mouth every 4 hours as needed for mild pain       acetaminophen (TYLENOL) 500 MG tablet Take 2 tablets (1,000 mg) by mouth 3 times daily 90 tablet 0     alum & mag hydroxide-simethicone (MYLANTA/MAALOX) 200-200-20 MG/5ML SUSP suspension Take 30 mLs by mouth every 6 hours as needed for indigestion       amitriptyline (ELAVIL) 150 MG tablet Take 150 mg by mouth At Bedtime       buprenorphine HCl-naloxone HCl (SUBOXONE) 8-2 MG per film Place 1 Film under the tongue daily Cut into thirds and take TID  HURK=[id number] 15 Film 0     buprenorphine-naloxone (SUBOXONE) 8-2 MG SUBL sublingual tablet Take 1/2 tab or 4mg TID [id number] 21 tablet 0     camphor-eucalyptus-menthol (VICKS VAPORUB) 4.73-1.2-2.6 % OINT ointment Apply topically daily as needed for cough       famotidine (PEPCID) 10 MG tablet Take 10 mg by mouth 2 times daily as needed       furosemide (LASIX) 40 MG tablet Take 40 mg by mouth 2 times daily       gabapentin (NEURONTIN) 300 MG capsule TK 1 C PO TID PRN P  4     gabapentin (NEURONTIN) 400 MG capsule Take 400 mg by mouth 3 times daily       guaiFENesin (ROBITUSSIN) 20 mg/mL SOLN solution Take 10 mLs by mouth every 4 hours as needed for cough       hydrocortisone (CORTAID) 1 % external cream Apply topically 2 times daily as needed       hydrogen peroxide 3 % solution Apply topically as needed for wound care Apply a small amount of  product on the affected area 1 to 3 times a day       ibuprofen (ADVIL/MOTRIN) 200 MG capsule Take 200 mg by mouth every 4 hours as needed for fever       lisinopril (PRINIVIL/ZESTRIL) 20 MG tablet   0     lisinopril (PRINIVIL/ZESTRIL) 20 MG tablet Take 1 tablet (20 mg) by mouth daily 30 tablet 1     loratadine (CLARITIN) 10 MG tablet Take 10 mg by mouth daily as needed for allergies       melatonin 3 MG tablet Take 3 mg by mouth nightly as needed for sleep       Menthol-Methyl Salicylate (ICY HOT BALM EXTRA STRENGTH EX)        metoprolol succinate ER (TOPROL-XL) 25 MG 24 hr tablet Take 3 tablets (75 mg) by mouth daily 90 tablet 1     multivitamin w/minerals (MULTI-VITAMIN) tablet Take 1 tablet by mouth daily       nicotine (NICORETTE) 2 MG gum use 2 to 5 pieces of gum per day as needed for cravings       oxyCODONE IR (ROXICODONE) 15 MG tablet TK 1 T PO 3-4 TIMES DAILY PRF PAIN  0     phenol-menthol (CEPASTAT) 14.5 MG lozenge Place 1 lozenge inside cheek every 2 hours as needed for moderate pain       predniSONE (DELTASONE) 20 MG tablet Take 40 mg by mouth daily for 5 days. 10 tablet 0     PROAIR  (90 Base) MCG/ACT inhaler INHALE 2 PUFFS BY MOUTH EVERY 4 HOURS AS NEEDED FOR SHORTNESS OF BREATH AND WHEEZING  0     senna-docusate (SENOKOT-S/PERICOLACE) 8.6-50 MG tablet Take 2 tablets by mouth daily as needed for constipation       sodium chloride (OCEAN) 0.65 % nasal spray Spray 1 spray into both nostrils daily as needed for congestion       spironolactone (ALDACTONE) 25 MG tablet Take 1 tablet (25 mg) by mouth every morning 30 tablet 1     tetrahydrozoline (VISINE) 0.05 % ophthalmic solution 1 drop 3 times daily       torsemide (DEMADEX) 20 MG tablet Take 1 tablet (20 mg) by mouth 2 times daily (Patient not taking: Reported on 5/7/2019) 60 tablet 3       Allergies   Allergen Reactions     Cefaclor Rash     Other reaction(s): *Unknown     Morphine Hives and Itching     Ibuprofen      Morphine Sulfate Itching  "    Olanzapine Other (See Comments)     Varenicline Other (See Comments)       Family History   Problem Relation Age of Onset     Breast Cancer Maternal Grandmother      Bipolar Disorder Maternal Grandmother      Substance Abuse Maternal Grandmother      Breast Cancer Maternal Aunt      Cancer Father 42        lung      Substance Abuse Father      Cancer Maternal Grandfather         lung      Bipolar Disorder Maternal Grandfather      Substance Abuse Maternal Grandfather      Cancer Other         maternal cousin lung      Gallbladder Disease Mother      Depression Mother      Bipolar Disorder Mother      Substance Abuse Mother      Gallbladder Disease Sister      Substance Abuse Sister      Substance Abuse Brother        Additional medical/Social/Surgical histories reviewed in [facility] and updated as appropriate.     REVIEW OF SYSTEMS (5/15/2019)  CONSTITUTIONAL: Denies fever and weight loss  EYES: Denies acute vision changes  ENT: Denies hearing changes or difficulty swallowing  CARDIAC: Denies chest pain or edema  RESPIRATORY: Denies dyspnea, cough or wheeze  GASTROINTESTINAL: Denies abdominal pain, nausea, vomiting  MUSCULOSKELETAL: See HPI  SKIN: Denies any recent rash or lesion  NEUROLOGICAL: Denies numbness or focal weakness  PSYCHIATRIC: Bipolar disorder  ENDOCRINE: Diagnosis of diabetes:No  HEMATOLOGY: Denies episodes of easy bleeding      PHYSICAL EXAM  /81   Ht 1.727 m (5' 8\")   Wt 115.6 kg (254 lb 14.4 oz)   BMI 38.76 kg/m       General  - normal appearance, in no obvious distress  HEENT  -Pupils equal, round, no conjunctival injection.  No lid lag  CV  - normal peripheral perfusion  Pulm  - normal respiratory pattern, non-labored  Musculoskeletal - lumbar spine  - stance: slow to rise and sit  - inspection: Thoracic kyphosis.  - palpation: bilateral lumbar paravertebral spasm  - ROM: pain with bilateral rotation, extension, relieved pain with flexion to 30 degrees. Limited flexion and extension.   - " strength: lower extremities 5/5 in all planes  - special tests:  (+) straight leg raise bilaterally  (+) slump test  Neuro  - patellar and Achilles DTRs 2+ bilaterally, no sensory or motor deficit, grossly normal coordination, normal muscle tone  Skin  - no ecchymosis, erythema, warmth, or induration, no obvious rash  Psych  - interactive, appropriate, normal mood and affect    IMAGING : XR 11/08/18  LUMBAR SPINE TWO TO THREE VIEWS   11/8/2018 6:51 PM      HISTORY: Low back pain.     COMPARISON: None.     FINDINGS: Moderate lumbar curve concave to the left. Plates and  pedicle screws in place bilaterally from L4 through the sacrum and  artificial discs at L4-5 and lumbosacral levels. Moderate degenerative  narrowing of L2-3 and L3-4. Nothing clearly acute. Surgical clips  right upper quadrant.                                                                      IMPRESSION: Postoperative and degenerative changes in the lumbar spine  as described. Nothing acute.     COLTEN DOYLE MD    Reviewed notes and images.     ASSESSMENT & PLAN  Ms. Guzman is a 53 year old year old female with PMHx of Lumbar degenerative disc disease, chronic pain syndrome, left TKA, chemical dependence on methamphetamine and h/o opioid dependence on suboxone who is s/p remote lumbar fusion surgery presenting with acute on chronic lumbago.      Diagnosis: Lumbar degenerative disc diease. Chronic lumbago with left sided radiculopathy    -Physical therapy referral - Pool therapy scheduled, faxed to [facility]  -Continue prednisone course x 5 days total per PCP  -Lidocaine patch - Lidoderm 5% prescribed, if denied, consider Lidocaine OTC 4%  -[facility] referral for rolling walker for prolonged ambulation  -Pain management referral by PCP - to schedule as her pain will require multispecialty care  -Follow up: 6 weeks, consider advanced imaging in the future, no urgent need.    It was a pleasure seeing Gloria today.    Zeb  DO Efrem, CAQSM  Primary Care Sports Medicine

## 2019-05-15 NOTE — PROGRESS NOTES
NEW PATIENT INTAKE QUESTIONNAIRE  SPORTS & ORTHOPEDIC WALK-IN 5/15/2019    Primary Care Physician: Dr. Posey    Where are the majority of your medical records?      Referred by JOSE De La Cruz?    Low back left side and goes down the leg. Has rods and screws in back and bent rods and has been suggested another surgery but does not want to. Been ongoing for a while now. Looking for a cane or walker and water exercises and primary back doctor    Reason for Visit:    What part of your body is injured / painful?  bilateral low back    What caused the injury /pain? Unsure    How long ago did your injury occur or pain begin? Chronic    What are your most bothersome symptoms? Pain and walks sideways     How would you characterize your symptom? shooting and burning    What makes your symptoms better? Ice and Heat    What makes your symptoms worse? Standing, Walking and Movement    Have you been previously seen for this problem? No    Medical History:    Medical History: Pace maker and defibrillator     Have you had surgery on this body part before? Yes, 15 years ago     Medications: As listed     Allergies: Yes: as listed     Family History of Medical Problems:     Previous Surgeries: back surgery, heart surgery, total knee replacement, hernia surgery and gallbladder surgery     Social History:    Occupation: No on disability     Handedness: Right    Exercise: None    Review of Systems:    Have you recently had a a fever, chills, weight loss? Always hot     Do you have any vision problems? Suppose to wear glasses     Do you have any chest pain or edema? No    Do you have any shortness of breath or wheezing?  Always- from heart    Do you have stomach problems? No    Do you have any numbness or focal weakness? No    Do you have diabetes? No    Do you have problems with bleeding or clotting? No    Do you have an rashes or other skin lesions? No    Communication:    How did you hear about us? My doctor referred me,   Kalpesh and heart doctor is here    Who else should know about this visit?

## 2019-05-15 NOTE — TELEPHONE ENCOUNTER
.Prior Authorization Retail Medication Request    Medication/Dose: Lidocaine 5% patches   ICD code (if different than what is on RX):    Previously Tried and Failed:    Rationale:      Insurance Name:  [employer]   Insurance ID:  [id number]     Insurance Response: Prior Authorization Required    Pharmacy Information (if different than what is on RX)  Name:  [facility]    Phone:  [phone]    RX# [id number]  [facility]-[id number]  NDC- [address]-39

## 2019-05-15 NOTE — PROGRESS NOTES
LE from 5/14/19    Patient was late for group.    Kelsey Potts MA, [facility]

## 2019-05-15 NOTE — PROGRESS NOTES
CHIEF COMPLAINT:  Pain of the Lower Back    Referred by: ELLI De La Cruz CNP    HISTORY OF PRESENT ILLNESS  Ms. Guzman is a pleasant 53 year old year old female who presents to clinic today with acute on chronic low back pain.  Gloria explains that she has a long history of lumbar degenerative disc disease s/p lumbar surgery 15 years ago with fusion hardware L3-L5.  Pain has been chronic but gradually worsening in the past several months.  Pain is persistent, with all positions but worse with prolonged static position.  Cannot sit or stand for prolonged periods. Relieved mildy by changing positions. Radiculopathy down left leg > right leg. Radiation to posterior thigh. Does not cross to tibia.  No weakness. No numbness/tingling.     Patient states she was told that she may need revision of her fusion surgery.  L3-L5 fusion 1999.     Treatment to date:  Physical therapy referral placed by FP - has not scheduled, would prefer pool therapy  Gabapentin - too drowsy, discontinued because of this.  Menthol Salicylate Balm  Opioids - now weaned off   Epidural injections prior to fusion surgery  Cannot have MRI due to recent ICD placement  No recent CT    Patient interested in assistive device for walking; rolling walker today.    Additional history: as documented    MEDICAL HISTORY  Patient Active Problem List   Diagnosis     Bipolar affective disorder (H)     Chronic obstructive pulmonary disease (H)     Hypertension     Obstructive sleep apnea syndrome     Arthralgia of shoulder     Injury of kidney     Cardiac pacemaker in situ     Automatic implantable cardioverter-defibrillator in situ     Blood glucose elevated     Nonischemic cardiomyopathy (H)     Chronic systolic CHF (congestive heart failure) (H)     CHF (congestive heart failure) (H)     Chronic pain     Chemical dependency (H)     Hypertensive urgency     Anemia     Obesity (BMI 35.0-39.9) with comorbidity (H)     DDD (degenerative disc disease), lumbar      Chronic midline low back pain with left-sided sciatica       Current Outpatient Medications   Medication Sig Dispense Refill     acetaminophen (TYLENOL) 325 MG tablet Take 325-650 mg by mouth every 4 hours as needed for mild pain       acetaminophen (TYLENOL) 500 MG tablet Take 2 tablets (1,000 mg) by mouth 3 times daily 90 tablet 0     alum & mag hydroxide-simethicone (MYLANTA/MAALOX) 200-200-20 MG/5ML SUSP suspension Take 30 mLs by mouth every 6 hours as needed for indigestion       amitriptyline (ELAVIL) 150 MG tablet Take 150 mg by mouth At Bedtime       buprenorphine HCl-naloxone HCl (SUBOXONE) 8-2 MG per film Place 1 Film under the tongue daily Cut into thirds and take TID  [id number] 15 Film 0     buprenorphine-naloxone (SUBOXONE) 8-2 MG SUBL sublingual tablet Take 1/2 tab or 4mg TID [id number] 21 tablet 0     camphor-eucalyptus-menthol (VICKS VAPORUB) 4.73-1.2-2.6 % OINT ointment Apply topically daily as needed for cough       famotidine (PEPCID) 10 MG tablet Take 10 mg by mouth 2 times daily as needed       furosemide (LASIX) 40 MG tablet Take 40 mg by mouth 2 times daily       gabapentin (NEURONTIN) 300 MG capsule TK 1 C PO TID PRN P  4     gabapentin (NEURONTIN) 400 MG capsule Take 400 mg by mouth 3 times daily       guaiFENesin (ROBITUSSIN) 20 mg/mL SOLN solution Take 10 mLs by mouth every 4 hours as needed for cough       hydrocortisone (CORTAID) 1 % external cream Apply topically 2 times daily as needed       hydrogen peroxide 3 % solution Apply topically as needed for wound care Apply a small amount of product on the affected area 1 to 3 times a day       ibuprofen (ADVIL/MOTRIN) 200 MG capsule Take 200 mg by mouth every 4 hours as needed for fever       lisinopril (PRINIVIL/ZESTRIL) 20 MG tablet   0     lisinopril (PRINIVIL/ZESTRIL) 20 MG tablet Take 1 tablet (20 mg) by mouth daily 30 tablet 1     loratadine (CLARITIN) 10 MG tablet Take 10 mg by mouth daily as needed for allergies       melatonin  3 MG tablet Take 3 mg by mouth nightly as needed for sleep       Menthol-Methyl Salicylate (ICY HOT BALM EXTRA STRENGTH EX)        metoprolol succinate ER (TOPROL-XL) 25 MG 24 hr tablet Take 3 tablets (75 mg) by mouth daily 90 tablet 1     multivitamin w/minerals (MULTI-VITAMIN) tablet Take 1 tablet by mouth daily       nicotine (NICORETTE) 2 MG gum use 2 to 5 pieces of gum per day as needed for cravings       oxyCODONE IR (ROXICODONE) 15 MG tablet TK 1 T PO 3-4 TIMES DAILY PRF PAIN  0     phenol-menthol (CEPASTAT) 14.5 MG lozenge Place 1 lozenge inside cheek every 2 hours as needed for moderate pain       predniSONE (DELTASONE) 20 MG tablet Take 40 mg by mouth daily for 5 days. 10 tablet 0     PROAIR  (90 Base) MCG/ACT inhaler INHALE 2 PUFFS BY MOUTH EVERY 4 HOURS AS NEEDED FOR SHORTNESS OF BREATH AND WHEEZING  0     senna-docusate (SENOKOT-S/PERICOLACE) 8.6-50 MG tablet Take 2 tablets by mouth daily as needed for constipation       sodium chloride (OCEAN) 0.65 % nasal spray Spray 1 spray into both nostrils daily as needed for congestion       spironolactone (ALDACTONE) 25 MG tablet Take 1 tablet (25 mg) by mouth every morning 30 tablet 1     tetrahydrozoline (VISINE) 0.05 % ophthalmic solution 1 drop 3 times daily       torsemide (DEMADEX) 20 MG tablet Take 1 tablet (20 mg) by mouth 2 times daily (Patient not taking: Reported on 5/7/2019) 60 tablet 3       Allergies   Allergen Reactions     Cefaclor Rash     Other reaction(s): *Unknown     Morphine Hives and Itching     Ibuprofen      Morphine Sulfate Itching     Olanzapine Other (See Comments)     Varenicline Other (See Comments)       Family History   Problem Relation Age of Onset     Breast Cancer Maternal Grandmother      Bipolar Disorder Maternal Grandmother      Substance Abuse Maternal Grandmother      Breast Cancer Maternal Aunt      Cancer Father 42        lung      Substance Abuse Father      Cancer Maternal Grandfather         lung      Bipolar  "Disorder Maternal Grandfather      Substance Abuse Maternal Grandfather      Cancer Other         maternal cousin lung      Gallbladder Disease Mother      Depression Mother      Bipolar Disorder Mother      Substance Abuse Mother      Gallbladder Disease Sister      Substance Abuse Sister      Substance Abuse Brother        Additional medical/Social/Surgical histories reviewed in [facility] and updated as appropriate.     REVIEW OF SYSTEMS (5/15/2019)  CONSTITUTIONAL: Denies fever and weight loss  EYES: Denies acute vision changes  ENT: Denies hearing changes or difficulty swallowing  CARDIAC: Denies chest pain or edema  RESPIRATORY: Denies dyspnea, cough or wheeze  GASTROINTESTINAL: Denies abdominal pain, nausea, vomiting  MUSCULOSKELETAL: See HPI  SKIN: Denies any recent rash or lesion  NEUROLOGICAL: Denies numbness or focal weakness  PSYCHIATRIC: Bipolar disorder  ENDOCRINE: Diagnosis of diabetes:No  HEMATOLOGY: Denies episodes of easy bleeding      PHYSICAL EXAM  /81   Ht 1.727 m (5' 8\")   Wt 115.6 kg (254 lb 14.4 oz)   BMI 38.76 kg/m      General  - normal appearance, in no obvious distress  HEENT  -Pupils equal, round, no conjunctival injection.  No lid lag  CV  - normal peripheral perfusion  Pulm  - normal respiratory pattern, non-labored  Musculoskeletal - lumbar spine  - stance: slow to rise and sit  - inspection: Thoracic kyphosis.  - palpation: bilateral lumbar paravertebral spasm  - ROM: pain with bilateral rotation, extension, relieved pain with flexion to 30 degrees. Limited flexion and extension.   - strength: lower extremities 5/5 in all planes  - special tests:  (+) straight leg raise bilaterally  (+) slump test  Neuro  - patellar and Achilles DTRs 2+ bilaterally, no sensory or motor deficit, grossly normal coordination, normal muscle tone  Skin  - no ecchymosis, erythema, warmth, or induration, no obvious rash  Psych  - interactive, appropriate, normal mood and affect    IMAGING : XR " 11/08/18  LUMBAR SPINE TWO TO THREE VIEWS   11/8/2018 6:51 PM      HISTORY: Low back pain.     COMPARISON: None.     FINDINGS: Moderate lumbar curve concave to the left. Plates and  pedicle screws in place bilaterally from L4 through the sacrum and  artificial discs at L4-5 and lumbosacral levels. Moderate degenerative  narrowing of L2-3 and L3-4. Nothing clearly acute. Surgical clips  right upper quadrant.                                                                      IMPRESSION: Postoperative and degenerative changes in the lumbar spine  as described. Nothing acute.     COLTEN DOYLE MD    Reviewed notes and images.     ASSESSMENT & PLAN  Ms. Guzman is a 53 year old year old female with PMHx of Lumbar degenerative disc disease, chronic pain syndrome, left TKA, chemical dependence on methamphetamine and h/o opioid dependence on suboxone who is s/p remote lumbar fusion surgery presenting with acute on chronic lumbago.      Diagnosis: Lumbar degenerative disc diease. Chronic lumbago with left sided radiculopathy    -Physical therapy referral - Pool therapy scheduled, faxed to [employer] via [place] [facility]  -Continue prednisone course x 5 days total per PCP  -Lidocaine patch - Lidoderm 5% prescribed, if denied, consider Lidocaine OTC 4%  -[facility] [place] rolling walker for prolonged ambulation  -Pain management referral by PCP - to schedule as her pain will require multispecialty care  -Follow up: 6 weeks, consider advanced imaging in the future, no urgent need.    It was a pleasure seeing Gloria today.    Zeb Topete DO, [facility]  Primary Care Sports Medicine

## 2019-05-15 NOTE — TELEPHONE ENCOUNTER
Dr. Mccallum, Pt Gloria stating that Suboxone really helped her pain, but now has nausea.  She stated that in the past oxycodone was an issue and caused nausea as well.  Pt stated that she has received zofran for this in the past.  Writer gave her fresh senia in hot water and also senia chews in hopes this would alleviate her nausea.  She currently went on pass for ortho appt, so not sure if senia helped her.  Please advise

## 2019-05-16 ENCOUNTER — HOSPITAL ENCOUNTER (OUTPATIENT)
Dept: BEHAVIORAL HEALTH | Facility: CLINIC | Age: 54
End: 2019-05-16
Attending: FAMILY MEDICINE
Payer: COMMERCIAL

## 2019-05-16 DIAGNOSIS — I42.8 NONISCHEMIC CARDIOMYOPATHY (H): Primary | ICD-10-CM

## 2019-05-16 PROCEDURE — H2035 A/D TX PROGRAM, PER HOUR: HCPCS | Mod: HQ

## 2019-05-16 PROCEDURE — 10020000 ZZH LODGING PLUS FACILITY CHARGE ADULT

## 2019-05-16 PROCEDURE — H2035 A/D TX PROGRAM, PER HOUR: HCPCS
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
## 2019-05-16 NOTE — PROGRESS NOTES
Patient presented her Book of Me assignment in group therapy. Patient discussed her childhood, her negative self-talk, and using anger as a defense. Patient received supportive feedback from counselor and peers. Patient to continue working on treatment plan goals and assignments.     GALEN Atkinson

## 2019-05-17 ENCOUNTER — HOSPITAL ENCOUNTER (EMERGENCY)
Facility: CLINIC | Age: 54
Discharge: HOME | End: 2019-05-17
Attending: EMERGENCY MEDICINE | Admitting: EMERGENCY MEDICINE
Payer: COMMERCIAL

## 2019-05-17 ENCOUNTER — TELEPHONE (OUTPATIENT)
Dept: CARDIOLOGY | Facility: CLINIC | Age: 54
End: 2019-05-17

## 2019-05-17 ENCOUNTER — PATIENT OUTREACH (OUTPATIENT)
Dept: CARE COORDINATION | Facility: CLINIC | Age: 54
End: 2019-05-17

## 2019-05-17 ENCOUNTER — APPOINTMENT (OUTPATIENT)
Dept: GENERAL RADIOLOGY | Facility: CLINIC | Age: 54
End: 2019-05-17
Attending: EMERGENCY MEDICINE
Payer: COMMERCIAL

## 2019-05-17 ENCOUNTER — HOSPITAL ENCOUNTER (OUTPATIENT)
Dept: BEHAVIORAL HEALTH | Facility: CLINIC | Age: 54
End: 2019-05-17
Attending: FAMILY MEDICINE
Payer: COMMERCIAL

## 2019-05-17 ENCOUNTER — HOSPITAL ENCOUNTER (EMERGENCY)
Facility: CLINIC | Age: 54
Discharge: HOME | End: 2019-05-17
Attending: EMERGENCY MEDICINE
Payer: COMMERCIAL

## 2019-05-17 VITALS
OXYGEN SATURATION: 93 % | SYSTOLIC BLOOD PRESSURE: 116 MMHG | TEMPERATURE: 98.2 F | WEIGHT: 289.9 LBS | BODY MASS INDEX: 44.08 KG/M2 | HEART RATE: 66 BPM | RESPIRATION RATE: 20 BRPM | DIASTOLIC BLOOD PRESSURE: 74 MMHG

## 2019-05-17 VITALS
SYSTOLIC BLOOD PRESSURE: 126 MMHG | RESPIRATION RATE: 16 BRPM | OXYGEN SATURATION: 92 % | DIASTOLIC BLOOD PRESSURE: 78 MMHG | HEART RATE: 62 BPM | WEIGHT: 255 LBS | BODY MASS INDEX: 38.77 KG/M2 | TEMPERATURE: 96 F

## 2019-05-17 DIAGNOSIS — R06.02 SHORTNESS OF BREATH: ICD-10-CM

## 2019-05-17 DIAGNOSIS — I50.9 CHF (CONGESTIVE HEART FAILURE) (H): Primary | ICD-10-CM

## 2019-05-17 DIAGNOSIS — R94.31 ABNORMAL ELECTROCARDIOGRAM: ICD-10-CM

## 2019-05-17 DIAGNOSIS — R07.9 CHEST PAIN, UNSPECIFIED TYPE: ICD-10-CM

## 2019-05-17 LAB
ALBUMIN SERPL-MCNC: 3.4 G/DL (ref 3.4–5)
ALBUMIN SERPL-MCNC: 3.6 G/DL (ref 3.4–5)
ALP SERPL-CCNC: 80 U/L (ref 40–150)
ALP SERPL-CCNC: 86 U/L (ref 40–150)
ALT SERPL W P-5'-P-CCNC: 37 U/L (ref 0–50)
ALT SERPL W P-5'-P-CCNC: 43 U/L (ref 0–50)
ANION GAP SERPL CALCULATED.3IONS-SCNC: 8 MMOL/L (ref 3–14)
ANION GAP SERPL CALCULATED.3IONS-SCNC: 9 MMOL/L (ref 3–14)
AST SERPL W P-5'-P-CCNC: 30 U/L (ref 0–45)
AST SERPL W P-5'-P-CCNC: 35 U/L (ref 0–45)
BASOPHILS # BLD AUTO: 0 10E9/L (ref 0–0.2)
BASOPHILS # BLD AUTO: 0.1 10E9/L (ref 0–0.2)
BASOPHILS NFR BLD AUTO: 0.2 %
BASOPHILS NFR BLD AUTO: 0.4 %
BILIRUB SERPL-MCNC: 0.3 MG/DL (ref 0.2–1.3)
BILIRUB SERPL-MCNC: 0.4 MG/DL (ref 0.2–1.3)
BUN SERPL-MCNC: 71 MG/DL (ref 7–30)
BUN SERPL-MCNC: 74 MG/DL (ref 7–30)
CALCIUM SERPL-MCNC: 8.9 MG/DL (ref 8.5–10.1)
CALCIUM SERPL-MCNC: 9.2 MG/DL (ref 8.5–10.1)
CHLORIDE SERPL-SCNC: 102 MMOL/L (ref 94–109)
CHLORIDE SERPL-SCNC: 102 MMOL/L (ref 94–109)
CO2 SERPL-SCNC: 28 MMOL/L (ref 20–32)
CO2 SERPL-SCNC: 28 MMOL/L (ref 20–32)
CREAT SERPL-MCNC: 1.4 MG/DL (ref 0.52–1.04)
CREAT SERPL-MCNC: 1.45 MG/DL (ref 0.52–1.04)
DIFFERENTIAL METHOD BLD: ABNORMAL
DIFFERENTIAL METHOD BLD: ABNORMAL
EOSINOPHIL # BLD AUTO: 0 10E9/L (ref 0–0.7)
EOSINOPHIL # BLD AUTO: 0.2 10E9/L (ref 0–0.7)
EOSINOPHIL NFR BLD AUTO: 0 %
EOSINOPHIL NFR BLD AUTO: 1.7 %
ERYTHROCYTE [DISTWIDTH] IN BLOOD BY AUTOMATED COUNT: 19.3 % (ref 10–15)
ERYTHROCYTE [DISTWIDTH] IN BLOOD BY AUTOMATED COUNT: 19.5 % (ref 10–15)
GFR SERPL CREATININE-BSD FRML MDRD: 40 ML/MIN/{1.73_M2}
GFR SERPL CREATININE-BSD FRML MDRD: 42 ML/MIN/{1.73_M2}
GLUCOSE SERPL-MCNC: 123 MG/DL (ref 70–99)
GLUCOSE SERPL-MCNC: 151 MG/DL (ref 70–99)
HCT VFR BLD AUTO: 43.1 % (ref 35–47)
HCT VFR BLD AUTO: 43.5 % (ref 35–47)
HGB BLD-MCNC: 13.5 G/DL (ref 11.7–15.7)
HGB BLD-MCNC: 13.7 G/DL (ref 11.7–15.7)
IMM GRANULOCYTES # BLD: 0 10E9/L (ref 0–0.4)
IMM GRANULOCYTES # BLD: 0 10E9/L (ref 0–0.4)
IMM GRANULOCYTES NFR BLD: 0.2 %
IMM GRANULOCYTES NFR BLD: 0.2 %
INR PPP: 1.1 (ref 0.86–1.14)
INTERPRETATION ECG - MUSE: NORMAL
LYMPHOCYTES # BLD AUTO: 1.3 10E9/L (ref 0.8–5.3)
LYMPHOCYTES # BLD AUTO: 4.1 10E9/L (ref 0.8–5.3)
LYMPHOCYTES NFR BLD AUTO: 12.2 %
LYMPHOCYTES NFR BLD AUTO: 30.8 %
MCH RBC QN AUTO: 26.4 PG (ref 26.5–33)
MCH RBC QN AUTO: 26.7 PG (ref 26.5–33)
MCHC RBC AUTO-ENTMCNC: 31.3 G/DL (ref 31.5–36.5)
MCHC RBC AUTO-ENTMCNC: 31.5 G/DL (ref 31.5–36.5)
MCV RBC AUTO: 84 FL (ref 78–100)
MCV RBC AUTO: 85 FL (ref 78–100)
MONOCYTES # BLD AUTO: 0.1 10E9/L (ref 0–1.3)
MONOCYTES # BLD AUTO: 0.7 10E9/L (ref 0–1.3)
MONOCYTES NFR BLD AUTO: 1.1 %
MONOCYTES NFR BLD AUTO: 5.5 %
NEUTROPHILS # BLD AUTO: 8.1 10E9/L (ref 1.6–8.3)
NEUTROPHILS # BLD AUTO: 9.1 10E9/L (ref 1.6–8.3)
NEUTROPHILS NFR BLD AUTO: 61.4 %
NEUTROPHILS NFR BLD AUTO: 86.3 %
NRBC # BLD AUTO: 0 10*3/UL
NRBC # BLD AUTO: 0 10*3/UL
NRBC BLD AUTO-RTO: 0 /100
NRBC BLD AUTO-RTO: 0 /100
NT-PROBNP SERPL-MCNC: 1275 PG/ML (ref 0–900)
NT-PROBNP SERPL-MCNC: 1625 PG/ML (ref 0–900)
PLATELET # BLD AUTO: 246 10E9/L (ref 150–450)
PLATELET # BLD AUTO: 266 10E9/L (ref 150–450)
POTASSIUM SERPL-SCNC: 4.3 MMOL/L (ref 3.4–5.3)
POTASSIUM SERPL-SCNC: 4.8 MMOL/L (ref 3.4–5.3)
PROT SERPL-MCNC: 7.7 G/DL (ref 6.8–8.8)
PROT SERPL-MCNC: 8.4 G/DL (ref 6.8–8.8)
RBC # BLD AUTO: 5.11 10E12/L (ref 3.8–5.2)
RBC # BLD AUTO: 5.13 10E12/L (ref 3.8–5.2)
SODIUM SERPL-SCNC: 138 MMOL/L (ref 133–144)
SODIUM SERPL-SCNC: 139 MMOL/L (ref 133–144)
TROPONIN I SERPL-MCNC: <0.015 UG/L (ref 0–0.04)
WBC # BLD AUTO: 10.5 10E9/L (ref 4–11)
WBC # BLD AUTO: 13.2 10E9/L (ref 4–11)

## 2019-05-17 PROCEDURE — 83880 ASSAY OF NATRIURETIC PEPTIDE: CPT | Mod: 91 | Performed by: EMERGENCY MEDICINE

## 2019-05-17 PROCEDURE — 85610 PROTHROMBIN TIME: CPT | Performed by: EMERGENCY MEDICINE

## 2019-05-17 PROCEDURE — 84484 ASSAY OF TROPONIN QUANT: CPT | Performed by: EMERGENCY MEDICINE

## 2019-05-17 PROCEDURE — 85025 COMPLETE CBC W/AUTO DIFF WBC: CPT | Performed by: EMERGENCY MEDICINE

## 2019-05-17 PROCEDURE — 10020000 ZZH LODGING PLUS FACILITY CHARGE ADULT

## 2019-05-17 PROCEDURE — 99285 EMERGENCY DEPT VISIT HI MDM: CPT | Mod: 25,27 | Performed by: EMERGENCY MEDICINE

## 2019-05-17 PROCEDURE — 80053 COMPREHEN METABOLIC PANEL: CPT | Performed by: EMERGENCY MEDICINE

## 2019-05-17 PROCEDURE — 83880 ASSAY OF NATRIURETIC PEPTIDE: CPT | Performed by: EMERGENCY MEDICINE

## 2019-05-17 PROCEDURE — 93005 ELECTROCARDIOGRAM TRACING: CPT | Performed by: EMERGENCY MEDICINE

## 2019-05-17 PROCEDURE — 93010 ELECTROCARDIOGRAM REPORT: CPT | Mod: Z6 | Performed by: EMERGENCY MEDICINE

## 2019-05-17 PROCEDURE — 99285 EMERGENCY DEPT VISIT HI MDM: CPT | Mod: 25 | Performed by: EMERGENCY MEDICINE

## 2019-05-17 PROCEDURE — H2035 A/D TX PROGRAM, PER HOUR: HCPCS | Mod: HQ

## 2019-05-17 PROCEDURE — 71046 X-RAY EXAM CHEST 2 VIEWS: CPT

## 2019-05-17 ASSESSMENT — ENCOUNTER SYMPTOMS
EYE DISCHARGE: 0
FLANK PAIN: 0
VOMITING: 0
ABDOMINAL PAIN: 0
MYALGIAS: 0
BACK PAIN: 1
HEADACHES: 0
WEAKNESS: 0
CHILLS: 0
BRUISES/BLEEDS EASILY: 0
DIARRHEA: 0
SORE THROAT: 0
DYSURIA: 0
FEVER: 0
SHORTNESS OF BREATH: 0
NAUSEA: 0
FEVER: 0
COUGH: 1
RHINORRHEA: 0
CONFUSION: 0
ABDOMINAL PAIN: 0
SHORTNESS OF BREATH: 1

## 2019-05-17 NOTE — ED PROVIDER NOTES
History     Chief Complaint   Patient presents with     Shortness of Breath     HPI  Gloria Guzman is a 53 year old female who past medical history significant for chronic systolic congestive heart failure, nonischemic cardiomyopathy status post ICD, hypertension, bipolar disorder, COPD, chronic back pain, nicotine use disorder, obstructive sleep apnea (does not use CPAP), history of methamphetamine abuse currently in treatment at [facility] who presents the emergency department with complaint of shortness of breath and chest pain.  Patient reports shortness of breath over the past 1 day.  Patient reports that she has shortness of breath with rest but is also worse with exertion.  Patient states that she has had similar episodes in the past for fluid overload and shortness of breath secondary to her congestive heart failure exacerbation.  Last hospitalization was 2/2/2019 and 2/8/2019.  Patient reports she has been taking her medications daily as directed but is concerned that she is starting to build up fluid.  Patient reports some left sided chest pain that is described as a pressure-like pain underneath her left breast that she noticed this morning just prior to arrival.  Patient denies any radiation of the pain, no aggravating or relieving factors. Patient reports nonproductive cough over the past 1-2 days. +seasonal allergies. Patient denies any fever, chills, weakness, paresthesias, abdominal pain, peripheral edema, no other symptoms or complaints.    I have reviewed the Medications, Allergies, Past Medical and Surgical History, and Social History in the Epic system.    Review of Systems   Constitutional: Negative for chills and fever.   HENT: Negative for congestion, rhinorrhea and sore throat.    Eyes: Negative for discharge.   Respiratory: Positive for cough and shortness of breath.    Cardiovascular: Positive for chest pain. Negative for leg swelling.   Gastrointestinal: Negative for abdominal pain,  diarrhea, nausea and vomiting.   Endocrine: Negative for polyuria.   Genitourinary: Negative for dysuria and flank pain.   Musculoskeletal: Positive for back pain (chronic). Negative for myalgias.   Skin: Negative for rash.   Allergic/Immunologic: Negative for immunocompromised state.   Neurological: Negative for weakness and headaches.   Hematological: Does not bruise/bleed easily.   Psychiatric/Behavioral: Negative for confusion and suicidal ideas.       Physical Exam   BP: 124/66  Pulse: 63  Temp: 98.2  F (36.8  C)  Resp: 22  Weight: 131.5 kg (289 lb 14.4 oz)  SpO2: 95 %      Physical Exam   Constitutional: She is oriented to person, place, and time. She appears well-developed. No distress.   HENT:   Head: Normocephalic and atraumatic.   Right Ear: External ear normal.   Left Ear: External ear normal.   Mouth/Throat: Oropharynx is clear and moist.   Eyes: Pupils are equal, round, and reactive to light. Conjunctivae and EOM are normal.   Neck: Normal range of motion. Neck supple.   Cardiovascular: Normal rate, regular rhythm and normal heart sounds.   Pulmonary/Chest: Effort normal and breath sounds normal. No respiratory distress.   Coarse breath sounds with bibasilar crackles   Abdominal: Soft. She exhibits no distension. There is no tenderness. There is no rebound and no guarding.   Musculoskeletal: Normal range of motion. She exhibits no tenderness or deformity.   Neurological: She is alert and oriented to person, place, and time. No cranial nerve deficit. Coordination normal.   Skin: Skin is warm and dry. No rash noted.   Psychiatric: She has a normal mood and affect. Her behavior is normal.       ED Course        Procedures       EKG Interpretation:      Interpreted by Peg Peterson  Time reviewed: 0237  Symptoms at time of EKG: shortness of breath, chest pain   Rhythm: normal sinus   Rate: Normal  Axis: Left Axis Deviation  Ectopy: none  Conduction: normal  ST Segments/ T Waves: No acute ischemic  changes, T wave inversions in inferior and lateral leads (II, III, avF, V4-V6)  Q Waves: none  Comparison to prior: 2/28/19 - new T wave inversions in inferior leads    Clinical Impression: Normal sinus rhythm, no acute ischemic change, new T wave inversions in inferior leads when compared to February 28, 2019.      Assessments & Plan (with Medical Decision Making)   Gloria Guzman is a 53 year old female who past medical history significant for chronic systolic congestive heart failure, nonischemic cardiomyopathy status post ICD, hypertension, bipolar disorder, COPD, chronic back pain, nicotine use disorder, obstructive sleep apnea (does not use CPAP), history of methamphetamine abuse currently in treatment at [facility] who presents the emergency department with complaint of shortness of breath and chest pain.  Upon arrival patient is well-appearing, afebrile, no respiratory distress, hemodynamically stable, and oxygen 95% on room air.  Patient reports a 1 day history of worsening shortness of breath, left-sided chest pain, and concern for fluid overload.  Differential diagnosis includes but is not limited to ACS versus unstable angina versus congestive heart failure versus pleural effusion versus pneumonia versus COPD exacerbation among others.  At this time plan for EKG, labs, chest x-ray and reevaluate.    I Reviewed EKG which demonstrates normal sinus rhythm with a ventricular rate of 63 bpm with no acute ischemic change, new T wave inversions in inferior lead when compared to previous EKG in February 2019.  I reviewed comprehensive labs which are remarkable for mild leukocytosis of 13.2, creatinine mildly elevated at 1.45, BNP 1625, negative troponin.  Per chart review patient had echocardiogram in December 2018 at [facility] with an ejection fraction of 30 to 35%.  I reviewed CXR which is unremarkable with cardiac device in place, no evidence of acute pneumonia, pneumothorax, pulmonary edema.  Patient  currently taking spironolactone, metoprolol, lisinopril.  At this time and discussed results with patient and recommend admission for further evaluation of chest pain, EKG changes, shortness of breath. I discussed the case with cardiology Dr. Alma Lynch at 0500am who agrees with the plan and admission.     At 0620 patient changed her mind and left AMA due to concerns of wanting to return to her chemical dependency program.     I have reviewed the nursing notes.    I have reviewed the findings, diagnosis, plan and need for follow up with the patient.       Medication List      There are no discharge medications for this visit.         Final diagnoses:   Shortness of breath   Chest pain, unspecified type   Abnormal electrocardiogram       5/17/2019   [facility], [place], EMERGENCY DEPARTMENT     Peg Peterson MD  05/17/19 3708       Peg Peterson MD  05/17/19 6063

## 2019-05-17 NOTE — TELEPHONE ENCOUNTER
Patient called reporting red-flag symptom: Cardiology Heart Failure patient with 7 lb weight gain.  Caller Giuliano states patient has difficulty breathing and described her as drowning.  Called to call 911 immediately.  He declined and said it was not possible because patient is in a treatment facility for chemical dependency and would have to start the program all over.       I reiterated to hang up and dial 911 and have her taken to the emergency room.    Per the [facility] Red-Flag symptom list, patient was: refused to dial 911.  Will send message to clinic team as an FYI.

## 2019-05-17 NOTE — ED PROVIDER NOTES
[facility] EMERGENCY DEPARTMENT ([facility])    5/17/19     ED 19 1:53 PM    History     Chief Complaint   Patient presents with     Shortness of Breath     pt here from [facility], seen here earlier today for similar complaints according to MD note pt had recomended admission but pt declined and signed AMA. pt states that she was initally told to follow up at clinic. and she agreed to this. pt now was sent back by staff who read MD notes. pt does not want admit but wants note to be able to return to group sessions.     The history is provided by the patient and medical records.     Gloria Guzman is a 53 year old female with history of nonischemic cardiomyopathy who presents from [facility]. She has a history of sleep apnea, nonischemic cardiomyopathy status post ICD placement, hypertension, COPD, prior meth use. She has been in [facility] for chemical dependency. Yesterday she developed some shortness of breath with left sided chest pain. She was sent to the ER by [facility] staff, was seen by Dr. Peg Peterson who performed workup including physical exam, labs, EKG and chest x-ray. She was noted to have new T wave inversions compared to EKG done 3 months ago.  Dr. Peterson discussed case with Dr. Joshi of cardiology and plan was made to admit patient for evaluation of chest pain, EKG changes and shortness of breath.  However patient wanted to return to the [facility] out of fear of getting dismissed from the program if she got admitted and so left AMA.  Patient states that she had felt ok at that time and wanted to follow-up with Cardiology clinic instead of admission. She states Dr. Peterson agreed with this but needed time to get discharge paperwork done. Patient wanted to leave the ER in time to make group meetings and so left against medical advice.  Last night staff noted that patient was choking a bit while sleeping.  She had slipped off of her sleep pillows, and had had increased  post-nasal drip from seasonal allergies causing coughing fits while asleep. [facility] staff were concerned given her past medical history and sent here for further evaluation.  Patient states that she did not want to come to the ER but was made to come in by [facility] staff.  She denies any acute symptoms at this time. No chest pain, shortness of breath, nausea, vomiting, dizziness or weakness. Does have appointment Wednesday with Cardiology [facility] clinic. Patient is on torsemide but states it's supposed to be furosemide. She thinks this difference in medication may be contributing to her symptoms at this time.  She notes that she did have a Cardiology admission about 2 1/2 months ago, was diuresed x 30 lbs. she denies any prior history of cardiac stents. No shortness of breath at this time.      Wt Readings from Last 10 Encounters:   05/17/19 115.7 kg (255 lb)   05/17/19 131.5 kg (289 lb 14.4 oz)   05/15/19 115.6 kg (254 lb 14.4 oz)   05/13/19 114.3 kg (252 lb)   05/13/19 114.6 kg (252 lb 9.6 oz)   02/28/19 116.1 kg (256 lb)   02/27/19 116.3 kg (256 lb 6.4 oz)   02/07/19 113.4 kg (250 lb 1.6 oz)   11/08/18 117 kg (258 lb)   02/16/18 111.1 kg (245 lb)        I have reviewed the Medications, Allergies, Past Medical and Surgical History, and Social History in the Epic system.    Review of Systems   Constitutional: Negative for fever.   HENT: Positive for postnasal drip.    Respiratory: Negative for shortness of breath.    Cardiovascular: Negative for chest pain.   Gastrointestinal: Negative for abdominal pain.   All other systems reviewed and are negative.      Physical Exam   BP: 126/78  Pulse: 62  Temp: 96  F (35.6  C)  Resp: 16  Weight: 115.7 kg (255 lb)  SpO2: 92 %    Physical Exam   Constitutional: No distress.   HENT:   Head: Atraumatic.   Mouth/Throat: Oropharynx is clear and moist. No oropharyngeal exudate.   Eyes: EOM are normal. No scleral icterus.   Neck: Neck supple.   Cardiovascular: Normal rate,  regular rhythm and normal heart sounds.   Pulmonary/Chest: Breath sounds normal. No respiratory distress.   Abdominal: Soft. She exhibits no distension. There is no tenderness.   Musculoskeletal: She exhibits no edema or deformity.   Neurological: She is alert.   Skin: Skin is warm and dry. She is not diaphoretic.   Psychiatric: She has a normal mood and affect. Her behavior is normal.       ED Course        Procedures             EKG Interpretation:      Interpreted by Kennedy Gracia  Time reviewed: 1425  Symptoms at time of EKG: Dyspnea  Rhythm: normal sinus   Rate: normal  Axis: normal  Ectopy: none  Conduction: normal  ST Segments/ T Waves: No ST segment elevation or depression.  Lateral T wave inversions as well as inferior inversions unchanged from prior EKG in February Q Waves: none  Comparison to prior: Unchanged    Clinical Impression: normal EKG          Results for orders placed or performed during the hospital encounter of 05/17/19 (from the past 24 hour(s))   EKG 12-lead, tracing only   Result Value Ref Range    Interpretation ECG Click View Image link to view waveform and result    CBC with platelets differential   Result Value Ref Range    WBC 10.5 4.0 - 11.0 10e9/L    RBC Count 5.13 3.8 - 5.2 10e12/L    Hemoglobin 13.7 11.7 - 15.7 g/dL    Hematocrit 43.5 35.0 - 47.0 %    MCV 85 78 - 100 fl    MCH 26.7 26.5 - 33.0 pg    MCHC 31.5 31.5 - 36.5 g/dL    RDW 19.3 (H) 10.0 - 15.0 %    Platelet Count 246 150 - 450 10e9/L    Diff Method Automated Method     % Neutrophils 86.3 %    % Lymphocytes 12.2 %    % Monocytes 1.1 %    % Eosinophils 0.0 %    % Basophils 0.2 %    % Immature Granulocytes 0.2 %    Nucleated RBCs 0 0 /100    Absolute Neutrophil 9.1 (H) 1.6 - 8.3 10e9/L    Absolute Lymphocytes 1.3 0.8 - 5.3 10e9/L    Absolute Monocytes 0.1 0.0 - 1.3 10e9/L    Absolute Eosinophils 0.0 0.0 - 0.7 10e9/L    Absolute Basophils 0.0 0.0 - 0.2 10e9/L    Abs Immature Granulocytes 0.0 0 - 0.4 10e9/L    Absolute  Nucleated RBC 0.0    Comprehensive metabolic panel   Result Value Ref Range    Sodium 138 133 - 144 mmol/L    Potassium 4.8 3.4 - 5.3 mmol/L    Chloride 102 94 - 109 mmol/L    Carbon Dioxide 28 20 - 32 mmol/L    Anion Gap 8 3 - 14 mmol/L    Glucose 151 (H) 70 - 99 mg/dL    Urea Nitrogen 71 (H) 7 - 30 mg/dL    Creatinine 1.40 (H) 0.52 - 1.04 mg/dL    GFR Estimate 42 (L) >60 mL/min/[1.73_m2]    GFR Estimate If Black 49 (L) >60 mL/min/[1.73_m2]    Calcium 9.2 8.5 - 10.1 mg/dL    Bilirubin Total 0.3 0.2 - 1.3 mg/dL    Albumin 3.6 3.4 - 5.0 g/dL    Protein Total 8.4 6.8 - 8.8 g/dL    Alkaline Phosphatase 86 40 - 150 U/L    ALT 43 0 - 50 U/L    AST 35 0 - 45 U/L   Troponin I   Result Value Ref Range    Troponin I ES <0.015 0.000 - 0.045 ug/L   Nt probnp inpatient (BNP)   Result Value Ref Range    N-Terminal Pro BNP Inpatient 1,275 (H) 0 - 900 pg/mL     Medications - No data to display      Assessments & Plan (with Medical Decision Making)   53-year-old female presents to us with a chief complaint of requesting medical clearance for shortness of breath when she left earlier today.  Differential includes but not limited to CHF, acute coronary syndrome, dysrhythmia, viral illness, cough, postnasal drip..  Patient reports that when she was sleeping at lodging plus her sinuses had been clogged and this is been resulted in a lot of postnasal drip which caused her to cough overnight.  She did not feel she was in any distress and was fine going back to sleep however the nurse recommended she come down to the emergency department for evaluation.  This was done and there was some concern that she would need to be admitted.  She left prior to completion of the evaluation.  The patient here at this point has no current concerns.  Her vital signs and her physical exam are unremarkable.  Her EKG is unchanged for last several months.  She reports no chest pain.  Her chest x-ray last night was clear.  Her labs are otherwise unremarkable.   Her BNP is somewhat elevated however this is unchanged from prior and again she has a clear lung exam and non-concerning vital signs.  She is in no distress has no complaints and would like to be discharged.  I recommended to her that she return to us if she develops any new or worsening symptoms.  She has a cardiology appointment next week that she plans to follow-up with.    I have reviewed the nursing notes.    I have reviewed the findings, diagnosis, plan and need for follow up with the patient.       Medication List      There are no discharge medications for this visit.         Final diagnoses:   Shortness of breath     I, Jessi Arcos, am serving as a trained medical scribe to document services personally performed by Alfonso Gracia DO based on the provider's statements to me on May 17, 2019.  This document has been checked and approved by the attending provider.    I, Alfonso Gracia DO, was physically present and have reviewed and verified the accuracy of this note documented by Jessi Arcos, medical scribe.        5/17/2019   [facility], [place], EMERGENCY DEPARTMENT     Kennedy Gracia DO  05/17/19 1600

## 2019-05-17 NOTE — DISCHARGE INSTRUCTIONS
Follow-up with your cardiologist and your primary care provider at your next scheduled appointment.  Your work-up today shows no new concerning findings that require hospitalization today.  You are cleared to go back to your counseling appointments if you develop any new concerning symptoms please return to the emergency department for evaluation

## 2019-05-17 NOTE — TELEPHONE ENCOUNTER
Spoke to Giuliano, significant other.  I have been unable to speak directly to Gloria as she is in a chemical dependency program at this time  He reports that her weight is up 7 lbs since yesterday and that she feels like she is drowning.  He reported that she had gone to the ER this morning but left AMA since she was fearful that she would lose her spot at the program if she was absent.  He is requesting that she be seen in the cardiology clinic today to receive IV lasix.  He additionally requested that I call the RN at [facility] to get more clinical information.      Afia Posey RN

## 2019-05-17 NOTE — PROGRESS NOTES
Clinic Care Coordination Contact    Clinic Care Coordination Contact  OUTREACH    Referral Information:       Primary Diagnosis: CHF    Chief Complaint   Patient presents with     Clinic Care Coordination - Post Hospital     Clinic Care Coordination RN         Universal Utilization: ED visit today SOB chest Pain weight gain.  History of CHF      Utilization    Last refreshed: 5/17/2019  6:48 AM:  Hospital Admissions 1           Last refreshed: 5/17/2019  6:48 AM:  ED Visits 2           Last refreshed: 5/17/2019  6:48 AM:  No Show Count (past year) 9              Current as of: 5/17/2019  6:48 AM              Clinical Concerns:  Current Medical Concerns:  Patient left AMA without a plan.because she would need to start all over with her treatment at [facility].  CC spoke to Giuliano significant other YOGESH completed per patient) .  CC encouraged Giuliano we need a plan of treatment for the patient .  Giuliano will call PCP triage and discuss further      Health Maintenance Reviewed: Not assessed  Clinical Pathway: None    Medication Management:  Not discussed       Outreach Frequency: weekly  Future Appointments              [facility]    In 2 days [facility], [place]    In 3 days [facility], [place]    In 4 days [facility]    In 5 days [facility]    In 5 days  LAB  Health Lab, [facility]    In 5 days Amber Luis NP  Health Heart Care, [facility]    In 6 days [facility]    In 1 week [facility]    In 1 week [facility]    In 1 week [facility]  [place]    In 1 week [facility] [place]    In 1 week [facility], [place]    In 1 week Mary Mccallum MD [facility], [facility]    In 1 week [facility]ULT [facility], [place]    In 1 week [facility]ULT [facility], [place]    In 2 weeks ADULT [facility], [place]    In 2 weeks [facility]ULT [facility], [place]    In 2 weeks [facility]ULT [facility], [place]    In 2 weeks [facility]ULT [facility], [place]          Plan:  Patient will continue [facility] treatment program  Giuliano significant other will call PCP triage to discuss a plan for weight gain and SOB episodes    CC will follow up in 3-5 business days     Nikki Alatorre RN, Care Coordinator   [facility] -Care Coordination  Devaughn Donahue  [phone]

## 2019-05-17 NOTE — PROGRESS NOTES
Spoke with patient and primary counselor after reviewing ED noted and recommendation patient be admitted to the hospital as well as las and imaging. Pt expressed concern that she would lose her spot in treatment if admitted to the hospital. Pt was assured by primary counselor that LP would work with pt to ensure she was able to continue CD tx if admitted to hospital. Discussed with patient concern that she is not medically stable at this time, abnormal lab results, advised that pt return to ED for hopsital admission. Pt asked to contact [facility] clinic and primary cardiologist to make appointment there for IV lasix and get primary cardiologist recommendation. Advised if pt is not able to be seen today in [facility] clinic by primary cardiologist that we would need her to go back to the ED and follow provider recommendations. Pt verbalized understanding and in agreement with plan.  Indigo Kline RN

## 2019-05-17 NOTE — TELEPHONE ENCOUNTER
Per Giuliano's request I called [facility] and spoke to Indigo WARD regarding Gloria's symptoms.  Indigo reported that they don't weigh the patients daily, but the scale is available if a patient would want to use it.  Medications are administered by the patient under nurse supervision.  She reported that Gloria was taking furosemide instead torsemide which had been ordered for her.  Additionally she had missed 3 doses 5/12-5/14 (had taken once daily instead of twice).  Since then she had been taking as ordered, along with other scheduled medications.    I specifically asked Indigo about Gloria's fear of being kicked out of the program if she was admitted to the hospital.  According to her, the patient could be kicked out of the program IF she didn't follow medical advice.  I told Indigo that I would discuss with the provider and call both Indigo and Giuliano back.  Dicussed situation with Amber Luis NP who recommended that Gloria return to the ER.  Relayed this information to Giuliano and left message at [facility] for Indigo.    Ultimate recommendation is that patient will go to the ER.    Afia Posey RN

## 2019-05-17 NOTE — ED TRIAGE NOTES
Patient woke up with shortness of breath about 2 hours ago, reports feel similar to last time she was in hospital with fluid overload. Reports tight feeling in chest and pain below left breast.

## 2019-05-17 NOTE — ED AVS SNAPSHOT
[facility], [place], Emergency Department  [address]  [facility] [address]  Phone:  [phone]  Fax:  [phone]                                    Gloria Guzman   MRN: [id number]    Department:  [facility], Emergency Department   Date of Visit:  5/17/2019           After Visit Summary Signature Page    I have received my discharge instructions, and my questions have been answered. I have discussed any challenges I see with this plan with the nurse or doctor.    ..........................................................................................................................................  Patient/Patient Representative Signature      ..........................................................................................................................................  Patient Representative Print Name and Relationship to Patient    ..................................................               ................................................  Date                                   Time    ..........................................................................................................................................  Reviewed by Signature/Title    ...................................................              ..............................................  Date                                               Time          22EPIC Rev 08/18

## 2019-05-17 NOTE — ED NOTES
States she is here because the nurse at [facility] last night heard her snoring and having troubles with sleep apnea and was worried about her breathing.  Pt signed out AMA from ER because she didn't want to be admitted and she wanted to go back to groups.  Says that [facility] made her come back here for another recheck.

## 2019-05-17 NOTE — PROGRESS NOTES
Pt met with LP nurse and this writer. Pt's concerned about returning to ED with possible admission to hospital and not completing LP. Pt was reassured she could return after being discharged from hospital and if she left hospital AMA we might not take her back because she would not be medically stable.

## 2019-05-18 ENCOUNTER — HOSPITAL ENCOUNTER (OUTPATIENT)
Dept: BEHAVIORAL HEALTH | Facility: CLINIC | Age: 54
End: 2019-05-18
Attending: FAMILY MEDICINE
Payer: COMMERCIAL

## 2019-05-18 PROCEDURE — 10020000 ZZH LODGING PLUS FACILITY CHARGE ADULT

## 2019-05-18 PROCEDURE — H2035 A/D TX PROGRAM, PER HOUR: HCPCS | Mod: HQ

## 2019-05-19 ENCOUNTER — HOSPITAL ENCOUNTER (OUTPATIENT)
Dept: BEHAVIORAL HEALTH | Facility: CLINIC | Age: 54
End: 2019-05-19
Attending: FAMILY MEDICINE
Payer: COMMERCIAL

## 2019-05-19 PROCEDURE — 10020000 ZZH LODGING PLUS FACILITY CHARGE ADULT

## 2019-05-19 NOTE — PROGRESS NOTES
Name: Gloria HANSEN Winter  Date: 5/19/2019  Medical Record: [id number]    Envelope Number: [id number]    List of Contents (List each item separately in new row):   Prednisone 20mg  1 tab    Admission:  I am responsible for any personal items that are not sent to the safe or pharmacy.  [facility] is not responsible for loss, theft or damage of any property in my possession.      Patient Signature:  ___________________________________________       Date/Time:__________________________    Staff Signature: __________________________________       Date/Time:__________________________    2nd Staff person, if patient is unable/unwilling to sign:      __________________________________________________________       Date/Time: __________________________      Discharge:  [facility] has returned all of my personal belongings:    Patient Signature: ________________________________________     Date/Time: ____________________________________    Staff Signature: ______________________________________     Date/Time:_____________________________________

## 2019-05-20 ENCOUNTER — PATIENT OUTREACH (OUTPATIENT)
Dept: CARE COORDINATION | Facility: CLINIC | Age: 54
End: 2019-05-20

## 2019-05-20 DIAGNOSIS — R06.02 SOB (SHORTNESS OF BREATH): Primary | ICD-10-CM

## 2019-05-20 NOTE — PROGRESS NOTES
Clinic Care Coordination Contact  [facility]/Voicemail       Clinical Data: Care Coordinator Outreach    5/17/2019--SOB    Outreach attempted x 1.  Left message on voicemail with call back information and requested return call.    Plan: Care Coordinator will try to reach patient  again in 1-2 business days.    Nikki Alatorre RN, Care Coordinator   [facility] -Care Coordination  Devaughn Donahue  [phone]

## 2019-05-20 NOTE — PROGRESS NOTES
Name: Gloria HANSEN Winter  Date: 5/20/2019  Medical Record: [id number]    Envelope Number: [id number]    List of Contents (List each item separately in new row):   Antacid 1000 (Calsium Carbonate)    Admission:  I am responsible for any personal items that are not sent to the safe or pharmacy.  [facility] is not responsible for loss, theft or damage of any property in my possession.      Patient Signature:  ___________________________________________       Date/Time:__________________________    Staff Signature: __________________________________       Date/Time:__________________________    2nd Staff person, if patient is unable/unwilling to sign:      __________________________________________________________       Date/Time: __________________________      Discharge:  [facility] has returned all of my personal belongings:    Patient Signature: ________________________________________     Date/Time: ____________________________________    Staff Signature: ______________________________________     Date/Time:_____________________________________

## 2019-05-20 NOTE — PROGRESS NOTES
The client left [facility] at 11:15pm from the unit with all her belongings and medications as the client stated that she needed to leave and take care of her dog as her boyfriend or the  was arrested today who was supposed to take care of her dog. The writer and [facility] staff encouraged the client to wait till tomorrow and get a pass from her counselor to get her dog back from the pound and further explained that leaving [facility] may affect her chance to get admission for the treatment in the future, but the client said she knows the consequences and has no choice but to leave the program.
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
## 2019-05-24 DIAGNOSIS — R11.0 NAUSEA: ICD-10-CM

## 2019-05-24 RX ORDER — ONDANSETRON 4 MG/1
4 TABLET, ORALLY DISINTEGRATING ORAL EVERY 8 HOURS PRN
Qty: 20 TABLET | Refills: 0 | Status: SHIPPED | OUTPATIENT
Start: 2019-05-24 | End: 2020-01-09

## 2019-05-24 NOTE — TELEPHONE ENCOUNTER
"Requested Prescriptions   Pending Prescriptions Disp Refills     ondansetron (ZOFRAN-ODT) 4 MG ODT tab  Last Written Prescription Date:  5/15/19  Last Fill Quantity: 20,  # refills: 0   Last office visit: 5/13/2019 with prescribing provider:     Future Office Visit:     20 tablet 0     Sig: Take 1 tablet (4 mg) by mouth every 8 hours as needed for nausea        Antivertigo/Antiemetic Agents Passed - 5/24/2019  9:47 AM        Passed - Recent (12 mo) or future (30 days) visit within the authorizing provider's specialty     Patient had office visit in the last 12 months or has a visit in the next 30 days with authorizing provider or within the authorizing provider's specialty.  See \"Patient Info\" tab in inbasket, or \"Choose Columns\" in Meds & Orders section of the refill encounter.              Passed - Medication is active on med list        Passed - Patient is 18 years of age or older          "

## 2019-05-24 NOTE — TELEPHONE ENCOUNTER
Refill for: ondansetron (ZOFRAN-ODT) 4 MG ODT tab    Last Appointment: 5/13/19    Next Appointment: 5/28/19    No Shows/Cancellations since last appointment:  none    Last Refill in Epic (date and amount/how many days):    Disp Refills Start End LUIS ENRIQUE   ondansetron (ZOFRAN-ODT) 4 MG ODT tab 20 tablet 0 5/15/2019  --   Sig - Route: Take 1 tablet (4 mg) by mouth every 8 hours as needed for nausea - Oral     Routing to provider-- unsure if this medication is to continue    Edie Contreras RN  05/24/19  10:59 AM

## 2019-05-28 NOTE — TELEPHONE ENCOUNTER
Alisa from [facility] called to say this is denied (they do not fax denials).        PRIOR AUTHORIZATION DENIED    Medication: Lidocaine 5% patches -PA denied    Denial Date: 5/28/2019    Denial Rational: Must try/fail:  2 months of each of the following:  Gabapentin 2400 mg/day, duloxetine 60 mg AND 2 months of local anesthesia such as lidocaine 2% gel, Emla 2.5-2.5% or capsaicin cream

## 2019-05-28 NOTE — TELEPHONE ENCOUNTER
Central Prior Authorization Team   Phone: [phone]      PA Initiation    Medication: Lidocaine 5% patches -PA initiated  Insurance Company: [employer] - Phone [phone] Fax [phone]  Pharmacy Filling the Rx: [facility] [place], [place] - [address] [place] [address] [id number]  Filling Pharmacy Phone: [phone]  Filling Pharmacy Fax:    Start Date: 5/28/2019

## 2019-05-31 NOTE — PROGRESS NOTES
No return call  Patient continues to be in the [facility] program  No further outreaches will be made     Nikki Alatorre RN, Care Coordinator   [facility] -Care Coordination  Devaughn Donahue  [phone]

## 2019-06-07 DIAGNOSIS — I10 ESSENTIAL HYPERTENSION: ICD-10-CM

## 2019-06-07 NOTE — TELEPHONE ENCOUNTER
"Routing refill request to provider for review/approval because:  Patient needs to be seen because it has been more than 1 year since last office visit.    Requested Prescriptions   Pending Prescriptions Disp Refills     metoprolol succinate ER (TOPROL-XL) 25 MG 24 hr tablet  Last Written Prescription Date:  2/9/19  Last Fill Quantity: 90,  # refills: 1   Last office visit: 5/13/2019 with prescribing provider:  2/8/18 with Dr. Hogan   Future Office Visit:     90 tablet 1     Sig: Take 3 tablets (75 mg) by mouth daily       Beta-Blockers Protocol Failed - 6/7/2019 10:42 AM        Failed - Recent (12 mo) or future (30 days) visit within the authorizing provider's specialty     Patient had office visit in the last 12 months or has a visit in the next 30 days with authorizing provider or within the authorizing provider's specialty.  See \"Patient Info\" tab in inbasket, or \"Choose Columns\" in Meds & Orders section of the refill encounter.              Passed - Blood pressure under 140/90 in past 12 months     BP Readings from Last 3 Encounters:   05/17/19 126/78   05/17/19 116/74   05/15/19 136/81                 Passed - Patient is age 6 or older        Passed - Medication is active on med list        Emerita Nicole RN - BC      "

## 2019-06-10 RX ORDER — METOPROLOL SUCCINATE 25 MG/1
75 TABLET, EXTENDED RELEASE ORAL DAILY
Qty: 90 TABLET | Refills: 1 | Status: SHIPPED | OUTPATIENT
Start: 2019-06-10 | End: 2019-07-26

## 2019-07-18 ENCOUNTER — TELEPHONE (OUTPATIENT)
Dept: FAMILY MEDICINE | Facility: CLINIC | Age: 54
End: 2019-07-18

## 2019-07-18 NOTE — TELEPHONE ENCOUNTER
Panel Management Review      Patient has the following on her problem list:     Hypertension   Last three blood pressure readings:  BP Readings from Last 3 Encounters:   05/17/19 126/78   05/17/19 116/74   05/15/19 136/81     Blood pressure: Passed    HTN Guidelines:  Less than 140/90      Composite cancer screening  Chart review shows that this patient is due/due soon for the following Pap Smear, Mammogram and Colonoscopy  Summary:    Patient is due/failing the following:   COLONOSCOPY, MAMMOGRAM and PAP    Action needed:   Patient needs office visit for physical.    Type of outreach:    Sent [facility] message. and Sent letter.    Questions for provider review:    None                                                                                                                                    Agatha Randall MA

## 2019-07-18 NOTE — LETTER
[address] [address]  Phone: [phone]  Fax: [phone]              July 18, 2019      Gloria Guzman  [address] [place] [address]        Dear Gloria,    I care about your health and have reviewed your health plan. I have reviewed your medical conditions, medication list, and lab results and am making recommendations based on this review, to better manage your health.    You are in particular need of attention regarding:  -Breast Cancer Screening  -Colon Cancer Screening  -Cervical Cancer Screening    I am recommending that you:  -schedule a WELLNESS (Physical) APPOINTMENT with me.         Here is a list of Health Maintenance topics that are due now or due soon:  Health Maintenance Due   Topic Date Due     Heart Failure Action Plan  1965     Breathing Capacity Test  1965     URINE DRUG SCREEN  1965     Hepatitis C Screening  1965     Discuss Advance Care Planning  1965     COPD Action Plan  1965     Mammogram  1965     PAP Smear  1965     Colonscopy  12/14/1973     HIV Screening  12/14/1978     Diptheria Tetanus Pertussis (DTAP/TDAP/TD) Vaccine (1 - Tdap) 12/14/1988     Zoster (Shingles) Vaccine (1 of 2) 12/14/2013     Preventive Care Visit  02/16/2019       Please call us at [phone] (or use [employer]) to address the above recommendations.     Thank you for trusting [facility] and we appreciate the opportunity to serve you.  We look forward to supporting your healthcare needs in the future.    Healthy Regards,    Sofia Posey NP

## 2019-07-26 ENCOUNTER — OFFICE VISIT (OUTPATIENT)
Dept: FAMILY MEDICINE | Facility: CLINIC | Age: 54
End: 2019-07-26
Payer: COMMERCIAL

## 2019-07-26 VITALS
SYSTOLIC BLOOD PRESSURE: 137 MMHG | HEIGHT: 68 IN | RESPIRATION RATE: 16 BRPM | HEART RATE: 74 BPM | DIASTOLIC BLOOD PRESSURE: 88 MMHG | OXYGEN SATURATION: 98 % | BODY MASS INDEX: 38.65 KG/M2 | WEIGHT: 255 LBS | TEMPERATURE: 98 F

## 2019-07-26 DIAGNOSIS — J45.901 MILD ASTHMA WITH EXACERBATION, UNSPECIFIED WHETHER PERSISTENT: ICD-10-CM

## 2019-07-26 DIAGNOSIS — I50.9 CONGESTIVE HEART FAILURE, UNSPECIFIED HF CHRONICITY, UNSPECIFIED HEART FAILURE TYPE (H): ICD-10-CM

## 2019-07-26 DIAGNOSIS — O08.4: ICD-10-CM

## 2019-07-26 DIAGNOSIS — J32.9 SINUSITIS, UNSPECIFIED CHRONICITY, UNSPECIFIED LOCATION: Primary | ICD-10-CM

## 2019-07-26 DIAGNOSIS — I10 ESSENTIAL HYPERTENSION: ICD-10-CM

## 2019-07-26 DIAGNOSIS — D22.9 ATYPICAL MOLE: ICD-10-CM

## 2019-07-26 DIAGNOSIS — L85.3 DRY SKIN: ICD-10-CM

## 2019-07-26 RX ORDER — IPRATROPIUM BROMIDE AND ALBUTEROL SULFATE 2.5; .5 MG/3ML; MG/3ML
3 SOLUTION RESPIRATORY (INHALATION) ONCE
Status: DISCONTINUED | OUTPATIENT
Start: 2019-07-26 | End: 2019-07-26

## 2019-07-26 RX ORDER — DOXYCYCLINE 100 MG/1
100 CAPSULE ORAL 2 TIMES DAILY
Qty: 20 CAPSULE | Refills: 0 | Status: SHIPPED | OUTPATIENT
Start: 2019-07-26 | End: 2020-01-09

## 2019-07-26 RX ORDER — ALBUTEROL SULFATE 90 UG/1
2 AEROSOL, METERED RESPIRATORY (INHALATION) ONCE
Status: COMPLETED | OUTPATIENT
Start: 2019-07-26 | End: 2019-07-26

## 2019-07-26 RX ORDER — SPIRONOLACTONE 25 MG/1
25 TABLET ORAL EVERY MORNING
Qty: 90 TABLET | Refills: 0 | Status: SHIPPED | OUTPATIENT
Start: 2019-07-26 | End: 2020-01-09

## 2019-07-26 RX ORDER — FLUTICASONE PROPIONATE 50 MCG
2 SPRAY, SUSPENSION (ML) NASAL DAILY
Qty: 16 G | Refills: 3 | Status: SHIPPED | OUTPATIENT
Start: 2019-07-26 | End: 2020-01-09

## 2019-07-26 RX ORDER — FUROSEMIDE 40 MG
40 TABLET ORAL 2 TIMES DAILY
Qty: 30 TABLET | Refills: 0 | Status: ON HOLD | OUTPATIENT
Start: 2019-07-26 | End: 2020-02-02

## 2019-07-26 RX ORDER — METOPROLOL SUCCINATE 25 MG/1
75 TABLET, EXTENDED RELEASE ORAL DAILY
Qty: 180 TABLET | Refills: 1 | Status: SHIPPED | OUTPATIENT
Start: 2019-07-26 | End: 2020-01-09

## 2019-07-26 RX ORDER — LISINOPRIL 20 MG/1
20 TABLET ORAL DAILY
Qty: 90 TABLET | Refills: 0 | Status: SHIPPED | OUTPATIENT
Start: 2019-07-26 | End: 2020-01-09

## 2019-07-26 RX ADMIN — ALBUTEROL SULFATE 2 PUFF: 90 INHALANT RESPIRATORY (INHALATION) at 12:24

## 2019-07-26 RX ADMIN — Medication 0.5 MG: at 12:24

## 2019-07-26 ASSESSMENT — PAIN SCALES - GENERAL: PAINLEVEL: SEVERE PAIN (7)

## 2019-07-26 ASSESSMENT — MIFFLIN-ST. JEOR: SCORE: 1800.17

## 2019-07-26 NOTE — NURSING NOTE
Chief Complaint   Patient presents with     Establish Care     Pt is here to establish care.         Amadou Quezada, EMT on 7/26/2019 at 11:17 AM

## 2019-07-26 NOTE — PATIENT INSTRUCTIONS
Sinus infection - Will treat with Doxycyline antibiotic 2 times a day for 10 days. Also Flonase for 3 weeks at least. Ok to use sinus wash before flonase. Recommend OTC vitamin C 1000 mg per day. Get plenty of rest. If your symptoms do not improve or become worse in the next week please schedule follow up visit with us.     Dry cracked skin and abnormal mole on scalp - call to make appt at your convenience with dermatology    Hypertension - refilled BP medication today double check with cardiology with to make sure they don't want to make any other changes.    CHF - continue to have this managed by cardiology. I did refill lasix today to get you through until your next appt with cardiology.    When you feel better make 1 hour appt complete physical exam with me.    Alyx Michael DNP, APRN, CNP      [facility] Medication Refill Request Information:  * Please contact your pharmacy regarding ANY request for medication refills.  ** NP Clinic Prescription Fax = [phone]  * Please allow 3 business days for routine medication refills.  * Please allow 5 business days for controlled substance medication refills.     [facility] Test Result notification information:  *You will be notified with in 7-10 days of your appointment day regarding the results of your test.  If you are on MyChart you will be notified as soon as the provider has reviewed the results and signed off on them.    [facility]: [phone]

## 2019-07-26 NOTE — PROGRESS NOTES
SUBJECTIVE:  Gloria Guzman is a 53 year old female presents for   Chief Complaint   Patient presents with     Establish Care     Pt is here to establish care.        HPI  Gloria is here today to establish care. Has the following concerns:    Sinus pressure - she has has sinus pressure pain and congestion for approx 2 weeks now. Pain is moderate. OTC medications aren't helping much. She has yellow/green mucous drainage. Symptoms are getting a bit worse. Denies fever, n/v/d.     Hypertension and CHF - she is needing refill of BP medication and lasix today if possible. These are typically managed by her cardiologist, but she is nearly out and wanting them filled for at least a short period of time today if possible until she can get back in to cardiology for a recheck.     Dry cracked skin and atypical mole - she is having trouble with dry cracked skin on her hands and sometimes other locations on her body. She has tried many different types of OTC lotions and isn't having good success with those treatments. She also has a mole that is enlarging and changing on her scalp. She would like a referral to dermatology if possible today for that.     Review Of Systems  Skin: negative, as above  Eyes: negative  Ears/Nose/Throat: as above  Respiratory: No shortness of breath, dyspnea on exertion, cough, or hemoptysis  Cardiovascular: as above  Gastrointestinal: negative  Genitourinary: negative  Musculoskeletal: negative  Neurologic: negative  Psychiatric: negative  Hematologic/Lymphatic/Immunologic: negative  Endocrine: negative      Medications and allergies were reviewed by me today.   PMH/PSH and Social History Reviewed per EMR.    Current Medications  Current Outpatient Medications   Medication Sig Dispense Refill     acetaminophen (TYLENOL) 325 MG tablet Take 325-650 mg by mouth every 4 hours as needed for mild pain       acetaminophen (TYLENOL) 500 MG tablet Take 2 tablets (1,000 mg) by mouth 3 times daily 90 tablet 0      alum & mag hydroxide-simethicone (MYLANTA/MAALOX) 200-200-20 MG/5ML SUSP suspension Take 30 mLs by mouth every 6 hours as needed for indigestion       buprenorphine HCl-naloxone HCl (SUBOXONE) 8-2 MG per film Place 1 Film under the tongue daily Cut into thirds and take TID  [id number] 15 Film 0     buprenorphine-naloxone (SUBOXONE) 8-2 MG SUBL sublingual tablet Take 1/2 tab or 4mg TID [id number] 21 tablet 0     camphor-eucalyptus-menthol (VICKS VAPORUB) 4.73-1.2-2.6 % OINT ointment Apply topically daily as needed for cough       furosemide (LASIX) 40 MG tablet Take 40 mg by mouth 2 times daily       hydrocortisone (CORTAID) 1 % external cream Apply topically 2 times daily as needed       ibuprofen (ADVIL/MOTRIN) 200 MG capsule Take 200 mg by mouth every 4 hours as needed for fever       lidocaine (LIDODERM) 5 % patch Place 1 patch onto the skin every 24 hours 30 patch 0     loratadine (CLARITIN) 10 MG tablet Take 10 mg by mouth daily as needed for allergies       melatonin 3 MG tablet Take 3 mg by mouth nightly as needed for sleep       Menthol-Methyl Salicylate (ICY HOT BALM EXTRA STRENGTH EX)        metoprolol succinate ER (TOPROL-XL) 25 MG 24 hr tablet Take 3 tablets (75 mg) by mouth daily 90 tablet 1     multivitamin w/minerals (MULTI-VITAMIN) tablet Take 1 tablet by mouth daily       ondansetron (ZOFRAN-ODT) 4 MG ODT tab Take 1 tablet (4 mg) by mouth every 8 hours as needed for nausea 20 tablet 0     phenol-menthol (CEPASTAT) 14.5 MG lozenge Place 1 lozenge inside cheek every 2 hours as needed for moderate pain       PROAIR  (90 Base) MCG/ACT inhaler INHALE 2 PUFFS BY MOUTH EVERY 4 HOURS AS NEEDED FOR SHORTNESS OF BREATH AND WHEEZING  0     senna-docusate (SENOKOT-S/PERICOLACE) 8.6-50 MG tablet Take 2 tablets by mouth daily as needed for constipation       sodium chloride (OCEAN) 0.65 % nasal spray Spray 1 spray into both nostrils daily as needed for congestion       spironolactone (ALDACTONE) 25  "MG tablet Take 1 tablet (25 mg) by mouth every morning 30 tablet 0     tetrahydrozoline (VISINE) 0.05 % ophthalmic solution 1 drop 3 times daily       torsemide (DEMADEX) 20 MG tablet Take 1 tablet (20 mg) by mouth 2 times daily 60 tablet 3     lisinopril (PRINIVIL/ZESTRIL) 20 MG tablet Take 1 tablet (20 mg) by mouth daily 30 tablet 1       Allergies  Allergies   Allergen Reactions     Cefaclor Rash     Other reaction(s): *Unknown     Morphine Hives and Itching     Ibuprofen      Morphine Sulfate Itching     Olanzapine Other (See Comments)     Varenicline Other (See Comments)         OBJECTIVE:    Physical Exam  /88   Pulse 74   Temp 98  F (36.7  C) (Oral)   Resp 16   Ht 1.727 m (5' 8\")   Wt 115.7 kg (255 lb)   SpO2 98%   BMI 38.77 kg/m      Exam:  Constitutional: healthy, alert and no distress  Head: Normocephalic. No masses, lesions, tenderness or abnormalities  Neck: Neck supple. No adenopathy. Thyroid symmetric, normal size,, Carotids without bruits.  ENT: Frontal and maxillary sinus tenderness, purulent drainage and boggy nasal mucous membranes noted, otherwise ENT exam normal  Cardiovascular: negative, PMI normal. No lifts, heaves, or thrills. RRR. No murmurs, clicks gallops or rub  Respiratory: negative, Percussion normal. Good diaphragmatic excursion. Lungs clear  Gastrointestinal: Abdomen soft, non-tender. BS normal. No masses, organomegaly  : Deferred  Musculoskeletal: extremities normal- no gross deformities noted, gait normal and normal muscle tone  Skin: dry cracked skin hands bilat, large dark mole raised present on scalp, otherwise no suspicious lesions or rashes  Neurologic: Gait normal. Reflexes normal and symmetric. Sensation grossly WNL.  Psychiatric: mentation appears normal and affect normal/bright  Hematologic/Lymphatic/Immunologic: Normal cervical lymph nodes      ASSESSMENT/PLAN:  Sinus infection - Treating with Doxycycline today bid for 10 days and flonase for 3 weeks. " Recommend OTC vitamin C 1000 mg per day. Get plenty of rest. May use OTC antihistamines or cold medications per label instructions for relief of symptoms. If your symptoms do not improve or become worse in the next week please schedule follow up visit with us.     Dry cracked skin and abnormal mole on scalp - call to make appt at your convenience with dermatology    Hypertension - refilled BP medication today double check with cardiology with to make sure they don't want to make any other changes.    CHF - continue to have this managed by cardiology. I did refill lasix today to get you through until your next appt with cardiology.      Alyx Michael DNP, APRN, CNP

## 2019-07-26 NOTE — LETTER
July 26, 2019      To Whom It May Concern:      Gloria Guzman was seen in our clinic today, 07/26/19. She is excused from work today through 7/29 due illness. I expect her condition to improve over the next 2 days.  She may return to work when improved.    Sincerely,        Alyx Michael NP

## 2019-09-24 RX ORDER — GABAPENTIN 400 MG/1
CAPSULE ORAL
Qty: 30 CAPSULE | Refills: 0 | OUTPATIENT
Start: 2019-09-24

## 2019-09-30 ENCOUNTER — HEALTH MAINTENANCE LETTER (OUTPATIENT)
Age: 54
End: 2019-09-30

## 2019-10-27 DIAGNOSIS — I42.8 NONISCHEMIC CARDIOMYOPATHY (H): Primary | ICD-10-CM

## 2019-10-29 ENCOUNTER — PATIENT OUTREACH (OUTPATIENT)
Dept: CARDIOLOGY | Facility: CLINIC | Age: 54
End: 2019-10-29

## 2019-10-29 NOTE — PROGRESS NOTES
Refill for Torsemide received. Patient has both Torsemide and Furosemide on medication list. Called [facility] pharmacy in [place]. Last filled Torsemide on 10/18 for a 30 day supply. They last filled Lasix in April. Called [facility]. They last filled Lasix on 7/26 for a 30 day supply.   I left Gloria a voicemail to clarify what she is currently taking and will fill a 1 month supply. She needs to schedule follow up in cardiology clinic. Brittany Guillen RN

## 2019-10-29 NOTE — TELEPHONE ENCOUNTER
torsemide (DEMADEX) 20 MG tablet      Last Written Prescription Date:  2/27/19  Last Fill Quantity: 60,   # refills: 3  Last Office Visit : 2/27/19  Future Office visit:  none    Routing refill request to provider for review/approval because:  Per protocol:   Elevated Cr:  Lab Test 05/17/19  1437   CR 1.40*     Thank-you, Roxanna KEITA RN Medication Refill Team

## 2019-11-07 RX ORDER — TORSEMIDE 20 MG/1
20 TABLET ORAL 2 TIMES DAILY
Qty: 60 TABLET | Refills: 0 | Status: SHIPPED | OUTPATIENT
Start: 2019-11-07 | End: 2020-01-09

## 2019-11-07 NOTE — TELEPHONE ENCOUNTER
Reviewed with Amber Luis NP. Will refill 1 month supply and instructed patient to follow up in clinic for further refills.   Brittany Guillen RN

## 2019-12-13 ENCOUNTER — TRANSFERRED RECORDS (OUTPATIENT)
Dept: HEALTH INFORMATION MANAGEMENT | Facility: CLINIC | Age: 54
End: 2019-12-13

## 2019-12-30 ENCOUNTER — TELEPHONE (OUTPATIENT)
Dept: FAMILY MEDICINE | Facility: CLINIC | Age: 54
End: 2019-12-30

## 2019-12-30 NOTE — LETTER
December 30, 2019      Gloria Guzman  [address] [facility] [address]        Dear Gloria    In order to ensure we are providing the best quality care, we have reviewed your chart and see that you are due for:    1. Pap smear  2. Mammogram  3. Colonoscopy    Please call the clinic at your earliest convenience to schedule an appointment.  If you have completed these please contact our office via phone or [facility] to update our records.  We would like to know the date (approximately month and year), the result, and ideally where the procedure was performed.    Thank you for trusting us with your health care.      Sincerely,    Care Team for Sofia Posey NP

## 2019-12-30 NOTE — TELEPHONE ENCOUNTER
Panel Management Review      Patient has the following on her problem list:   Asthma review   No flowsheet data found.   1. Is Asthma diagnosis on the Problem List? Yes    2. Is Asthma listed on Health Maintenance? Yes    3. Patient is due for:  ACT and AAP      Composite cancer screening  Chart review shows that this patient is due/due soon for the following Pap Smear, Mammogram and Colonoscopy  Summary:    Patient is due/failing the following:   COLONOSCOPY, MAMMOGRAM and PAP    Action needed:   Patient needs office visit for pap smear, colonoscopy and mammogram.    Type of outreach:    Sent letter.    Questions for provider review:    None                                                                                                                                    Regina Weinstein CMA  December 30, 2019 2:42 PM       Chart routed to none .

## 2020-01-08 ENCOUNTER — OFFICE VISIT (OUTPATIENT)
Dept: CARDIOLOGY | Facility: CLINIC | Age: 55
End: 2020-01-08
Attending: NURSE PRACTITIONER
Payer: COMMERCIAL

## 2020-01-08 VITALS
WEIGHT: 263.1 LBS | BODY MASS INDEX: 39.87 KG/M2 | HEART RATE: 68 BPM | HEIGHT: 68 IN | DIASTOLIC BLOOD PRESSURE: 82 MMHG | SYSTOLIC BLOOD PRESSURE: 141 MMHG | OXYGEN SATURATION: 99 %

## 2020-01-08 DIAGNOSIS — Z76.89 ENCOUNTER TO ESTABLISH CARE: ICD-10-CM

## 2020-01-08 DIAGNOSIS — L03.039 CELLULITIS OF TOE, UNSPECIFIED LATERALITY: ICD-10-CM

## 2020-01-08 DIAGNOSIS — F19.10 POLYSUBSTANCE ABUSE (H): ICD-10-CM

## 2020-01-08 DIAGNOSIS — I50.21 ACUTE SYSTOLIC HEART FAILURE (H): Primary | ICD-10-CM

## 2020-01-08 DIAGNOSIS — Z91.148 NONCOMPLIANCE WITH MEDICATION REGIMEN: ICD-10-CM

## 2020-01-08 DIAGNOSIS — I42.8 NONISCHEMIC CARDIOMYOPATHY (H): ICD-10-CM

## 2020-01-08 DIAGNOSIS — G47.33 OSA (OBSTRUCTIVE SLEEP APNEA): ICD-10-CM

## 2020-01-08 LAB
ANION GAP SERPL CALCULATED.3IONS-SCNC: 4 MMOL/L (ref 3–14)
BUN SERPL-MCNC: 45 MG/DL (ref 7–30)
CALCIUM SERPL-MCNC: 8.9 MG/DL (ref 8.5–10.1)
CHLORIDE SERPL-SCNC: 113 MMOL/L (ref 94–109)
CO2 SERPL-SCNC: 24 MMOL/L (ref 20–32)
CREAT SERPL-MCNC: 1.03 MG/DL (ref 0.52–1.04)
GFR SERPL CREATININE-BSD FRML MDRD: 61 ML/MIN/{1.73_M2}
GLUCOSE SERPL-MCNC: 112 MG/DL (ref 70–99)
POTASSIUM SERPL-SCNC: 4.4 MMOL/L (ref 3.4–5.3)
SODIUM SERPL-SCNC: 140 MMOL/L (ref 133–144)

## 2020-01-08 PROCEDURE — 80048 BASIC METABOLIC PNL TOTAL CA: CPT | Performed by: NURSE PRACTITIONER

## 2020-01-08 PROCEDURE — G0463 HOSPITAL OUTPT CLINIC VISIT: HCPCS | Mod: ZF

## 2020-01-08 PROCEDURE — 99214 OFFICE O/P EST MOD 30 MIN: CPT | Mod: ZP | Performed by: NURSE PRACTITIONER

## 2020-01-08 PROCEDURE — 36415 COLL VENOUS BLD VENIPUNCTURE: CPT | Performed by: NURSE PRACTITIONER

## 2020-01-08 ASSESSMENT — PAIN SCALES - GENERAL: PAINLEVEL: NO PAIN (0)

## 2020-01-08 ASSESSMENT — MIFFLIN-ST. JEOR: SCORE: 1831.91

## 2020-01-08 NOTE — LETTER
January 9, 2020      RE: Gloria Guzman  [address] [facility] [address]             To whom it may concern,    I am following Gloria for her nonischemic cardiomyopathy.  She has a history of chemical dependency and would like to enroll in a drug treatment program.  My understanding is that she is not able to enroll without a letter stating that she does not require round-the-clock nursing care.    Currently we are managing Gloria's condition as an outpatient.  She is not required round-the-clock nursing care at home and is able to take care of herself independently.  I don't feel that she needs a nurse and highly encourage her to enter drug rehabilitation.    If she were to have any non-urgent cardiac concerns, she could schedule an appointment for a medication adjustment if needed.  If she has shortness of breath or any changes in health that is concerning and needs to be evaluated right away, she would require going to the emergency room for immediate treatment.      If you have any questions, please do not hesitate to contact me at [phone].      Sincerely,        Amber CALLOWAY CNP  [facility]  [facility]
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
## 2020-01-08 NOTE — NURSING NOTE
Diet: Patient instructed regarding a heart healthy diet, including discussion of reduced fat and sodium intake. Patient demonstrated understanding of this information and agreed to call with further questions or concerns.    Labs: Still pending at time of visit. Will call patient with results and recommendations. Patient demonstrated understanding of this information and agreed to call with further questions or concerns.     Return Appointment: Patient given instructions regarding scheduling next clinic visit. Patient demonstrated understanding of this information and agreed to call with further questions or concerns.  Ash[facility] ride was waiting and she did not want to wait to schedule. Will have schedulers reach out to schedule with general cardiologist. Was given phone number of primary care and told her to call to schedule to establish care.     Medication Change: Will contact patient once we have lab results with medication recommendations.     Patient stated she understood all health information given and agreed to call with further questions or concerns.

## 2020-01-08 NOTE — NURSING NOTE
Chief Complaint   Patient presents with     Follow Up     54 year old female with NICM presents for follow up visit with labs prior.      Vitals were taken and medications were reconciled.  Lolita Ortiz  1:40 PM

## 2020-01-08 NOTE — PATIENT INSTRUCTIONS
Take your medicines every day, as directed    Changes made today:  o We will call you with medication recommendations once we have labs  o Will order antibiotic for your foot - directions will be on the bottle    Monitor Your Weight and Symptoms    Contact us if you:      Gain 2 pounds in one day or 5 pounds in one week    Feel more short of breath    Notice more leg swelling    Feel lightheadeded   Change your lifestyle    Limit Salt or Sodium:    2000 mg  Limit Fluids:    2000 mL or approximately 64 ounces  Eat a Heart Healthy Diet    Low in saturated fats  Stay Active:    Aim to move at least 150 minutes every  week         To Contact us    During Business Hours:  [phone], option # 1 (University)  Then option # 4 (medical questions)     After hours, weekends or holidays:   [phone], Option #4  Ask to speak to the On-Call Cardiologist. Inform them you are a CORE/heart failure patient at the [facility].     Use [employer] allows you to communicate directly with your heart team through secure messaging.    [employer] can be accessed any time on your phone, computer, or tablet.    If you need assistance, we'd be happy to help!         Keep your Heart Appointments:    Follow up with a general cardiologist in next month  Establish care with primary care doctor - can call [phone] to schedule this.   Will have Social Work reach out to you

## 2020-01-09 ENCOUNTER — PATIENT OUTREACH (OUTPATIENT)
Dept: CARDIOLOGY | Facility: CLINIC | Age: 55
End: 2020-01-09

## 2020-01-09 DIAGNOSIS — I10 ESSENTIAL HYPERTENSION: ICD-10-CM

## 2020-01-09 RX ORDER — LISINOPRIL 20 MG/1
20 TABLET ORAL DAILY
Qty: 90 TABLET | Refills: 0 | Status: SHIPPED | OUTPATIENT
Start: 2020-01-09 | End: 2020-06-17

## 2020-01-09 RX ORDER — METOPROLOL SUCCINATE 25 MG/1
75 TABLET, EXTENDED RELEASE ORAL DAILY
Qty: 270 TABLET | Refills: 0 | Status: SHIPPED | OUTPATIENT
Start: 2020-01-09 | End: 2020-06-17

## 2020-01-09 RX ORDER — AZITHROMYCIN 250 MG/1
TABLET, FILM COATED ORAL
Qty: 5 TABLET | Refills: 0 | Status: ON HOLD | OUTPATIENT
Start: 2020-01-09 | End: 2020-02-02

## 2020-01-09 RX ORDER — BUMETANIDE 2 MG/1
2 TABLET ORAL 2 TIMES DAILY
Qty: 60 TABLET | Refills: 1 | Status: SHIPPED | OUTPATIENT
Start: 2020-01-09 | End: 2020-03-31

## 2020-01-09 RX ORDER — METOLAZONE 2.5 MG/1
2.5 TABLET ORAL ONCE
Qty: 1 TABLET | Refills: 0 | Status: ON HOLD | OUTPATIENT
Start: 2020-01-09 | End: 2020-02-02

## 2020-01-09 RX ORDER — SPIRONOLACTONE 25 MG/1
25 TABLET ORAL EVERY MORNING
Qty: 90 TABLET | Refills: 0 | Status: SHIPPED | OUTPATIENT
Start: 2020-01-09 | End: 2020-06-17

## 2020-01-09 NOTE — PROGRESS NOTES
I called Gloria to review labs from clinic visit yesterday and review recommendations.     Following recommendation and orders placed by provider:    Have her take Metolazone 2.5 mg x 1.  Increase potassium rich food that day only.    BMP in 2 weeks  Azithromycin 500 mg x 1 day, then 250 mg x 4 days.    Do not take scheduled tylenol with PRN tylenol (on med list). Stop taking any ibuprofen/NSAIDS.     Left voicemail and asked her to call back to review recommendations. Also told her to check [employer] as recommendations sent by provider via [employer].   Brittany Guillen RN

## 2020-01-09 NOTE — PROGRESS NOTES
Got hold of Gloria and reviewed all recommendations below. Gloria wrote them down and verbalized understanding. Told her they are also in mychart attached to her lab results. She also needs refills of Toprol, Raccoon and lisinopril and will send these.

## 2020-01-10 ENCOUNTER — PATIENT OUTREACH (OUTPATIENT)
Dept: CARE COORDINATION | Facility: CLINIC | Age: 55
End: 2020-01-10

## 2020-01-10 NOTE — TELEPHONE ENCOUNTER
Action    Action Taken 1-10: established pt in cardio clinic  1-10: pt was seen in [facility] ED 12-13-19. Requested ED US and EKG from that visit  1-15: sent ekg to scanning. Resolved ED US in PACS

## 2020-01-10 NOTE — PROGRESS NOTES
Social Work Telephone Message Note  [facility]    Patient Name:  Gloria Guzman  /Age:  1965 (54 year old)    Referral Source: RN Brenda Guillen in CORE clinic (Coverage for  Roxanna Willetts)   Reason for Referral:  Housing issues      contacted patient via telephone on 1/10/20. Left message on patient's voicemail with SW call back info. Will await patient's return phone call and provide assistance at that time.    JOHANN Quan, [facility]  Clinical   Outpatient Speciality Clinics   [facility] [place]  Ph. [phone]    NO LETTER

## 2020-01-16 ENCOUNTER — TELEPHONE (OUTPATIENT)
Dept: CARDIOLOGY | Facility: CLINIC | Age: 55
End: 2020-01-16

## 2020-01-16 NOTE — TELEPHONE ENCOUNTER
[facility] Call Center    Phone Message    May a detailed message be left on voicemail: yes    Reason for Call: Other: Pt is calling to check the status that Amber Luis was going to send over a referral or note to [facility].  Fax is [phone], pt would like a call on this today please     Action Taken: Message routed to:  [facility] ([facility]): Cardiology

## 2020-01-16 NOTE — TELEPHONE ENCOUNTER
Called Gloria back. Informed her letter is in [employer] and copy is in the mail. Faxed copy to fax number below.   Brittany Guillen RN

## 2020-01-20 ENCOUNTER — MYC REFILL (OUTPATIENT)
Dept: FAMILY MEDICINE | Facility: CLINIC | Age: 55
End: 2020-01-20

## 2020-01-20 DIAGNOSIS — M54.42 CHRONIC MIDLINE LOW BACK PAIN WITH LEFT-SIDED SCIATICA: ICD-10-CM

## 2020-01-20 DIAGNOSIS — G89.29 CHRONIC MIDLINE LOW BACK PAIN WITH LEFT-SIDED SCIATICA: ICD-10-CM

## 2020-01-20 DIAGNOSIS — M51.369 DDD (DEGENERATIVE DISC DISEASE), LUMBAR: ICD-10-CM

## 2020-01-20 RX ORDER — ACETAMINOPHEN 500 MG
1000 TABLET ORAL 3 TIMES DAILY
Qty: 90 TABLET | Refills: 0 | Status: SHIPPED | OUTPATIENT
Start: 2020-01-20 | End: 2020-01-28

## 2020-01-20 NOTE — TELEPHONE ENCOUNTER
"Requested Prescriptions   Pending Prescriptions Disp Refills     acetaminophen (TYLENOL) 500 MG tablet 90 tablet 0     Sig: Take 2 tablets (1,000 mg) by mouth 3 times daily   Last Written Prescription Date:  5/13/2019  Last Fill Quantity: 90,  # refills: 0   :    Future Office Visit:   Next 5 appointments (look out 90 days)    Feb 13, 2020  1:30 PM CST  Screening Mammogram with [id number]  [facility] ([facility]) [address] [place] [address], [address]  [facility] [address] [phone]             Analgesics (Non-Narcotic Tylenol and ASA Only) Passed - 1/20/2020  7:23 AM        Passed - Recent (12 mo) or future (30 days) visit within the authorizing provider's specialty     Patient has had an office visit with the authorizing provider or a provider within the authorizing providers department within the previous 12 mos or has a future within next 30 days. See \"Patient Info\" tab in inbasket, or \"Choose Columns\" in Meds & Orders section of the refill encounter.              Passed - Patient is 7 months old or older     If patient is a peds patient of the age 7 mos -12 years, ok to refill using weight-based dosing.     If >3g daily and/or sig is not \"prn\", check for liver enzymes. If normal in the last year, ok to refill.  If not, refer to the provider.          Passed - Medication is active on med list        Prescription approved per [facility] Refill Protocol.  Amber Melvin RN on 1/20/2020 at 10:24 AM    "

## 2020-01-23 ENCOUNTER — MYC MEDICAL ADVICE (OUTPATIENT)
Dept: CARDIOLOGY | Facility: CLINIC | Age: 55
End: 2020-01-23

## 2020-01-28 ENCOUNTER — MYC REFILL (OUTPATIENT)
Dept: FAMILY MEDICINE | Facility: CLINIC | Age: 55
End: 2020-01-28

## 2020-01-28 DIAGNOSIS — M51.369 DDD (DEGENERATIVE DISC DISEASE), LUMBAR: ICD-10-CM

## 2020-01-28 DIAGNOSIS — M54.42 CHRONIC MIDLINE LOW BACK PAIN WITH LEFT-SIDED SCIATICA: ICD-10-CM

## 2020-01-28 DIAGNOSIS — G89.29 CHRONIC MIDLINE LOW BACK PAIN WITH LEFT-SIDED SCIATICA: ICD-10-CM

## 2020-01-29 RX ORDER — ACETAMINOPHEN 500 MG
1000 TABLET ORAL 3 TIMES DAILY
Qty: 90 TABLET | Refills: 0 | Status: SHIPPED | OUTPATIENT
Start: 2020-01-29 | End: 2021-01-21

## 2020-01-29 NOTE — TELEPHONE ENCOUNTER
"Requested Prescriptions   Pending Prescriptions Disp Refills     acetaminophen (TYLENOL) 500 MG tablet 90 tablet 0     Sig: Take 2 tablets (1,000 mg) by mouth 3 times daily       Analgesics (Non-Narcotic Tylenol and ASA Only) Passed - 1/28/2020  5:56 PM        Passed - Recent (12 mo) or future (30 days) visit within the authorizing provider's specialty     Patient has had an office visit with the authorizing provider or a provider within the authorizing providers department within the previous 12 mos or has a future within next 30 days. See \"Patient Info\" tab in inbasket, or \"Choose Columns\" in Meds & Orders section of the refill encounter.              Passed - Patient is 7 months old or older     If patient is a peds patient of the age 7 mos -12 years, ok to refill using weight-based dosing.     If >3g daily and/or sig is not \"prn\", check for liver enzymes. If normal in the last year, ok to refill.  If not, refer to the provider.          Passed - Medication is active on med list      Prescription approved per [facility] [place]ill Protocol.  Mariam Ba RN   [facility]   "

## 2020-01-31 ENCOUNTER — HOSPITAL ENCOUNTER (INPATIENT)
Facility: CLINIC | Age: 55
LOS: 2 days | Discharge: HOME | End: 2020-02-02
Attending: EMERGENCY MEDICINE | Admitting: INTERNAL MEDICINE
Payer: COMMERCIAL

## 2020-01-31 ENCOUNTER — PATIENT OUTREACH (OUTPATIENT)
Dept: CARDIOLOGY | Facility: CLINIC | Age: 55
End: 2020-01-31

## 2020-01-31 ENCOUNTER — APPOINTMENT (OUTPATIENT)
Dept: GENERAL RADIOLOGY | Facility: CLINIC | Age: 55
End: 2020-01-31
Attending: EMERGENCY MEDICINE
Payer: COMMERCIAL

## 2020-01-31 ENCOUNTER — APPOINTMENT (OUTPATIENT)
Dept: CARDIOLOGY | Facility: CLINIC | Age: 55
End: 2020-01-31
Attending: STUDENT IN AN ORGANIZED HEALTH CARE EDUCATION/TRAINING PROGRAM
Payer: COMMERCIAL

## 2020-01-31 DIAGNOSIS — I50.21 ACUTE SYSTOLIC HEART FAILURE (H): ICD-10-CM

## 2020-01-31 DIAGNOSIS — I50.9 ACUTE ON CHRONIC CONGESTIVE HEART FAILURE, UNSPECIFIED HEART FAILURE TYPE (H): ICD-10-CM

## 2020-01-31 LAB
ANION GAP SERPL CALCULATED.3IONS-SCNC: 3 MMOL/L (ref 3–14)
ANION GAP SERPL CALCULATED.3IONS-SCNC: <1 MMOL/L (ref 3–14)
BASE EXCESS BLDV CALC-SCNC: 2.8 MMOL/L
BUN SERPL-MCNC: 40 MG/DL (ref 7–30)
BUN SERPL-MCNC: 41 MG/DL (ref 7–30)
CALCIUM SERPL-MCNC: 9.2 MG/DL (ref 8.5–10.1)
CALCIUM SERPL-MCNC: 9.6 MG/DL (ref 8.5–10.1)
CHLORIDE SERPL-SCNC: 107 MMOL/L (ref 94–109)
CHLORIDE SERPL-SCNC: 110 MMOL/L (ref 94–109)
CO2 SERPL-SCNC: 26 MMOL/L (ref 20–32)
CO2 SERPL-SCNC: 29 MMOL/L (ref 20–32)
CREAT SERPL-MCNC: 0.98 MG/DL (ref 0.52–1.04)
CREAT SERPL-MCNC: 1 MG/DL (ref 0.52–1.04)
GFR SERPL CREATININE-BSD FRML MDRD: 63 ML/MIN/{1.73_M2}
GFR SERPL CREATININE-BSD FRML MDRD: 65 ML/MIN/{1.73_M2}
GLUCOSE SERPL-MCNC: 123 MG/DL (ref 70–99)
GLUCOSE SERPL-MCNC: 87 MG/DL (ref 70–99)
HCO3 BLDV-SCNC: 27 MMOL/L (ref 21–28)
INTERPRETATION ECG - MUSE: NORMAL
LACTATE BLD-SCNC: 1.3 MMOL/L (ref 0.7–2)
NT-PROBNP SERPL-MCNC: 492 PG/ML (ref 0–900)
O2/TOTAL GAS SETTING VFR VENT: ABNORMAL %
OXYHGB MFR BLDV: 97 %
PCO2 BLDV: 39 MM HG (ref 40–50)
PH BLDV: 7.45 PH (ref 7.32–7.43)
PO2 BLDV: 135 MM HG (ref 25–47)
POTASSIUM SERPL-SCNC: 4.6 MMOL/L (ref 3.4–5.3)
POTASSIUM SERPL-SCNC: 5.8 MMOL/L (ref 3.4–5.3)
SODIUM SERPL-SCNC: 136 MMOL/L (ref 133–144)
SODIUM SERPL-SCNC: 139 MMOL/L (ref 133–144)
TROPONIN I SERPL-MCNC: <0.015 UG/L (ref 0–0.04)

## 2020-01-31 PROCEDURE — 96376 TX/PRO/DX INJ SAME DRUG ADON: CPT | Performed by: EMERGENCY MEDICINE

## 2020-01-31 PROCEDURE — 25000128 H RX IP 250 OP 636: Performed by: STUDENT IN AN ORGANIZED HEALTH CARE EDUCATION/TRAINING PROGRAM

## 2020-01-31 PROCEDURE — 36415 COLL VENOUS BLD VENIPUNCTURE: CPT | Performed by: NURSE PRACTITIONER

## 2020-01-31 PROCEDURE — 96374 THER/PROPH/DIAG INJ IV PUSH: CPT | Performed by: EMERGENCY MEDICINE

## 2020-01-31 PROCEDURE — 99223 1ST HOSP IP/OBS HIGH 75: CPT | Mod: AI | Performed by: INTERNAL MEDICINE

## 2020-01-31 PROCEDURE — 83605 ASSAY OF LACTIC ACID: CPT | Performed by: STUDENT IN AN ORGANIZED HEALTH CARE EDUCATION/TRAINING PROGRAM

## 2020-01-31 PROCEDURE — 84484 ASSAY OF TROPONIN QUANT: CPT | Performed by: EMERGENCY MEDICINE

## 2020-01-31 PROCEDURE — 99285 EMERGENCY DEPT VISIT HI MDM: CPT | Mod: 25 | Performed by: EMERGENCY MEDICINE

## 2020-01-31 PROCEDURE — 25000128 H RX IP 250 OP 636: Performed by: EMERGENCY MEDICINE

## 2020-01-31 PROCEDURE — 80048 BASIC METABOLIC PNL TOTAL CA: CPT | Performed by: EMERGENCY MEDICINE

## 2020-01-31 PROCEDURE — 85049 AUTOMATED PLATELET COUNT: CPT | Performed by: STUDENT IN AN ORGANIZED HEALTH CARE EDUCATION/TRAINING PROGRAM

## 2020-01-31 PROCEDURE — 93005 ELECTROCARDIOGRAM TRACING: CPT | Performed by: EMERGENCY MEDICINE

## 2020-01-31 PROCEDURE — 93010 ELECTROCARDIOGRAM REPORT: CPT | Mod: Z6 | Performed by: EMERGENCY MEDICINE

## 2020-01-31 PROCEDURE — 71046 X-RAY EXAM CHEST 2 VIEWS: CPT

## 2020-01-31 PROCEDURE — 93306 TTE W/DOPPLER COMPLETE: CPT

## 2020-01-31 PROCEDURE — 21400000 ZZH R&B CCU UMMC

## 2020-01-31 PROCEDURE — 83880 ASSAY OF NATRIURETIC PEPTIDE: CPT | Performed by: EMERGENCY MEDICINE

## 2020-01-31 PROCEDURE — 82805 BLOOD GASES W/O2 SATURATION: CPT | Performed by: STUDENT IN AN ORGANIZED HEALTH CARE EDUCATION/TRAINING PROGRAM

## 2020-01-31 PROCEDURE — 93306 TTE W/DOPPLER COMPLETE: CPT | Mod: 26 | Performed by: INTERNAL MEDICINE

## 2020-01-31 PROCEDURE — 80048 BASIC METABOLIC PNL TOTAL CA: CPT | Performed by: NURSE PRACTITIONER

## 2020-01-31 RX ORDER — ACETAMINOPHEN 650 MG/1
650 SUPPOSITORY RECTAL EVERY 4 HOURS PRN
Status: DISCONTINUED | OUTPATIENT
Start: 2020-01-31 | End: 2020-02-01

## 2020-01-31 RX ORDER — ACETAMINOPHEN 325 MG/1
325-650 TABLET ORAL EVERY 4 HOURS PRN
Status: DISCONTINUED | OUTPATIENT
Start: 2020-01-31 | End: 2020-02-02 | Stop reason: HOSPADM

## 2020-01-31 RX ORDER — NITROGLYCERIN 0.4 MG/1
0.4 TABLET SUBLINGUAL EVERY 5 MIN PRN
Status: DISCONTINUED | OUTPATIENT
Start: 2020-01-31 | End: 2020-02-02 | Stop reason: HOSPADM

## 2020-01-31 RX ORDER — FUROSEMIDE 10 MG/ML
80 INJECTION INTRAMUSCULAR; INTRAVENOUS ONCE
Status: COMPLETED | OUTPATIENT
Start: 2020-01-31 | End: 2020-01-31

## 2020-01-31 RX ORDER — LIDOCAINE 40 MG/G
CREAM TOPICAL
Status: DISCONTINUED | OUTPATIENT
Start: 2020-01-31 | End: 2020-02-02 | Stop reason: HOSPADM

## 2020-01-31 RX ORDER — LORATADINE 10 MG/1
10 TABLET ORAL DAILY PRN
Status: DISCONTINUED | OUTPATIENT
Start: 2020-01-31 | End: 2020-02-02 | Stop reason: HOSPADM

## 2020-01-31 RX ORDER — ALBUTEROL SULFATE 90 UG/1
1 AEROSOL, METERED RESPIRATORY (INHALATION)
Status: DISCONTINUED | OUTPATIENT
Start: 2020-01-31 | End: 2020-02-02 | Stop reason: HOSPADM

## 2020-01-31 RX ORDER — LISINOPRIL 20 MG/1
20 TABLET ORAL DAILY
Status: DISCONTINUED | OUTPATIENT
Start: 2020-02-01 | End: 2020-02-02 | Stop reason: HOSPADM

## 2020-01-31 RX ORDER — ACETAMINOPHEN 325 MG/1
325 TABLET ORAL EVERY 4 HOURS PRN
Status: DISCONTINUED | OUTPATIENT
Start: 2020-01-31 | End: 2020-02-01

## 2020-01-31 RX ORDER — MULTIPLE VITAMINS W/ MINERALS TAB 9MG-400MCG
1 TAB ORAL DAILY
Status: DISCONTINUED | OUTPATIENT
Start: 2020-02-01 | End: 2020-02-02 | Stop reason: HOSPADM

## 2020-01-31 RX ORDER — AMOXICILLIN 250 MG
2 CAPSULE ORAL DAILY PRN
Status: DISCONTINUED | OUTPATIENT
Start: 2020-01-31 | End: 2020-02-02 | Stop reason: HOSPADM

## 2020-01-31 RX ORDER — BUMETANIDE 0.25 MG/ML
3 INJECTION INTRAMUSCULAR; INTRAVENOUS ONCE
Status: COMPLETED | OUTPATIENT
Start: 2020-01-31 | End: 2020-01-31

## 2020-01-31 RX ORDER — HEPARIN SODIUM 5000 [USP'U]/.5ML
5000 INJECTION, SOLUTION INTRAVENOUS; SUBCUTANEOUS EVERY 12 HOURS
Status: DISCONTINUED | OUTPATIENT
Start: 2020-01-31 | End: 2020-02-02 | Stop reason: HOSPADM

## 2020-01-31 RX ORDER — FUROSEMIDE 10 MG/ML
40 INJECTION INTRAMUSCULAR; INTRAVENOUS ONCE
Status: COMPLETED | OUTPATIENT
Start: 2020-01-31 | End: 2020-01-31

## 2020-01-31 RX ORDER — ALUMINA, MAGNESIA, AND SIMETHICONE 2400; 2400; 240 MG/30ML; MG/30ML; MG/30ML
30 SUSPENSION ORAL EVERY 4 HOURS PRN
Status: DISCONTINUED | OUTPATIENT
Start: 2020-01-31 | End: 2020-02-02 | Stop reason: HOSPADM

## 2020-01-31 RX ADMIN — FUROSEMIDE 80 MG: 10 INJECTION, SOLUTION INTRAVENOUS at 15:31

## 2020-01-31 RX ADMIN — FUROSEMIDE 40 MG: 10 INJECTION, SOLUTION INTRAVENOUS at 14:36

## 2020-01-31 RX ADMIN — BUMETANIDE 3 MG: 0.25 INJECTION INTRAMUSCULAR; INTRAVENOUS at 22:19

## 2020-01-31 ASSESSMENT — ENCOUNTER SYMPTOMS
CONFUSION: 0
FEVER: 0
DIFFICULTY URINATING: 0
BACK PAIN: 1
SHORTNESS OF BREATH: 1
COLOR CHANGE: 0
ARTHRALGIAS: 0
HEADACHES: 0
NECK STIFFNESS: 0
EYE REDNESS: 0
ABDOMINAL PAIN: 0

## 2020-01-31 ASSESSMENT — MIFFLIN-ST. JEOR
SCORE: 1806.51
SCORE: 1843.94

## 2020-01-31 NOTE — ED PROVIDER NOTES
[place] EMERGENCY DEPARTMENT ([facility])  1/31/20   History     Chief Complaint   Patient presents with     Abnormal Labs     HPI  Gloria Guzman is a 54 year old female with a past medical history of systolic heart failure (nonischemic cardiomyopathy with an ejection fraction of 19% per chart), COPD, bipolar disorder, anemia, arthritis, alcohol abuse, GERD, hypertension who presents to the emergency department with chief complaint of shortness of breath and fatigue.  It has been increasing from her baseline. She feels like she has wanted to sleep more. It is associated with a 30 pound weight gain over the past 4 weeks, primarily occurring over the last 2 weeks.  Patient states that her medications were changed 2 weeks ago with the addition of spironolactone.  Patient had outpatient lab work done today which showed a potassium of 5.8.  Patient states that she is dyspneic to the point where she is unable to ambulate more than 10 feet without experiencing shortness of breath.  Patient states she has had similar symptoms 6 months ago which required admission for IV diuretic therapy. No BLE swelling. Pt states she typically accumulates fluid in the abdomen, and has noted some distension recently. No dietary changes. No abdominal pain. Pt takes Bumex at home (2 mg BID per chart review) and spironolactone was recently added. No other medication changes recently. Has been compliant with prescribed regimen.     I have reviewed the Medications, Allergies, Past Medical and Surgical History, and Social History in the [employer] system.  Past Medical History:   Diagnosis Date     Anemia      Arthritis      Bipolar affective disorder, current episode moderate (H)      Chronic back pain      Chronic systolic CHF (congestive heart failure) (H)      COPD (chronic obstructive pulmonary disease) (H)      COPD (chronic obstructive pulmonary disease) (H)      ETOH abuse      GERD (gastroesophageal reflux disease)      H/O heart  failure      History of posttraumatic stress disorder (PTSD)      Homeless      HTN (hypertension)      Marijuana abuse      Nonischemic cardiomyopathy (H)     EF 19% by CMRI     Obesity      Polysubstance abuse (H)     tobacco, previous cocaine, meth, and alcohol, and marijuana     Sleep apnea      Tobacco abuse      Past Surgical History:   Procedure Laterality Date     BACK SURGERY       CARDIAC SURGERY      AICD placement       SECTION       GALLBLADDER SURGERY       HERNIA REPAIR       JOINT REPLACEMTN, KNEE RT/LT  11/10    Joint Replacement knee LT     SURGICAL PATHOLOGY EXAM       TONSILLECTOMY       No current facility-administered medications for this encounter.      Current Outpatient Medications   Medication     acetaminophen (TYLENOL) 500 MG tablet     alum & mag hydroxide-simethicone (MYLANTA/MAALOX) 200-200-20 MG/5ML SUSP suspension     azithromycin (ZITHROMAX) 250 MG tablet     bumetanide (BUMEX) 2 MG tablet     camphor-eucalyptus-menthol (VICKS VAPORUB) 4.73-1.2-2.6 % OINT ointment     furosemide (LASIX) 40 MG tablet     hydrocortisone (CORTAID) 1 % external cream     lisinopril (PRINIVIL/ZESTRIL) 20 MG tablet     loratadine (CLARITIN) 10 MG tablet     melatonin 3 MG tablet     Menthol-Methyl Salicylate (ICY HOT BALM EXTRA STRENGTH EX)     metolazone (ZAROXOLYN) 2.5 MG tablet     metoprolol succinate ER (TOPROL-XL) 25 MG 24 hr tablet     multivitamin w/minerals (MULTI-VITAMIN) tablet     phenol-menthol (CEPASTAT) 14.5 MG lozenge     PROAIR  (90 Base) MCG/ACT inhaler     senna-docusate (SENOKOT-S/PERICOLACE) 8.6-50 MG tablet     sodium chloride (OCEAN) 0.65 % nasal spray     spironolactone (ALDACTONE) 25 MG tablet     tetrahydrozoline (VISINE) 0.05 % ophthalmic solution        Allergies   Allergen Reactions     Cefaclor Rash     Other reaction(s): *Unknown     Morphine Hives and Itching     Ibuprofen      Morphine Sulfate Itching     Mushrooms [Mushroom] Hives     Olanzapine Other  (See Comments)     Varenicline Other (See Comments)     Social History     Socioeconomic History     Marital status: Legally      Spouse name: Not on file     Number of children: Not on file     Years of education: Not on file     Highest education level: Not on file   Occupational History     Not on file   Social Needs     Financial resource strain: Not on file     Food insecurity:     Worry: Not on file     Inability: Not on file     Transportation needs:     Medical: Not on file     Non-medical: Not on file   Tobacco Use     Smoking status: Current Every Day Smoker     Packs/day: 0.50     Types: Cigarettes     Smokeless tobacco: Never Used   Substance and Sexual Activity     Alcohol use: No     Drug use: Yes     Types: Marijuana     Sexual activity: Never   Lifestyle     Physical activity:     Days per week: Not on file     Minutes per session: Not on file     Stress: Not on file   Relationships     Social connections:     Talks on phone: Not on file     Gets together: Not on file     Attends [employer] service: Not on file     Active member of club or organization: Not on file     Attends meetings of clubs or organizations: Not on file     Relationship status: Not on file     Intimate partner violence:     Fear of current or ex partner: Not on file     Emotionally abused: Not on file     Physically abused: Not on file     Forced sexual activity: Not on file   Other Topics Concern     Parent/sibling w/ CABG, MI or angioplasty before 65F 55M? Not Asked   Social History Narrative     Not on file       Review of Systems  General: No fevers or chills, positive for significant weight gain  Skin: No rash or diaphoresis  Eyes: No eye redness or discharge  Ears/Nose/Throat: No rhinorrhea or nasal congestion  Respiratory: No cough, positive for shortness of breath  Cardiovascular: No chest pain or palpitations  Gastrointestinal: No nausea, vomiting, or diarrhea, positive for abdominal distension  Genitourinary: No  "urinary frequency, hematuria, or dysuria  Musculoskeletal: Positive for chronic back pain  Neurologic: No numbness or weakness  Psychiatric: Positive for history of PTSD and depression  Hematologic/Lymphatic/Immunologic: No leg swelling, no easy bruising/bleeding  Endocrine: No polyuria/polydypsia      Physical Exam   BP: (!) 151/86  Pulse: 77  Temp: 97.8  F (36.6  C)  Resp: 20  Height: 174 cm (5' 8.5\")  Weight: 119.7 kg (264 lb)(scale)  SpO2: 99 %      Physical Exam  General: Well nourished, well developed, NAD  HEENT: EOMI, anicteric. NCAT, MMM  Neck: no jugular venous distension, supple, nl ROM  Cardiac: Regular rate and rhythm. No murmurs, rubs, or gallops. Normal S1, S2.  Intact peripheral pulses  Pulm: CTAB, no stridor, wheezes, rales, rhonchi  Abd: Soft, nontender, distended.  No masses palpated.    Skin: Warm and dry to the touch.  No rash  Extremities: Trace BLE edema, no cyanosis, w/w/p  Neuro: A&Ox3, no gross focal deficits        ED Course   3:08 PM  The patient was seen and examined by Dr. Ferrera in Room 32.       Procedures             Critical Care time:  none          EKG Interpretation:      Interpreted by Pham Ferrera MD  Time reviewed: 1459  Symptoms at time of EKG: Dyspnea  Rhythm: normal sinus   Rate: normal  Axis: NORMAL  Ectopy: none  Conduction: normal  ST Segments/ T Waves: No ST-T wave changes  Q Waves: none  Comparison to prior: previously seen PVCs have resolved, otherwise unchanged from 12/13/2019    Clinical Impression: normal EKG            Labs Ordered and Resulted from Time of ED Arrival Up to the Time of Departure from the ED   BASIC METABOLIC PANEL - Abnormal; Notable for the following components:       Result Value    Glucose 123 (*)     Urea Nitrogen 41 (*)     All other components within normal limits   NT PROBNP INPATIENT   TROPONIN I          Results for orders placed or performed during the hospital encounter of 01/31/20 (from the past 24 hour(s))   EKG 12 lead "   Result Value Ref Range    Interpretation ECG Click View Image link to view waveform and result    XR Chest 2 Views    Narrative    EXAM: XR CHEST 2 VW  1/31/2020 2:02 PM      HISTORY: Dyspnea    COMPARISON: 5/17/2019    FINDINGS: PA and lateral chest. Single-lead implantable cardiac  defibrillator. Trachea is midline, heart size is normal. No focal  pulmonary opacity, pneumothorax, or pleural effusion. No acute osseous  or abdominal abnormality.      Impression    IMPRESSION: No acute cardiopulmonary disease.         I have personally reviewed the examination and initial interpretation  and I agree with the findings.    ROGER HAMILTON MD   Basic metabolic panel   Result Value Ref Range    Sodium 136 133 - 144 mmol/L    Potassium 4.6 3.4 - 5.3 mmol/L    Chloride 107 94 - 109 mmol/L    Carbon Dioxide 26 20 - 32 mmol/L    Anion Gap 3 3 - 14 mmol/L    Glucose 123 (H) 70 - 99 mg/dL    Urea Nitrogen 41 (H) 7 - 30 mg/dL    Creatinine 1.00 0.52 - 1.04 mg/dL    GFR Estimate 63 >60 mL/min/[1.73_m2]    GFR Estimate If Black 73 >60 mL/min/[1.73_m2]    Calcium 9.2 8.5 - 10.1 mg/dL   Nt probnp inpatient (BNP)   Result Value Ref Range    N-Terminal Pro BNP Inpatient 492 0 - 900 pg/mL   Troponin I   Result Value Ref Range    Troponin I ES <0.015 0.000 - 0.045 ug/L       Labs, vital signs, and imaging studies were reviewed by me.    Assessments & Plan (with Medical Decision Making)   Patient is a 54-year-old female presenting with shortness of breath.  Potassium was 5.8 on outpatient labs, however potassium is normal at 4.6 on recheck in the emergency department.  This may have been a laboratory error.  Troponin and BNP are within normal limits.  Labs are otherwise unremarkable.    Chest x-ray is negative for acute disease process.    I have reviewed the nursing notes.    I have reviewed the findings, diagnosis, plan and need for follow up with the patient.  Patient discussed with cardiology 2 service, to be admitted to their  service for further management. Plan was discussed with patient who understands and agrees with plan.  They recommend treating patient with 120 mg of Lasix total IV (patient has already received 40 mg IV, and additional 80 mg was ordered).    New Prescriptions    No medications on file       Final diagnoses:   Acute on chronic congestive heart failure, unspecified heart failure type (H)     Willie HILL, am serving as a trained medical scribe to document services personally performed by Pham Ferrera MD, based on the provider's statements to me.   Pham HILL MD, was physically present and have reviewed and verified the accuracy of this note documented by Willie Bledsoe.     1/31/2020   [facility], [place], EMERGENCY DEPARTMENT     Pham Ferrera MD  01/31/20 1556

## 2020-01-31 NOTE — TELEPHONE ENCOUNTER
Gloria got labs today. Had CORE appointment on 1/8 and was supposed to have follow up labs in 2 weeks after adjusting diuretics. Gloria notes that her weight is up and she feels more short of breath. More notably, K is 5.8 and not on potassium supplement. Recommended she be seen in ER for hyperkalemia and likely fluid overload. Asked her to not take any more Spironolactone until she is seen and evaluated. Gloria is agreeable with this and will present to ER.     Brittany Guillen RN

## 2020-01-31 NOTE — ED NOTES
"  ED Triage Provider Note  [facility]  Encounter Date: Jan 31, 2020  1:37 PM     History:  Chief Complaint   Patient presents with     Abnormal Labs     Gloria Guzman is a 54 year old female who is being followed by the core clinic for systolic heart failure who presents with abnormal laboratory this morning, 30 pound weight gain over the past 4 weeks and increasing shortness of breath.  Patient apparently had medications changed 2 weeks ago including addition of spironolactone.  She had routine laboratories this morning revealing a potassium of 5.8 and in the setting of 30 pound weight gain was directed to the emergency room for further evaluation and treatment.  Patient describes escalating dyspnea to the point where she is unable to walk more than 10 feet without shortness of breath.  She had similar symptoms 6 months ago requiring admission for IV diuretics.      Review of Systems:  Review of Systems   Constitutional: Negative for fever.   HENT: Negative for congestion.    Eyes: Negative for redness.   Respiratory: Positive for shortness of breath.    Cardiovascular: Negative for chest pain.   Gastrointestinal: Negative for abdominal pain.   Genitourinary: Negative for difficulty urinating.   Musculoskeletal: Positive for back pain ( Chronic low back). Negative for arthralgias and neck stiffness.   Skin: Negative for color change.   Neurological: Negative for headaches.   Psychiatric/Behavioral: Negative for confusion.   Chest tightness ongoing for months      Exam:  BP (!) 151/86   Pulse 77   Temp 97.8  F (36.6  C) (Oral)   Resp 20   Ht 1.74 m (5' 8.5\")   Wt 119.7 kg (264 lb)   SpO2 99%   BMI 39.56 kg/m    General: No acute distress. Appears stated age.   Cardio: Regular rate, extremities well perfused  Resp: Normal work of breathing, grossly normal respiratory rate  Neuro: Alert. CN II-XII grossly intact. Grossly intact strength.           EKG Interpretation:  "     Interpreted by Suhail Domínguez MD  Time reviewed: 1:46 PM  Symptoms at time of EKG: Dyspnea  Rhythm: normal sinus   Rate: normal  Axis: NORMAL  Ectopy: none  Conduction: normal  ST Segments/ T Waves: No ST-T wave changes  Q Waves: none  Comparison to prior: Unchanged    Clinical Impression: normal EKG    Medical Decision Making:  Patient arriving to the ED with problem as above.  Differential included volume overload, MI, pneumonia, pneumothorax.  A medical screening exam was performed and orders initiated from Triage.     Note created by Suhail Domínguez MD on 1/31/2020 at 1:37 PM       Suhail Domínguez MD  01/31/20 [id number]

## 2020-01-31 NOTE — H&P
Cardiology    History and Physical         Date of Admission:  1/31/2020  Date of Service (when I saw the patient): 01/31/20    Assessment & Plan   Gloria Guzman is a 54 year old female who presents with PMHx of dilated CM, HFpEF(EF:55-60%), HTN, COPd, chronic back pain, smoking history who was admitted for decompensated heart failure.     #Dilated CM  #HFpEF(Ef:55-60%)  Patient with history of dilated CM and prior history of HFrEF, now presenting today with increasing volume overload. Dry weight of 250lbs, presenting with a weight of 264lbs ProBNP:492 which is lower than her prior exacerbation.. TTE revealed EF: 60-65 with a normal IVC. Patient may have some mild volume overload, can diurese with IV Bumex this evening and transition back to home PO Bumex dose.   - Volume: Mildly hypervolemic, will start IV Bumex 3mg this evening and then transition to PO Bumex 2mg BID   - BB: Metop 75mg qday starting tomorrow  - ACEI/ARB: Continue PO Lisinopril 20mg   - Hold aldactone due to hyperkalemia  - Afterload: Defer  - Contractile: Obtain SVO2 and lactate  - Strict I's and O's  - Daily weights       #Hyperkalemia  Likely 2/2 to aldactone and fluid shift  - Hold aldactone    #Chest pressure  EKG appears reassuring. Troponin negative. No RV strain on TTE suggestive of PE.  Likely in the setting of volume overload  - Diuresis as above      Chronic Medical Problems:  COPD: Albuterol inhaler  Constipation: Senna-docusate  Allergic rhinitis: Claritin 10mg qday PRN      Staffed with Dr. Ramon Pradhan MD  Cardiology Fellow  PGY4      Chief Complaint     SOB and 30 lb weight gain     History of Present Illness   Gloria Guzman is a 54 year old female who presents with PMHx of dilated CM, HFrEF(EF:30-35%), HTN, COPD, chronic back pain, homelessness, past methamphetamine abuse, and  smoking history who was admitted for decompensated heart failure.     Patient was last admitted on 2/02/2019 for CHF exacerbation. Presented  with 20 lb weight gain with significant JVD. Patient was diuresed with IV lasix 40mg TID and then transitioned to PO Lisinopril, aldactone, metop succinate and PO Lasix 40mg BID. She then presented to [facility] ED for significant volume overload and was to be admitted, however patient eloped from the ED and ecaped police custody. She returns back today for significant overload and hyperkalemia. She was then admitted to the Heart Failure service for further work up.     At bedside, patient endorsed significant MARQUEZ, PND, orthopnea. She denied chest pain, nausea, luightheadedness and dizziness.     Past Medical History    I have reviewed this patient's medical history and updated it with pertinent information if needed.   Past Medical History:   Diagnosis Date     Anemia      Arthritis      Bipolar affective disorder, current episode moderate (H)      Chronic back pain      Chronic systolic CHF (congestive heart failure) (H)      COPD (chronic obstructive pulmonary disease) (H)      COPD (chronic obstructive pulmonary disease) (H)      ETOH abuse      GERD (gastroesophageal reflux disease)      H/O heart failure      History of posttraumatic stress disorder (PTSD)      Homeless      HTN (hypertension)      Marijuana abuse      Nonischemic cardiomyopathy (H)     EF 19% by CMRI     Obesity      Polysubstance abuse (H)     tobacco, previous cocaine, meth, and alcohol, and marijuana     Sleep apnea      Tobacco abuse        Past Surgical History   I have reviewed this patient's surgical history and updated it with pertinent information if needed.  Past Surgical History:   Procedure Laterality Date     BACK SURGERY       CARDIAC SURGERY      AICD placement       SECTION       GALLBLADDER SURGERY       HERNIA REPAIR       JOINT REPLACEMTN, KNEE RT/LT  11/10    Joint Replacement knee LT     SURGICAL PATHOLOGY EXAM       TONSILLECTOMY         Prior to Admission Medications   Prior to Admission Medications    Prescriptions Last Dose Informant Patient Reported? Taking?   Menthol-Methyl Salicylate (ICY HOT BALM EXTRA STRENGTH EX)   Yes No   PROAIR  (90 Base) MCG/ACT inhaler   Yes No   Sig: INHALE 2 PUFFS BY MOUTH EVERY 4 HOURS AS NEEDED FOR SHORTNESS OF BREATH AND WHEEZING   acetaminophen (TYLENOL) 500 MG tablet   No No   Sig: Take 2 tablets (1,000 mg) by mouth 3 times daily   alum & mag hydroxide-simethicone (MYLANTA/MAALOX) 200-200-20 MG/5ML SUSP suspension   Yes No   Sig: Take 30 mLs by mouth every 6 hours as needed for indigestion   azithromycin (ZITHROMAX) 250 MG tablet   No No   Sig: Take 500 mg for one day, then decrease to 250 mg daily for 4 days.   bumetanide (BUMEX) 2 MG tablet   No No   Sig: Take 1 tablet (2 mg) by mouth 2 times daily   camphor-eucalyptus-menthol (VICKS VAPORUB) 4.73-1.2-2.6 % OINT ointment   Yes No   Sig: Apply topically daily as needed for cough   furosemide (LASIX) 40 MG tablet   No No   Sig: Take 1 tablet (40 mg) by mouth 2 times daily   hydrocortisone (CORTAID) 1 % external cream   Yes No   Sig: Apply topically 2 times daily as needed   lisinopril (PRINIVIL/ZESTRIL) 20 MG tablet   No No   Sig: Take 1 tablet (20 mg) by mouth daily   loratadine (CLARITIN) 10 MG tablet   Yes No   Sig: Take 10 mg by mouth daily as needed for allergies   melatonin 3 MG tablet   Yes No   Sig: Take 3 mg by mouth nightly as needed for sleep   metolazone (ZAROXOLYN) 2.5 MG tablet   No No   Sig: Take 1 tablet (2.5 mg) by mouth once for 1 dose   metoprolol succinate ER (TOPROL-XL) 25 MG 24 hr tablet   No No   Sig: Take 3 tablets (75 mg) by mouth daily   multivitamin w/minerals (MULTI-VITAMIN) tablet   Yes No   Sig: Take 1 tablet by mouth daily   phenol-menthol (CEPASTAT) 14.5 MG lozenge   Yes No   Sig: Place 1 lozenge inside cheek every 2 hours as needed for moderate pain   senna-docusate (SENOKOT-S/PERICOLACE) 8.6-50 MG tablet   Yes No   Sig: Take 2 tablets by mouth daily as needed for constipation   sodium  chloride (OCEAN) 0.65 % nasal spray   Yes No   Sig: Spray 1 spray into both nostrils daily as needed for congestion   spironolactone (ALDACTONE) 25 MG tablet   No No   Sig: Take 1 tablet (25 mg) by mouth every morning   tetrahydrozoline (VISINE) 0.05 % ophthalmic solution   Yes No   Si drop 3 times daily      Facility-Administered Medications: None     Allergies   Allergies   Allergen Reactions     Cefaclor Rash     Other reaction(s): *Unknown     Morphine Hives and Itching     Ibuprofen      Morphine Sulfate Itching     Mushrooms [Mushroom] Hives     Olanzapine Other (See Comments)     Varenicline Other (See Comments)       Social History   I have reviewed this patient's social history and updated it with pertinent information if needed. Gloria Guzman  reports that she has been smoking cigarettes. She has been smoking about 0.50 packs per day. She has never used smokeless tobacco. She reports current drug use. Drug: Marijuana. She reports that she does not drink alcohol.    Family History   I have reviewed this patient's family history and updated it with pertinent information if needed.   Family History   Problem Relation Age of Onset     Breast Cancer Maternal Grandmother      Bipolar Disorder Maternal Grandmother      Substance Abuse Maternal Grandmother      Breast Cancer Maternal Aunt      Cancer Father 42        lung      Substance Abuse Father      Cancer Maternal Grandfather         lung      Bipolar Disorder Maternal Grandfather      Substance Abuse Maternal Grandfather      Cancer Other         maternal cousin lung      Gallbladder Disease Mother      Depression Mother      Bipolar Disorder Mother      Substance Abuse Mother      Gallbladder Disease Sister      Substance Abuse Sister      Substance Abuse Brother        Review of Systems   The 10 point Review of Systems is negative other than noted in the HPI or here. SOB, PND, orthopnea    Physical Exam   Temp: 97.8  F (36.6  C) Temp src: Oral BP:  (!) 151/86 Pulse: 77   Resp: 20 SpO2: 99 % O2 Device: None (Room air)    Vital Signs with Ranges  Temp:  [97.8  F (36.6  C)] 97.8  F (36.6  C)  Pulse:  [77] 77  Resp:  [20] 20  BP: (151)/(86) 151/86  SpO2:  [99 %] 99 %  264 lbs 0 oz    GEN:  Alert, oriented x 3, appears comfortable, NAD.  HEENT:  Normocephalic/atraumatic, no scleral icterus, no nasal discharge, mouth moist.  CV:  Regular rate and rhythm, no murmur or JVD.  S1 + S2 noted, no S3 or S4.  LUNGS:  Clear to auscultation bilaterally   ABD:  Active bowel sounds, soft, non-tender/non-distended.  No rebound/guarding/rigidity.  EXT:  No edema or cyanosis.  Hands/feet warm to touch with good signs of peripheral perfusion.   SKIN:  Dry to touch, no exanthems noted in the visualized areas.  NEURO:  No new focal deficits appreciated.    Data   Data reviewed today:  I personally reviewed the EKG tracing showing NSR with possible LVH.  Recent Labs   Lab 01/31/20  1427 01/31/20  1029    139   POTASSIUM 4.6 5.8*   CHLORIDE 107 110*   CO2 26 29   BUN 41* 40*   CR 1.00 0.98   ANIONGAP 3 <1*   MADDISON 9.2 9.6   * 87   TROPI <0.015  --        Recent Results (from the past 24 hour(s))   XR Chest 2 Views    Narrative    EXAM: XR CHEST 2 VW  1/31/2020 2:02 PM      HISTORY: Dyspnea    COMPARISON: 5/17/2019    FINDINGS: PA and lateral chest. Single-lead implantable cardiac  defibrillator. Trachea is midline, heart size is normal. No focal  pulmonary opacity, pneumothorax, or pleural effusion. No acute osseous  or abdominal abnormality.      Impression    IMPRESSION: No acute cardiopulmonary disease.         I have personally reviewed the examination and initial interpretation  and I agree with the findings.    ROGER HAMILTON MD

## 2020-02-01 ENCOUNTER — APPOINTMENT (OUTPATIENT)
Dept: CT IMAGING | Facility: CLINIC | Age: 55
End: 2020-02-01
Payer: COMMERCIAL

## 2020-02-01 LAB
ALBUMIN SERPL-MCNC: 3.5 G/DL (ref 3.4–5)
ALP SERPL-CCNC: 83 U/L (ref 40–150)
ALT SERPL W P-5'-P-CCNC: 23 U/L (ref 0–50)
ANION GAP SERPL CALCULATED.3IONS-SCNC: 8 MMOL/L (ref 3–14)
AST SERPL W P-5'-P-CCNC: 16 U/L (ref 0–45)
BASOPHILS # BLD AUTO: 0.1 10E9/L (ref 0–0.2)
BASOPHILS NFR BLD AUTO: 1 %
BILIRUB SERPL-MCNC: 0.4 MG/DL (ref 0.2–1.3)
BUN SERPL-MCNC: 41 MG/DL (ref 7–30)
CALCIUM SERPL-MCNC: 9.3 MG/DL (ref 8.5–10.1)
CHLORIDE SERPL-SCNC: 104 MMOL/L (ref 94–109)
CO2 SERPL-SCNC: 24 MMOL/L (ref 20–32)
CREAT SERPL-MCNC: 1.11 MG/DL (ref 0.52–1.04)
DIFFERENTIAL METHOD BLD: NORMAL
EOSINOPHIL # BLD AUTO: 0.6 10E9/L (ref 0–0.7)
EOSINOPHIL NFR BLD AUTO: 6.6 %
ERYTHROCYTE [DISTWIDTH] IN BLOOD BY AUTOMATED COUNT: 13.2 % (ref 10–15)
GFR SERPL CREATININE-BSD FRML MDRD: 55 ML/MIN/{1.73_M2}
GLUCOSE SERPL-MCNC: 105 MG/DL (ref 70–99)
HCT VFR BLD AUTO: 46.6 % (ref 35–47)
HGB BLD-MCNC: 14.8 G/DL (ref 11.7–15.7)
IMM GRANULOCYTES # BLD: 0 10E9/L (ref 0–0.4)
IMM GRANULOCYTES NFR BLD: 0.5 %
INR PPP: 1.11 (ref 0.86–1.14)
LYMPHOCYTES # BLD AUTO: 3 10E9/L (ref 0.8–5.3)
LYMPHOCYTES NFR BLD AUTO: 34.4 %
MAGNESIUM SERPL-MCNC: 2.2 MG/DL (ref 1.6–2.3)
MCH RBC QN AUTO: 29 PG (ref 26.5–33)
MCHC RBC AUTO-ENTMCNC: 31.8 G/DL (ref 31.5–36.5)
MCV RBC AUTO: 91 FL (ref 78–100)
MONOCYTES # BLD AUTO: 0.8 10E9/L (ref 0–1.3)
MONOCYTES NFR BLD AUTO: 9 %
NEUTROPHILS # BLD AUTO: 4.2 10E9/L (ref 1.6–8.3)
NEUTROPHILS NFR BLD AUTO: 48.5 %
NRBC # BLD AUTO: 0 10*3/UL
NRBC BLD AUTO-RTO: 0 /100
PLATELET # BLD AUTO: 282 10E9/L (ref 150–450)
POTASSIUM SERPL-SCNC: 4.3 MMOL/L (ref 3.4–5.3)
PROT SERPL-MCNC: 8.3 G/DL (ref 6.8–8.8)
RBC # BLD AUTO: 5.11 10E12/L (ref 3.8–5.2)
SODIUM SERPL-SCNC: 136 MMOL/L (ref 133–144)
WBC # BLD AUTO: 8.7 10E9/L (ref 4–11)

## 2020-02-01 PROCEDURE — 83735 ASSAY OF MAGNESIUM: CPT | Performed by: STUDENT IN AN ORGANIZED HEALTH CARE EDUCATION/TRAINING PROGRAM

## 2020-02-01 PROCEDURE — 99207 ZZC NO CHARGE LOS: CPT | Performed by: INTERNAL MEDICINE

## 2020-02-01 PROCEDURE — 36415 COLL VENOUS BLD VENIPUNCTURE: CPT | Performed by: STUDENT IN AN ORGANIZED HEALTH CARE EDUCATION/TRAINING PROGRAM

## 2020-02-01 PROCEDURE — 25000128 H RX IP 250 OP 636: Performed by: STUDENT IN AN ORGANIZED HEALTH CARE EDUCATION/TRAINING PROGRAM

## 2020-02-01 PROCEDURE — 99233 SBSQ HOSP IP/OBS HIGH 50: CPT | Mod: GC | Performed by: INTERNAL MEDICINE

## 2020-02-01 PROCEDURE — 74177 CT ABD & PELVIS W/CONTRAST: CPT

## 2020-02-01 PROCEDURE — 80307 DRUG TEST PRSMV CHEM ANLYZR: CPT | Performed by: STUDENT IN AN ORGANIZED HEALTH CARE EDUCATION/TRAINING PROGRAM

## 2020-02-01 PROCEDURE — 85610 PROTHROMBIN TIME: CPT | Performed by: STUDENT IN AN ORGANIZED HEALTH CARE EDUCATION/TRAINING PROGRAM

## 2020-02-01 PROCEDURE — 85025 COMPLETE CBC W/AUTO DIFF WBC: CPT | Performed by: STUDENT IN AN ORGANIZED HEALTH CARE EDUCATION/TRAINING PROGRAM

## 2020-02-01 PROCEDURE — 25000132 ZZH RX MED GY IP 250 OP 250 PS 637: Performed by: STUDENT IN AN ORGANIZED HEALTH CARE EDUCATION/TRAINING PROGRAM

## 2020-02-01 PROCEDURE — 80053 COMPREHEN METABOLIC PANEL: CPT | Performed by: STUDENT IN AN ORGANIZED HEALTH CARE EDUCATION/TRAINING PROGRAM

## 2020-02-01 PROCEDURE — 21400000 ZZH R&B CCU UMMC

## 2020-02-01 PROCEDURE — 25000128 H RX IP 250 OP 636: Performed by: INTERNAL MEDICINE

## 2020-02-01 PROCEDURE — 40000141 ZZH STATISTIC PERIPHERAL IV START W/O US GUIDANCE

## 2020-02-01 RX ORDER — IOPAMIDOL 755 MG/ML
135 INJECTION, SOLUTION INTRAVASCULAR ONCE
Status: COMPLETED | OUTPATIENT
Start: 2020-02-01 | End: 2020-02-01

## 2020-02-01 RX ORDER — BUMETANIDE 0.25 MG/ML
3 INJECTION INTRAMUSCULAR; INTRAVENOUS
Status: DISCONTINUED | OUTPATIENT
Start: 2020-02-01 | End: 2020-02-01

## 2020-02-01 RX ORDER — SPIRONOLACTONE 25 MG/1
25 TABLET ORAL EVERY MORNING
Status: DISCONTINUED | OUTPATIENT
Start: 2020-02-01 | End: 2020-02-01

## 2020-02-01 RX ADMIN — IOPAMIDOL 135 ML: 755 INJECTION, SOLUTION INTRAVENOUS at 14:03

## 2020-02-01 RX ADMIN — LISINOPRIL 20 MG: 20 TABLET ORAL at 07:51

## 2020-02-01 RX ADMIN — METOPROLOL SUCCINATE 75 MG: 25 TABLET, EXTENDED RELEASE ORAL at 07:51

## 2020-02-01 RX ADMIN — BUMETANIDE 3 MG: 0.25 INJECTION INTRAMUSCULAR; INTRAVENOUS at 08:45

## 2020-02-01 RX ADMIN — MULTIPLE VITAMINS W/ MINERALS TAB 1 TABLET: TAB at 07:51

## 2020-02-01 ASSESSMENT — ACTIVITIES OF DAILY LIVING (ADL)
ADLS_ACUITY_SCORE: 12

## 2020-02-01 ASSESSMENT — MIFFLIN-ST. JEOR: SCORE: 1799.71

## 2020-02-01 NOTE — PLAN OF CARE
"Pt admitted 1/31 with CP, SOB, decompensated HF with increase weight.  SR with PVC's, VS'S on RA and with chronic back pain, but declined tx.  One dose of IV Bumex given and plan to transition to PO soon.  Drug screen pending.  Pt tearful this morning and stated, \" I can't do this anymore\".  ECHO, trop's, and BNP all okay.  Otherwise, pt slept well and up independently.  Continue to monitor and with POC.      "

## 2020-02-01 NOTE — ED NOTES
Patient up and walking around without difficulty.  Looking for her phone for she had left it in the waiting area of triage.  Able to find it security had it.

## 2020-02-01 NOTE — PROGRESS NOTES
"                              Cardiology Progress Note  Gloria Guzman MRN: [id number]  Age: 54 year old, : 1965       Changes today     - Diuresis: Bumex 3 mg IV in the morning   - CT abdomen and pelvis with contrast (patient complains of abdominal discomfort that not entirely explained by heart failure)       Assessments and Plans     Gloria Guzman is a 54-year-old female with medical history significant for dilated cardiomyopathy with HFpEF (last LVEF 55-60%, on ), HTN, COPD, chronic back pain, and smoking history who was admitted on  for decompensated heart failure.      # Acute decompensated heart failure   # Dilated cardiomyopathy with HFpEF (55-60%)     Patient with history of dilated cardiomyopathy and prior history of HFrEF (LVEF 19% in ), presenting today with increasing volume overload. Dry weight of 250 lbs, weight of 264 lb on admission. TTE () revealed LVEF of 55-60% with a normal IVC.   - Telemetry, strict I&O, daily weight and monitor electrolyte   - Diuresis: Bumex 3 mg IV for 2 doses ( and )   - Beta blocker: Continue home metoprolol XL 75 mg daily   - Afterload reduction: Defer  - ACEI/ARB: Continue home lisinopril 20 mg daily   - Hold aldactone for now due to hyperkalemia on admission     # Abdominal discomfort and congestion   Unclear etiology, not entirely explained by heart failure.  - CT abdomen and pelvis with contrast       # Hyperkalemia on admission - resolved   K 5.8 on admission, down trending   - Continue to monitor       Chronic Problems    # COPD - Albuterol inhaler q 6 hours   # Constipation - Senna-docusate prn   # Allergic rhinitis - Loratadine 10 mg prn q day   # Chronic back pain - Tylenol prn     FEN: 2 g sodium diet   DVT Prophylaxis: Ambulation   GI Prophylaxis: None   Disposition: Pending treatment   Code Status: Full code      Patient discussed with staff attending, Dr. Laguerre and the note reflects our joint plan.     Supavit \"Mac\" " "MD Maria Antonia   PGY-3, Internal Medicine  Cardiology Service  Pager: [phone]      Subjective     Nursing note was reviewed, no acute event overnight. Still having chest congestion and abdominal bloating. Denies shortness of breath, orthopnea or PND.          Objective     Vital signs:  Temp: 97.9  F (36.6  C) Temp src: Oral BP: 118/79 Pulse: 77 Heart Rate: 84 Resp: 18 SpO2: 98 % O2 Device: None (Room air)   Height: 172.7 cm (5' 8\") Weight: 116.1 kg (256 lb)  Estimated body mass index is 38.92 kg/m  as calculated from the following:    Height as of this encounter: 1.727 m (5' 8\").    Weight as of this encounter: 116.1 kg (256 lb).    Gen: AA&Ox3, no acute distress, breathing on room air   HEENT: JVP 6 cm   PULM/THORAX: Clear to auscultation bilaterally, no rales/stridor/wheezes  CV: RRR, S1 and S2 appreciated, no extra heart sounds, murmurs or rub auscultated  ABD: Obese, soft, nontender, nondistended. Normoactive bowel sounds   EXT: 1+ edema, clubbing or cyanosis. Warm well perfused     Lab and imaging were reviewed in EPIC.     "

## 2020-02-01 NOTE — PROGRESS NOTES
Admission     Diagnosis:  Hypervolemia causing SOB  Admitted from: [facility]  Via: Wheelchair  Accompanied by: transport  Belongings: Placed in closet; valuables sent home with family, declined sending any items to security.  Admission Profile: Complete  Teaching: orientation to unit, call don't fall, use of console, meal times, visiting hours, when to call for the RN (angina/sob/dizziness, etc.), and enforced importance of safety   Access: PIV Left  Telemetry: Placed on patient  Height/Weight: Complete    Refused full and/or dual skin assessment.

## 2020-02-01 NOTE — ED NOTES
[facility], [place]   ED Nurse to Floor Handoff     Gloria Guzman is a 54 year old female who speaks English and lives alone,  in a home  They arrived in the ED by car from emergency room    ED Chief Complaint: Abnormal Labs    ED Dx;   Final diagnoses:   Acute on chronic congestive heart failure, unspecified heart failure type (H)         Needed?: No    Allergies:   Allergies   Allergen Reactions     Cefaclor Rash     Other reaction(s): *Unknown     Morphine Hives and Itching     Ibuprofen      Morphine Sulfate Itching     Mushrooms [Mushroom] Hives     Olanzapine Other (See Comments)     Varenicline Other (See Comments)   .  Past Medical Hx:   Past Medical History:   Diagnosis Date     Anemia      Arthritis      Bipolar affective disorder, current episode moderate (H)      Chronic back pain      Chronic systolic CHF (congestive heart failure) (H)      COPD (chronic obstructive pulmonary disease) (H)      COPD (chronic obstructive pulmonary disease) (H)      ETOH abuse      GERD (gastroesophageal reflux disease)      H/O heart failure      History of posttraumatic stress disorder (PTSD)      Homeless      HTN (hypertension)      Marijuana abuse      Nonischemic cardiomyopathy (H)     EF 19% by CMRI     Obesity      Polysubstance abuse (H)     tobacco, previous cocaine, meth, and alcohol, and marijuana     Sleep apnea      Tobacco abuse       Baseline Mental status: WDL  Current Mental Status changes: at basesline    Infection present or suspected this encounter: no  Sepsis suspected: No  Isolation type: No active isolations     Activity level - Baseline/Home:  Independent  Activity Level - Current:   Independent    Bariatric equipment needed?: No    In the ED these meds were given:   Medications   furosemide (LASIX) injection 40 mg (40 mg Intravenous Given 1/31/20 1436)   furosemide (LASIX) injection 80 mg (80 mg Intravenous Given 1/31/20 1531)       Drips running?   No    Home pump  No    Current LDAs  Peripheral IV 20 Left Wrist (Active)   Site Assessment WDL 2020  2:16 PM   Number of days: 0       Labs results:   Labs Ordered and Resulted from Time of ED Arrival Up to the Time of Departure from the ED   BASIC METABOLIC PANEL - Abnormal; Notable for the following components:       Result Value    Glucose 123 (*)     Urea Nitrogen 41 (*)     All other components within normal limits   NT PROBNP INPATIENT   TROPONIN I   BLOOD GAS VENOUS AND OXYHGB   LACTIC ACID WHOLE BLOOD       Imaging Studies:   Recent Results (from the past 24 hour(s))   XR Chest 2 Views    Narrative    EXAM: XR CHEST 2 VW  2020 2:02 PM      HISTORY: Dyspnea    COMPARISON: 2019    FINDINGS: PA and lateral chest. Single-lead implantable cardiac  defibrillator. Trachea is midline, heart size is normal. No focal  pulmonary opacity, pneumothorax, or pleural effusion. No acute osseous  or abdominal abnormality.      Impression    IMPRESSION: No acute cardiopulmonary disease.         I have personally reviewed the examination and initial interpretation  and I agree with the findings.    ROGER HAMILTON MD   Echo Complete    Narrative    [id number]  [id number]  [id number]  [id number]^MONA^AMBER           [facility],[place]  Echocardiography Laboratory  [address] [place], [place] [address]     Name: DAVID WRIGHT  MRN: [id number]  : 1965  Study Date: 2020 04:13 PM  Age: 54 yrs  Gender: Female  Patient Location: [facility]  Reason For Study: Heart Failure  Ordering Physician: AMBER DUNN  Performed By: KIM Marte     BSA: 2.3 m2  Height: 69 in  Weight: 264 lb  _____________________________________________________________________________  __        Procedure  Echocardiogram with two-dimensional, color and spectral Doppler performed.  _____________________________________________________________________________  __        Interpretation Summary  Global and  regional left ventricular function is normal with an EF of 55-60%.  Right ventricular function, chamber size, wall motion, and thickness are  normal.  No significant valvular disease.  IVC diameter <2.1 cm collapsing >50% with sniff suggests a normal RA pressure  of 3 mmHg.  No pericardial effusion is present.     There is no prior study for direct comparison.  _____________________________________________________________________________  __        Left Ventricle  Left ventricular wall thickness is normal. Left ventricular size is normal.  Global and regional left ventricular function is normal with an EF of 55-60%.  Left ventricular diastolic function is indeterminate. No regional wall motion  abnormalities are seen.     Right Ventricle  Right ventricular function, chamber size, wall motion, and thickness are  normal.     Atria  Both atria appear normal.        Mitral Valve  The mitral valve is normal.     Aortic Valve  Aortic valve is normal in structure and function. The aortic valve is  tricuspid.     Tricuspid Valve  The tricuspid valve is normal. Trace tricuspid insufficiency is present. The  peak velocity of the tricuspid regurgitant jet is not obtainable.     Pulmonic Valve  The pulmonic valve is normal.     Vessels  The aorta root is normal. Sinuses of Valsalva 2.9 cm. Ascending aorta 3.0 cm.  IVC diameter <2.1 cm collapsing >50% with sniff suggests a normal RA pressure  of 3 mmHg.        Pericardium  No pericardial effusion is present.     Compared to Previous Study  There is no prior study for direct comparison.     Attestation  I have personally viewed the imaging and agree with the interpretation and  report as documented by the fellow, Jonh Campos, and/or edited by me.  _____________________________________________________________________________  __     MMode/2D Measurements & Calculations  RVDd: 3.3 cm  IVSd: 1.3 cm  LVIDd: 4.7 cm  LVIDs: 3.3 cm  LVPWd: 1.2 cm  FS: 28.8 %  LV mass(C)d: 217.3  "grams  LV mass(C)dI: 93.5 grams/m2  asc Aorta Diam: 3.0 cm  LVOT diam: 2.4 cm  LVOT area: 4.3 cm2     LA Volume Index (BP): 18.8 ml/m2  RWT: 0.50  TAPSE: 1.4 cm        Doppler Measurements & Calculations  MV E max indy: 61.2 cm/sec  MV A max indy: 73.5 cm/sec  MV E/A: 0.83  MV dec slope: 204.5 cm/sec2  MV dec time: 0.30 sec  TV max PG: 3.9 mmHg  PA acc time: 0.07 sec  TR max indy: 99.3 cm/sec  TR max PG: 3.9 mmHg  E/E' av.7  Lateral E/e': 9.1  Medial E/e': 16.2        _____________________________________________________________________________  __        Report approved by: Stefania Asencio 2020 05:15 PM          Recent vital signs:   BP (!) 151/86   Pulse 77   Temp 97.8  F (36.6  C) (Oral)   Resp 20   Ht 1.74 m (5' 8.5\")   Wt 119.7 kg (264 lb)   SpO2 99%   BMI 39.56 kg/m      [place] Coma Scale Score: 15 (20 1336)       Cardiac Rhythm: Normal Sinus  Pt needs tele? No  Skin/wound Issues: None    Code Status: Full Code    Pain control: good    Nausea control: pt had none    Abnormal labs/tests/findings requiring intervention: None    Family present during ED course? No   Family Comments/Social Situation comments: None    Tasks needing completion: None    Ayse Hyatt RN  [facility] -- [phone]  [phone] [facility] ED  "

## 2020-02-02 ENCOUNTER — DOCUMENTATION ONLY (OUTPATIENT)
Dept: CARE COORDINATION | Facility: CLINIC | Age: 55
End: 2020-02-02

## 2020-02-02 VITALS
SYSTOLIC BLOOD PRESSURE: 130 MMHG | TEMPERATURE: 98 F | WEIGHT: 256.6 LBS | DIASTOLIC BLOOD PRESSURE: 82 MMHG | RESPIRATION RATE: 20 BRPM | HEIGHT: 68 IN | HEART RATE: 83 BPM | BODY MASS INDEX: 38.89 KG/M2 | OXYGEN SATURATION: 96 %

## 2020-02-02 PROCEDURE — 88112 CYTOPATH CELL ENHANCE TECH: CPT | Performed by: STUDENT IN AN ORGANIZED HEALTH CARE EDUCATION/TRAINING PROGRAM

## 2020-02-02 PROCEDURE — 80048 BASIC METABOLIC PNL TOTAL CA: CPT | Performed by: STUDENT IN AN ORGANIZED HEALTH CARE EDUCATION/TRAINING PROGRAM

## 2020-02-02 PROCEDURE — 25000132 ZZH RX MED GY IP 250 OP 250 PS 637: Performed by: STUDENT IN AN ORGANIZED HEALTH CARE EDUCATION/TRAINING PROGRAM

## 2020-02-02 PROCEDURE — 88120 CYTP URNE 3-5 PROBES EA SPEC: CPT | Performed by: STUDENT IN AN ORGANIZED HEALTH CARE EDUCATION/TRAINING PROGRAM

## 2020-02-02 PROCEDURE — 99239 HOSP IP/OBS DSCHRG MGMT >30: CPT | Mod: GC | Performed by: INTERNAL MEDICINE

## 2020-02-02 PROCEDURE — 99207 ZZC NO CHARGE LOS: CPT | Performed by: INTERNAL MEDICINE

## 2020-02-02 RX ORDER — BUMETANIDE 2 MG/1
2 TABLET ORAL
Status: DISCONTINUED | OUTPATIENT
Start: 2020-02-02 | End: 2020-02-02 | Stop reason: HOSPADM

## 2020-02-02 RX ADMIN — LISINOPRIL 20 MG: 20 TABLET ORAL at 07:47

## 2020-02-02 RX ADMIN — ACETAMINOPHEN 650 MG: 325 TABLET, FILM COATED ORAL at 07:47

## 2020-02-02 RX ADMIN — METOPROLOL SUCCINATE 75 MG: 25 TABLET, EXTENDED RELEASE ORAL at 07:47

## 2020-02-02 RX ADMIN — BUMETANIDE 2 MG: 2 TABLET ORAL at 10:00

## 2020-02-02 RX ADMIN — MULTIPLE VITAMINS W/ MINERALS TAB 1 TABLET: TAB at 07:47

## 2020-02-02 RX ADMIN — ACETAMINOPHEN 650 MG: 325 TABLET, FILM COATED ORAL at 00:42

## 2020-02-02 ASSESSMENT — ACTIVITIES OF DAILY LIVING (ADL)
ADLS_ACUITY_SCORE: 12

## 2020-02-02 ASSESSMENT — MIFFLIN-ST. JEOR: SCORE: 1802.43

## 2020-02-02 NOTE — PROGRESS NOTES
Patient has clinic visit within 24-48 hours of Discharge so no post DC follow up call is needed    Name: Gloria Guzman MRN: [id number]   Date: 2/3/2020 Status: [facility]   Time: 2:00 PM Length: 30   Visit Type: UMP NEW [[id number]] EDUARDA: [id number]   Provider: Hugo Boyer MD Department:  CARDIOVASCULAR CTR     Madhuri Tovar CMA   Post Hospital Discharge Team

## 2020-02-02 NOTE — PROGRESS NOTES
Shift: 0700 - 1130  VS: Temp: 98  F (36.7  C) Temp src: Oral BP: 130/82 Pulse: 83 Heart Rate: 74 Resp: 20 SpO2: 96 % O2 Device: Nasal cannula    Pain: patient reports chronic back pain but declined intervention. Reports her pain as tolerable.   Neuro: A&Ox4.   Cardiac:   WDL  Respiratory: Lung sounds clear on RA.   GI/Diet/Appetite: Regular diet.   :  Voiding.   LDA's: PIV removed prior to discharge.   Skin: Intact   Activity: Independent.   Tests/Procedures:   Pertinent Labs/Lab Collection:      Plan: Patient discharged home, discharge orders reviewed. Patient transferred home with .
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
## 2020-02-02 NOTE — DISCHARGE SUMMARY
Cardiology Discharge Summary  Gloria Guzman MRN: [id number]  1965  Primary care provider: Sofia Posey  ___________________________________          Date of Admission:  1/31/2020  Date of Discharge:  2/2/2020   Admitting Physician:  Demarcus Laguerre MD, PhD  Discharge Physician:  Demarcus Laguerre MD, PhD  Discharging Service:  [facility]         Reason for Admission:     Gloria Guzman is a 54-year-old female with medical history significant for dilated cardiomyopathy with HFpEF (last LVEF 55-60%, on 1/31), HTN, COPD, chronic back pain, and smoking history who was admitted on 1/31 for decompensated heart failure.           Discharge Diagnosis:     POA  1. Acute decompensated heart failure   2. HFpEF (55-60%)     3. Abdominal discomfort and congestion  4. Renal mass    5. Hyperkalemia   6. COPD   7. Constipation   8. Allergic rhinitis  9. Chronic back pain  10. Substance abuse          Procedures & Significant Findings:     TTE (1/31)   Global and regional left ventricular function is normal with an EF of 55-60%. Right ventricular function, chamber size, wall motion, and thickness are normal. No significant valvular disease. IVC diameter <2.1 cm collapsing >50% with sniff suggests a normal RA pressure of 3 mmHg. No pericardial effusion is present.    CT abdomen and pelvis with contrast (2/1)   1.  No evidence of acute intra-abdominal abnormality.  2.  3.0 cm right renal mass, new since 2013. Renal cell carcinoma less  well otherwise. Urology consultation is recommended.         Consultations:     None          Hospital Course by Problem:      # Acute decompensated heart failure - improved    # Dilated cardiomyopathy with HFpEF (55-60%)     Patient with history of dilated cardiomyopathy and prior history of HFrEF (LVEF 19% in 2015), presenting on 1/31 with increasing volume overload. Dry weight of 250 lbs, weight of 264 lb on admission. TTE (1/31) revealed LVEF  "of 55-60% with a normal IVC. Patient was diuresed with Bumex 3 mg IV daily for 2 days (1/31-2/1). Weight came down to 256 lb on discharge. Patient was eucolemia on exam, 256 lb, could be her dry weight.       - Diuresis: Bumex 2 mg oral bid   - Beta blocker: Metoprolol XL 75 mg daily   - Afterload reduction: None  - ACEI/ARB: Lisinopril 20 mg daily   - Aldosterone antagonist: Spironolactone 25 mg daily         # R renal mass   Patient complains with abdominal discomfort and congestion, unclear etiology, not entirely explained by heart failure. CT abdomen and pelvis with contrast (2/1) showed 3 cm R renal mass. Urology was consulted - follow up as an outpatient.          # Hyperkalemia on admission - resolved   K 5.8 on admission, down trending after that. 4s on discharge.         Chronic Problems     # COPD - Albuterol inhaler q 6 hours   # Constipation - Senna-docusate prn   # Allergic rhinitis - Loratadine 10 mg prn q day   # Chronic back pain - Tylenol prn   # Substance abuse - Follow up with PCP          Follow up plan summary:     - Follow up with primary care within 1 week with BMP check   - Follow up with Dr. Laguerre within 2-4 weeks (message was sent to [facility] to set up the appointment)  - Follow up with urology (urology team will set up the appointment)           Physical Exam on day of Discharge:     Blood pressure 130/82, pulse 83, temperature 98  F (36.7  C), temperature source Oral, resp. rate 20, height 1.727 m (5' 8\"), weight 116.4 kg (256 lb 9.6 oz), SpO2 96 %, not currently breastfeeding.    Gen: AA&Ox3, no acute distress, breathing on room air   HEENT: JVP < 5 cm   PULM/THORAX: Clear to auscultation bilaterally, no rales/stridor/wheezes  CV: RRR, S1 and S2 appreciated, no extra heart sounds, murmurs or rub auscultated  ABD: Obese, soft, nontender, nondistended. Normoactive bowel sounds   EXT: No edema, clubbing or cyanosis. Warm well perfused          Discharge Medications:     Discharge " Medication List as of 2/2/2020 11:16 AM      CONTINUE these medications which have NOT CHANGED    Details   acetaminophen (TYLENOL) 500 MG tablet Take 2 tablets (1,000 mg) by mouth 3 times daily, Disp-90 tablet, R-0, E-Prescribe      alum & mag hydroxide-simethicone (MYLANTA/MAALOX) 200-200-20 MG/5ML SUSP suspension Take 30 mLs by mouth every 6 hours as needed for indigestion, Historical      bumetanide (BUMEX) 2 MG tablet Take 1 tablet (2 mg) by mouth 2 times daily, Disp-60 tablet, R-1, E-Prescribe      camphor-eucalyptus-menthol (VICKS VAPORUB) 4.73-1.2-2.6 % OINT ointment Apply topically daily as needed for cough, Historical      hydrocortisone (CORTAID) 1 % external cream Apply topically 2 times daily as neededHistorical      lisinopril (PRINIVIL/ZESTRIL) 20 MG tablet Take 1 tablet (20 mg) by mouth daily, Disp-90 tablet, R-0, E-Prescribe      loratadine (CLARITIN) 10 MG tablet Take 10 mg by mouth daily as needed for allergies, Historical      melatonin 3 MG tablet Take 3 mg by mouth nightly as needed for sleep, Historical      Menthol-Methyl Salicylate (ICY HOT BALM EXTRA STRENGTH EX) Historical      metoprolol succinate ER (TOPROL-XL) 25 MG 24 hr tablet Take 3 tablets (75 mg) by mouth daily, Disp-270 tablet, R-0, E-Prescribe      multivitamin w/minerals (MULTI-VITAMIN) tablet Take 1 tablet by mouth daily, Historical      phenol-menthol (CEPASTAT) 14.5 MG lozenge Place 1 lozenge inside cheek every 2 hours as needed for moderate pain, Historical      PROAIR  (90 Base) MCG/ACT inhaler INHALE 2 PUFFS BY MOUTH EVERY 4 HOURS AS NEEDED FOR SHORTNESS OF BREATH AND WHEEZING, R-0, LUIS ENRIQUE, Historical      senna-docusate (SENOKOT-S/PERICOLACE) 8.6-50 MG tablet Take 2 tablets by mouth daily as needed for constipation, Historical      sodium chloride (OCEAN) 0.65 % nasal spray Spray 1 spray into both nostrils daily as needed for congestion, Historical      spironolactone (ALDACTONE) 25 MG tablet Take 1 tablet (25 mg) by  mouth every morning, Disp-90 tablet, R-0, E-Prescribe      tetrahydrozoline (VISINE) 0.05 % ophthalmic solution 1 drop 3 times daily, Historical         STOP taking these medications       azithromycin (ZITHROMAX) 250 MG tablet Comments:   Reason for Stopping:         furosemide (LASIX) 40 MG tablet Comments:   Reason for Stopping:         metolazone (ZAROXOLYN) 2.5 MG tablet Comments:   Reason for Stopping:                  Discharge Instructions and Follow-Up:     Discharge Procedure Orders   Reason for your hospital stay   Order Comments: Heart failure exacerbation     Adult [facility] Follow-up and recommended labs and tests   Order Comments: - Follow up with your primary care within 1 week with BMP check   - Follow up with Dr. Laguerre within 2-4 weeks (will send message to [facility] to set up the appointment)  - Follow up with urology (urology team will set up the appointment)        Appointments on [place] and/or [facility] (with [facility] or [facility] provider or service). Call [phone] if you haven't heard regarding these appointments within 7 days of discharge.     Activity   Order Comments: Your activity upon discharge: activity as tolerated     Order Specific Question Answer Comments   Is discharge order? Yes      Monitor and record   Order Comments: weight every day     When to contact your care team   Order Comments: Shortness of breath, significant weight gain     Discharge Instructions   Order Comments: Please continue taking Bumex 2 mg oral twice daily (stop taking Lasix and metolazone)     Full Code     Order Specific Question Answer Comments   Code status determined by: Discussion with patient/legal decision maker      Diet   Order Comments: Follow this diet upon discharge: Orders Placed This Encounter      Regular Diet Adult     Order Specific Question Answer Comments   Is discharge order? Yes              Discharge Disposition:     Home          Condition on Discharge:     Discharge condition:  "Fair   Code status on discharge: Full Code      Date of service: 2/2/2020    Patient discussed with staff attending, Dr. Laguerre and the note reflects our joint plan.     Supavit \"Mac\" MD Maria Antonia   PGY-3, Internal Medicine  Cardiology Service  Pager: [phone]    I have seen and examined the patient on February 2, 2020 and agree with the above outlined hospital course and discharge plan.    Greater than 30 minutes were spent in discharge planning between patient education, medication teaching and in he coordination of care with outpatient providers.    Demarcus Laguerre MD   of Medicine  [facility] Division of Cardiology      "

## 2020-02-02 NOTE — CONSULTS
Urology Consult    Name: Gloria Guzman    MRN: [id number]   YOB: 1965               Chief Complaint:   Right renal mass    History is obtained from the patient and chart review          History of Present Illness:   54F with PMH noted for dilated cardiomyopathy and COPD admitted on  for decompensated heart failure and urologic hx of incidental finding of a right 3 cm renal mass on CT A/P done for w/u of abdominal pain.     She denies history of hematuria.  Mom's side has family history of kidney and bladder cancer.  She also has personal history renal stones. She has chronic back pain                Past Medical History:     Noted for above + bipolar disorder, substance abuse; remainder reviewed in EMR           Past Surgical History:     Noted for cardiac surgery ; remainder reviewed in EMR  Past Surgical History:   Procedure Laterality Date     BACK SURGERY       CARDIAC SURGERY      AICD placement       SECTION       GALLBLADDER SURGERY       HERNIA REPAIR       JOINT REPLACEMTN, KNEE RT/LT  11/10    Joint Replacement knee LT     SURGICAL PATHOLOGY EXAM       TONSILLECTOMY              Social History:     0.5 ppd smoker, prior polysubstance abuse         Family History:     No pertinent FHx of urologic cancers; remainder reviewed in EMR         Allergies:     Allergies   Allergen Reactions     Cefaclor Rash     Other reaction(s): *Unknown     Morphine Hives and Itching     Ibuprofen      Morphine Sulfate Itching     Mushrooms [Mushroom] Hives     Olanzapine Other (See Comments)     Varenicline Other (See Comments)            Medications:     No pertinent meds; remainder reviewed in EMR          Review of Systems:      ROS: 10 point ROS neg other than the symptoms noted above in the HPI           Physical Exam:     VS:  T: 98.3    HR: 80    BP: 130/82    RR: 20   GEN:  AOx3.  NAD.    LUNGS: Non-labored breathing.   BACK:  No midline or CVA tenderness.  ABD:  Soft.  NT.  ND.  No  "rebound or guarding.  No masses.   EXT:  Warm, well perfused.  No edema.  SKIN:  Warm.  Dry.  No rashes.  NEURO:  A&O. CN grossly intact.            Data:     All laboratory data reviewed:    CBC wnl   BMP noted for Cr 1.1    All pertinent imaging reviewed, noted for above.          Impression and Plan:     Impression:   Gloria Guzman is a 54 year old female with PMH of dilated HF and urologic hx of incidental finding of right 3 cm renal mass.   Discussed management of renal masses with pt. Per AUA guidelines, management options include active surveillance with imaging, or interventions (cryo vs surgery). This can be done as outpatient as no acute interventions are needed during this admission      Plan:     - No acute interventions needed    - Urine cytology (ordered)     - Follow-up as outpatient to discuss management of renal mass (message sent to )    - Urology will sign off. Please contact resident/PA on call with any questions or concerns.         This patient's exam findings, labs, and imaging discussed with urology staff surgeon, Dr. Lugo, who developed the treatment plan.    Lili Gomez MD MS  Urology Resident    Patient was seen, evaluated and plan was formulated in conjunction with me and I agree with the above.  Ephraim Lugo MD      For questions re this patient, please see \"contacting urology team\" instructions below, or refer to the call sheet     Contacting the Urology Team     Please use the following job codes to reach the Urology Team. Note that you must use an in house phone and that job codes cannot receive text pages.     On weekdays, dial [phone] (or star-star-star 777 on the new [employer] telephones) then [phone] to reach the Adult Urology resident or PA on call    On weekdays, dial [phone] (or star-star-star 777 on the new [employer] telephones) then [phone] to reach the Pediatric Urology resident    On weeknights and weekends, dial [phone] (or star-star-star 777 on the new [employer] telephones) then " "[phone] to reach the Urology resident on call (for both Adult and Pediatrics)    Alternatively, you can reach urology pagers directly using the [employer] intranet as follows:    Open Internet Explorer (you must be logged to the [employer] intranet)    On the [employer] home page, hover over the Resources menu (4th menu from the [employer] symbol on the menu bar), which will reveal a submenu, then click \"In House Pagers and On Call Calendars\" (right column, 6th option from the bottom).    Click the drop down menu next to the Date, and scroll down to Urology/[facility], then click it. You should see a list of the residents assigned to each site, with a hyperlink to their pager (click the pager icon)                      "

## 2020-02-02 NOTE — PLAN OF CARE
D: Pt admitted on 1/31 for decompensated heart failure. History of dilated cardiomyopathy with HFpEF (last LVEF 55-60%, on 1/31), HTN, COPD, chronic back pain, and smoking.    I/A: VSS on room air, up independently via walker. IV bumex. CT for abdominal discomfort, showing new R renal mass. Pt refusing evening MD LUCIA aware.     P: Urology consult for CT results. SW consult for inpatient drug rehab program. Continue with plan of care, notify team with changes/concerns.

## 2020-02-03 ENCOUNTER — PATIENT OUTREACH (OUTPATIENT)
Dept: CARE COORDINATION | Facility: CLINIC | Age: 55
End: 2020-02-03

## 2020-02-03 ENCOUNTER — PATIENT OUTREACH (OUTPATIENT)
Dept: CARDIOLOGY | Facility: CLINIC | Age: 55
End: 2020-02-03

## 2020-02-03 ENCOUNTER — PRE VISIT (OUTPATIENT)
Dept: CARDIOLOGY | Facility: CLINIC | Age: 55
End: 2020-02-03

## 2020-02-03 DIAGNOSIS — Z71.89 OTHER SPECIFIED COUNSELING: ICD-10-CM

## 2020-02-03 NOTE — TELEPHONE ENCOUNTER
I called Gloria HANSEN Megan this morning to follow-up with her post-hospitalization discharged from [facility] 2/2/2020.     She reports she is doing well. No questions on her medications.     Medication Review:  I reviewed all her discharge medications with her and she had no questions regarding her medications.     Follow-up appointment review:  We scheduled her follow up in [facility] next week and with Dr Laguerre on March 4th. I reviewed the [facility]'s phone number and to call with any increase of SOB, increased edema or swelling, and weight gain. She verbalizes understanding and agrees with plan of care. Brittany Guillen RN

## 2020-02-03 NOTE — PROGRESS NOTES
Scheduled Follow Up Call: Attempt 1 Community Health Worker called and left a message for the patient. If the patient is returning my call, please transfer the patient to [facility] at ext. [phone].    Will attempt second outreach 2/4/20.

## 2020-02-04 ENCOUNTER — PATIENT OUTREACH (OUTPATIENT)
Dept: CARE COORDINATION | Facility: CLINIC | Age: 55
End: 2020-02-04

## 2020-02-04 NOTE — LETTER
2/4/2020    Dear Gloria,                                                                         You were recently referred to the [facility]'s Clinic Care Coordination service.  This is a service offered through your Primary Care Clinic which can help you access resources, services in regard to your health and well-being goals. The clinic Community Health Worker has placed two calls to you to discuss the nature of this service and to offer enrollment.  If you are interested in learning more about Clinic Care Coordination, please call your Primary Care Clinic's Community Health Worker, Nela, at [phone].                                                    Sincerely,                                                                              ZOILA Rondon                                                                                          Clinic Care Coordination                                                  [facility]

## 2020-02-04 NOTE — PROGRESS NOTES
Community Health Worker called and left a message for the patient.  If the patient is returning my call, please transfer the patient to [facility] at ext. [phone].   Patient has been mailed a unreachable letter and was provided with [facility] contact information if they are interested in accessing Clinic Care Coordination.  Order for Care Management has been closed, no further outreach will be done at this time and patient can be re-referred.         Referral:  Referral made off of discharge report.

## 2020-02-05 ENCOUNTER — E-VISIT (OUTPATIENT)
Dept: FAMILY MEDICINE | Facility: CLINIC | Age: 55
End: 2020-02-05
Payer: COMMERCIAL

## 2020-02-05 DIAGNOSIS — N18.30 CKD (CHRONIC KIDNEY DISEASE) STAGE 3, GFR 30-59 ML/MIN (H): ICD-10-CM

## 2020-02-05 DIAGNOSIS — Z96.659 HISTORY OF KNEE REPLACEMENT, UNSPECIFIED LATERALITY: Primary | ICD-10-CM

## 2020-02-05 DIAGNOSIS — K02.9 ACTIVE DENTAL CARIES: ICD-10-CM

## 2020-02-05 LAB — COMPREHEN DRUG ANALYSIS UR: NORMAL

## 2020-02-05 PROCEDURE — 99422 OL DIG E/M SVC 11-20 MIN: CPT | Performed by: NURSE PRACTITIONER

## 2020-02-05 RX ORDER — AMOXICILLIN 500 MG/1
500 CAPSULE ORAL 2 TIMES DAILY
Qty: 10 CAPSULE | Refills: 0 | Status: SHIPPED | OUTPATIENT
Start: 2020-02-05 | End: 2020-03-27

## 2020-02-05 NOTE — TELEPHONE ENCOUNTER
Provider E-Visit time total (minutes): 15    Replied to pt and ordered 5 days of Amox, see note instructing patient to check with her dentist to be sure they still require this before procedures before starting the course, as dental prophylaxis no longer supported in literature.     Pt with possible cross allergy-also instructed her to verify she has no allergies to Amox before starting.     Sofia CALLOWAY CNP

## 2020-02-05 NOTE — PATIENT INSTRUCTIONS
See my reply to your message, and also it appears you are due for a check up with me--please schedule at your soonest convenience.     Come fasting if you want your cholesterol and diabetes screenings done.

## 2020-02-11 ENCOUNTER — TRANSFERRED RECORDS (OUTPATIENT)
Dept: HEALTH INFORMATION MANAGEMENT | Facility: CLINIC | Age: 55
End: 2020-02-11

## 2020-02-11 DIAGNOSIS — I50.22 CHRONIC SYSTOLIC CHF (CONGESTIVE HEART FAILURE) (H): Primary | ICD-10-CM

## 2020-02-12 LAB — COPATH REPORT: NORMAL

## 2020-02-18 ENCOUNTER — TELEPHONE (OUTPATIENT)
Dept: BEHAVIORAL HEALTH | Facility: CLINIC | Age: 55
End: 2020-02-18

## 2020-02-18 NOTE — TELEPHONE ENCOUNTER
Rec'd Rule 25 from Junior Hernandez ([phone]) @ [facility]. Tx recommendation is L+.     Faxed R25 to [facility] for scanning    Bens sent

## 2020-02-18 NOTE — TELEPHONE ENCOUNTER
Lilly Leslie ([phone]) called from [facility], provided verbal authorization for L+.

## 2020-02-20 ENCOUNTER — OFFICE VISIT (OUTPATIENT)
Dept: CARDIOLOGY | Facility: CLINIC | Age: 55
End: 2020-02-20
Attending: NURSE PRACTITIONER
Payer: COMMERCIAL

## 2020-02-20 ENCOUNTER — TELEPHONE (OUTPATIENT)
Dept: BEHAVIORAL HEALTH | Facility: CLINIC | Age: 55
End: 2020-02-20

## 2020-02-20 VITALS
OXYGEN SATURATION: 96 % | HEART RATE: 58 BPM | HEIGHT: 68 IN | WEIGHT: 264 LBS | SYSTOLIC BLOOD PRESSURE: 113 MMHG | DIASTOLIC BLOOD PRESSURE: 77 MMHG | BODY MASS INDEX: 40.01 KG/M2

## 2020-02-20 DIAGNOSIS — I50.32 CHRONIC DIASTOLIC HEART FAILURE (H): ICD-10-CM

## 2020-02-20 DIAGNOSIS — I42.8 NONISCHEMIC CARDIOMYOPATHY (H): ICD-10-CM

## 2020-02-20 DIAGNOSIS — F19.20 CHEMICAL DEPENDENCY (H): ICD-10-CM

## 2020-02-20 DIAGNOSIS — I50.22 CHRONIC SYSTOLIC CHF (CONGESTIVE HEART FAILURE) (H): Primary | ICD-10-CM

## 2020-02-20 DIAGNOSIS — N28.89 RENAL MASS: ICD-10-CM

## 2020-02-20 DIAGNOSIS — G47.33 OBSTRUCTIVE SLEEP APNEA SYNDROME: ICD-10-CM

## 2020-02-20 DIAGNOSIS — I50.22 CHRONIC SYSTOLIC CHF (CONGESTIVE HEART FAILURE) (H): ICD-10-CM

## 2020-02-20 LAB
ANION GAP SERPL CALCULATED.3IONS-SCNC: 5 MMOL/L (ref 3–14)
BUN SERPL-MCNC: 50 MG/DL (ref 7–30)
CALCIUM SERPL-MCNC: 9.1 MG/DL (ref 8.5–10.1)
CHLORIDE SERPL-SCNC: 110 MMOL/L (ref 94–109)
CO2 SERPL-SCNC: 25 MMOL/L (ref 20–32)
CREAT SERPL-MCNC: 1.17 MG/DL (ref 0.52–1.04)
GFR SERPL CREATININE-BSD FRML MDRD: 52 ML/MIN/{1.73_M2}
GLUCOSE SERPL-MCNC: 88 MG/DL (ref 70–99)
POTASSIUM SERPL-SCNC: 4.4 MMOL/L (ref 3.4–5.3)
SODIUM SERPL-SCNC: 140 MMOL/L (ref 133–144)

## 2020-02-20 PROCEDURE — G0463 HOSPITAL OUTPT CLINIC VISIT: HCPCS | Mod: ZF

## 2020-02-20 PROCEDURE — 80048 BASIC METABOLIC PNL TOTAL CA: CPT | Performed by: NURSE PRACTITIONER

## 2020-02-20 PROCEDURE — 99214 OFFICE O/P EST MOD 30 MIN: CPT | Mod: ZP | Performed by: NURSE PRACTITIONER

## 2020-02-20 PROCEDURE — 36415 COLL VENOUS BLD VENIPUNCTURE: CPT | Performed by: NURSE PRACTITIONER

## 2020-02-20 RX ORDER — METOLAZONE 2.5 MG/1
TABLET ORAL
Qty: 5 TABLET | Refills: 0 | Status: SHIPPED | OUTPATIENT
Start: 2020-02-20 | End: 2022-01-01

## 2020-02-20 RX ORDER — OXYCODONE HYDROCHLORIDE 15 MG/1
5 TABLET ORAL EVERY 4 HOURS PRN
COMMUNITY
End: 2022-01-01

## 2020-02-20 RX ORDER — METOLAZONE 2.5 MG/1
2.5 TABLET ORAL DAILY
Qty: 1 TABLET | Refills: 0 | Status: SHIPPED | OUTPATIENT
Start: 2020-02-20 | End: 2020-02-20

## 2020-02-20 RX ORDER — POTASSIUM CHLORIDE 750 MG/1
20 CAPSULE, EXTENDED RELEASE ORAL DAILY
Qty: 60 CAPSULE | Refills: 0 | Status: SHIPPED | OUTPATIENT
Start: 2020-02-20 | End: 2020-04-27

## 2020-02-20 ASSESSMENT — ENCOUNTER SYMPTOMS
NECK MASS: 1
MUSCLE WEAKNESS: 1
DOUBLE VISION: 0
SHORTNESS OF BREATH: 1
SORE THROAT: 0
SLEEP DISTURBANCES DUE TO BREATHING: 1
NIGHT SWEATS: 1
ORTHOPNEA: 1
PALPITATIONS: 0
HOARSE VOICE: 0
EYE WATERING: 0
SMELL DISTURBANCE: 0
HYPOTENSION: 0
POOR WOUND HEALING: 0
COUGH DISTURBING SLEEP: 1
STIFFNESS: 1
CHILLS: 1
SINUS CONGESTION: 1
WHEEZING: 1
DIFFICULTY URINATING: 0
COUGH: 1
FEVER: 1
HALLUCINATIONS: 0
ALTERED TEMPERATURE REGULATION: 1
SYNCOPE: 0
SKIN CHANGES: 1
EYE REDNESS: 0
WEIGHT GAIN: 1
HEMATURIA: 0
POLYPHAGIA: 0
EYE IRRITATION: 0
BACK PAIN: 1
INCREASED ENERGY: 1
HEMOPTYSIS: 0
JOINT SWELLING: 1
ARTHRALGIAS: 1
DYSPNEA ON EXERTION: 1
TASTE DISTURBANCE: 0
POSTURAL DYSPNEA: 1
DYSURIA: 0
HYPERTENSION: 1
POLYDIPSIA: 1
NECK PAIN: 1
MYALGIAS: 1
NAIL CHANGES: 0
MUSCLE CRAMPS: 1
LEG PAIN: 1
FATIGUE: 1
LIGHT-HEADEDNESS: 0
EYE PAIN: 0
DECREASED APPETITE: 0
SINUS PAIN: 1
FLANK PAIN: 0
TROUBLE SWALLOWING: 0
EXERCISE INTOLERANCE: 0
WEIGHT LOSS: 0
SPUTUM PRODUCTION: 1
SNORES LOUDLY: 1

## 2020-02-20 ASSESSMENT — MIFFLIN-ST. JEOR: SCORE: 1836

## 2020-02-20 ASSESSMENT — PAIN SCALES - GENERAL: PAINLEVEL: NO PAIN (0)

## 2020-02-20 NOTE — TELEPHONE ENCOUNTER
This counselor attempted to contact this patient on 2/20/2020 at 4:15 pm. The voice mail said that number is not accepting calls.

## 2020-02-20 NOTE — PROGRESS NOTES
HPI:   Ms. Guzman is a 54 year old female with a past medical history including HFpEF (EF 55-60%), NICM, HTN, Bipolar, COPD, MARV, and Chemical dependency. She has had several hospitalizations for acute decompensated heart failure, most recently from 1/31-2/2. She received IV diuretics and dry weight approximated to be 256 lbs. A 3.0 cm renal mass was also found via CT and was referred to urology. Notably, she has history of marijuana and drug use.  Presents to clinic for CORE follow-up.     Today, Ms. Guzman reports feeling poorly. She feels her fluid status is increased and is carrying extra fluid in her abdominal area. She notes her weight to be up about 10 lbs. Reports SOB, MARQUEZ x 10 feet, orthopnea, PND, and moderate appetite. She also reports lower back pain that has continued to bother her. She is taking Oxycodone 15mg PRN for this. She was told while hospitalized that pain may be related to her renal mass. She has note yet made an appointment with urology but requests help with this today. She denies dizziness, palpitations, CP/pressure. Confirmed that she is taking Bumex 2mg every day. Metolazone, which has provided successful diuresis in the past, was prescribed as one dose back in January. She has been evaluated for MARV in the past and has a CPAP but does not tolerate the machine/mask.     Ms. Guzman endorses smoking, although has decreased cigarettes from 1.5 packs per day down to 2 cigarettes daily. She is not interested in quitting smoking today. She has been participating in a sober program and has been sober from marijuana and drugs for 96 days. She requests to speak with a clinic  today for help connecting her to a [facility] .         PAST MEDICAL HISTORY:  Past Medical History:   Diagnosis Date     Anemia      Arthritis      Bipolar affective disorder, current episode moderate (H)      Chronic back pain      Chronic systolic CHF (congestive heart failure) (H)      COPD  (chronic obstructive pulmonary disease) (H)      COPD (chronic obstructive pulmonary disease) (H)      ETOH abuse      GERD (gastroesophageal reflux disease)      H/O heart failure      History of posttraumatic stress disorder (PTSD)      Homeless      HTN (hypertension)      Marijuana abuse      Nonischemic cardiomyopathy (H)     EF 19% by CMRI     Obesity      Polysubstance abuse (H)     tobacco, previous cocaine, meth, and alcohol, and marijuana     Sleep apnea      Tobacco abuse        FAMILY HISTORY:  Family History   Problem Relation Age of Onset     Breast Cancer Maternal Grandmother      Bipolar Disorder Maternal Grandmother      Substance Abuse Maternal Grandmother      Breast Cancer Maternal Aunt      Cancer Father 42        lung      Substance Abuse Father      Cancer Maternal Grandfather         lung      Bipolar Disorder Maternal Grandfather      Substance Abuse Maternal Grandfather      Cancer Other         maternal cousin lung      Gallbladder Disease Mother      Depression Mother      Bipolar Disorder Mother      Substance Abuse Mother      Gallbladder Disease Sister      Substance Abuse Sister      Substance Abuse Brother        SOCIAL HISTORY:  Social History     Socioeconomic History     Marital status: Legally      Spouse name: None     Number of children: None     Years of education: None     Highest education level: None   Occupational History     None   Social Needs     Financial resource strain: None     Food insecurity:     Worry: None     Inability: None     Transportation needs:     Medical: None     Non-medical: None   Tobacco Use     Smoking status: Current Every Day Smoker     Packs/day: 0.50     Types: Cigarettes     Smokeless tobacco: Never Used   Substance and Sexual Activity     Alcohol use: No     Drug use: Yes     Types: Marijuana     Sexual activity: Never   Lifestyle     Physical activity:     Days per week: None     Minutes per session: None     Stress: None    Relationships     Social connections:     Talks on phone: None     Gets together: None     Attends Yazdanism service: None     Active member of club or organization: None     Attends meetings of clubs or organizations: None     Relationship status: None     Intimate partner violence:     Fear of current or ex partner: None     Emotionally abused: None     Physically abused: None     Forced sexual activity: None   Other Topics Concern     Parent/sibling w/ CABG, MI or angioplasty before 65F 55M? Not Asked   Social History Narrative     None       CURRENT MEDICATIONS:  acetaminophen (TYLENOL) 500 MG tablet, Take 2 tablets (1,000 mg) by mouth 3 times daily  bumetanide (BUMEX) 2 MG tablet, Take 1 tablet (2 mg) by mouth 2 times daily  hydrocortisone (CORTAID) 1 % external cream, Apply topically 2 times daily as needed  lisinopril (PRINIVIL/ZESTRIL) 20 MG tablet, Take 1 tablet (20 mg) by mouth daily  metoprolol succinate ER (TOPROL-XL) 25 MG 24 hr tablet, Take 3 tablets (75 mg) by mouth daily  multivitamin w/minerals (MULTI-VITAMIN) tablet, Take 1 tablet by mouth daily  oxyCODONE IR 15 MG PO tablet, Take 5 mg by mouth every 4 hours as needed for severe pain  PROAIR  (90 Base) MCG/ACT inhaler, INHALE 2 PUFFS BY MOUTH EVERY 4 HOURS AS NEEDED FOR SHORTNESS OF BREATH AND WHEEZING  sodium chloride (OCEAN) 0.65 % nasal spray, Spray 1 spray into both nostrils daily as needed for congestion  spironolactone (ALDACTONE) 25 MG tablet, Take 1 tablet (25 mg) by mouth every morning  alum & mag hydroxide-simethicone (MYLANTA/MAALOX) 200-200-20 MG/5ML SUSP suspension, Take 30 mLs by mouth every 6 hours as needed for indigestion  [] amoxicillin (AMOXIL) 500 MG capsule, Take 1 capsule (500 mg) by mouth 2 times daily for 5 days  camphor-eucalyptus-menthol (VICKS VAPORUB) 4.73-1.2-2.6 % OINT ointment, Apply topically daily as needed for cough  loratadine (CLARITIN) 10 MG tablet, Take 10 mg by mouth daily as needed for  "allergies  melatonin 3 MG tablet, Take 3 mg by mouth nightly as needed for sleep  Menthol-Methyl Salicylate (ICY HOT BALM EXTRA STRENGTH EX),   phenol-menthol (CEPASTAT) 14.5 MG lozenge, Place 1 lozenge inside cheek every 2 hours as needed for moderate pain  senna-docusate (SENOKOT-S/PERICOLACE) 8.6-50 MG tablet, Take 2 tablets by mouth daily as needed for constipation  tetrahydrozoline (VISINE) 0.05 % ophthalmic solution, 1 drop 3 times daily    No current facility-administered medications on file prior to visit.       ROS:   Refer to HPI    EXAM:  /77 (BP Location: Left arm, Patient Position: Chair, Cuff Size: Adult Large)   Pulse 58   Ht 1.727 m (5' 8\")   Wt 119.7 kg (264 lb)   SpO2 96%   BMI 40.14 kg/m    GENERAL: Appears comfortable, in no acute distress.   HEENT: Eye symmetrical, no discharge or icterus bilaterally. Mucous membranes moist and without lesions.  CV: RRR, +S1S2, no murmur, rub, or gallop. JVP midneck at 75 degrees.   RESPIRATORY: Respirations regular, even, and unlabored. Lungs CTA throughout, without wheezing, crackles.   GI: Soft and non distended with normoactive bowel sounds present in all quadrants. No tenderness, rebound, guarding. No hepatomegaly. Edema visibly present around abdominal cavity and flank.   EXTREMITIES: +1 peripheral edema. 2+ bilateral pedal pulses.   NEUROLOGIC: Alert and oriented x 3. No focal deficits.   MUSCULOSKELETAL: No joint swelling or tenderness.   SKIN: No jaundice. No rashes or lesions.     Labs, reviewed with patient in clinic today:  CBC RESULTS:  Lab Results   Component Value Date    WBC 8.7 02/01/2020    RBC 5.11 02/01/2020    HGB 14.8 02/01/2020    HCT 46.6 02/01/2020    MCV 91 02/01/2020    MCH 29.0 02/01/2020    MCHC 31.8 02/01/2020    RDW 13.2 02/01/2020     02/01/2020       CMP RESULTS:  Lab Results   Component Value Date     02/20/2020    POTASSIUM 4.4 02/20/2020    CHLORIDE 110 (H) 02/20/2020    CO2 25 02/20/2020    ANIONGAP 5 " 02/20/2020    GLC 88 02/20/2020    BUN 50 (H) 02/20/2020    CR 1.17 (H) 02/20/2020    GFRESTIMATED 52 (L) 02/20/2020    GFRESTBLACK 60 (L) 02/20/2020    MADDISON 9.1 02/20/2020    BILITOTAL 0.4 02/01/2020    ALBUMIN 3.5 02/01/2020    ALKPHOS 83 02/01/2020    ALT 23 02/01/2020    AST 16 02/01/2020        INR RESULTS:  Lab Results   Component Value Date    INR 1.11 02/01/2020       Lab Results   Component Value Date    MAG 2.2 02/01/2020     Lab Results   Component Value Date    NTBNPI 492 01/31/2020     Lab Results   Component Value Date    NTBNP 6,585 (H) 02/27/2019       Diagnostics:  TTE 1/31/20:  Global and regional left ventricular function is normal with an EF of 55-60%.  Right ventricular function, chamber size, wall motion, and thickness are  normal.  No significant valvular disease.  IVC diameter <2.1 cm collapsing >50% with sniff suggests a normal RA pressure  of 3 mmHg.  No pericardial effusion is present.    2/1/20: CT Abdomen/Pelvis  1.  No evidence of acute intra-abdominal abnormality.  2.  3.0 cm right renal mass, new since 2013. Renal cell carcinoma less  well otherwise. Urology consultation is recommended.    Assessment and Plan:   Ms. Guzman is a 54 year old female with NICM who appears hypervolemic today. She does not feel her Bumex is keeping fluid off and her weight is trending up about 10 lbs. She has tried Metolazone in the past, but was only given a one time dose. IV Bumex in clinic was offered, but patient declined. We increased her oral Bumex today, gave her a one time dose of Metolazone, and ordered Potassium. She feels that since her sobriety she has felt worse, although her EF has greatly improved from 30-35% in 2018 to 55-60% in Jan 2020. She is agreeable to following GDMT. We set her up with a  today in clinic.     # History of systolic heart failure with improved EF/HFpEF (EF 55-60%)  Stage C. NYHA Class III.  Risk factors include female gender, HTN, sleep apnea and obesity.  The mainstays of therapy for HFpEF include volume management, blood pressure control, treating underlying sleep apnea if present, treatment of underlying CAD if within the goals of care, weight management, exercise training, rate control for atrial fibrillation as well as consideration for a rhythm control strategy, and consideration for clinical trials. Consideration of spironolactone in low risk patients should be taken given the positive HF results in the TOPCAT trial.  Stage C. NYHA Class III.    Primary Cardiologist: Dr. Laguerre Last seen: new patient, appointment scheduled 3/4/2020  BP: controlled: 113/77, does not check at home  Fluid status Hypervolemic, Taking Bumex 2mg every day, Increase Bumex to 4mg am and 2mg pm, add Metolazone 2.5 mg x1  Aldosterone antagonist: yes: Spironolactone 25 mg QD  Ischemia evaluation: MRI c/w NICM 2015, low risk  ACEi/ARB/ARNI: remains on Lisinopril 20mg QD, although no evidence for use in HFpEF  BB: Metoprolol suc 75mg QD, although no evidence for use in HFpEF   SCD prophylaxis: ICD in place, no evidence for use in HFpEF  NSAID use: contraindicated  Sleep apnea evaluation: Refused or previously referred: offered again, but declined. Has CPAP machine, but not tolerating d/t claustrophobia  Remote PA Pressure Monitoring ([employer]) Deferred while medical therapy is optimized  Remote monitoring: Encouraged to utilize MyChart, coaching offerred: Could benefit from this     Increased Bumex today, one time dose of Metolazone, and started Potassium 40 meq daily. BMP next week.     # MARV  -Endorses trouble sleeping. Has seen sleep medicine in the past with diagnosed MARV, but unable to find note in Epic. Has CPAP at home but not using due to mask fitting and claustrophobia. Poor sleep due to constant waking. Recommended referral to Sleep Medicine but patient declined. We discussed that MARV could be causing stress on the heart and and a modifiable risk factor for heart failure.     #  Chemical Dependency  -96 days sober from marijuana. No alcohol use. We discussed that this may be why her EF has improved so much and that sobriety is important for heart health.     # Renal Mass  -Referral placed to urology and assistance provided for appointment set up.       Follow up BMP labs in 1 week, follow up with Dr. Laguerre for CORE in 2 weeks.       25 minutes spent face-to-face with patient, >50% in counseling and/or coordination of care as described above      Catie Asif, RN, DNP Student      I saw this patient with the student and agree with student's documentation and findings. Above note reflects our joint assessment and plan.     Miranda Yang DNP, NP-C  2/20/2020          ALANIS JONES

## 2020-02-20 NOTE — NURSING NOTE
Chief Complaint   Patient presents with     Follow Up     Return CORE, labs prior     Vitals were taken and medications were reconciled.     Deidre Melvin CMA    9:35 AM

## 2020-02-20 NOTE — LETTER
2/20/2020      RE: Gloria Guzman  [address] [facility] [address]       Dear Colleague,    Thank you for the opportunity to participate in the care of your patient, Gloria Guzman, at the [facility] at [facility]. Please see a copy of my visit note below.    HPI:   Ms. Guzman is a 54 year old female with a past medical history including HFpEF (EF 55-60%), NICM, HTN, Bipolar, COPD, MARV, and Chemical dependency. She has had several hospitalizations for acute decompensated heart failure, most recently from 1/31-2/2. She received IV diuretics and dry weight approximated to be 256 lbs. A 3.0 cm renal mass was also found via CT and was referred to urology. Notably, she has history of marijuana and drug use.  Presents to clinic for CORE follow-up.     Today, Ms. Guzman reports feeling poorly. She feels her fluid status is increased and is carrying extra fluid in her abdominal area. She notes her weight to be up about 10 lbs. Reports SOB, MARQUEZ x 10 feet, orthopnea, PND, and moderate appetite. She also reports lower back pain that has continued to bother her. She is taking Oxycodone 15mg PRN for this. She was told while hospitalized that pain may be related to her renal mass. She has note yet made an appointment with urology but requests help with this today. She denies dizziness, palpitations, CP/pressure. Confirmed that she is taking Bumex 2mg every day. Metolazone, which has provided successful diuresis in the past, was prescribed as one dose back in January. She has been evaluated for MARV in the past and has a CPAP but does not tolerate the machine/mask.     Ms. Guzman endorses smoking, although has decreased cigarettes from 1.5 packs per day down to 2 cigarettes daily. She is not interested in quitting smoking today. She has been participating in a sober program and has been sober from marijuana and drugs for 96 days. She requests to speak with a clinic   today for help connecting her to a [facility] .         PAST MEDICAL HISTORY:  Past Medical History:   Diagnosis Date     Anemia      Arthritis      Bipolar affective disorder, current episode moderate (H)      Chronic back pain      Chronic systolic CHF (congestive heart failure) (H)      COPD (chronic obstructive pulmonary disease) (H)      COPD (chronic obstructive pulmonary disease) (H)      ETOH abuse      GERD (gastroesophageal reflux disease)      H/O heart failure      History of posttraumatic stress disorder (PTSD)      Homeless      HTN (hypertension)      Marijuana abuse      Nonischemic cardiomyopathy (H)     EF 19% by [facility]     Obesity      Polysubstance abuse (H)     tobacco, previous cocaine, meth, and alcohol, and marijuana     Sleep apnea      Tobacco abuse        FAMILY HISTORY:  Family History   Problem Relation Age of Onset     Breast Cancer Maternal Grandmother      Bipolar Disorder Maternal Grandmother      Substance Abuse Maternal Grandmother      Breast Cancer Maternal Aunt      Cancer Father 42        lung      Substance Abuse Father      Cancer Maternal Grandfather         lung      Bipolar Disorder Maternal Grandfather      Substance Abuse Maternal Grandfather      Cancer Other         maternal cousin lung      Gallbladder Disease Mother      Depression Mother      Bipolar Disorder Mother      Substance Abuse Mother      Gallbladder Disease Sister      Substance Abuse Sister      Substance Abuse Brother        SOCIAL HISTORY:  Social History     Socioeconomic History     Marital status: Legally      Spouse name: None     Number of children: None     Years of education: None     Highest education level: None   Occupational History     None   Social Needs     Financial resource strain: None     Food insecurity:     Worry: None     Inability: None     Transportation needs:     Medical: None     Non-medical: None   Tobacco Use     Smoking status: Current Every Day  Smoker     Packs/day: 0.50     Types: Cigarettes     Smokeless tobacco: Never Used   Substance and Sexual Activity     Alcohol use: No     Drug use: Yes     Types: Marijuana     Sexual activity: Never   Lifestyle     Physical activity:     Days per week: None     Minutes per session: None     Stress: None   Relationships     Social connections:     Talks on phone: None     Gets together: None     Attends Baptism service: None     Active member of club or organization: None     Attends meetings of clubs or organizations: None     Relationship status: None     Intimate partner violence:     Fear of current or ex partner: None     Emotionally abused: None     Physically abused: None     Forced sexual activity: None   Other Topics Concern     Parent/sibling w/ CABG, MI or angioplasty before 65F 55M? Not Asked   Social History Narrative     None       CURRENT MEDICATIONS:  acetaminophen (TYLENOL) 500 MG tablet, Take 2 tablets (1,000 mg) by mouth 3 times daily  bumetanide (BUMEX) 2 MG tablet, Take 1 tablet (2 mg) by mouth 2 times daily  hydrocortisone (CORTAID) 1 % external cream, Apply topically 2 times daily as needed  lisinopril (PRINIVIL/ZESTRIL) 20 MG tablet, Take 1 tablet (20 mg) by mouth daily  metoprolol succinate ER (TOPROL-XL) 25 MG 24 hr tablet, Take 3 tablets (75 mg) by mouth daily  multivitamin w/minerals (MULTI-VITAMIN) tablet, Take 1 tablet by mouth daily  oxyCODONE IR 15 MG PO tablet, Take 5 mg by mouth every 4 hours as needed for severe pain  PROAIR  (90 Base) MCG/ACT inhaler, INHALE 2 PUFFS BY MOUTH EVERY 4 HOURS AS NEEDED FOR SHORTNESS OF BREATH AND WHEEZING  sodium chloride (OCEAN) 0.65 % nasal spray, Spray 1 spray into both nostrils daily as needed for congestion  spironolactone (ALDACTONE) 25 MG tablet, Take 1 tablet (25 mg) by mouth every morning  alum & mag hydroxide-simethicone (MYLANTA/MAALOX) 200-200-20 MG/5ML SUSP suspension, Take 30 mLs by mouth every 6 hours as needed for  "indigestion  [] amoxicillin (AMOXIL) 500 MG capsule, Take 1 capsule (500 mg) by mouth 2 times daily for 5 days  camphor-eucalyptus-menthol (VICKS VAPORUB) 4.73-1.2-2.6 % OINT ointment, Apply topically daily as needed for cough  loratadine (CLARITIN) 10 MG tablet, Take 10 mg by mouth daily as needed for allergies  melatonin 3 MG tablet, Take 3 mg by mouth nightly as needed for sleep  Menthol-Methyl Salicylate (ICY HOT BALM EXTRA STRENGTH EX),   phenol-menthol (CEPASTAT) 14.5 MG lozenge, Place 1 lozenge inside cheek every 2 hours as needed for moderate pain  senna-docusate (SENOKOT-S/PERICOLACE) 8.6-50 MG tablet, Take 2 tablets by mouth daily as needed for constipation  tetrahydrozoline (VISINE) 0.05 % ophthalmic solution, 1 drop 3 times daily    No current facility-administered medications on file prior to visit.       ROS:   Refer to HPI    EXAM:  /77 (BP Location: Left arm, Patient Position: Chair, Cuff Size: Adult Large)   Pulse 58   Ht 1.727 m (5' 8\")   Wt 119.7 kg (264 lb)   SpO2 96%   BMI 40.14 kg/m     GENERAL: Appears comfortable, in no acute distress.   HEENT: Eye symmetrical, no discharge or icterus bilaterally. Mucous membranes moist and without lesions.  CV: RRR, +S1S2, no murmur, rub, or gallop. JVP midneck at 75 degrees.   RESPIRATORY: Respirations regular, even, and unlabored. Lungs CTA throughout, without wheezing, crackles.   GI: Soft and non distended with normoactive bowel sounds present in all quadrants. No tenderness, rebound, guarding. No hepatomegaly. Edema visibly present around abdominal cavity and flank.   EXTREMITIES: +1 peripheral edema. 2+ bilateral pedal pulses.   NEUROLOGIC: Alert and oriented x 3. No focal deficits.   MUSCULOSKELETAL: No joint swelling or tenderness.   SKIN: No jaundice. No rashes or lesions.     Labs, reviewed with patient in clinic today:  CBC RESULTS:  Lab Results   Component Value Date    WBC 8.7 2020    RBC 5.11 2020    HGB 14.8 " 02/01/2020    HCT 46.6 02/01/2020    MCV 91 02/01/2020    MCH 29.0 02/01/2020    MCHC 31.8 02/01/2020    RDW 13.2 02/01/2020     02/01/2020       CMP RESULTS:  Lab Results   Component Value Date     02/20/2020    POTASSIUM 4.4 02/20/2020    CHLORIDE 110 (H) 02/20/2020    CO2 25 02/20/2020    ANIONGAP 5 02/20/2020    GLC 88 02/20/2020    BUN 50 (H) 02/20/2020    CR 1.17 (H) 02/20/2020    GFRESTIMATED 52 (L) 02/20/2020    GFRESTBLACK 60 (L) 02/20/2020    MADDISON 9.1 02/20/2020    BILITOTAL 0.4 02/01/2020    ALBUMIN 3.5 02/01/2020    ALKPHOS 83 02/01/2020    ALT 23 02/01/2020    AST 16 02/01/2020        INR RESULTS:  Lab Results   Component Value Date    INR 1.11 02/01/2020       Lab Results   Component Value Date    MAG 2.2 02/01/2020     Lab Results   Component Value Date    NTBNPI 492 01/31/2020     Lab Results   Component Value Date    NTBNP 6,585 (H) 02/27/2019       Diagnostics:  TTE 1/31/20:  Global and regional left ventricular function is normal with an EF of 55-60%.  Right ventricular function, chamber size, wall motion, and thickness are  normal.  No significant valvular disease.  IVC diameter <2.1 cm collapsing >50% with sniff suggests a normal RA pressure  of 3 mmHg.  No pericardial effusion is present.    2/1/20: CT Abdomen/Pelvis  1.  No evidence of acute intra-abdominal abnormality.  2.  3.0 cm right renal mass, new since 2013. Renal cell carcinoma less  well otherwise. Urology consultation is recommended.    Assessment and Plan:   Ms. Guzman is a 54 year old female with NICM who appears hypervolemic today. She does not feel her Bumex is keeping fluid off and her weight is trending up about 10 lbs. She has tried Metolazone in the past, but was only given a one time dose. IV Bumex in clinic was offered, but patient declined. We increased her oral Bumex today, gave her a one time dose of Metolazone, and ordered Potassium. She feels that since her sobriety she has felt worse, although her EF has  greatly improved from 30-35% in 2018 to 55-60% in Jan 2020. She is agreeable to following GDMT. We set her up with a  today in clinic.     # History of systolic heart failure with improved EF/HFpEF (EF 55-60%)  Stage C. NYHA Class III.  Risk factors include female gender, HTN, sleep apnea and obesity. The mainstays of therapy for HFpEF include volume management, blood pressure control, treating underlying sleep apnea if present, treatment of underlying CAD if within the goals of care, weight management, exercise training, rate control for atrial fibrillation as well as consideration for a rhythm control strategy, and consideration for clinical trials. Consideration of spironolactone in low risk patients should be taken given the positive HF results in the TOPCAT trial.  Stage C. NYHA Class III.    Primary Cardiologist: Dr. Laguerre Last seen: new patient, appointment scheduled 3/4/2020  BP: controlled: 113/77, does not check at home  Fluid status Hypervolemic, Taking Bumex 2mg every day, Increase Bumex to 4mg am and 2mg pm, add Metolazone 2.5 mg x1  Aldosterone antagonist: yes: Spironolactone 25 mg QD  Ischemia evaluation: MRI c/w NICM 2015, low risk  ACEi/ARB/ARNI: remains on Lisinopril 20mg QD, although no evidence for use in HFpEF  BB: Metoprolol suc 75mg QD, although no evidence for use in HFpEF   SCD prophylaxis: ICD in place, no evidence for use in HFpEF  NSAID use: contraindicated  Sleep apnea evaluation: Refused or previously referred: offered again, but declined. Has CPAP machine, but not tolerating d/t claustrophobia  Remote PA Pressure Monitoring (Cardio[employer]ems) Deferred while medical therapy is optimized  Remote monitoring: Encouraged to utilize MyChart, coaching offerred: Could benefit from this     Increased Bumex today, one time dose of Metolazone, and started Potassium 40 meq daily. BMP next week.     # MARV  -Endorses trouble sleeping. Has seen sleep medicine in the past with diagnosed MARV,  but unable to find note in Epic. Has CPAP at home but not using due to mask fitting and claustrophobia. Poor sleep due to constant waking. Recommended referral to Sleep Medicine but patient declined. We discussed that MARV could be causing stress on the heart and and a modifiable risk factor for heart failure.     # Chemical Dependency  -96 days sober from marijuana. No alcohol use. We discussed that this may be why her EF has improved so much and that sobriety is important for heart health.     # Renal Mass  -Referral placed to urology and assistance provided for appointment set up.       Follow up BMP labs in 1 week, follow up with Dr. Laguerre for CORE in 2 weeks.       25 minutes spent face-to-face with patient, >50% in counseling and/or coordination of care as described above      Catie Asif RN, DNP Student      I agree with student's documentation and findings. Above note reflects our joint assessment and plan.    iMranda Yang DNP, NP-C  2/20/2020      ALANIS JONES

## 2020-02-24 NOTE — TELEPHONE ENCOUNTER
Pt called asking for update. Transferred to Jose RADFORD Incorrect number was listed for pt - correct number is [phone].  Msg routed to Jose RADFORD

## 2020-02-25 NOTE — TELEPHONE ENCOUNTER
February 25, 2020    Referral Medical Screening:  Per client self report    1) Medications?  See med list in epic, She told me she is no longer taking oxycodone for pain and that her pain is poorly managed with Tylenol.      2) Medical conditions?Congestive heart failure Pacemaker, internal defibrillator, knee replacement, back surgery, degenerative disc syndrome, sleep apnea  Acute decompensated heart failure, Renal mass, COPD       3) Independent with ADL'S?  Yes     4) Assistive devices (ambulatory, orthotics, hearing, c-pap etc.)?  Pt ambulates with walker    5) Fall risk?  No-pt reports she walks without difficulty with her walker    6) Pain management concerns?  Yes, pt reports pain is poorly managed on non-narcotic meds    7) Dental health concerns?  None    8) Skin integrity concerns (wounds, rash, etc)?  None    9) Infectious disease concerns (MRSA, ESBL, VRE, C-Diff, TB, etc.)  None    10) Mental health concerns /suicidal ideation?  No acute concerns, Diagnosed with bi-polar affective disorder, but reports managing symptoms of corrine without medications for the last 20 years.        Based on above assessment: Clients medical approval is PENDING ---sent pt's chart to medical director and  to review prior to approving pt

## 2020-02-25 NOTE — TELEPHONE ENCOUNTER
Date: 2/25/2020    To: SHRAVAN RN    Please call this patient at [phone] to complete a medical screening to clarify her medications and medical condition.      The patient has a history of:  Congestive heart failure and oxicodin    OUTSIDE: This is an outside referral so I will tube up the assessment and any other clinical documentation to the 6th floor.     Thanks,  GALEN Germain

## 2020-02-28 NOTE — TELEPHONE ENCOUNTER
Based on above assessment: Client does not meet medical criteria for admission to [facility] RN, LP Medical Director and LP Manager reviewed pt Epic file and due to medical comorbidities / complexity pt would benefit from higher level of care to meet her needs.    Jose, please inform pt.  Thank you

## 2020-03-03 ENCOUNTER — TELEPHONE (OUTPATIENT)
Dept: BEHAVIORAL HEALTH | Facility: CLINIC | Age: 55
End: 2020-03-03

## 2020-03-03 ENCOUNTER — PATIENT OUTREACH (OUTPATIENT)
Dept: CARDIOLOGY | Facility: CLINIC | Age: 55
End: 2020-03-03

## 2020-03-03 DIAGNOSIS — I50.22 CHRONIC SYSTOLIC CHF (CONGESTIVE HEART FAILURE) (H): Primary | ICD-10-CM

## 2020-03-03 NOTE — TELEPHONE ENCOUNTER
This counselor spoke to this patient on 3/3/2020 at 12:30 pm. Notified her that she is not eligible for LP due to medical concerns determined by the LP medical director.

## 2020-03-03 NOTE — TELEPHONE ENCOUNTER
Pt called and asked for update on waiting list. Pt not medically eligible for L+ per Jose GARCÍA. Trans to Jose [phone].
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
## 2020-03-05 NOTE — TELEPHONE ENCOUNTER
"Pt called again asking for assistance with outpt therapy. Writer referred pt back to her .  Pt became agitated on the phone and yelling \"I've been to so many assessors.  Writer stated I would try to ask one of our LP admission staff.  Writer was able to speak to one of our counselors and he called pt.  Counselor called back writer after he was done speaking with Gloria.  Writer reiterated to pt that she would need to go back to  that did her last Rule 25.  Counselor also relayed to pt that writing a letter for the pt is not possible at this moment.    If the  were to reach out to LP MD, there is a possibility that a letter could be written , but that would be up to our Medical Director of   Counselor stated that Gloria ended the conversation saying that she was coming down to \"see all of us\"  "

## 2020-03-06 DIAGNOSIS — I50.21 ACUTE SYSTOLIC HEART FAILURE (H): ICD-10-CM

## 2020-03-06 DIAGNOSIS — I50.22 CHRONIC SYSTOLIC CHF (CONGESTIVE HEART FAILURE) (H): ICD-10-CM

## 2020-03-06 RX ORDER — METOLAZONE 2.5 MG/1
TABLET ORAL
Qty: 5 TABLET | Refills: 0 | OUTPATIENT
Start: 2020-03-06

## 2020-03-06 RX ORDER — POTASSIUM CHLORIDE 750 MG/1
CAPSULE, EXTENDED RELEASE ORAL
Qty: 60 CAPSULE | Refills: 0 | OUTPATIENT
Start: 2020-03-06

## 2020-03-06 RX ORDER — BUMETANIDE 2 MG/1
TABLET ORAL
Qty: 60 TABLET | Refills: 0 | OUTPATIENT
Start: 2020-03-06
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
## 2020-03-09 ENCOUNTER — TELEPHONE (OUTPATIENT)
Dept: BEHAVIORAL HEALTH | Facility: CLINIC | Age: 55
End: 2020-03-09
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
## 2020-03-13 NOTE — TELEPHONE ENCOUNTER
"Per Catalina CORTEZ OP scheduling team directed not to transfer this patient to L+ she has been harassing staff and is threatening to \"come up there\" they cannot provide her what she is asking for. DIRECT HER TO MEDICAL RECORDS.  "

## 2020-03-13 NOTE — TELEPHONE ENCOUNTER
CPA and updated Rule 25 received from Lilly Leslie @ [facility].     Both documents scanned to [facility]     Behavioral OP Authorization:  Initial    Authorizing Agency: [facility]  Authorizing Person: Lilly Leslie  Phone Number: NA  Level of Care: L+  Authorization Number: [id number]   Approved Services Dates:  From February 19, 2020 through March 17, 2020  Approval Details: NA    Quantity of Hours/Units: 28 days  Billing Code: 0101    Pt has been deined from L+, documents filed.
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
## 2020-03-25 ENCOUNTER — TELEPHONE (OUTPATIENT)
Dept: UROLOGY | Facility: CLINIC | Age: 55
End: 2020-03-25

## 2020-03-25 NOTE — TELEPHONE ENCOUNTER
Spoke with patient and let her know Dr Abarca will call her to discuss care on Friday. No in person appointment at this time. She agreed with this.

## 2020-03-25 NOTE — TELEPHONE ENCOUNTER
"[facility] Call Center    Phone Message    May a detailed message be left on voicemail: yes     Reason for Call: Patient was discharged from [facility] on 2/2/20. The patient had a CT abdomen and pelvis with contrast (2/1) that showed a 3 cm R renal mass. Urology was consulted - follow up as an outpatient.     Patient's discharge instructions stated \"Urology team will set up appointment\". The patient hasn't heard from anyone in Urology and called to schedule.  Please see imaging results and contact patient with appointment options    Action Taken: Message routed to:  [facility] Urology    Travel Screening: Not Applicable                                                                      "

## 2020-03-27 ENCOUNTER — VIRTUAL VISIT (OUTPATIENT)
Dept: UROLOGY | Facility: CLINIC | Age: 55
End: 2020-03-27
Payer: COMMERCIAL

## 2020-03-27 ENCOUNTER — PREP FOR PROCEDURE (OUTPATIENT)
Dept: UROLOGY | Facility: CLINIC | Age: 55
End: 2020-03-27

## 2020-03-27 DIAGNOSIS — N28.89 RIGHT RENAL MASS: Primary | ICD-10-CM

## 2020-03-27 RX ORDER — CLINDAMYCIN PHOSPHATE 900 MG/50ML
900 INJECTION, SOLUTION INTRAVENOUS
Status: CANCELLED | OUTPATIENT
Start: 2020-03-27

## 2020-03-27 RX ORDER — CLINDAMYCIN PHOSPHATE 900 MG/50ML
900 INJECTION, SOLUTION INTRAVENOUS SEE ADMIN INSTRUCTIONS
Status: CANCELLED | OUTPATIENT
Start: 2020-03-27

## 2020-03-27 RX ORDER — ONDANSETRON 4 MG/1
TABLET, ORALLY DISINTEGRATING ORAL
COMMUNITY
Start: 2020-03-06 | End: 2022-01-01

## 2020-03-27 NOTE — PROGRESS NOTES
ASSESSMENT AND PLAN  3cm renal mass on the right new since 2013.    During counseling for this visit, we covered the risk of renal cell carcinoma and how this renal mass may be a non-cancerous lesion.  We covered options including observation, cryoablation, partial nephrectomy, and nephrectomy.  We covered different surgical approaches such as percutaneous, laparoscopic, robotic, and open surgery.  We covered the risk of observation which is metastatic spread and need for continued observation.  We covered surgical risks which include but are not limited to heart attack, stroke, blood clot in the legs or lungs, death, injury to surrounding organs (intestine, liver, spleen, pancreas, lung, muscles, nerves), hernias, loss of sensation around incisions, decreased renal function, and infection.  We discussed how blood transfusion may be necessary and the risks are viral illnesses such as HIV(AIDS) and Hepatitis.  Additional procedures may be necessary in the perioperative period.  If minimally invasive techniques are used it may be necessary to convert to open surgery, and if partial nephrectomy is attempted than full nephrectomy may be required.    Given his anterior/posterior approach spine surgery, she would like to proceed with right percutaneous cryoablation.  I will plan for this in 3-4 months when covid crisis is over.  She will need a preoperative h and p with either [facility] ([facility]) or her primary care physician.  _______________________________________________________________________    CHIEF COMPLAINT  It was my pleasure to see Gloria Guzman who is a 54 year old female for evaluation of renal mass    HPI   She has a history of back pain, CHRONIC OBSTRUCTIVE LUNG DISEASE, cardiovascular disease, pacemaker, congestive heart failure with EF in the past of 20%.  She had an MRI for back pain and the renal mass was found incidentally.      This was a new finding and she no  previous history of renal mass.  Her back pain is focused on her lower left back.  It is long standing but worse recently.  It is constant and sharp.  No relieving factors.    No family history of kidney cancer.     She states she had 2 weeks of gross hematuria about 2 months.  She denies any infection or other cause of this.  She denies any previous occurrence of gross hematuria.       Presenting symptom: Incidental.  Denies weight loss or bone pain.  Hereditary syndromes: None  ECOG Performance Score: 2.  She is limited by her back pain.  0=Fully active, 1=Unable to do strenuous activity but capable of light work, 2=Ambulatory and capable of all selfcare, 3=Capable of limited selfcare, confined to bed >50% of waking hours, 4=Completely disabled.    RADIOLOGIC IMAGING  I reviewed the recent radiologic imaging and reports described below.  [facility]  CT ABDOMEN PELVIS W CONTRAST  Date and Time: 2/1/2020 2:13 PM  History: Abd distension; Abdominal pain and congestion, not explain by  heart failure  Comparison: 10/18/2013     Procedure:   Helical  CT images were obtained with IV contrast.  Contrast dose: Isovue 370 ml of contrast was injected intravenously.  Oral contrast was not administered.      Radiation Dose (DLP): 1363 mGy*cm     FINDINGS:     LOWER CHEST:  LUNG: Within normal limits.  PLEURA: Within normal limits.  HEART: Within normal limits. Partially visualized implantable cardiac  device lead terminating in the right ventricle.  MEDIASTINUM: Small sliding-type hiatal hernia.  VESSELS: Within normal limits.     ABDOMEN:  LIVER: Within normal limits.  BILE DUCTS: Normal caliber.  GALLBLADDER: Surgically absent.  PANCREAS: Within normal limits.  SPLEEN: Within normal limits.  ADRENALS: Within normal limits.  KIDNEYS: Interpolar region of the right kidney demonstrates  heterogeneously enhancing, lobular, exophytic mass measuring 2.7 x 3.0  cm, new since comparison study 10/18/2013. Left kidney appears  within  normal limits. No nephrolithiasis or hydronephrosis.     PELVIS:  REPRODUCTIVE ORGANS: No pelvic masses.  URETERS: Within normal limits.  BLADDER: Within normal limits.     BOWEL: Not obstructive. Normal vermiform appendix.  PERITONEUM: No ascites or free air, no fluid collection.  RETROPERITONEUM: Within normal limits.  VESSELS: No abdominal aortic aneurysm. Mild calcification of the aorta  and major branches.  LYMPH NODES: No enlarged mesenteric lymph nodes.     BONES AND SOFT TISSUES:  No suspicious lesions. Postsurgical change along the anterior  abdominal wall. Degenerative changes of the spine. Postsurgical  changes of attempted fusion of L3-S1.                                                                      IMPRESSION: In this patient with history of abdominal pain:  1.  No evidence of acute intra-abdominal abnormality.  2.  3.0 cm right renal mass, new since 2013. Renal cell carcinoma less  well otherwise. Urology consultation is recommended.      XR CHEST 2 VW  1/31/2020 2:02 PM       HISTORY: Dyspnea     COMPARISON: 5/17/2019     FINDINGS: PA and lateral chest. Single-lead implantable cardiac  defibrillator. Trachea is midline, heart size is normal. No focal  pulmonary opacity, pneumothorax, or pleural effusion. No acute osseous  or abdominal abnormality.                                                                      IMPRESSION: No acute cardiopulmonary disease.        Family History   Problem Relation Age of Onset     Breast Cancer Maternal Grandmother      Bipolar Disorder Maternal Grandmother      Substance Abuse Maternal Grandmother      Breast Cancer Maternal Aunt      Cancer Father 42        lung      Substance Abuse Father      Cancer Maternal Grandfather         lung      Bipolar Disorder Maternal Grandfather      Substance Abuse Maternal Grandfather      Cancer Other         maternal cousin lung      Gallbladder Disease Mother      Depression Mother      Bipolar Disorder Mother       Substance Abuse Mother      Gallbladder Disease Sister      Substance Abuse Sister      Substance Abuse Brother       Patient Active Problem List    Diagnosis Date Noted     CKD (chronic kidney disease) stage 3, GFR 30-59 ml/min (H) 02/05/2020     Priority: Medium     Heart failure (H) 01/31/2020     Priority: Medium     Obesity (BMI 35.0-39.9) with comorbidity (H) 05/13/2019     Priority: Medium     DDD (degenerative disc disease), lumbar 05/13/2019     Priority: Medium     Chronic midline low back pain with left-sided sciatica 05/13/2019     Priority: Medium     Chemical dependency (H) 05/07/2019     Priority: Medium     Chronic pain 02/08/2019     Priority: Medium     CHF (congestive heart failure) (H) 02/02/2019     Priority: Medium     Anemia 12/26/2018     Priority: Medium     Hypertensive urgency 08/15/2017     Priority: Medium     Nonischemic cardiomyopathy (H)      Priority: Medium     EF 19% by [facility]       Chronic systolic CHF (congestive heart failure) (H)      Priority: Medium     Injury of kidney 03/27/2017     Priority: Medium     Cardiac pacemaker in situ 03/27/2017     Priority: Medium     Automatic implantable cardioverter-defibrillator in situ 03/27/2017     Priority: Medium     Blood glucose elevated 03/27/2017     Priority: Medium     Bipolar affective disorder (H) 02/26/2017     Priority: Medium     Sees [facility] Clinic on [facility] 02/26/2017     Priority: Medium     Obstructive sleep apnea syndrome 02/26/2017     Priority: Medium     Chronic obstructive pulmonary disease (H) 12/14/2014     Priority: Medium     Arthralgia of shoulder 03/30/2005     Priority: Medium     Past Medical History:   Diagnosis Date     Anemia      Arthritis      Bipolar affective disorder, current episode moderate (H)      Chronic back pain      Chronic systolic CHF (congestive heart failure) (H)      COPD (chronic obstructive pulmonary disease) (H)      COPD (chronic obstructive pulmonary disease)  (H)      ETOH abuse      GERD (gastroesophageal reflux disease)      H/O heart failure      History of posttraumatic stress disorder (PTSD)      Homeless      HTN (hypertension)      Marijuana abuse      Nonischemic cardiomyopathy (H)     EF 19% by [facility]     Obesity      Polysubstance abuse (H)     tobacco, previous cocaine, meth, and alcohol, and marijuana     Sleep apnea      Tobacco abuse      Past Surgical History:   Procedure Laterality Date     BACK SURGERY       CARDIAC SURGERY      AICD placement       SECTION       GALLBLADDER SURGERY       HERNIA REPAIR       JOINT REPLACEMTN, KNEE RT/LT  11/10    Joint Replacement knee LT     SURGICAL PATHOLOGY EXAM       TONSILLECTOMY       Current Outpatient Medications   Medication Sig Dispense Refill     acetaminophen (TYLENOL) 500 MG tablet Take 2 tablets (1,000 mg) by mouth 3 times daily 90 tablet 0     alum & mag hydroxide-simethicone (MYLANTA/MAALOX) 200-200-20 MG/5ML SUSP suspension Take 30 mLs by mouth every 6 hours as needed for indigestion       bumetanide (BUMEX) 2 MG tablet Take 1 tablet (2 mg) by mouth 2 times daily 60 tablet 1     camphor-eucalyptus-menthol (VICKS VAPORUB) 4.73-1.2-2.6 % OINT ointment Apply topically daily as needed for cough       hydrocortisone (CORTAID) 1 % external cream Apply topically 2 times daily as needed       lisinopril (PRINIVIL/ZESTRIL) 20 MG tablet Take 1 tablet (20 mg) by mouth daily 90 tablet 0     loratadine (CLARITIN) 10 MG tablet Take 10 mg by mouth daily as needed for allergies       melatonin 3 MG tablet Take 3 mg by mouth nightly as needed for sleep       Menthol-Methyl Salicylate (ICY HOT BALM EXTRA STRENGTH EX)        metolazone 2.5 MG PO tablet Take one tablet of 2.5 mg today  then only as directed by [facility] 5 tablet 0     metoprolol succinate ER (TOPROL-XL) 25 MG 24 hr tablet Take 3 tablets (75 mg) by mouth daily 270 tablet 0     multivitamin w/minerals (MULTI-VITAMIN) tablet Take 1 tablet by  mouth daily       oxyCODONE IR 15 MG PO tablet Take 5 mg by mouth every 4 hours as needed for severe pain       phenol-menthol (CEPASTAT) 14.5 MG lozenge Place 1 lozenge inside cheek every 2 hours as needed for moderate pain       potassium chloride ER 10 MEQ PO CR capsule Take 2 capsules (20 mEq) by mouth daily 60 capsule 0     PROAIR  (90 Base) MCG/ACT inhaler INHALE 2 PUFFS BY MOUTH EVERY 4 HOURS AS NEEDED FOR SHORTNESS OF BREATH AND WHEEZING  0     senna-docusate (SENOKOT-S/PERICOLACE) 8.6-50 MG tablet Take 2 tablets by mouth daily as needed for constipation       sodium chloride (OCEAN) 0.65 % nasal spray Spray 1 spray into both nostrils daily as needed for congestion       spironolactone (ALDACTONE) 25 MG tablet Take 1 tablet (25 mg) by mouth every morning 90 tablet 0     tetrahydrozoline (VISINE) 0.05 % ophthalmic solution 1 drop 3 times daily        Allergies   Allergen Reactions     Cefaclor Rash     Other reaction(s): *Unknown     Morphine Hives and Itching     Ibuprofen      Morphine Sulfate Itching     Mushrooms [Mushroom] Hives     Olanzapine Other (See Comments)     Varenicline Other (See Comments)     Social History     Occupational History     Not on file   Tobacco Use     Smoking status: Current Every Day Smoker     Packs/day: 0.50     Types: Cigarettes     Smokeless tobacco: Never Used   Substance and Sexual Activity     Alcohol use: No     Drug use: Yes     Types: Marijuana     Sexual activity: Never       Recent Labs   Lab Test 02/01/20  0555 05/17/19  1437 05/17/19  0255 02/05/19  0838   WBC 8.7 10.5 13.2* 10.3   HGB 14.8 13.7 13.5 12.8   HCT 46.6 43.5 43.1 42.4    246 266 287     Recent Labs   Lab Test 02/20/20  0913 02/01/20  0555 01/31/20  1427 01/31/20  1029    136 136 139   POTASSIUM 4.4 4.3 4.6 5.8*   CHLORIDE 110* 104 107 110*   CO2 25 24 26 29   ANIONGAP 5 8 3 <1*   GLC 88 105* 123* 87   BUN 50* 41* 41* 40*   CR 1.17* 1.11* 1.00 0.98   GFRESTIMATED 52* 55* 63 65    GFRESTBLACK 60* 64 73 75   MADDISON 9.1 9.3 9.2 9.6     No results for input(s): COLOR, APPEARANCE, URINEGLC, URINEBILI, URINEKETONE, SG, UBLD, URINEPH, PROTEIN, UROBILINOGEN, NITRITE, LEUKEST, RBCU, WBCU in the last [id number] hours.    I, Akash Abarca, reviewed these laboratory values.    REVIEW OF SYSTEMS  The following systems were evaluated:   Constitutional, Eyes, Ears/Nose/Throat, Respiratory, Cardiovascular, Gastrointestinal, Genitourinary, Musculoskeletal, Skin/Integument, Neurologic, Psychiatric, Hematologic/Lymphatic, Allergic/Immunologic, Endocrine.  The only pertinent positives were as follows:  She notes increased fluid retention today.    I spoke with Gloria Wright via telephone for this visit.  I spent over 30 minutes in medical discussion during this telephone visit.      CC  Patient Care Team:  Sofia Posey APRN CNP as PCP - General (Nurse Practitioner - Family)  Brittany Guillen RN as Specialty Care Coordinator (Cardiology)  Sofia Posey APRN CNP as Assigned PCP  Alyx Michael NP as Nurse Practitioner (Nurse Practitioner)  Shayy Crocker, RN as Specialty Care Coordinator (Cardiology)  Afia Posey, RN as Specialty Care Coordinator (Cardiology)  RILEY [facility]I A    Copy to patient  GLORIA WRIGHT  [address] [place] [id number]

## 2020-03-27 NOTE — NURSING NOTE
Chief Complaint   Patient presents with     Telephone     New patient consult for renal mass     Alexandra Munoz, [facility]

## 2020-03-27 NOTE — PATIENT INSTRUCTIONS
Schedule surgery with Dr. Abarca.    It was a pleasure meeting with you today.  Thank you for allowing me and my team the privilege of caring for you today.  YOU are the reason we are here, and I truly hope we provided you with the excellent service you deserve.  Please let us know if there is anything else we can do for you so that we can be sure you are leaving completely satisfied with your care experience.        Alexandra Munoz, CMA

## 2020-03-30 DIAGNOSIS — I50.21 ACUTE SYSTOLIC HEART FAILURE (H): ICD-10-CM

## 2020-03-31 ENCOUNTER — DOCUMENTATION ONLY (OUTPATIENT)
Dept: CARE COORDINATION | Facility: CLINIC | Age: 55
End: 2020-03-31

## 2020-03-31 NOTE — TELEPHONE ENCOUNTER
Bumetanide Oral Tablet 2 MG   Last Written Prescription Date:  1//2020  Last Fill Quantity: 60,   # refills: 1  Last Office Visit : 2/20/2020  Future Office visit:  5/13/2020    Routing refill request to provider for review/approval because:  Last order 60 Tabs, 1 Refill??   Continue med?   Change dose? Refer to clinic for review    Christy Walsh RN  Central Triage Red Flags/Med Refills

## 2020-04-02 ENCOUNTER — PATIENT OUTREACH (OUTPATIENT)
Dept: CARDIOLOGY | Facility: CLINIC | Age: 55
End: 2020-04-02

## 2020-04-02 RX ORDER — BUMETANIDE 2 MG/1
2 TABLET ORAL 2 TIMES DAILY
Qty: 60 TABLET | Refills: 0 | Status: SHIPPED | OUTPATIENT
Start: 2020-04-02 | End: 2020-04-08

## 2020-04-02 NOTE — TELEPHONE ENCOUNTER
Left Gloria a voicemail to check in on weights and symptoms. Reports being out of her Bumex for 4 days. Setting up [facility] telephone visit and checking with provider to see if she needs labs prior.   Brittany Guillen RN

## 2020-04-02 NOTE — TELEPHONE ENCOUNTER
Refilled at 2 mg BID until can get labs and have [facility] appointment. Communicating with patient via [employer] to set this up.

## 2020-04-07 ENCOUNTER — VIRTUAL VISIT (OUTPATIENT)
Dept: CARDIOLOGY | Facility: CLINIC | Age: 55
End: 2020-04-07
Attending: NURSE PRACTITIONER
Payer: COMMERCIAL

## 2020-04-07 DIAGNOSIS — I50.22 CHRONIC SYSTOLIC CHF (CONGESTIVE HEART FAILURE) (H): Primary | ICD-10-CM

## 2020-04-07 PROCEDURE — 99443 ZZC PHYSICIAN TELEPHONE EVALUATION 21-30 MIN: CPT | Mod: ZP | Performed by: NURSE PRACTITIONER

## 2020-04-07 RX ORDER — METOLAZONE 2.5 MG/1
2.5 TABLET ORAL SEE ADMIN INSTRUCTIONS
Qty: 5 TABLET | Refills: 0 | Status: SHIPPED | OUTPATIENT
Start: 2020-04-07 | End: 2020-07-08

## 2020-04-07 NOTE — PROGRESS NOTES
"[facility] Failure [facility] -- CORE Follow Up -- Telephone Encounter  April 7, 2020    HPI:   Ms. Gloria Guzman is a 54yr old female with a history of HFpEF (LVEF 55-60% per TTE 1/2020), HTN, COPD, bipolar disorder, and substance abuse who is being evaluated via telephone encounter for CORE follow up.    She was last seen in CORE clinic 2/20/20, where she was found to be hypervolemic.  Her diuretics were increased, and she is being evaluated today for follow up.    Since her last visit, she states that she does not feel well.  She has a lot of water weight, and thinks that she is up ~20# from her dry weight.  She is not weighing herself, but states that her clothes do not fit and that \"the pills are not working.\"  She is taking bumex 4mg in the am and 2mg in the pm.  She has not had any metolazone since her last visit.  She states that the extra weight is \"killing my back and knees.\"  She is not eating or drinking any different than her usual.  She feels bloated.  She is not able to walk far, and is not exercising.  She has been using a walker, which she attributes to the extra fluid weight.  She is more short of breath.  She is sleeping ok, and is able to lie in her bed with 1-2 pillows without PND/orthopnea.  She otherwise denies chest pain, palpitations, dizziness, headaches, acute vision changes, fevers, chills, cough, sore throat, nausea, vomiting, diarrhea, constipation, and signs of bleeding.      PAST MEDICAL HISTORY:  Past Medical History:   Diagnosis Date     Anemia      Arthritis      Bipolar affective disorder, current episode moderate (H)      Chronic back pain      Chronic systolic CHF (congestive heart failure) (H)      COPD (chronic obstructive pulmonary disease) (H)      COPD (chronic obstructive pulmonary disease) (H)      ETOH abuse      GERD (gastroesophageal reflux disease)      H/O heart failure      History of posttraumatic stress disorder (PTSD)      Homeless      HTN (hypertension)      " Marijuana abuse      Nonischemic cardiomyopathy (H)     EF 19% by CMRI     Obesity      Polysubstance abuse (H)     tobacco, previous cocaine, meth, and alcohol, and marijuana     Sleep apnea      Tobacco abuse        FAMILY HISTORY:  Family History   Problem Relation Age of Onset     Breast Cancer Maternal Grandmother      Bipolar Disorder Maternal Grandmother      Substance Abuse Maternal Grandmother      Breast Cancer Maternal Aunt      Cancer Father 42        lung      Substance Abuse Father      Cancer Maternal Grandfather         lung      Bipolar Disorder Maternal Grandfather      Substance Abuse Maternal Grandfather      Cancer Other         maternal cousin lung      Gallbladder Disease Mother      Depression Mother      Bipolar Disorder Mother      Substance Abuse Mother      Gallbladder Disease Sister      Substance Abuse Sister      Substance Abuse Brother        SOCIAL HISTORY:  Social History     Socioeconomic History     Marital status: Legally      Spouse name: Not on file     Number of children: Not on file     Years of education: Not on file     Highest education level: Not on file   Occupational History     Not on file   Social Needs     Financial resource strain: Not on file     Food insecurity     Worry: Not on file     Inability: Not on file     Transportation needs     Medical: Not on file     Non-medical: Not on file   Tobacco Use     Smoking status: Current Every Day Smoker     Packs/day: 0.50     Types: Cigarettes     Smokeless tobacco: Never Used   Substance and Sexual Activity     Alcohol use: No     Drug use: Yes     Types: Marijuana     Sexual activity: Never   Lifestyle     Physical activity     Days per week: Not on file     Minutes per session: Not on file     Stress: Not on file   Relationships     Social connections     Talks on phone: Not on file     Gets together: Not on file     Attends [employer] service: Not on file     Active member of club or organization: Not on file      Attends meetings of clubs or organizations: Not on file     Relationship status: Not on file     Intimate partner violence     Fear of current or ex partner: Not on file     Emotionally abused: Not on file     Physically abused: Not on file     Forced sexual activity: Not on file   Other Topics Concern     Parent/sibling w/ CABG, MI or angioplasty before 65F 55M? Not Asked   Social History Narrative     Not on file       CURRENT MEDICATIONS:  Current Outpatient Medications   Medication Sig Dispense Refill     acetaminophen (TYLENOL) 500 MG tablet Take 2 tablets (1,000 mg) by mouth 3 times daily 90 tablet 0     bumetanide (BUMEX) 2 MG tablet Take 1 tablet (2 mg) by mouth 2 times daily 60 tablet 0     lisinopril (PRINIVIL/ZESTRIL) 20 MG tablet Take 1 tablet (20 mg) by mouth daily 90 tablet 0     metolazone 2.5 MG PO tablet Take one tablet of 2.5 mg today 2/20 then only as directed by CORE clinic 5 tablet 0     metoprolol succinate ER (TOPROL-XL) 25 MG 24 hr tablet Take 3 tablets (75 mg) by mouth daily 270 tablet 0     ondansetron (ZOFRAN-ODT) 4 MG ODT tab DISSOLVE ONE TABLET IN MOUTH DAILY AS NEEDED FOR NAUSEA       oxyCODONE IR 15 MG PO tablet Take 5 mg by mouth every 4 hours as needed for severe pain       potassium chloride ER 10 MEQ PO CR capsule Take 2 capsules (20 mEq) by mouth daily 60 capsule 0     PROAIR  (90 Base) MCG/ACT inhaler INHALE 2 PUFFS BY MOUTH EVERY 4 HOURS AS NEEDED FOR SHORTNESS OF BREATH AND WHEEZING  0     sodium chloride (OCEAN) 0.65 % nasal spray Spray 1 spray into both nostrils daily as needed for congestion       spironolactone (ALDACTONE) 25 MG tablet Take 1 tablet (25 mg) by mouth every morning 90 tablet 0     hydrocortisone (CORTAID) 1 % external cream Apply topically 2 times daily as needed         ROS:   As per HPI.    Labs:  CBC RESULTS:  Lab Results   Component Value Date    WBC 8.7 02/01/2020    RBC 5.11 02/01/2020    HGB 14.8 02/01/2020    HCT 46.6 02/01/2020    MCV 91  "02/01/2020    MCH 29.0 02/01/2020    MCHC 31.8 02/01/2020    RDW 13.2 02/01/2020     02/01/2020       CMP RESULTS:  Lab Results   Component Value Date     02/20/2020    POTASSIUM 4.4 02/20/2020    CHLORIDE 110 (H) 02/20/2020    CO2 25 02/20/2020    ANIONGAP 5 02/20/2020    GLC 88 02/20/2020    BUN 50 (H) 02/20/2020    CR 1.17 (H) 02/20/2020    GFRESTIMATED 52 (L) 02/20/2020    GFRESTBLACK 60 (L) 02/20/2020    MADDISON 9.1 02/20/2020    BILITOTAL 0.4 02/01/2020    ALBUMIN 3.5 02/01/2020    ALKPHOS 83 02/01/2020    ALT 23 02/01/2020    AST 16 02/01/2020        INR RESULTS:  Lab Results   Component Value Date    INR 1.11 02/01/2020       Lab Results   Component Value Date    MAG 2.2 02/01/2020     Lab Results   Component Value Date    NTBNPI 492 01/31/2020     Lab Results   Component Value Date    NTBNP 6,585 (H) 02/27/2019       Assessment and Plan:   Ms. Gloria Guzman is a 54yr old female with a history of HFpEF (LVEF 55-60% per TTE 1/2020), HTN, COPD, bipolar disorder, and substance abuse who is being evaluated via telephone encounter for CORE follow up.    Ms. Guzman has not had labs checked in the last 2 weeks.    She endorses significant weight gain, and does not feel like her bumex is working well for her.  Discussed that she could come in to the ER for a better evaluation if her symptoms are so severe, but she declined as she doesn't \"want to get pneumonia.\"  Also discussed coming in to clinic for a face-to-face visit, where we could attempt IV diuretics, which she also declined.    Discussed that she should have labs drawn today or tomorrow, if possible.  If her renal function and electrolytes are stable, will plan for her to take a dose of metolazone.  Discussed that she should not take this until I see her lab results to ensure that it is safe and appropriate for her to take.    She does not have any metolazone tablets.  Will send in a prescription for her to  after she has her labs drawn (to " "limit the number of times she needs to go out of her home during the COVID crisis).  Asked that she wait to hear from us BEFORE taking metolazone.      Chronic HFpEF (LVEF 55-60% per TTE 1/2020)  Risk factors include female gender, HTN, sleep apnea, and obesity. The mainstays of therapy for HFpEF include volume management, blood pressure control, treating underlying sleep apnea if present, treatment of underlying CAD if within the goals of care, weight management, exercise training, rate control for atrial fibrillation as well as consideration for a rhythm control strategy, and consideration for clinical trials. Consideration of spironolactone in low risk patients should be taken given the positive HF results in the TOPCAT trial.  Stage C. NYHA Class III.     Primary Cardiologist: needs to establish care, will see Dr. Laguerre in May  BP: appears controlled, but does not check at home  Fluid status:  Reports significant weight gain.  Will check a BMP and increase diuretics after results are reviewed.  Aldosterone antagonist:  Spironolactone 25mg once daily  Ischemia evaluation:  MRI c/w NICM in 2015  ACEi/ARB/ARNI:  remains on Lisinopril 20mg once daily, although no evidence for use in HFpEF  BB: Metoprolol XL 75mg once daily, although no evidence for use in HFpEF   SCD prophylaxis: ICD in place, no evidence for use in HFpEF  NSAID use: contraindicated  Sleep apnea evaluation: Refused referral multiple times.  Has CPAP machine, but not tolerating d/t claustrophobia.  Remote PA Pressure Monitoring (CardioMems):  Deferred during COVID crisis, could consider once COVID clears      The patient has been notified of the following:      \"We have found that certain health care needs can be provided without the need for a face to face visit.  This service lets us provide the care you need with a phone conversation.       I will have full access to your [place] medical record during this entire phone call.   I will be taking " "notes for your medical record.      Since this is like an office visit, we will bill your insurance company for this service.       There are potential benefits and risks of telephone visits (e.g. limits to patient confidentiality) that differ from in-person visits.?  Confidentiality still applies for telephone services, and nobody will record the visit.  It is important to be in a quiet, private space that is free of distractions (including cell phone or other devices) during the visit.??      If during the course of the call I believe a telephone visit is not appropriate, you will not be charged for this service\"     Consent has been obtained for this service by care team member: Yes     The above was reviewed with Ms. Guzman, who verbalized understanding and will call with further questions/concerns.    30+ minutes spent face-to-face with patient, with >50% in counseling and/or coordination of care as described above.      Irene Pineda DNP, FNP-BC, CHFN  Advanced Heart Failure Nurse Practitioner  [facility]  Patient Care Team:  Sofia Posey APRN CNP as PCP - General (Nurse Practitioner - Family)  Brittany Guillen, RN as Specialty Care Coordinator (Cardiology)  Sofia Posey APRN CNP as Assigned PCP  Alyx Michael NP as Nurse Practitioner (Nurse Practitioner)  Shayy Crocker, RN as Specialty Care Coordinator (Cardiology)  Afia Posey, RN as Specialty Care Coordinator (Cardiology)  SELF, REFERRED    "

## 2020-04-08 DIAGNOSIS — I50.21 ACUTE SYSTOLIC HEART FAILURE (H): ICD-10-CM

## 2020-04-08 RX ORDER — BUMETANIDE 2 MG/1
2 TABLET ORAL 2 TIMES DAILY
Qty: 60 TABLET | Refills: 0 | Status: SHIPPED | OUTPATIENT
Start: 2020-04-08 | End: 2020-06-15

## 2020-04-09 ENCOUNTER — PATIENT OUTREACH (OUTPATIENT)
Dept: CARDIOLOGY | Facility: CLINIC | Age: 55
End: 2020-04-09

## 2020-04-09 NOTE — PROGRESS NOTES
Called Gloria again as still no lab results pending and no call back. Again, no answer. LEft another voicemail asking her to please get in to have them drawn or to call back if she prefers another location besides [facility]. Will await call back or try again tomorrow.

## 2020-04-09 NOTE — PROGRESS NOTES
Called Gloria as she did not get labs done yesterday was was planned. Left voicemail asking her to please get in ASAP to get these drawn. Asked her to call back if she wants to get them done out of our system and I can fax orders. Will also send mychart and try again later today.

## 2020-04-13 DIAGNOSIS — I50.22 CHRONIC SYSTOLIC CHF (CONGESTIVE HEART FAILURE) (H): ICD-10-CM

## 2020-04-13 LAB
ALBUMIN SERPL-MCNC: 3.7 G/DL (ref 3.4–5)
ALP SERPL-CCNC: 92 U/L (ref 40–150)
ALT SERPL W P-5'-P-CCNC: 36 U/L (ref 0–50)
ANION GAP SERPL CALCULATED.3IONS-SCNC: 7 MMOL/L (ref 3–14)
AST SERPL W P-5'-P-CCNC: 23 U/L (ref 0–45)
BILIRUB SERPL-MCNC: 0.2 MG/DL (ref 0.2–1.3)
BUN SERPL-MCNC: 54 MG/DL (ref 7–30)
CALCIUM SERPL-MCNC: 9.3 MG/DL (ref 8.5–10.1)
CHLORIDE SERPL-SCNC: 104 MMOL/L (ref 94–109)
CO2 SERPL-SCNC: 27 MMOL/L (ref 20–32)
CREAT SERPL-MCNC: 1.62 MG/DL (ref 0.52–1.04)
ERYTHROCYTE [DISTWIDTH] IN BLOOD BY AUTOMATED COUNT: 13.2 % (ref 10–15)
GFR SERPL CREATININE-BSD FRML MDRD: 35 ML/MIN/{1.73_M2}
GLUCOSE SERPL-MCNC: 130 MG/DL (ref 70–99)
HCT VFR BLD AUTO: 45.7 % (ref 35–47)
HGB BLD-MCNC: 14.6 G/DL (ref 11.7–15.7)
MCH RBC QN AUTO: 29.8 PG (ref 26.5–33)
MCHC RBC AUTO-ENTMCNC: 31.9 G/DL (ref 31.5–36.5)
MCV RBC AUTO: 93 FL (ref 78–100)
NT-PROBNP SERPL-MCNC: 600 PG/ML (ref 0–125)
PLATELET # BLD AUTO: 281 10E9/L (ref 150–450)
POTASSIUM SERPL-SCNC: 4.4 MMOL/L (ref 3.4–5.3)
PROT SERPL-MCNC: 8.7 G/DL (ref 6.8–8.8)
RBC # BLD AUTO: 4.9 10E12/L (ref 3.8–5.2)
SODIUM SERPL-SCNC: 138 MMOL/L (ref 133–144)
WBC # BLD AUTO: 12 10E9/L (ref 4–11)

## 2020-04-14 ENCOUNTER — PATIENT OUTREACH (OUTPATIENT)
Dept: CARDIOLOGY | Facility: CLINIC | Age: 55
End: 2020-04-14

## 2020-04-14 DIAGNOSIS — I50.22 CHRONIC SYSTOLIC CHF (CONGESTIVE HEART FAILURE) (H): Primary | ICD-10-CM

## 2020-04-14 NOTE — PROGRESS NOTES
Called Gloria again. No answer. Left voicemail detailing that she should continue current dose of bumex, no more metolazone until after we talk to her and tell her it's okay to after labs on Friday. Will also send mychart. Asked for call back with any questions.

## 2020-04-14 NOTE — PROGRESS NOTES
Per Irene Pineda NP, continue current diuretics. No more metolazone unless directed by clinic. Plan for labs on Friday.    Called Gloria to discuss. No answer. Left voicemail asking for call back.

## 2020-04-16 DIAGNOSIS — N28.89 RIGHT RENAL MASS: Primary | ICD-10-CM

## 2020-04-17 ENCOUNTER — PATIENT OUTREACH (OUTPATIENT)
Dept: CARDIOLOGY | Facility: CLINIC | Age: 55
End: 2020-04-17

## 2020-04-17 NOTE — PROGRESS NOTES
Called kel to remind her that she is due for labs today. She reports she is unable to set up the appt right now as she has another call to complete her rule 25. I will try  Her later today.

## 2020-04-17 NOTE — PROGRESS NOTES
Called Gloria again. She is unable to get her labs done today. She reports she can come in on Monday to get them done. Lab appt scheduled for her for Monday at 1pm. Will follow up after results received.

## 2020-04-19 ENCOUNTER — VIRTUAL VISIT (OUTPATIENT)
Dept: FAMILY MEDICINE | Facility: OTHER | Age: 55
End: 2020-04-19

## 2020-04-19 NOTE — PROGRESS NOTES
"Date: 2020 17:18:31  Clinician: Neville Hinton  Clinician NPI: [id number]  Patient: Gloria Guzman  Patient : 1965  Patient Address: [address] [place] [address]  Patient Phone: [phone]  Visit Protocol: URI  Patient Summary:  Gloria is a 54 year old ( : 1965 ) female who initiated a Visit for COVID-19 (Coronavirus) evaluation and screening. When asked the question \"Please sign me up to receive news, health information and promotions. \", Gloria responded \"Yes\".    Gloria states her symptoms started 1-2 days ago.   Her symptoms consist of rhinitis, chills, a cough, nasal congestion, malaise, myalgia, a headache, wheezing, and ageusia. She is experiencing mild difficulty breathing with activities but can speak normally in full sentences.   Symptom details     Nasal secretions: The color of her mucus is yellow.    Cough: Gloria coughs every 5-10 minutes and her cough is more bothersome at night. Phlegm does not come into her throat when she coughs. She does not believe her cough is caused by post-nasal drip.     Wheezing: Gloria has been diagnosed with asthma. The wheezing does not interfere with her normal daily activities.    Headache: She states the headache is moderate (4-6 on a 10 point pain scale).      Gloria denies having diarrhea, facial pain or pressure, sore throat, ear pain, fever, vomiting, nausea, teeth pain, and anosmia. She also denies taking antibiotic medication for the symptoms, having a sinus infection within the past year, and having recent facial or sinus surgery in the past 60 days.   Precipitating events  She has recently been exposed to someone with influenza. Gloria has been in close contact with the following high risk individuals: adults 65 or older and people with asthma, heart disease or diabetes.   Pertinent COVID-19 (Coronavirus) information  Gloria has traveled internationally or to the areas where COVID-19 (Coronavirus) is widespread, including cruise " ship travel in the last 14 days before the start of her symptoms. Countries or locations traveled as reported by the patient (free text): [place]   Gloria does not work or volunteer as healthcare worker or a  and does not work or volunteer in a healthcare facility.   She does not live with a healthcare worker.   Gloria has had a close contact with a laboratory-confirmed COVID-19 patient within 14 days of symptom onset. She also has had a close contact with a suspected COVID-19 patient within 14 days of symptom onset. Additional information about contact with COVID-19 (Coronavirus) patient as reported by the patient (free text): My daughter in law and son   Pertinent medical history  Gloria does not get yeast infections when she takes antibiotics.   Gloria needs a return to work/school note.   Weight: 200 lbs   Gloria smokes or uses smokeless tobacco.   Weight: 200 lbs  A synchronous phone visit was initiated by the provider for the following reason: More HX on patient.    MEDICATIONS: bumetanide oral, Tylenol Extra Strength oral, oxycodone-acetaminophen oral, ALLERGIES: NKDA  Clinician Response:  Dear Gloria,  I am sorry you are not feeling well. Additional information was needed to clarify some of the details provided during your Visit. Because I was unable to reach you, I would like you to be seen in person to further discuss your health history and symptoms so you get the most appropriate care for you.  You will not be charged for this Visit. Thank you for trusting us with your care.  COVID-19 (Coronavirus) General Information  Because there is currently no vaccine to prevent infection, the best way to protect yourself is to avoid being exposed to this virus. Common symptoms of COVID-19 include but are not limited to fever, cough, and shortness of breath. These symptoms appear 2-14 days after you are exposed to the virus that causes COVID-19. Click here for more information from the CDC on how to protect  yourself.  If you are sick with COVID-19 or suspect you are infected with the virus that causes COVID-19, follow the steps here from the CDC to help prevent the disease from spreading to people in your home and community.  Click here for general information from the CDC on testing.  If you develop any of these emergency warning signs for COVID-19, get medical attention immediately:     Trouble breathing    Persistent pain or pressure in the chest    New confusion or inability to arouse    Bluish lips or face      Call your doctor or clinic before going in. Call 911 if you have a medical emergency and notify the  you have or think you may have COVID-19.  For more detailed and up to date information on COVID-19 (Coronavirus), please visit the CDC website.   Diagnosis: Unable to contact patient  Diagnosis ICD: R69  Triage Notes: Unable to reach by phone.  Synchronous Triage: phone, status: incomplete, duration: 44 seconds

## 2020-04-23 ENCOUNTER — PATIENT OUTREACH (OUTPATIENT)
Dept: CARDIOLOGY | Facility: CLINIC | Age: 55
End: 2020-04-23

## 2020-04-23 NOTE — PROGRESS NOTES
Called Gloria to remind her she is due fo rlabs. No answer. Left voicemail and phone number for her to call back and get scheduled. Palomo follow up once lab results received.

## 2020-04-27 ENCOUNTER — PATIENT OUTREACH (OUTPATIENT)
Dept: CARDIOLOGY | Facility: CLINIC | Age: 55
End: 2020-04-27

## 2020-04-27 DIAGNOSIS — I50.22 CHRONIC SYSTOLIC CHF (CONGESTIVE HEART FAILURE) (H): ICD-10-CM

## 2020-04-27 RX ORDER — POTASSIUM CHLORIDE 750 MG/1
20 CAPSULE, EXTENDED RELEASE ORAL DAILY
Qty: 60 CAPSULE | Refills: 3 | Status: SHIPPED | OUTPATIENT
Start: 2020-04-27 | End: 2022-01-01

## 2020-04-27 NOTE — PROGRESS NOTES
Called Gloria again to remind her to get labs. She reports she finally has a ride today and will be in to get them drawn before 4pm today. She reports that she feels a lot better on the metolazone and wonders if she can take it every day. We reviewed how metolazone works and that for most people it is not appropriate for daily use, especially without labs as it can be dangerous. She reports she took 1 tablet of metolazone every day until it was gone (she was prescribed 5 tablets at last fill). She tells me she needs more potassium which I will fill for her and send to her [facility] pharmacy. Reported we will follow up with her once we see lab results.

## 2020-04-28 DIAGNOSIS — I50.22 CHRONIC SYSTOLIC CHF (CONGESTIVE HEART FAILURE) (H): ICD-10-CM

## 2020-04-28 LAB
ANION GAP SERPL CALCULATED.3IONS-SCNC: 7 MMOL/L (ref 3–14)
BUN SERPL-MCNC: 44 MG/DL (ref 7–30)
CALCIUM SERPL-MCNC: 9.2 MG/DL (ref 8.5–10.1)
CHLORIDE SERPL-SCNC: 103 MMOL/L (ref 94–109)
CO2 SERPL-SCNC: 28 MMOL/L (ref 20–32)
CREAT SERPL-MCNC: 1.27 MG/DL (ref 0.52–1.04)
GFR SERPL CREATININE-BSD FRML MDRD: 47 ML/MIN/{1.73_M2}
GLUCOSE SERPL-MCNC: 107 MG/DL (ref 70–99)
NT-PROBNP SERPL-MCNC: 391 PG/ML (ref 0–125)
POTASSIUM SERPL-SCNC: 4.4 MMOL/L (ref 3.4–5.3)
SODIUM SERPL-SCNC: 138 MMOL/L (ref 133–144)

## 2020-05-08 ENCOUNTER — TELEPHONE (OUTPATIENT)
Dept: UROLOGY | Facility: CLINIC | Age: 55
End: 2020-05-08

## 2020-05-08 ENCOUNTER — HOSPITAL ENCOUNTER (OUTPATIENT)
Facility: CLINIC | Age: 55
End: 2020-05-08
Attending: ANESTHESIOLOGY | Admitting: ANESTHESIOLOGY
Payer: COMMERCIAL

## 2020-05-08 DIAGNOSIS — Z11.59 ENCOUNTER FOR SCREENING FOR OTHER VIRAL DISEASES: Primary | ICD-10-CM

## 2020-05-08 NOTE — TELEPHONE ENCOUNTER
Patient is scheduled for surgery with Dr. Abarca      Spoke or left message with: Gloria    Date of Surgery: 5/26/2020    Location: [place] OR    Informed patient they will need an adult  yes    Pre-op with surgeon (if applicable): n/a    H&P: Patient to schedule with PCP at [facility] [place]    Post-op: CT and 6 week scheduled on 7/10/2020     Additional imaging/appointments: n/a    Surgery packet: To be mailed     Additional comments: n/a

## 2020-05-13 ENCOUNTER — VIRTUAL VISIT (OUTPATIENT)
Dept: CARDIOLOGY | Facility: CLINIC | Age: 55
End: 2020-05-13
Attending: INTERNAL MEDICINE
Payer: COMMERCIAL

## 2020-05-13 DIAGNOSIS — I10 BENIGN ESSENTIAL HYPERTENSION: Primary | ICD-10-CM

## 2020-05-13 PROCEDURE — 99207: CPT | Mod: GT | Performed by: INTERNAL MEDICINE

## 2020-05-20 ENCOUNTER — PATIENT OUTREACH (OUTPATIENT)
Dept: UROLOGY | Facility: CLINIC | Age: 55
End: 2020-05-20

## 2020-05-21 RX ORDER — ONDANSETRON 2 MG/ML
4 INJECTION INTRAMUSCULAR; INTRAVENOUS ONCE
Status: CANCELLED | OUTPATIENT
Start: 2020-05-21 | End: 2020-05-21

## 2020-05-21 RX ORDER — NICOTINE POLACRILEX 4 MG
15-30 LOZENGE BUCCAL
Status: CANCELLED | OUTPATIENT
Start: 2020-05-21

## 2020-05-21 RX ORDER — SODIUM CHLORIDE 9 MG/ML
INJECTION, SOLUTION INTRAVENOUS CONTINUOUS
Status: CANCELLED | OUTPATIENT
Start: 2020-05-21

## 2020-05-21 RX ORDER — LIDOCAINE 40 MG/G
CREAM TOPICAL
Status: CANCELLED | OUTPATIENT
Start: 2020-05-21

## 2020-05-21 RX ORDER — DEXTROSE MONOHYDRATE 25 G/50ML
25-50 INJECTION, SOLUTION INTRAVENOUS
Status: CANCELLED | OUTPATIENT
Start: 2020-05-21

## 2020-05-21 RX ORDER — CLINDAMYCIN PHOSPHATE 900 MG/50ML
900 INJECTION, SOLUTION INTRAVENOUS
Status: CANCELLED | OUTPATIENT
Start: 2020-05-21

## 2020-05-22 ENCOUNTER — ANESTHESIA EVENT (OUTPATIENT)
Dept: SURGERY | Facility: CLINIC | Age: 55
End: 2020-05-22

## 2020-05-25 ASSESSMENT — COPD QUESTIONNAIRES: COPD: 1

## 2020-05-26 ENCOUNTER — ANESTHESIA (OUTPATIENT)
Dept: SURGERY | Facility: CLINIC | Age: 55
End: 2020-05-26

## 2020-05-26 NOTE — ANESTHESIA PREPROCEDURE EVALUATION
"Anesthesia Pre-Procedure Evaluation    Patient: Gloria Guzman   MRN:     [id number] Gender:   female   Age:    54 year old :      1965        Preoperative Diagnosis: Renal mass [N28.89]   Procedure(s):  ANESTHESIA OUT OF OR CT right renal cryo ablation @0900     LABS:  CBC:   Lab Results   Component Value Date    WBC 12.0 (H) 2020    WBC 8.7 2020    HGB 14.6 2020    HGB 14.8 2020    HCT 45.7 2020    HCT 46.6 2020     2020     2020     BMP:   Lab Results   Component Value Date     2020     2020    POTASSIUM 4.4 2020    POTASSIUM 4.4 2020    CHLORIDE 103 2020    CHLORIDE 104 2020    CO2 28 2020    CO2 27 2020    BUN 44 (H) 2020    BUN 54 (H) 2020    CR 1.27 (H) 2020    CR 1.62 (H) 2020     (H) 2020     (H) 2020     COAGS:   Lab Results   Component Value Date    PTT 27 2019    INR 1.11 2020     POC: No results found for: BGM, HCG, HCGS  OTHER:   Lab Results   Component Value Date    LACT 1.3 2020    MADDISON 9.2 2020    MAG 2.2 2020    ALBUMIN 3.7 2020    PROTTOTAL 8.7 2020    ALT 36 2020    AST 23 2020    ALKPHOS 92 2020    BILITOTAL 0.2 2020        Preop Vitals    BP Readings from Last 3 Encounters:   20 113/77   20 130/82   20 (!) 141/82    Pulse Readings from Last 3 Encounters:   20 58   20 83   20 68      Resp Readings from Last 3 Encounters:   20 20   19 16   19 16    SpO2 Readings from Last 3 Encounters:   20 96%   20 96%   20 99%      Temp Readings from Last 1 Encounters:   20 36.7  C (98  F) (Oral)    Ht Readings from Last 1 Encounters:   20 1.727 m (5' 8\")      Wt Readings from Last 1 Encounters:   20 119.7 kg (264 lb)    Estimated body mass index is 40.14 kg/m  as calculated from " "the following:    Height as of 20: 1.727 m (5' 8\").    Weight as of 20: 119.7 kg (264 lb).     LDA:        Past Medical History:   Diagnosis Date     Anemia      Arthritis      Bipolar affective disorder, current episode moderate (H)      Chronic back pain      Chronic systolic CHF (congestive heart failure) (H)      COPD (chronic obstructive pulmonary disease) (H)      COPD (chronic obstructive pulmonary disease) (H)      ETOH abuse      GERD (gastroesophageal reflux disease)      H/O heart failure      History of posttraumatic stress disorder (PTSD)      Homeless      HTN (hypertension)      Marijuana abuse      Nonischemic cardiomyopathy (H)     EF 19% by CMRI     Obesity      Polysubstance abuse (H)     tobacco, previous cocaine, meth, and alcohol, and marijuana     Sleep apnea      Tobacco abuse       Past Surgical History:   Procedure Laterality Date     BACK SURGERY       CARDIAC SURGERY      AICD placement       SECTION       GALLBLADDER SURGERY       HERNIA REPAIR       JOINT REPLACEMTN, KNEE RT/LT  11/10    Joint Replacement knee LT     SURGICAL PATHOLOGY EXAM       TONSILLECTOMY        Allergies   Allergen Reactions     Cefaclor Rash     Other reaction(s): *Unknown     Morphine Hives and Itching     Ibuprofen      Morphine Sulfate Itching     Mushrooms [Mushroom] Hives     Olanzapine Other (See Comments)     Varenicline Other (See Comments)        Anesthesia Evaluation     .             ROS/MED HX    ENT/Pulmonary:     (+)COPD, , . .    Neurologic:       Cardiovascular:     (+) hypertension----. : . CHF (HFpEF (EF 55-60%)) . . :. .       METS/Exercise Tolerance:     Hematologic:         Musculoskeletal:         GI/Hepatic:         Renal/Genitourinary:         Endo:     (+) Obesity, .      Psychiatric:     (+) psychiatric history bipolar      Infectious Disease:         Malignancy:         Other:                     TARMAINE FV AN PHYSICAL EXAM    Assessment:   ASA SCORE: 3    H&P: History " and physical reviewed and following examination; no interval change.   Smoking Status:  Active Smoker   NPO Status: NPO Appropriate     Plan:   Anes. Type:  General   Pre-Medication: None   Induction:  IV (Standard)   Airway: ETT; Oral   Access/Monitoring: PIV   Maintenance: Balanced     Postop Plan:   Postop Pain: Opioids  Postop Sedation/Airway: Not planned  Disposition: Outpatient     PONV Management:   Adult Risk Factors: Female, Postop Opioids   Prevention: Ondansetron, Dexamethasone     CONSENT: Direct conversation   Plan and risks discussed with: Patient                      Jhonathan Haines MD

## 2020-06-13 DIAGNOSIS — I10 ESSENTIAL HYPERTENSION: ICD-10-CM

## 2020-06-15 ENCOUNTER — MYC REFILL (OUTPATIENT)
Dept: CARDIOLOGY | Facility: CLINIC | Age: 55
End: 2020-06-15

## 2020-06-15 DIAGNOSIS — I50.22 CHRONIC SYSTOLIC CHF (CONGESTIVE HEART FAILURE) (H): ICD-10-CM

## 2020-06-15 DIAGNOSIS — I50.21 ACUTE SYSTOLIC HEART FAILURE (H): ICD-10-CM

## 2020-06-15 RX ORDER — POTASSIUM CHLORIDE 750 MG/1
20 CAPSULE, EXTENDED RELEASE ORAL DAILY
Qty: 60 CAPSULE | Refills: 3 | Status: CANCELLED | OUTPATIENT
Start: 2020-06-15

## 2020-06-16 ENCOUNTER — MYC MEDICAL ADVICE (OUTPATIENT)
Dept: FAMILY MEDICINE | Facility: CLINIC | Age: 55
End: 2020-06-16

## 2020-06-16 DIAGNOSIS — G89.29 CHRONIC MIDLINE LOW BACK PAIN WITH LEFT-SIDED SCIATICA: ICD-10-CM

## 2020-06-16 DIAGNOSIS — M51.369 DDD (DEGENERATIVE DISC DISEASE), LUMBAR: ICD-10-CM

## 2020-06-16 DIAGNOSIS — M54.42 CHRONIC MIDLINE LOW BACK PAIN WITH LEFT-SIDED SCIATICA: ICD-10-CM

## 2020-06-16 NOTE — TELEPHONE ENCOUNTER
Bumex      Last Written Prescription Date:  4-8-2020  Last Fill Quantity: 60,   # refills: 0      metolazone      Last Written Prescription Date: 4-7-2020  Last Fill Quantity: 5, # refills: 0          Last Office Visit :  4-7-2020   Next Office Visit: none      Kathleen M Doege RN

## 2020-06-17 NOTE — TELEPHONE ENCOUNTER
Spironolactone Oral Tablet 25 MG    Last Written Prescription Date:  1/9/2020  Last Fill Quantity: 90,   # refills: 0  Last Office Visit : 5/13/2020  Future Office visit:  None  Routing refill request to provider for review/approval because:  Abnormal Labs:   Cr        Refer to Provider for review  Recent Labs   Lab Test 04/28/20  1435   CR 1.27*       Lisinopril Oral Tablet 20 MG      Last Written Prescription Date:  1/9/2020  Last Fill Quantity: 90,   # refills: 0  Last Office Visit : 5/13/2020  Future Office visit:  None  Routing refill request to provider for review/approval because:  Abnormal Labs:  CR     Refer to Provider for review    Recent Labs   Lab Test 04/28/20  1435   CR 1.27*       Metoprolol Succinate ER Oral Tablet Extended Release 24 Hour 25 MG   Last Written Prescription Date:  1/9/2020  Last Fill Quantity: 270,   # refills: 0  Last Office Visit : 5/13/2020  Future Office visit:  None  Routing refill request to provider for review/approval because:  Abnormal Labs:  CR       Refer to Provider for reviw    Recent Labs   Lab Test 04/28/20  1435   CR 1.27*       Christy Walsh RN  Central Triage Red Flags/Med Refills

## 2020-06-18 RX ORDER — METOPROLOL SUCCINATE 25 MG/1
75 TABLET, EXTENDED RELEASE ORAL DAILY
Qty: 90 TABLET | Refills: 1 | Status: SHIPPED | OUTPATIENT
Start: 2020-06-18 | End: 2020-09-02

## 2020-06-18 RX ORDER — BUMETANIDE 2 MG/1
2 TABLET ORAL 2 TIMES DAILY
Qty: 60 TABLET | Refills: 1 | Status: SHIPPED | OUTPATIENT
Start: 2020-06-18 | End: 2020-09-02

## 2020-06-18 RX ORDER — METOLAZONE 2.5 MG/1
2.5 TABLET ORAL SEE ADMIN INSTRUCTIONS
Qty: 5 TABLET | Refills: 0 | OUTPATIENT
Start: 2020-06-18

## 2020-06-18 RX ORDER — LISINOPRIL 20 MG/1
20 TABLET ORAL DAILY
Qty: 30 TABLET | Refills: 1 | Status: SHIPPED | OUTPATIENT
Start: 2020-06-18 | End: 2020-09-12

## 2020-06-18 RX ORDER — SPIRONOLACTONE 25 MG/1
25 TABLET ORAL DAILY
Qty: 30 TABLET | Refills: 1 | Status: SHIPPED | OUTPATIENT
Start: 2020-06-18 | End: 2020-09-02

## 2020-06-18 NOTE — TELEPHONE ENCOUNTER
Per Irene Irving NP, okay to refill for 30 days with 1 refill but patient needs follow up appointment for further refills.

## 2020-06-22 DIAGNOSIS — N28.89 RIGHT RENAL MASS: Primary | ICD-10-CM

## 2020-06-23 ENCOUNTER — HOSPITAL ENCOUNTER (OUTPATIENT)
Facility: CLINIC | Age: 55
End: 2020-06-23
Attending: ANESTHESIOLOGY | Admitting: ANESTHESIOLOGY
Payer: COMMERCIAL

## 2020-07-03 DIAGNOSIS — I50.22 CHRONIC SYSTOLIC CHF (CONGESTIVE HEART FAILURE) (H): ICD-10-CM

## 2020-07-03 NOTE — TELEPHONE ENCOUNTER
metolazone (ZAROXOLYN) 2.5 MG   Last Written Prescription Date:  4/7/20  Last Fill Quantity: 5,   # refills: 0  Last Office Visit : 5/13/20  Future Office visit:  NONE    Routing refill request to provider for review/approval because:  UNDETERMINED RF  * SEE LAST NOTE  She does not have any metolazone tablets.  Will send in a prescription for her to  after she has her labs drawn (to limit the number of times she needs to go out of her home during the COVID crisis).  Asked that she wait to hear from us BEFORE taking metolazone.

## 2020-07-07 ENCOUNTER — VIRTUAL VISIT (OUTPATIENT)
Dept: DERMATOLOGY | Facility: CLINIC | Age: 55
End: 2020-07-07
Payer: COMMERCIAL

## 2020-07-07 DIAGNOSIS — Z80.8 FAMILY HISTORY OF SKIN CANCER: ICD-10-CM

## 2020-07-07 DIAGNOSIS — D48.5 NEOPLASM OF UNCERTAIN BEHAVIOR OF SKIN: Primary | ICD-10-CM

## 2020-07-07 ASSESSMENT — PAIN SCALES - GENERAL: PAINLEVEL: NO PAIN (0)

## 2020-07-07 NOTE — PATIENT INSTRUCTIONS
"[facility] Teledermatology Visit    Thank you for allowing us to participate in your care. Your findings, instructions and follow-up plan are as follows:    Spot on the scalp. Appears most consistent with a seborrheic keratosis (benign), but recommend in-person exam for evaluation. My nurse will call you to arrange this.     Overview    Seborrheic keratoses are common benign growths of unknown cause seen in adults   due to a thickening of an area of the top skin layer.    Who's At Risk  Although they can occur anytime after puberty, almost everyone over 50 has one or more of these and they increase in number with age. Some families have an inherited tendency to grow multiple lesions. Men and women are equally as likely to develop seborrheic keratoses. Dark-skinned people are less affected than those with light skin; a variant seen in blacks is called dermatosis papulosa nigra.    Signs & Symptoms  One or more spots can occur anywhere on the body, except for palms, soles, and mucous membranes (eg, in the mouth or rectum). They do not go away. They do not turn into cancers, but some cancers resemble seborrheic keratosis.    They start as light brown to skin-colored, flat areas, which are round to oval and of varying size (usually less than a half inch, but sometimes much larger). As they grow thicker and rise above the skin surface, seborrheic keratoses may become dark brown to almost black with a \"stuck on\" appearance. The surface may feel smooth or rough.    Self-Care Guidelines  No treatment is needed unless there is irritation from clothing with itching or bleeding.    There is no way to prevent new spots from forming.    Some lotions with alpha hydroxyl acids may make the areas feel smoother with regular use but will not eliminate them.    OTC freezing techniques are available but usually not effective.    When to Seek Medical Care  If a spot on the skin is growing, bleeding, painful, or " itchy, see your doctor.    Spots can be removed if you don't want them, but removal is considered a cosmetic issue and is usually not covered by insurance.    Treatments Your Physician May Prescribe  Removal can be accomplished with freezing (cryosurgery), scraping (curettage), burning (electrocautery), lasers, or with acids. The doctor might conduct a biopsy if the growth looks unusual.    References:  Toni Andrew, ed. Dermatology, pp.4434-1516. [place]: [employer], 2003.    Stanislaw Feliz, ed. Nishant's Dermatology in General Medicine. 6th ed, pp.767-770. [facility] [place]: [employer], 2003.      When should I call my doctor?    If you are worsening or not improving, please, contact us or seek urgent care as noted below.     Who should I call with questions (adults)?    [facility] (adult and pediatric): [phone]     [facility] (adult): [phone]    For urgent needs outside of business hours call the [facility] at [phone] and ask for the dermatology resident on call    If this is a medical emergency and you are unable to reach an ER, Call [phone]      Who should I call with questions (pediatric)?  [facility]- Pediatric Dermatology  Dr. Arleen Rico, Dr. Love Conrad, Dr. Iwona Krause, Angi Mello, PA  Dr. Yasmin Sullivan, Dr. Lois Peterson & Dr. Zeb Gray  Non Urgent  Nurse Triage Line; [phone]- Kimberley and Lexus [facility] Care Coordinators   Tracie (/Complex ) [phone]    If you need a prescription refill, please contact your pharmacy. Refills are approved or denied by our Physicians during normal business hours, Monday through Fridays  Per office policy, refills will not be granted if you have not been seen within the past year (or sooner depending on your child's condition)    Scheduling Information:  Pediatric Appointment Scheduling and Call Center [phone]  [phone]  Radiology Scheduling- [phone]  Sedation Unit Scheduling- [phone]  [place] Scheduling- General [phone]; Pediatric Dermatology [phone]  Main  Services: [phone]  Greek: [phone]  Qatari: [phone]  Hmong/Kavon/Eduardo: [phone]  Preadmission Nursing Department Fax Number: [phone] (Fax all pre-operative paperwork to this number)    For urgent matters arising during evenings, weekends, or holidays that cannot wait for normal business hours please call [phone] and ask for the Dermatology Resident On-Call to be paged.          Preventive Care:    Colorectal Cancer Screening: During our visit today, we discussed that it is recommended you receive colorectal cancer screening. Please call or make an appointment with your primary care provider to discuss this. You may also call the  [facility] scheduling line ([phone]) to set up a colonoscopy appointment.      Preventive Care:    Breast Cancer Screening: During our visit today, we discussed that it is recommended you receive breast cancer screening. Please call or make an appointment with your primary care provider to discuss this with them. You may also call the  [facility] scheduling line ([phone]) to set up a mammography appointment at the Breast Center within the [facility].

## 2020-07-07 NOTE — NURSING NOTE
Dermatology Rooming Note    Gloria Guzman's goals for this visit include:   Chief Complaint   Patient presents with     Derm Problem     Gloria has a concernig lump on her scalp that she's had for 6 months.         Sharon Guillen LPN

## 2020-07-08 ENCOUNTER — VIRTUAL VISIT (OUTPATIENT)
Dept: CARDIOLOGY | Facility: CLINIC | Age: 55
End: 2020-07-08
Attending: INTERNAL MEDICINE
Payer: COMMERCIAL

## 2020-07-08 ENCOUNTER — CARE COORDINATION (OUTPATIENT)
Dept: CARDIOLOGY | Facility: CLINIC | Age: 55
End: 2020-07-08

## 2020-07-08 ENCOUNTER — TELEPHONE (OUTPATIENT)
Dept: CARDIOLOGY | Facility: CLINIC | Age: 55
End: 2020-07-08

## 2020-07-08 DIAGNOSIS — I50.22 CHRONIC SYSTOLIC CHF (CONGESTIVE HEART FAILURE) (H): Primary | ICD-10-CM

## 2020-07-08 DIAGNOSIS — I50.30 HEART FAILURE WITH PRESERVED EJECTION FRACTION, BORDERLINE, CLASS III (H): Primary | ICD-10-CM

## 2020-07-08 PROCEDURE — 99214 OFFICE O/P EST MOD 30 MIN: CPT | Mod: 95 | Performed by: INTERNAL MEDICINE

## 2020-07-08 RX ORDER — METOLAZONE 2.5 MG/1
2.5 TABLET ORAL SEE ADMIN INSTRUCTIONS
Qty: 5 TABLET | Refills: 0 | Status: SHIPPED | OUTPATIENT
Start: 2020-07-08 | End: 2022-01-01

## 2020-07-08 RX ORDER — METOLAZONE 2.5 MG/1
2.5 TABLET ORAL
Qty: 30 TABLET | Refills: 3 | Status: SHIPPED | OUTPATIENT
Start: 2020-07-08 | End: 2020-11-17

## 2020-07-08 ASSESSMENT — PAIN SCALES - GENERAL: PAINLEVEL: NO PAIN (0)

## 2020-07-08 NOTE — TELEPHONE ENCOUNTER
[facility] Call Center    Phone Message    May a detailed message be left on voicemail: yes     Reason for Call: Other: Pharmacy needs a call to clarify which medication to use as they got two perscriptions for same med but different directions     Action Taken: Message routed to:  [facility] ([facility] cardio    Travel Screening: Not Applicable

## 2020-07-08 NOTE — LETTER
7/8/2020    RE: Gloria Guzman  [address] [facility] [address]     Dear Colleague,    Thank you for the opportunity to participate in the care of your patient, Gloria Guzman, at the [facility] HEART [facility]. Please see a copy of my visit note below.    Gloria Guzman is a 54 year old female who is being evaluated via a billable telephone visit.      July 8, 2020  Ms. Gloria Guzman is a 54yr old female with a history of HFpEF (LVEF 55-60% per TTE 1/2020), HTN, COPD, bipolar disorder, and substance abuse history who is being evaluated via telephone encounter for HF follow up. [facility].   Since her last visit [employer] she notices been doing relatively well however she feels that she is volume overloaded again.  She was given metolazone 2.5 mg tablets in the past and that seemed to work well with the Bumex 2 mg in the morning and 1 mg in the afternoon.  She has not been taking it recently and her weight is up now again closer to 260 pounds.  She feels little bit more short of breath and more swollen.  She would like to take metolazone if possible.  Other than that denies any dizziness lightheadedness no chest pain.  She does have one pillow orthopnea.  No chills no fevers no cough no nausea vomiting or bleeding.  No other complaints today.  She is preparing for upcoming surgery for renal tumor resection.     PAST MEDICAL HISTORY:  Past Medical History:   Diagnosis Date     Anemia      Arthritis      Bipolar affective disorder, current episode moderate (H)      Chronic back pain      Chronic systolic CHF (congestive heart failure) (H)      COPD (chronic obstructive pulmonary disease) (H)      COPD (chronic obstructive pulmonary disease) (H)      ETOH abuse      GERD (gastroesophageal reflux disease)      H/O heart failure      History of posttraumatic stress disorder (PTSD)      Homeless      HTN (hypertension)      Marijuana abuse       Nonischemic cardiomyopathy (H)     EF 19% by CMRI     Obesity      Polysubstance abuse (H)     tobacco, previous cocaine, meth, and alcohol, and marijuana     Sleep apnea      Tobacco abuse      FAMILY HISTORY:  Family History   Problem Relation Age of Onset     Breast Cancer Maternal Grandmother      Bipolar Disorder Maternal Grandmother      Substance Abuse Maternal Grandmother      Breast Cancer Maternal Aunt      Cancer Father 42        lung      Substance Abuse Father      Cancer Maternal Grandfather         lung      Bipolar Disorder Maternal Grandfather      Substance Abuse Maternal Grandfather      Cancer Other         maternal cousin lung      Gallbladder Disease Mother      Depression Mother      Bipolar Disorder Mother      Substance Abuse Mother      Skin Cancer Mother      Gallbladder Disease Sister      Substance Abuse Sister      Substance Abuse Brother      Melanoma No family hx of      SOCIAL HISTORY:  Social History     Socioeconomic History     Marital status: Legally      Spouse name: None     Number of children: None     Years of education: None     Highest education level: None   Occupational History     None   Social Needs     Financial resource strain: None     Food insecurity     Worry: None     Inability: None     Transportation needs     Medical: None     Non-medical: None   Tobacco Use     Smoking status: Current Every Day Smoker     Packs/day: 0.50     Types: Cigarettes     Smokeless tobacco: Never Used   Substance and Sexual Activity     Alcohol use: No     Drug use: Yes     Types: Marijuana     Sexual activity: Never   Lifestyle     Physical activity     Days per week: None     Minutes per session: None     Stress: None   Relationships     Social connections     Talks on phone: None     Gets together: None     Attends Druze service: None     Active member of club or organization: None     Attends meetings of clubs or organizations: None     Relationship status: None      Intimate partner violence     Fear of current or ex partner: None     Emotionally abused: None     Physically abused: None     Forced sexual activity: None   Other Topics Concern     Parent/sibling w/ CABG, MI or angioplasty before 65F 55M? Not Asked   Social History Narrative     None     CURRENT MEDICATIONS:  Current Outpatient Medications   Medication     acetaminophen (TYLENOL) 500 MG tablet     bumetanide (BUMEX) 2 MG tablet     hydrocortisone (CORTAID) 1 % external cream     lisinopril (ZESTRIL) 20 MG tablet     metolazone (ZAROXOLYN) 2.5 MG tablet     metolazone 2.5 MG PO tablet     metoprolol succinate ER (TOPROL-XL) 25 MG 24 hr tablet     ondansetron (ZOFRAN-ODT) 4 MG ODT tab     oxyCODONE IR 15 MG PO tablet     potassium chloride ER (MICRO-K) 10 MEQ CR capsule     PROAIR  (90 Base) MCG/ACT inhaler     sodium chloride (OCEAN) 0.65 % nasal spray     spironolactone (ALDACTONE) 25 MG tablet     No current facility-administered medications for this visit.      ROS:   Constitutional: No fever, chills, or sweats.   ENT: No visual disturbance, ear ache, epistaxis, sore throat.   Allergies/Immunologic: Negative.   Respiratory: No cough, hemoptysis.   Cardiovascular: As per HPI.   GI: No nausea, vomiting, hematemesis, melena, or hematochezia.   : No urinary frequency, dysuria, or hematuria.   Integument: Negative.   Psychiatric: Pleasant, no major depression noted  Neuro: No focal neurological deficits noted  Endocrinology: Negative.   Musculoskeletal: As per HPI.      EXAM:  Limited due to phone encounter     Labs:  Lab Results   Component Value Date    WBC 12.0 (H) 04/13/2020    HGB 14.6 04/13/2020    HCT 45.7 04/13/2020     04/13/2020     04/28/2020    POTASSIUM 4.4 04/28/2020    CHLORIDE 103 04/28/2020    CO2 28 04/28/2020    BUN 44 (H) 04/28/2020    CR 1.27 (H) 04/28/2020     (H) 04/28/2020    DD 0.5 02/02/2019    NTBNPI 492 01/31/2020    NTBNP 391 (H) 04/28/2020    TROPI <0.015  01/31/2020    AST 23 04/13/2020    ALT 36 04/13/2020    ALKPHOS 92 04/13/2020    BILITOTAL 0.2 04/13/2020    INR 1.11 02/01/2020     Assessment and Plan:   Ms. Gloria Guzman is a 54yr old female with a history of HFpEF (LVEF 55-60% per TTE 1/2020), HTN, COPD, bipolar disorder, and substance abuse who is being evaluated via telephone encounter for heart failure follow-up.  Notes that she has been relatively well however retaining some fluid again.  We discussed to continue the 2 mg Bumex in the morning and 1 mg in the afternoon.  May need to adjust based on how she feels and and how effective it is.  I did send in metolazone 2.5 mg pills for her to the pharmacy and discussed that only take it once a day and only as needed if she would need more than she is to call us and we can adjust the medications before.  I will see her back in about 3 months when reevaluate her volume status and how she does overall from the heart failure standpoint.  Continue other medications at this point.    Appreciate the opportunity to participate in the care of Ms. Guzman.  Please do not hesitate to call us anytime with questions.  Demarcus Laguerre MD

## 2020-07-08 NOTE — PATIENT INSTRUCTIONS
-Please continue medications as you are taking right now  -I did send in the metolazone 2.5 mg tablets to the pharmacy.  Please take it only as needed and the maximum of 1 time per week.  If you would need more please let us know and we will adjust the medications accordingly.  -I will see you back in 3 months or sooner as needed

## 2020-07-08 NOTE — PROGRESS NOTES
"Gloria Guzman is a 54 year old female who is being evaluated via a billable telephone visit.      The patient has been notified of following:     \"This telephone visit will be conducted via a call between you and your physician/provider. We have found that certain health care needs can be provided without the need for a physical exam.  This service lets us provide the care you need with a short phone conversation.  If a prescription is necessary we can send it directly to your pharmacy.  If lab work is needed we can place an order for that and you can then stop by our lab to have the test done at a later time.    Telephone visits are billed at different rates depending on your insurance coverage. During this emergency period, for some insurers they may be billed the same as an in-person visit.  Please reach out to your insurance provider with any questions.    If during the course of the call the physician/provider feels a telephone visit is not appropriate, you will not be charged for this service.\"    Patient has given verbal consent for Telephone visit?  Yes    What phone number would you like to be contacted at? [phone]    How would you like to obtain your AVS? Micheal    July 8, 2020  Ms. Gloria Guzman is a 54yr old female with a history of HFpEF (LVEF 55-60% per TTE 1/2020), HTN, COPD, bipolar disorder, and substance abuse history who is being evaluated via telephone encounter for HF follow up. [facility].   Since her last visit [employer] she notices been doing relatively well however she feels that she is volume overloaded again.  She was given metolazone 2.5 mg tablets in the past and that seemed to work well with the Bumex 2 mg in the morning and 1 mg in the afternoon.  She has not been taking it recently and her weight is up now again closer to 260 pounds.  She feels little bit more short of breath and more swollen.  She would like to take metolazone if possible.  Other than " that denies any dizziness lightheadedness no chest pain.  She does have one pillow orthopnea.  No chills no fevers no cough no nausea vomiting or bleeding.  No other complaints today.  She is preparing for upcoming surgery for renal tumor resection.     PAST MEDICAL HISTORY:  Past Medical History:   Diagnosis Date     Anemia      Arthritis      Bipolar affective disorder, current episode moderate (H)      Chronic back pain      Chronic systolic CHF (congestive heart failure) (H)      COPD (chronic obstructive pulmonary disease) (H)      COPD (chronic obstructive pulmonary disease) (H)      ETOH abuse      GERD (gastroesophageal reflux disease)      H/O heart failure      History of posttraumatic stress disorder (PTSD)      Homeless      HTN (hypertension)      Marijuana abuse      Nonischemic cardiomyopathy (H)     EF 19% by CMRI     Obesity      Polysubstance abuse (H)     tobacco, previous cocaine, meth, and alcohol, and marijuana     Sleep apnea      Tobacco abuse      FAMILY HISTORY:  Family History   Problem Relation Age of Onset     Breast Cancer Maternal Grandmother      Bipolar Disorder Maternal Grandmother      Substance Abuse Maternal Grandmother      Breast Cancer Maternal Aunt      Cancer Father 42        lung      Substance Abuse Father      Cancer Maternal Grandfather         lung      Bipolar Disorder Maternal Grandfather      Substance Abuse Maternal Grandfather      Cancer Other         maternal cousin lung      Gallbladder Disease Mother      Depression Mother      Bipolar Disorder Mother      Substance Abuse Mother      Skin Cancer Mother      Gallbladder Disease Sister      Substance Abuse Sister      Substance Abuse Brother      Melanoma No family hx of      SOCIAL HISTORY:  Social History     Socioeconomic History     Marital status: Legally      Spouse name: None     Number of children: None     Years of education: None     Highest education level: None   Occupational History     None    Social Needs     Financial resource strain: None     Food insecurity     Worry: None     Inability: None     Transportation needs     Medical: None     Non-medical: None   Tobacco Use     Smoking status: Current Every Day Smoker     Packs/day: 0.50     Types: Cigarettes     Smokeless tobacco: Never Used   Substance and Sexual Activity     Alcohol use: No     Drug use: Yes     Types: Marijuana     Sexual activity: Never   Lifestyle     Physical activity     Days per week: None     Minutes per session: None     Stress: None   Relationships     Social connections     Talks on phone: None     Gets together: None     Attends Confucianism service: None     Active member of club or organization: None     Attends meetings of clubs or organizations: None     Relationship status: None     Intimate partner violence     Fear of current or ex partner: None     Emotionally abused: None     Physically abused: None     Forced sexual activity: None   Other Topics Concern     Parent/sibling w/ CABG, MI or angioplasty before 65F 55M? Not Asked   Social History Narrative     None     CURRENT MEDICATIONS:  Current Outpatient Medications   Medication     acetaminophen (TYLENOL) 500 MG tablet     bumetanide (BUMEX) 2 MG tablet     hydrocortisone (CORTAID) 1 % external cream     lisinopril (ZESTRIL) 20 MG tablet     metolazone (ZAROXOLYN) 2.5 MG tablet     metolazone 2.5 MG PO tablet     metoprolol succinate ER (TOPROL-XL) 25 MG 24 hr tablet     ondansetron (ZOFRAN-ODT) 4 MG ODT tab     oxyCODONE IR 15 MG PO tablet     potassium chloride ER (MICRO-K) 10 MEQ CR capsule     PROAIR  (90 Base) MCG/ACT inhaler     sodium chloride (OCEAN) 0.65 % nasal spray     spironolactone (ALDACTONE) 25 MG tablet     No current facility-administered medications for this visit.      ROS:   Constitutional: No fever, chills, or sweats.   ENT: No visual disturbance, ear ache, epistaxis, sore throat.   Allergies/Immunologic: Negative.   Respiratory: No  cough, hemoptysis.   Cardiovascular: As per HPI.   GI: No nausea, vomiting, hematemesis, melena, or hematochezia.   : No urinary frequency, dysuria, or hematuria.   Integument: Negative.   Psychiatric: Pleasant, no major depression noted  Neuro: No focal neurological deficits noted  Endocrinology: Negative.   Musculoskeletal: As per HPI.      EXAM:  Limited due to phone encounter     Labs:  Lab Results   Component Value Date    WBC 12.0 (H) 04/13/2020    HGB 14.6 04/13/2020    HCT 45.7 04/13/2020     04/13/2020     04/28/2020    POTASSIUM 4.4 04/28/2020    CHLORIDE 103 04/28/2020    CO2 28 04/28/2020    BUN 44 (H) 04/28/2020    CR 1.27 (H) 04/28/2020     (H) 04/28/2020    DD 0.5 02/02/2019    NTBNPI 492 01/31/2020    NTBNP 391 (H) 04/28/2020    TROPI <0.015 01/31/2020    AST 23 04/13/2020    ALT 36 04/13/2020    ALKPHOS 92 04/13/2020    BILITOTAL 0.2 04/13/2020    INR 1.11 02/01/2020     Assessment and Plan:   Ms. Gloria Guzman is a 54yr old female with a history of HFpEF (LVEF 55-60% per TTE 1/2020), HTN, COPD, bipolar disorder, and substance abuse who is being evaluated via telephone encounter for heart failure follow-up.  Notes that she has been relatively well however retaining some fluid again.  We discussed to continue the 2 mg Bumex in the morning and 1 mg in the afternoon.  May need to adjust based on how she feels and and how effective it is.  I did send in metolazone 2.5 mg pills for her to the pharmacy and discussed that only take it once a day and only as needed if she would need more than she is to call us and we can adjust the medications before.  I will see her back in about 3 months when reevaluate her volume status and how she does overall from the heart failure standpoint.  Continue other medications at this point.    Appreciate the opportunity to participate in the care of Ms. Guzman.  Please do not hesitate to call us anytime with questions.  Demarcus Laguerre MD

## 2020-07-10 ENCOUNTER — PATIENT OUTREACH (OUTPATIENT)
Dept: UROLOGY | Facility: CLINIC | Age: 55
End: 2020-07-10

## 2020-07-15 ENCOUNTER — TELEPHONE (OUTPATIENT)
Dept: UROLOGY | Facility: CLINIC | Age: 55
End: 2020-07-15

## 2020-07-15 NOTE — TELEPHONE ENCOUNTER
Patient is scheduled for surgery with Dr. Abarca      Left voice mail with surgery information and phone number for a call back at [phone]    Date of Surgery: 7/28/20    Location: [facility]    Informed patient they will need an adult  yes    H&P: Scheduled with PCP    Additional imaging/appointments: CT at post op 9/11/20    Surgery packet: will be mailed by 7/16/20     Additional comments: informed about Covid 19 testing within 72 hours prior to surgery

## 2020-07-16 ENCOUNTER — VIRTUAL VISIT (OUTPATIENT)
Dept: CARDIOLOGY | Facility: CLINIC | Age: 55
End: 2020-07-16
Attending: INTERNAL MEDICINE
Payer: COMMERCIAL

## 2020-07-16 DIAGNOSIS — I50.22 CHRONIC SYSTOLIC CONGESTIVE HEART FAILURE (H): ICD-10-CM

## 2020-07-16 DIAGNOSIS — I42.8 NONISCHEMIC CARDIOMYOPATHY (H): ICD-10-CM

## 2020-07-16 DIAGNOSIS — Z01.810 PRE-OPERATIVE CARDIOVASCULAR EXAMINATION: ICD-10-CM

## 2020-07-16 DIAGNOSIS — I10 ESSENTIAL HYPERTENSION: Primary | ICD-10-CM

## 2020-07-16 DIAGNOSIS — Z95.810 AUTOMATIC IMPLANTABLE CARDIOVERTER-DEFIBRILLATOR IN SITU: ICD-10-CM

## 2020-07-16 DIAGNOSIS — N18.30 CKD (CHRONIC KIDNEY DISEASE) STAGE 3, GFR 30-59 ML/MIN (H): ICD-10-CM

## 2020-07-16 DIAGNOSIS — I50.22 CHRONIC SYSTOLIC HEART FAILURE (H): ICD-10-CM

## 2020-07-16 DIAGNOSIS — Z95.0 CARDIAC PACEMAKER IN SITU: ICD-10-CM

## 2020-07-16 DIAGNOSIS — I50.22 CHRONIC SYSTOLIC CHF (CONGESTIVE HEART FAILURE) (H): ICD-10-CM

## 2020-07-16 PROCEDURE — 99443 ZZC PHYSICIAN TELEPHONE EVALUATION 21-30 MIN: CPT | Performed by: INTERNAL MEDICINE

## 2020-07-16 NOTE — PATIENT INSTRUCTIONS
Patient Instructions:  It was a pleasure to see you in the cardiology clinic today.      If you have any questions, you can reach my nurse, Elida JAMES LPN, at [phone].  Press Option #1 for the [facility], and then press Option #4 for nursing.    We are encouraging the use of [employer] to communicate with your HealthCare Provider    Medication Changes: None.    Recommendations: You are approved for surgery from a cardiovascular perspective.    Studies Ordered: None.    The results from today include: None.    Please follow up: With Dr. Boyer as needed.  Continue to follow with Dr Laguerre and his team.    Sincerely,    Hugo Boyer MD     If you have an urgent need after hours (8:00 am to 4:30 pm) please call [phone] and ask for the cardiology fellow on call.

## 2020-07-16 NOTE — PROGRESS NOTES
"Gloria Guzman is a 54 year old female who is being evaluated via a billable telephone visit.      The patient has been notified of following:     \"This telephone visit will be conducted via a call between you and your physician/provider. We have found that certain health care needs can be provided without the need for a physical exam.  This service lets us provide the care you need with a short phone conversation.  If a prescription is necessary we can send it directly to your pharmacy.  If lab work is needed we can place an order for that and you can then stop by our lab to have the test done at a later time.    Telephone visits are billed at different rates depending on your insurance coverage. During this emergency period, for some insurers they may be billed the same as an in-person visit.  Please reach out to your insurance provider with any questions.    If during the course of the call the physician/provider feels a telephone visit is not appropriate, you will not be charged for this service.\"    Patient has given verbal consent for Telephone visit?  Yes    What phone number would you like to be contacted at? [phone]    How would you like to obtain your AVS? Mail a copy      HPI: 54 year old woman with HFpEF (LVEF 55-60% per 1/2020 TTE), HTN, COPD, bipolar disorder, substance abuse disorder in need of telephone pre-operative assessment prior to renal cryoablation later this month. Denied any chest pain lightheadedness, dizziness, or new fatigue. No changes since seen in virtual visit by Dr Laguerre one week ago. At that time, she reported being volume overloaded, feeling short of breath, and having 1 pillow orthopnea. Noted weight gain with weight of 260lbs. Dr Laguerre added metolazone 2.5mg once daily as needed of weight gain and dyspnea, discussed potential of adjusting Bumex but no changed made. Today, patient feels well. No fevers/chills/cough/nausea/vomiting.     PAST MEDICAL HISTORY:  Past Medical " History:   Diagnosis Date     Anemia      Arthritis      Bipolar affective disorder, current episode moderate (H)      Chronic back pain      Chronic systolic CHF (congestive heart failure) (H)      COPD (chronic obstructive pulmonary disease) (H)      COPD (chronic obstructive pulmonary disease) (H)      ETOH abuse      GERD (gastroesophageal reflux disease)      H/O heart failure      History of posttraumatic stress disorder (PTSD)      Homeless      HTN (hypertension)      Marijuana abuse      Nonischemic cardiomyopathy (H)     EF 19% by CMRI     Obesity      Polysubstance abuse (H)     tobacco, previous cocaine, meth, and alcohol, and marijuana     Sleep apnea      Tobacco abuse        CURRENT MEDICATIONS:  Current Outpatient Medications   Medication Sig Dispense Refill     acetaminophen (TYLENOL) 500 MG tablet Take 2 tablets (1,000 mg) by mouth 3 times daily 90 tablet 0     bumetanide (BUMEX) 2 MG tablet Take 1 tablet (2 mg) by mouth 2 times daily Please call to schedule an appointment before we can give you further refills. [phone] option 1 60 tablet 1     hydrocortisone (CORTAID) 1 % external cream Apply topically 2 times daily as needed       lisinopril (ZESTRIL) 20 MG tablet Take 1 tablet (20 mg) by mouth daily Please schedule appointment for further refills. [phone] option 1 30 tablet 1     metolazone (ZAROXOLYN) 2.5 MG tablet Take 1 tablet (2.5 mg) by mouth See Admin Instructions Take ONLY as directed by the [facility] clinic/HF team 5 tablet 0     metolazone (ZAROXOLYN) 2.5 MG tablet Take 1 tablet (2.5 mg) by mouth every 72 hours as needed (for fluid retention) 30 tablet 3     metoprolol succinate ER (TOPROL-XL) 25 MG 24 hr tablet Take 3 tablets (75 mg) by mouth daily Please schedule appointment for further refills. [phone] option 1 90 tablet 1     ondansetron (ZOFRAN-ODT) 4 MG ODT tab DISSOLVE ONE TABLET IN MOUTH DAILY AS NEEDED FOR NAUSEA       oxyCODONE IR 15 MG PO tablet Take 5 mg by mouth  every 4 hours as needed for severe pain       potassium chloride ER (MICRO-K) 10 MEQ CR capsule Take 2 capsules (20 mEq) by mouth daily 60 capsule 3     PROAIR  (90 Base) MCG/ACT inhaler INHALE 2 PUFFS BY MOUTH EVERY 4 HOURS AS NEEDED FOR SHORTNESS OF BREATH AND WHEEZING  0     sodium chloride (OCEAN) 0.65 % nasal spray Spray 1 spray into both nostrils daily as needed for congestion       spironolactone (ALDACTONE) 25 MG tablet Take 1 tablet (25 mg) by mouth daily Please schedule appt for any further refills. [phone] option1 30 tablet 1     metolazone 2.5 MG PO tablet Take one tablet of 2.5 mg today  then only as directed by [facility] clinic 5 tablet 0       PAST SURGICAL HISTORY:  Past Surgical History:   Procedure Laterality Date     BACK SURGERY       CARDIAC SURGERY      AICD placement       SECTION       GALLBLADDER SURGERY       HERNIA REPAIR       JOINT REPLACEMTN, KNEE RT/LT  11/10    Joint Replacement knee LT     SURGICAL PATHOLOGY EXAM       TONSILLECTOMY         ALLERGIES     Allergies   Allergen Reactions     Cefaclor Rash     Other reaction(s): *Unknown     Morphine Hives and Itching     Ibuprofen      Morphine Sulfate Itching     Mushrooms [Mushroom] Hives     Olanzapine Other (See Comments)     Varenicline Other (See Comments)       FAMILY HISTORY:  Family History   Problem Relation Age of Onset     Breast Cancer Maternal Grandmother      Bipolar Disorder Maternal Grandmother      Substance Abuse Maternal Grandmother      Breast Cancer Maternal Aunt      Cancer Father 42        lung      Substance Abuse Father      Cancer Maternal Grandfather         lung      Bipolar Disorder Maternal Grandfather      Substance Abuse Maternal Grandfather      Cancer Other         maternal cousin lung      Gallbladder Disease Mother      Depression Mother      Bipolar Disorder Mother      Substance Abuse Mother      Skin Cancer Mother      Gallbladder Disease Sister      Substance Abuse Sister       Substance Abuse Brother      Melanoma No family hx of        SOCIAL HISTORY:  Social History     Socioeconomic History     Marital status: Legally      Spouse name: Not on file     Number of children: Not on file     Years of education: Not on file     Highest education level: Not on file   Occupational History     Not on file   Social Needs     Financial resource strain: Not on file     Food insecurity     Worry: Not on file     Inability: Not on file     Transportation needs     Medical: Not on file     Non-medical: Not on file   Tobacco Use     Smoking status: Current Every Day Smoker     Packs/day: 0.50     Types: Cigarettes     Smokeless tobacco: Never Used   Substance and Sexual Activity     Alcohol use: No     Drug use: Yes     Types: Marijuana     Sexual activity: Never   Lifestyle     Physical activity     Days per week: Not on file     Minutes per session: Not on file     Stress: Not on file   Relationships     Social connections     Talks on phone: Not on file     Gets together: Not on file     Attends Congregational service: Not on file     Active member of club or organization: Not on file     Attends meetings of clubs or organizations: Not on file     Relationship status: Not on file     Intimate partner violence     Fear of current or ex partner: Not on file     Emotionally abused: Not on file     Physically abused: Not on file     Forced sexual activity: Not on file   Other Topics Concern     Parent/sibling w/ CABG, MI or angioplasty before 65F 55M? Not Asked   Social History Narrative     Not on file       ROS:   Constitutional: No fever, chills, or sweats. No weight gain/loss   ENT: No visual disturbance, ear ache, epistaxis, sore throat  Allergies/Immunologic: Negative.   Respiratory: No cough, hemoptysia  Cardiovascular: As per HPI  GI: No nausea, vomiting, hematemesis, melena, or hematochezia  : No urinary frequency, dysuria, or hematuria  Integument: Negative  Psychiatric:  Negative  Neuro: Negative  Endocrinology: Negative   Musculoskeletal: Negative      Labs:  LIPID RESULTS:  Lab Results   Component Value Date    CHOL 72 02/03/2019    HDL 24 (L) 02/03/2019    LDL 35 02/03/2019    TRIG 62 02/03/2019    NHDL 48 02/03/2019       LIVER ENZYME RESULTS:  Lab Results   Component Value Date    AST 23 04/13/2020    ALT 36 04/13/2020       CBC RESULTS:  Lab Results   Component Value Date    WBC 12.0 (H) 04/13/2020    RBC 4.90 04/13/2020    HGB 14.6 04/13/2020    HCT 45.7 04/13/2020    MCV 93 04/13/2020    MCH 29.8 04/13/2020    MCHC 31.9 04/13/2020    RDW 13.2 04/13/2020     04/13/2020       BMP RESULTS:  Lab Results   Component Value Date     04/28/2020    POTASSIUM 4.4 04/28/2020    CHLORIDE 103 04/28/2020    CO2 28 04/28/2020    ANIONGAP 7 04/28/2020     (H) 04/28/2020    BUN 44 (H) 04/28/2020    CR 1.27 (H) 04/28/2020    GFRESTIMATED 47 (L) 04/28/2020    GFRESTBLACK 54 (L) 04/28/2020    MADDISON 9.2 04/28/2020        A1C RESULTS:  No results found for: A1C    INR RESULTS:  Lab Results   Component Value Date    INR 1.11 02/01/2020    INR 1.10 05/17/2019       Procedures:    Transthoracic Echocardiogram 1/31/20:  Interpretation Summary  Global and regional left ventricular function is normal with an EF of 55-60%.  Right ventricular function, chamber size, wall motion, and thickness are  normal.  No significant valvular disease.  IVC diameter <2.1 cm collapsing >50% with sniff suggests a normal RA pressure  of 3 mmHg.  No pericardial effusion is present.     There is no prior study for direct comparison    ECG 1/31/20  NSR, rate of 63 bpm, left axis deviation with moderate criteria for LVH. No evidence of prior infarct.    Assessment and Plan: 54 year old woman with HFpEF followed closely by Dr Laguerre in heart failure clinic presenting on telephone visit for pre-operative risk assessment. Patient is low-moderate risk for a low risk procedure. Attention should be given to her  fluid status given congestive heart failure with recent recurrence of volume overload. No further work up prior to surgery unless patient develops new onset chest pain or becomes volume overloaded again.     Patient can be cleared for surgery form cardiovascular point of view.    Ifeanyi Rosas MD, MSc  Cardiovascular Disease Fellow  [facility]    I saw and evaluated and examined patient with CV fellow. I discussed the findings and results with patient and CV fellow. I agree with CV fellow's note and edited this note to a cohesive document.    30 min were directly spent with patient during phone visit.    Hugo Boyer MD, PhD  Professor of Medicine  Division of Cardiology    CC  Patient Care Team:  Sofia Posey APRN CNP as PCP - General (Nurse Practitioner - Family)  Brittany Guillen, RN as Specialty Care Coordinator (Cardiology)  Sofia Posey APRN CNP as Assigned PCP  Alyx Michael NP as Nurse Practitioner (Nurse Practitioner)  Shayy Crocker, RN as Specialty Care Coordinator (Cardiology)  Afia Posey, RN as Specialty Care Coordinator (Cardiology)  Radha Novoa, RN as Registered Nurse (Urology)  Jhonathan Abarca MD as MD (Urology)  Sofia Posey APRN CNP as Referring Physician (Nurse Practitioner - Family)  SOFIA POSEY

## 2020-07-16 NOTE — LETTER
7/16/2020      RE: Gloria Guzman  [address] [facility] [address]       Dear Colleague,    Thank you for the opportunity to participate in the care of your patient, Gloria Guzman, at the [facility]. Please see a copy of my visit note below.    Gloria Guzman is a 54 year old female who is being evaluated via a billable telephone visit.        HPI: 54 year old woman with HFpEF (LVEF 55-60% per 1/2020 TTE), HTN, COPD, bipolar disorder, substance abuse disorder in need of telephone pre-operative assessment prior to renal cryoablation later this month. Denied any chest pain lightheadedness, dizziness, or new fatigue. No changes since seen in virtual visit by Dr Laguerre one week ago. At that time, she reported being volume overloaded, feeling short of breath, and having 1 pillow orthopnea. Noted weight gain with weight of 260lbs. Dr Laguerre added metolazone 2.5mg once daily as needed of weight gain and dyspnea, discussed potential of adjusting Bumex but no changed made. Today, patient feels well. No fevers/chills/cough/nausea/vomiting.     PAST MEDICAL HISTORY:  Past Medical History:   Diagnosis Date     Anemia      Arthritis      Bipolar affective disorder, current episode moderate (H)      Chronic back pain      Chronic systolic CHF (congestive heart failure) (H)      COPD (chronic obstructive pulmonary disease) (H)      COPD (chronic obstructive pulmonary disease) (H)      ETOH abuse      GERD (gastroesophageal reflux disease)      H/O heart failure      History of posttraumatic stress disorder (PTSD)      Homeless      HTN (hypertension)      Marijuana abuse      Nonischemic cardiomyopathy (H)     EF 19% by CMRI     Obesity      Polysubstance abuse (H)     tobacco, previous cocaine, meth, and alcohol, and marijuana     Sleep apnea      Tobacco abuse        CURRENT MEDICATIONS:  Current Outpatient Medications   Medication Sig Dispense Refill      acetaminophen (TYLENOL) 500 MG tablet Take 2 tablets (1,000 mg) by mouth 3 times daily 90 tablet 0     bumetanide (BUMEX) 2 MG tablet Take 1 tablet (2 mg) by mouth 2 times daily Please call to schedule an appointment before we can give you further refills. [phone] option 1 60 tablet 1     hydrocortisone (CORTAID) 1 % external cream Apply topically 2 times daily as needed       lisinopril (ZESTRIL) 20 MG tablet Take 1 tablet (20 mg) by mouth daily Please schedule appointment for further refills. [phone] option 1 30 tablet 1     metolazone (ZAROXOLYN) 2.5 MG tablet Take 1 tablet (2.5 mg) by mouth See Admin Instructions Take ONLY as directed by the [facility] clinic/HF team 5 tablet 0     metolazone (ZAROXOLYN) 2.5 MG tablet Take 1 tablet (2.5 mg) by mouth every 72 hours as needed (for fluid retention) 30 tablet 3     metoprolol succinate ER (TOPROL-XL) 25 MG 24 hr tablet Take 3 tablets (75 mg) by mouth daily Please schedule appointment for further refills. [phone] option 1 90 tablet 1     ondansetron (ZOFRAN-ODT) 4 MG ODT tab DISSOLVE ONE TABLET IN MOUTH DAILY AS NEEDED FOR NAUSEA       oxyCODONE IR 15 MG PO tablet Take 5 mg by mouth every 4 hours as needed for severe pain       potassium chloride ER (MICRO-K) 10 MEQ CR capsule Take 2 capsules (20 mEq) by mouth daily 60 capsule 3     PROAIR  (90 Base) MCG/ACT inhaler INHALE 2 PUFFS BY MOUTH EVERY 4 HOURS AS NEEDED FOR SHORTNESS OF BREATH AND WHEEZING  0     sodium chloride (OCEAN) 0.65 % nasal spray Spray 1 spray into both nostrils daily as needed for congestion       spironolactone (ALDACTONE) 25 MG tablet Take 1 tablet (25 mg) by mouth daily Please schedule appt for any further refills. [phone] option1 30 tablet 1     metolazone 2.5 MG PO tablet Take one tablet of 2.5 mg today 2/20 then only as directed by [facility] 5 tablet 0       PAST SURGICAL HISTORY:  Past Surgical History:   Procedure Laterality Date     BACK SURGERY       CARDIAC  SURGERY      AICD placement       SECTION       GALLBLADDER SURGERY       HERNIA REPAIR       JOINT REPLACEMTN, KNEE RT/LT  11/10    Joint Replacement knee LT     SURGICAL PATHOLOGY EXAM       TONSILLECTOMY         ALLERGIES     Allergies   Allergen Reactions     Cefaclor Rash     Other reaction(s): *Unknown     Morphine Hives and Itching     Ibuprofen      Morphine Sulfate Itching     Mushrooms [Mushroom] Hives     Olanzapine Other (See Comments)     Varenicline Other (See Comments)       FAMILY HISTORY:  Family History   Problem Relation Age of Onset     Breast Cancer Maternal Grandmother      Bipolar Disorder Maternal Grandmother      Substance Abuse Maternal Grandmother      Breast Cancer Maternal Aunt      Cancer Father 42        lung      Substance Abuse Father      Cancer Maternal Grandfather         lung      Bipolar Disorder Maternal Grandfather      Substance Abuse Maternal Grandfather      Cancer Other         maternal cousin lung      Gallbladder Disease Mother      Depression Mother      Bipolar Disorder Mother      Substance Abuse Mother      Skin Cancer Mother      Gallbladder Disease Sister      Substance Abuse Sister      Substance Abuse Brother      Melanoma No family hx of        SOCIAL HISTORY:  Social History     Socioeconomic History     Marital status: Legally      Spouse name: Not on file     Number of children: Not on file     Years of education: Not on file     Highest education level: Not on file   Occupational History     Not on file   Social Needs     Financial resource strain: Not on file     Food insecurity     Worry: Not on file     Inability: Not on file     Transportation needs     Medical: Not on file     Non-medical: Not on file   Tobacco Use     Smoking status: Current Every Day Smoker     Packs/day: 0.50     Types: Cigarettes     Smokeless tobacco: Never Used   Substance and Sexual Activity     Alcohol use: No     Drug use: Yes     Types: Marijuana      Sexual activity: Never   Lifestyle     Physical activity     Days per week: Not on file     Minutes per session: Not on file     Stress: Not on file   Relationships     Social connections     Talks on phone: Not on file     Gets together: Not on file     Attends Buddhist service: Not on file     Active member of club or organization: Not on file     Attends meetings of clubs or organizations: Not on file     Relationship status: Not on file     Intimate partner violence     Fear of current or ex partner: Not on file     Emotionally abused: Not on file     Physically abused: Not on file     Forced sexual activity: Not on file   Other Topics Concern     Parent/sibling w/ CABG, MI or angioplasty before 65F 55M? Not Asked   Social History Narrative     Not on file       ROS:   Constitutional: No fever, chills, or sweats. No weight gain/loss   ENT: No visual disturbance, ear ache, epistaxis, sore throat  Allergies/Immunologic: Negative.   Respiratory: No cough, hemoptysia  Cardiovascular: As per HPI  GI: No nausea, vomiting, hematemesis, melena, or hematochezia  : No urinary frequency, dysuria, or hematuria  Integument: Negative  Psychiatric: Negative  Neuro: Negative  Endocrinology: Negative   Musculoskeletal: Negative      Labs:  LIPID RESULTS:  Lab Results   Component Value Date    CHOL 72 02/03/2019    HDL 24 (L) 02/03/2019    LDL 35 02/03/2019    TRIG 62 02/03/2019    NHDL 48 02/03/2019       LIVER ENZYME RESULTS:  Lab Results   Component Value Date    AST 23 04/13/2020    ALT 36 04/13/2020       CBC RESULTS:  Lab Results   Component Value Date    WBC 12.0 (H) 04/13/2020    RBC 4.90 04/13/2020    HGB 14.6 04/13/2020    HCT 45.7 04/13/2020    MCV 93 04/13/2020    MCH 29.8 04/13/2020    MCHC 31.9 04/13/2020    RDW 13.2 04/13/2020     04/13/2020       BMP RESULTS:  Lab Results   Component Value Date     04/28/2020    POTASSIUM 4.4 04/28/2020    CHLORIDE 103 04/28/2020    CO2 28 04/28/2020    ANIONGAP 7  04/28/2020     (H) 04/28/2020    BUN 44 (H) 04/28/2020    CR 1.27 (H) 04/28/2020    GFRESTIMATED 47 (L) 04/28/2020    GFRESTBLACK 54 (L) 04/28/2020    MADDISON 9.2 04/28/2020        A1C RESULTS:  No results found for: A1C    INR RESULTS:  Lab Results   Component Value Date    INR 1.11 02/01/2020    INR 1.10 05/17/2019       Procedures:    Transthoracic Echocardiogram 1/31/20:  Interpretation Summary  Global and regional left ventricular function is normal with an EF of 55-60%.  Right ventricular function, chamber size, wall motion, and thickness are  normal.  No significant valvular disease.  IVC diameter <2.1 cm collapsing >50% with sniff suggests a normal RA pressure  of 3 mmHg.  No pericardial effusion is present.     There is no prior study for direct comparison    ECG 1/31/20  NSR, rate of 63 bpm, left axis deviation with moderate criteria for LVH. No evidence of prior infarct.    Assessment and Plan: 54 year old woman with HFpEF followed closely by Dr Laguerre in heart failure clinic presenting on telephone visit for pre-operative risk assessment. Patient is low-moderate risk for a low risk procedure. Attention should be given to her fluid status given congestive heart failure with recent recurrence of volume overload. No further work up prior to surgery unless patient develops new onset chest pain or becomes volume overloaded again.     Patient can be cleared for surgery form cardiovascular point of view.    Ifeanyi Rosas MD, MSc  Cardiovascular Disease Fellow  [facility]    I saw and evaluated and examined patient with CV fellow. I discussed the findings and results with patient and CV fellow. I agree with CV fellow's note and edited this note to a cohesive document.    30 min were directly spent with patient during phone visit.    Hugo Boyer MD, PhD  Professor of Medicine  Division of Cardiology    CC  Patient Care Team:  Sofia Posey APRN CNP as PCP - General (Nurse Practitioner -  Family)  Brittany Guillen, RN as Specialty Care Coordinator (Cardiology)  Sofia Norris APRN CNP as Assigned PCP  Alyx Michael, NP as Nurse Practitioner (Nurse Practitioner)  Shayy Crocker, SYLVIA as Specialty Care Coordinator (Cardiology)  Afia Norris, RN as Specialty Care Coordinator (Cardiology)  Radha Novoa, RN as Registered Nurse (Urology)  Jhonathan Abarca MD as MD (Urology)  Sofia Norris APRN CNP as Referring Physician (Nurse Practitioner - Family)  SOFIA NORRIS      Please do not hesitate to contact me if you have any questions/concerns.     Sincerely,     Hugo Boyer MD

## 2020-07-23 PROBLEM — Z01.810 PRE-OPERATIVE CARDIOVASCULAR EXAMINATION: Status: ACTIVE | Noted: 2020-07-23

## 2020-07-23 RX ORDER — DEXTROSE MONOHYDRATE 25 G/50ML
25-50 INJECTION, SOLUTION INTRAVENOUS
Status: CANCELLED | OUTPATIENT
Start: 2020-07-23

## 2020-07-23 RX ORDER — SODIUM CHLORIDE 9 MG/ML
INJECTION, SOLUTION INTRAVENOUS CONTINUOUS
Status: CANCELLED | OUTPATIENT
Start: 2020-07-23

## 2020-07-23 RX ORDER — NICOTINE POLACRILEX 4 MG
15-30 LOZENGE BUCCAL
Status: CANCELLED | OUTPATIENT
Start: 2020-07-23

## 2020-07-23 RX ORDER — ONDANSETRON 2 MG/ML
4 INJECTION INTRAMUSCULAR; INTRAVENOUS ONCE
Status: CANCELLED | OUTPATIENT
Start: 2020-07-23 | End: 2020-07-23

## 2020-07-23 RX ORDER — CLINDAMYCIN PHOSPHATE 900 MG/50ML
900 INJECTION, SOLUTION INTRAVENOUS
Status: CANCELLED | OUTPATIENT
Start: 2020-07-23

## 2020-07-23 RX ORDER — LIDOCAINE 40 MG/G
CREAM TOPICAL
Status: CANCELLED | OUTPATIENT
Start: 2020-07-23

## 2020-07-24 ENCOUNTER — PATIENT OUTREACH (OUTPATIENT)
Dept: UROLOGY | Facility: CLINIC | Age: 55
End: 2020-07-24

## 2020-07-24 NOTE — TELEPHONE ENCOUNTER
Patient was cleared by her cardiologist for surgery but did not see her primary MD.  Left message to have patient see her primary MD for surgery and to get a urinalysis. Patient is scheduled for Covid-19.    Afia Rivas RN   Care Coordinator Urology

## 2020-07-26 DIAGNOSIS — Z11.59 ENCOUNTER FOR SCREENING FOR OTHER VIRAL DISEASES: ICD-10-CM

## 2020-07-27 ENCOUNTER — ANESTHESIA EVENT (OUTPATIENT)
Dept: SURGERY | Facility: CLINIC | Age: 55
End: 2020-07-27

## 2020-07-27 LAB
SARS-COV-2 RNA SPEC QL NAA+PROBE: NOT DETECTED
SPECIMEN SOURCE: NORMAL

## 2020-07-27 NOTE — OR NURSING
Radha Novoa, Yajaira Nicholas, SYLVIA; Jhonathan Abarca MD; Afia Rivas RN; P Pas                Dr. Abarca will do H&P tomorrow morning.  Thank you    Previous Messages     ----- Message -----   From: Yajaira Flood RN   Sent: 7/27/2020  10:48 AM CDT   To: Jhonathan Abarca MD, Radha Novoa RN, *   Subject: Hand P requirement                               Hello all,     I see a telephone note from Afia from 7/24/20 stating that patient would need to see PCP for H and P. It does talk about the cardiology visit. I do not see that there is any appt. for an H and P in Care Everywhere. Does the patient actually know that they need to get an H and P?     Please advise re: plan. This patient's surgery is tomorrow, 7/28/20.     Thank-you

## 2020-07-28 ENCOUNTER — ANESTHESIA (OUTPATIENT)
Dept: SURGERY | Facility: CLINIC | Age: 55
End: 2020-07-28

## 2020-07-28 NOTE — ANESTHESIA PREPROCEDURE EVALUATION
Anesthesia Pre-Procedure Evaluation    Patient: Gloria Guzman   MRN:     [id number] Gender:   female   Age:    54 year old :      1965        Preoperative Diagnosis: Renal mass [N28.89]   Procedure(s):  ANESTHESIA OUT OF OR CT Guided Renal Cryoablation @0900     54 year old woman with HFpEF (LVEF 55-60% per 2020 TTE), HTN, COPD, bipolar disorder, substance abuse disorder in need of telephone pre-operative assessment prior to renal cryoablation later this month. Denied any chest pain lightheadedness, dizziness, or new fatigue. No changes since seen in virtual visit by Dr Laguerre one week ago. At that time, she reported being volume overloaded, feeling short of breath, and having 1 pillow orthopnea. Noted weight gain with weight of 260lbs. Dr Laguerre added metolazone 2.5mg once daily as needed of weight gain and dyspnea, discussed potential of adjusting Bumex but no changed made. Today, patient feels well. No fevers/chills/cough/nausea/vomiting.     Transthoracic Echocardiogram 20:  Interpretation Summary  Global and regional left ventricular function is normal with an EF of 55-60%.  Right ventricular function, chamber size, wall motion, and thickness are  normal.  No significant valvular disease.  IVC diameter <2.1 cm collapsing >50% with sniff suggests a normal RA pressure  of 3 mmHg.  No pericardial effusion is present.     There is no prior study for direct comparison     ECG 20  NSR, rate of 63 bpm, left axis deviation with moderate criteria for LVH. No evidence of prior infarct.    LABS:  CBC:   Lab Results   Component Value Date    WBC 12.0 (H) 2020    WBC 8.7 2020    HGB 14.6 2020    HGB 14.8 2020    HCT 45.7 2020    HCT 46.6 2020     2020     2020     BMP:   Lab Results   Component Value Date     2020     2020    POTASSIUM 4.4 2020    POTASSIUM 4.4 2020    CHLORIDE 103 2020    CHLORIDE 104  "04/13/2020    CO2 28 04/28/2020    CO2 27 04/13/2020    BUN 44 (H) 04/28/2020    BUN 54 (H) 04/13/2020    CR 1.27 (H) 04/28/2020    CR 1.62 (H) 04/13/2020     (H) 04/28/2020     (H) 04/13/2020     COAGS:   Lab Results   Component Value Date    PTT 27 02/02/2019    INR 1.11 02/01/2020     POC: No results found for: BGM, HCG, HCGS  OTHER:   Lab Results   Component Value Date    LACT 1.3 01/31/2020    MADDISON 9.2 04/28/2020    MAG 2.2 02/01/2020    ALBUMIN 3.7 04/13/2020    PROTTOTAL 8.7 04/13/2020    ALT 36 04/13/2020    AST 23 04/13/2020    ALKPHOS 92 04/13/2020    BILITOTAL 0.2 04/13/2020        Preop Vitals    BP Readings from Last 3 Encounters:   02/20/20 113/77   02/02/20 130/82   01/08/20 (!) 141/82    Pulse Readings from Last 3 Encounters:   02/20/20 58   02/02/20 83   01/08/20 68      Resp Readings from Last 3 Encounters:   02/02/20 20   07/26/19 16   05/17/19 16    SpO2 Readings from Last 3 Encounters:   02/20/20 96%   02/02/20 96%   01/08/20 99%      Temp Readings from Last 1 Encounters:   02/02/20 36.7  C (98  F) (Oral)    Ht Readings from Last 1 Encounters:   02/20/20 1.727 m (5' 8\")      Wt Readings from Last 1 Encounters:   02/20/20 119.7 kg (264 lb)    Estimated body mass index is 40.14 kg/m  as calculated from the following:    Height as of 2/20/20: 1.727 m (5' 8\").    Weight as of 2/20/20: 119.7 kg (264 lb).     LDA:  ETT (Active)   Number of days: 0        Past Medical History:   Diagnosis Date     Anemia      Arthritis      Bipolar affective disorder, current episode moderate (H)      Chronic back pain      Chronic systolic CHF (congestive heart failure) (H)      COPD (chronic obstructive pulmonary disease) (H)      COPD (chronic obstructive pulmonary disease) (H)      ETOH abuse      GERD (gastroesophageal reflux disease)      H/O heart failure      History of posttraumatic stress disorder (PTSD)      Homeless      HTN (hypertension)      Marijuana abuse      Nonischemic cardiomyopathy " (H)     EF 19% by [facility]     Obesity      Pacemaker      Polysubstance abuse (H)     tobacco, previous cocaine, meth, and alcohol, and marijuana     Sleep apnea      Tobacco abuse       Past Surgical History:   Procedure Laterality Date     BACK SURGERY       CARDIAC SURGERY      AICD placement 2014      SECTION       GALLBLADDER SURGERY       HERNIA REPAIR       JOINT REPLACEMTN, KNEE RT/LT  11/10    Joint Replacement knee LT     SURGICAL PATHOLOGY EXAM       TONSILLECTOMY        Allergies   Allergen Reactions     Cefaclor Rash     Other reaction(s): *Unknown     Morphine Hives and Itching     Ibuprofen      Morphine Sulfate Itching     Mushrooms [Mushroom] Hives     Olanzapine Other (See Comments)     Varenicline Other (See Comments)             JNANCYG [facility] AN PHYSICAL EXAM    Assessment:   ASA SCORE: 4    H&P: History and physical reviewed and following examination; no interval change.   Smoking Status:  Non-Smoker/Unknown   NPO Status: NPO Appropriate     Plan:   Anes. Type:  General   Pre-Medication: None   Induction:  IV (Standard)   Airway: ETT; Oral   Access/Monitoring: PIV   Maintenance: Balanced     Postop Plan:   Postop Pain: Opioids  Postop Sedation/Airway: SECURED AIRWAY likely  Disposition: Outpatient     PONV Management:   Adult Risk Factors: Female, Non-Smoker, Postop Opioids   Prevention: Ondansetron, Dexamethasone     CONSENT: Direct conversation   Plan and risks discussed with: Patient   Blood Products: Consent Deferred (Minimal Blood Loss)                   Christopher J. Behrens, MD

## 2020-08-19 ENCOUNTER — PRE VISIT (OUTPATIENT)
Dept: UROLOGY | Facility: CLINIC | Age: 55
End: 2020-08-19

## 2020-08-30 DIAGNOSIS — I10 ESSENTIAL HYPERTENSION: ICD-10-CM

## 2020-08-30 DIAGNOSIS — I50.21 ACUTE SYSTOLIC HEART FAILURE (H): ICD-10-CM

## 2020-09-02 RX ORDER — BUMETANIDE 2 MG/1
2 TABLET ORAL 2 TIMES DAILY
Qty: 60 TABLET | Refills: 1 | Status: SHIPPED | OUTPATIENT
Start: 2020-09-02 | End: 2021-01-21

## 2020-09-02 RX ORDER — METOPROLOL SUCCINATE 25 MG/1
75 TABLET, EXTENDED RELEASE ORAL DAILY
Qty: 90 TABLET | Refills: 1 | Status: SHIPPED | OUTPATIENT
Start: 2020-09-02 | End: 2021-03-03

## 2020-09-02 RX ORDER — SPIRONOLACTONE 25 MG/1
25 TABLET ORAL DAILY
Qty: 30 TABLET | Refills: 1 | Status: SHIPPED | OUTPATIENT
Start: 2020-09-02 | End: 2022-01-01 | Stop reason: ALTCHOICE

## 2020-09-02 NOTE — TELEPHONE ENCOUNTER
spironolactone (ALDACTONE) 25 MG tablet       L ast Written Prescription Date:  6-18-20  Last Fill Quantity: 30,   # refills: 1  Last Office Visit : 7-8-20 (RTC 3 M)  Future Office visit:  none    Abnormal Cr: reviewed by Dr. Laguerre - no med changes  4-13-20, 1.62    Routing refill request to provider for review/approval because:  Last BMP > 3 M ( per cardiology protocol)    bumetanide (BUMEX) 2 MG tablet   Cr abnormal, reviewed by provider  RF 60: 1 w/ instruction to schedule RTC  metoprolol succinate ER (TOPROL-XL) 25 MG 24 hr tablet   RF 90: 1 w/ instruction to schedule RTC

## 2020-09-04 ENCOUNTER — HOSPITAL ENCOUNTER (EMERGENCY)
Facility: CLINIC | Age: 55
Discharge: HOME | End: 2020-09-04
Attending: EMERGENCY MEDICINE | Admitting: EMERGENCY MEDICINE
Payer: COMMERCIAL

## 2020-09-04 VITALS
RESPIRATION RATE: 20 BRPM | BODY MASS INDEX: 40.92 KG/M2 | OXYGEN SATURATION: 99 % | DIASTOLIC BLOOD PRESSURE: 103 MMHG | WEIGHT: 270 LBS | TEMPERATURE: 98.5 F | HEIGHT: 68 IN | SYSTOLIC BLOOD PRESSURE: 182 MMHG | HEART RATE: 79 BPM

## 2020-09-04 DIAGNOSIS — R06.00 DYSPNEA, UNSPECIFIED TYPE: ICD-10-CM

## 2020-09-04 LAB
ALBUMIN SERPL-MCNC: 3.5 G/DL (ref 3.4–5)
ALP SERPL-CCNC: 83 U/L (ref 40–150)
ALT SERPL W P-5'-P-CCNC: 26 U/L (ref 0–50)
ANION GAP SERPL CALCULATED.3IONS-SCNC: 6 MMOL/L (ref 3–14)
AST SERPL W P-5'-P-CCNC: 18 U/L (ref 0–45)
BASOPHILS # BLD AUTO: 0.1 10E9/L (ref 0–0.2)
BASOPHILS NFR BLD AUTO: 0.7 %
BILIRUB DIRECT SERPL-MCNC: 0.1 MG/DL (ref 0–0.2)
BILIRUB SERPL-MCNC: 0.3 MG/DL (ref 0.2–1.3)
BUN SERPL-MCNC: 41 MG/DL (ref 7–30)
CALCIUM SERPL-MCNC: 8.7 MG/DL (ref 8.5–10.1)
CHLORIDE SERPL-SCNC: 102 MMOL/L (ref 94–109)
CO2 SERPL-SCNC: 29 MMOL/L (ref 20–32)
CREAT SERPL-MCNC: 1.35 MG/DL (ref 0.52–1.04)
DIFFERENTIAL METHOD BLD: ABNORMAL
EOSINOPHIL # BLD AUTO: 0.8 10E9/L (ref 0–0.7)
EOSINOPHIL NFR BLD AUTO: 9.9 %
ERYTHROCYTE [DISTWIDTH] IN BLOOD BY AUTOMATED COUNT: 15 % (ref 10–15)
ETHANOL SERPL-MCNC: <0.01 G/DL
GFR SERPL CREATININE-BSD FRML MDRD: 44 ML/MIN/{1.73_M2}
GLUCOSE SERPL-MCNC: 102 MG/DL (ref 70–99)
HCT VFR BLD AUTO: 43.5 % (ref 35–47)
HGB BLD-MCNC: 14.3 G/DL (ref 11.7–15.7)
IMM GRANULOCYTES # BLD: 0 10E9/L (ref 0–0.4)
IMM GRANULOCYTES NFR BLD: 0.4 %
INTERPRETATION ECG - MUSE: NORMAL
LYMPHOCYTES # BLD AUTO: 2.3 10E9/L (ref 0.8–5.3)
LYMPHOCYTES NFR BLD AUTO: 27.8 %
MCH RBC QN AUTO: 28.3 PG (ref 26.5–33)
MCHC RBC AUTO-ENTMCNC: 32.9 G/DL (ref 31.5–36.5)
MCV RBC AUTO: 86 FL (ref 78–100)
MONOCYTES # BLD AUTO: 0.7 10E9/L (ref 0–1.3)
MONOCYTES NFR BLD AUTO: 7.9 %
NEUTROPHILS # BLD AUTO: 4.4 10E9/L (ref 1.6–8.3)
NEUTROPHILS NFR BLD AUTO: 53.3 %
NRBC # BLD AUTO: 0 10*3/UL
NRBC BLD AUTO-RTO: 0 /100
NT-PROBNP SERPL-MCNC: 469 PG/ML (ref 0–900)
PLATELET # BLD AUTO: 292 10E9/L (ref 150–450)
POTASSIUM SERPL-SCNC: 3.2 MMOL/L (ref 3.4–5.3)
PROT SERPL-MCNC: 7.7 G/DL (ref 6.8–8.8)
RBC # BLD AUTO: 5.05 10E12/L (ref 3.8–5.2)
SODIUM SERPL-SCNC: 137 MMOL/L (ref 133–144)
WBC # BLD AUTO: 8.3 10E9/L (ref 4–11)

## 2020-09-04 PROCEDURE — 83880 ASSAY OF NATRIURETIC PEPTIDE: CPT | Performed by: EMERGENCY MEDICINE

## 2020-09-04 PROCEDURE — 85025 COMPLETE CBC W/AUTO DIFF WBC: CPT | Performed by: EMERGENCY MEDICINE

## 2020-09-04 PROCEDURE — 80320 DRUG SCREEN QUANTALCOHOLS: CPT | Performed by: EMERGENCY MEDICINE

## 2020-09-04 PROCEDURE — C9803 HOPD COVID-19 SPEC COLLECT: HCPCS

## 2020-09-04 PROCEDURE — 93005 ELECTROCARDIOGRAM TRACING: CPT

## 2020-09-04 PROCEDURE — 80048 BASIC METABOLIC PNL TOTAL CA: CPT | Performed by: EMERGENCY MEDICINE

## 2020-09-04 PROCEDURE — U0003 INFECTIOUS AGENT DETECTION BY NUCLEIC ACID (DNA OR RNA); SEVERE ACUTE RESPIRATORY SYNDROME CORONAVIRUS 2 (SARS-COV-2) (CORONAVIRUS DISEASE [COVID-19]), AMPLIFIED PROBE TECHNIQUE, MAKING USE OF HIGH THROUGHPUT TECHNOLOGIES AS DESCRIBED BY CMS-2020-01-R: HCPCS | Performed by: EMERGENCY MEDICINE

## 2020-09-04 PROCEDURE — 99284 EMERGENCY DEPT VISIT MOD MDM: CPT

## 2020-09-04 PROCEDURE — 80076 HEPATIC FUNCTION PANEL: CPT | Performed by: EMERGENCY MEDICINE

## 2020-09-04 ASSESSMENT — ENCOUNTER SYMPTOMS
COUGH: 1
CHILLS: 0
SHORTNESS OF BREATH: 1
FEVER: 0

## 2020-09-04 ASSESSMENT — MIFFLIN-ST. JEOR: SCORE: 1863.21

## 2020-09-05 LAB
LABORATORY COMMENT REPORT: NORMAL
SARS-COV-2 RNA SPEC QL NAA+PROBE: NEGATIVE
SARS-COV-2 RNA SPEC QL NAA+PROBE: NORMAL
SPECIMEN SOURCE: NORMAL
SPECIMEN SOURCE: NORMAL
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
## 2020-09-05 NOTE — ED NOTES
"RN received text message from registration Danae that pt was exiting out of door 2 into [facility]. According to registration pt stated she was \"sick of waiting so long\" (RN had exited room less than 5 mins prior) and asked for a cab voucher. Registration informed her that this was not possible and pt left grounds. RN attempted to look for pt without success. Pt did not have IV access in place. MD and charge RN aware.     "

## 2020-09-05 NOTE — ED NOTES
"Bed: ED11  Expected date:   Expected time:   Means of arrival:   Comments:  531  55F SOB/Cough  \"Possible covid\" 2059  "

## 2020-09-05 NOTE — ED NOTES
"Pt refusing IV access stating \"There's no fucking way you're putting an IV in me. They always miss. They got to poke me 75 fucking times\". Attempted to educate pt on need for IV access and pt still refused. After discussion pt allowed blood draw but still no IV. MD aware.   "

## 2020-09-09 DIAGNOSIS — I10 ESSENTIAL HYPERTENSION: ICD-10-CM

## 2020-09-12 NOTE — TELEPHONE ENCOUNTER
lisinopril (ZESTRIL) 20 MG tablet     Last Written Prescription Date:  6/18/2020  Last Fill Quantity: 30,   # refills: 1  Last Office Visit : 7/16/2020  Future Office visit:  None    Routing refill request to provider for review/approval because:  Blood pressure out of range    Abnormal Labs: Cr, K+       Change med? Add med? Follow up with Pt.   Refer to clinic   09/04/20 (!) 182/103       Recent Labs   Lab Test 09/04/20  2142   CR 1.35*      Ok to refill medication if creatinine is low         Normal serum potassium on file in past 12 months         Recent Labs   Lab Test 09/04/20  2142   POTASSIUM 3.2*               Christy Walsh RN  Central Triage Red Flags/Med Refills

## 2020-09-25 ENCOUNTER — TELEPHONE (OUTPATIENT)
Dept: DERMATOLOGY | Facility: CLINIC | Age: 55
End: 2020-09-25

## 2020-09-25 DIAGNOSIS — Z53.9 ERRONEOUS ENCOUNTER--DISREGARD: Primary | ICD-10-CM

## 2020-10-07 RX ORDER — LISINOPRIL 20 MG/1
20 TABLET ORAL DAILY
Qty: 90 TABLET | Refills: 3 | Status: SHIPPED | OUTPATIENT
Start: 2020-10-07

## 2020-11-13 DIAGNOSIS — I50.22 CHRONIC SYSTOLIC CHF (CONGESTIVE HEART FAILURE) (H): Primary | ICD-10-CM

## 2020-11-17 NOTE — TELEPHONE ENCOUNTER
metOLazone Oral Tablet 2.5 MG    Last Written Prescription Date:  7/8/2020  Last Fill Quantity: 5,   # refills: 0  Last Office Visit : 7/16/2020  Future Office visit:  None  Routing refill request to provider for review/approval because:  Abnormal Labs:   Cr, K+  Only 5 Tabs sent to pharm last order?    Refill med?  Continue med?    Refer to clinic for review     Christy Walsh RN  Central Triage Red Flags/Med Refills    Recent Labs   Lab Test 09/04/20 2142   CR 1.35*            Normal serum potassium on file in past 12 months        Recent Labs   Lab Test 09/04/20 2142   POTASSIUM 3.2*               Christy Walsh RN  Central Triage Red Flags/Med Refills

## 2020-11-18 RX ORDER — METOLAZONE 2.5 MG/1
TABLET ORAL
Qty: 30 TABLET | Refills: 1 | Status: SHIPPED | OUTPATIENT
Start: 2020-11-18 | End: 2022-01-01

## 2020-12-07 ENCOUNTER — TELEPHONE (OUTPATIENT)
Dept: BEHAVIORAL HEALTH | Facility: CLINIC | Age: 55
End: 2020-12-07

## 2020-12-07 ENCOUNTER — HOSPITAL ENCOUNTER (OUTPATIENT)
Dept: BEHAVIORAL HEALTH | Facility: CLINIC | Age: 55
End: 2020-12-07
Attending: FAMILY MEDICINE
Payer: COMMERCIAL

## 2020-12-07 PROCEDURE — 999N000216 HC STATISTIC ADULT CD FACE TO FACE-NO CHRG: Mod: TEL | Performed by: COUNSELOR

## 2020-12-07 NOTE — TELEPHONE ENCOUNTER
Tried reaching out to patient to check them in for their MH eval today,. Called the listed cell number in Epic, but no answer and the phone rings for a long time with no option of leaving a voicemail. Also tried calling the listed home number, but it says number is not in service. Will try to call again.

## 2020-12-07 NOTE — PROGRESS NOTES
OBC checked patient in at 10:16am for the Diagnostic Assessment. Patient asked to switch from MyChart Video to Telephone assessment at [phone]. At 10:30am, I called but no answer. At 10:35am, I called back at 10:35am, no answer, and couldn't leave voicemail. At 10:41am, OBC called again, but no answer again.

## 2021-01-15 ENCOUNTER — HEALTH MAINTENANCE LETTER (OUTPATIENT)
Age: 56
End: 2021-01-15

## 2021-01-19 ENCOUNTER — DOCUMENTATION ONLY (OUTPATIENT)
Dept: CARE COORDINATION | Facility: CLINIC | Age: 56
End: 2021-01-19

## 2021-01-21 ENCOUNTER — MYC REFILL (OUTPATIENT)
Dept: FAMILY MEDICINE | Facility: CLINIC | Age: 56
End: 2021-01-21

## 2021-01-21 ENCOUNTER — VIRTUAL VISIT (OUTPATIENT)
Dept: URGENT CARE | Facility: CLINIC | Age: 56
End: 2021-01-21

## 2021-01-21 DIAGNOSIS — M51.369 DDD (DEGENERATIVE DISC DISEASE), LUMBAR: ICD-10-CM

## 2021-01-21 DIAGNOSIS — I50.21 ACUTE SYSTOLIC HEART FAILURE (H): ICD-10-CM

## 2021-01-21 DIAGNOSIS — M54.42 CHRONIC MIDLINE LOW BACK PAIN WITH LEFT-SIDED SCIATICA: ICD-10-CM

## 2021-01-21 DIAGNOSIS — G89.29 CHRONIC MIDLINE LOW BACK PAIN WITH LEFT-SIDED SCIATICA: ICD-10-CM

## 2021-01-21 PROCEDURE — 99213 OFFICE O/P EST LOW 20 MIN: CPT | Mod: 95 | Performed by: PHYSICIAN ASSISTANT

## 2021-01-21 RX ORDER — BUMETANIDE 2 MG/1
2 TABLET ORAL 2 TIMES DAILY
Qty: 60 TABLET | Refills: 0 | Status: SHIPPED | OUTPATIENT
Start: 2021-01-21 | End: 2021-03-03

## 2021-01-21 ASSESSMENT — ENCOUNTER SYMPTOMS
CONSTIPATION: 0
NAUSEA: 0
ARTHRALGIAS: 0
FEVER: 0
EYE REDNESS: 0
BACK PAIN: 0
VOMITING: 0
SINUS PAIN: 0
EYE DISCHARGE: 0
DIARRHEA: 0
HEMATOCHEZIA: 0
SINUS PRESSURE: 0
SHORTNESS OF BREATH: 0
HEARTBURN: 0
FATIGUE: 0
CHILLS: 0
MYALGIAS: 0
ABDOMINAL PAIN: 0
EYE PAIN: 0
RHINORRHEA: 0
COUGH: 0
PALPITATIONS: 0
SORE THROAT: 0
WHEEZING: 0

## 2021-01-21 NOTE — PROGRESS NOTES
"Gloria is a 55 year old who is being evaluated via a billable telephone visit.      Assessment & Plan     Acute systolic heart failure (H)  Refilled bumex. She will schedule cardiology follow up after this phone call. She is due for lab work. Discussed with her that she cannot take medications prescribed to her significant other. Encouraged high potassium foods. Discussed in detail symptoms that would warrant emergent evaluation in the ED. Patient agrees with plan and will follow up with cardiology.       - bumetanide (BUMEX) 2 MG tablet; Take 1 tablet (2 mg) by mouth 2 times daily Please call to schedule an appointment before we can give you further refills. [phone] option 1                       Tobacco Cessation:   reports that she has been smoking cigarettes. She has been smoking about 0.50 packs per day. She has never used smokeless tobacco.      BMI:   Estimated body mass index is 41.05 kg/m  as calculated from the following:    Height as of 9/4/20: 1.727 m (5' 8\").    Weight as of 9/4/20: 122.5 kg (270 lb).             No follow-ups on file.    [facility]  [facility]    Subjective     Chief Complaint   Patient presents with     Refill Request        HPI     Patient requesting refill of bumex for CHF. Has been out of diuretic for 2 days. Reports she was taking significant other's lasix. Has noticed mild weight gain since she has been out.  No chest pain or shortness of breath. Due for follow up with cardiology and she will schedule that visit after this phone call.             Review of Systems   Constitutional: Negative for chills, fatigue and fever.   HENT: Negative for congestion, ear pain, rhinorrhea, sinus pressure, sinus pain and sore throat.    Eyes: Negative for pain, discharge and redness.   Respiratory: Negative for cough, shortness of breath and wheezing.    Cardiovascular: Positive for peripheral edema. Negative for palpitations.   Gastrointestinal: Negative " for abdominal pain, constipation, diarrhea, heartburn, hematochezia, nausea and vomiting.   Musculoskeletal: Negative for arthralgias, back pain and myalgias.   Skin: Negative for rash.            Objective           Vitals:  No vitals were obtained today due to virtual visit.    Physical Exam   GENERAL: Healthy, alert and no distress  RESP: No audible cough, wheezing, or shortness of breath.    PSYCH: Mentation appears normal, affect normal/bright, judgement and insight intact, normal speech    No results found for this or any previous visit (from the past 24 hour(s)).            Telephone visit     Duration: 13 minutes

## 2021-01-23 RX ORDER — ACETAMINOPHEN 500 MG
1000 TABLET ORAL 3 TIMES DAILY
Qty: 90 TABLET | Refills: 0 | Status: SHIPPED | OUTPATIENT
Start: 2021-01-23 | End: 2021-03-03

## 2021-01-24 NOTE — TELEPHONE ENCOUNTER
"Requested Prescriptions   Pending Prescriptions Disp Refills     acetaminophen (TYLENOL) 500 MG tablet 90 tablet 0     Sig: Take 2 tablets (1,000 mg) by mouth 3 times daily       Analgesics (Non-Narcotic Tylenol and ASA Only) Passed - 1/21/2021 12:45 PM        Passed - Recent (12 mo) or future (30 days) visit within the authorizing provider's specialty     Patient has had an office visit with the authorizing provider or a provider within the authorizing providers department within the previous 12 mos or has a future within next 30 days. See \"Patient Info\" tab in inbasket, or \"Choose Columns\" in Meds & Orders section of the refill encounter.              Passed - Patient is 7 months old or older     If patient is a peds patient of the age 7 mos -12 years, ok to refill using weight-based dosing.     If >3g daily and/or sig is not \"prn\", check for liver enzymes. If normal in the last year, ok to refill.  If not, refer to the provider.          Passed - Medication is active on med list           Signed Prescriptions:                        Disp   Refills    acetaminophen (TYLENOL) 500 MG tablet      90 tab*0        Sig: Take 2 tablets (1,000 mg) by mouth 3 times daily  Authorizing Provider: ALANIS NORRIS  Ordering User: TUNG CASPER      "

## 2021-03-03 ENCOUNTER — VIRTUAL VISIT (OUTPATIENT)
Dept: URGENT CARE | Facility: CLINIC | Age: 56
End: 2021-03-03
Payer: COMMERCIAL

## 2021-03-03 ENCOUNTER — MYC REFILL (OUTPATIENT)
Dept: FAMILY MEDICINE | Facility: CLINIC | Age: 56
End: 2021-03-03

## 2021-03-03 DIAGNOSIS — I50.21 ACUTE SYSTOLIC HEART FAILURE (H): ICD-10-CM

## 2021-03-03 DIAGNOSIS — L40.9 PSORIASIS: Primary | ICD-10-CM

## 2021-03-03 DIAGNOSIS — M51.369 DDD (DEGENERATIVE DISC DISEASE), LUMBAR: ICD-10-CM

## 2021-03-03 DIAGNOSIS — G89.29 CHRONIC MIDLINE LOW BACK PAIN WITH LEFT-SIDED SCIATICA: ICD-10-CM

## 2021-03-03 DIAGNOSIS — M54.42 CHRONIC MIDLINE LOW BACK PAIN WITH LEFT-SIDED SCIATICA: ICD-10-CM

## 2021-03-03 DIAGNOSIS — I10 ESSENTIAL HYPERTENSION: ICD-10-CM

## 2021-03-03 PROCEDURE — 99213 OFFICE O/P EST LOW 20 MIN: CPT | Mod: 95

## 2021-03-03 RX ORDER — CLOBETASOL PROPIONATE 0.5 MG/G
CREAM TOPICAL 2 TIMES DAILY
Qty: 60 G | Refills: 3 | Status: SHIPPED | OUTPATIENT
Start: 2021-03-03

## 2021-03-03 RX ORDER — METOPROLOL SUCCINATE 25 MG/1
75 TABLET, EXTENDED RELEASE ORAL DAILY
Qty: 270 TABLET | Refills: 0 | Status: SHIPPED | OUTPATIENT
Start: 2021-03-03 | End: 2021-06-03

## 2021-03-03 RX ORDER — BUMETANIDE 2 MG/1
2 TABLET ORAL 2 TIMES DAILY
Qty: 60 TABLET | Refills: 0 | Status: SHIPPED | OUTPATIENT
Start: 2021-03-03 | End: 2021-06-03

## 2021-03-03 RX ORDER — ACETAMINOPHEN 500 MG
1000 TABLET ORAL 3 TIMES DAILY
Qty: 90 TABLET | Refills: 0 | Status: SHIPPED | OUTPATIENT
Start: 2021-03-03 | End: 2021-06-08

## 2021-03-03 ASSESSMENT — ENCOUNTER SYMPTOMS
GASTROINTESTINAL NEGATIVE: 1
MUSCULOSKELETAL NEGATIVE: 1
CONSTITUTIONAL NEGATIVE: 1
NEUROLOGICAL NEGATIVE: 1
PSYCHIATRIC NEGATIVE: 1
CARDIOVASCULAR NEGATIVE: 1
RESPIRATORY NEGATIVE: 1

## 2021-03-03 NOTE — TELEPHONE ENCOUNTER
"Requested Prescriptions   Signed Prescriptions Disp Refills    acetaminophen (TYLENOL) 500 MG tablet 90 tablet 0     Sig: Take 2 tablets (1,000 mg) by mouth 3 times daily       Analgesics (Non-Narcotic Tylenol and ASA Only) Passed - 3/3/2021  2:51 AM        Passed - Recent (12 mo) or future (30 days) visit within the authorizing provider's specialty     Patient has had an office visit with the authorizing provider or a provider within the authorizing providers department within the previous 12 mos or has a future within next 30 days. See \"Patient Info\" tab in inbasket, or \"Choose Columns\" in Meds & Orders section of the refill encounter.              Passed - Patient is 7 months old or older     If patient is a peds patient of the age 7 mos -12 years, ok to refill using weight-based dosing.     If >3g daily and/or sig is not \"prn\", check for liver enzymes. If normal in the last year, ok to refill.  If not, refer to the provider.          Passed - Medication is active on med list             " Abdominal Pain, N/V/D

## 2021-03-03 NOTE — NURSING NOTE
Gloria is a 55 year old who is being evaluated via a billable video visit.      How would you like to obtain your AVS? MyChart  If the video visit is dropped, the invitation should be resent by: Text to cell phone: [phone]  Will anyone else be joining your video visit? No

## 2021-03-03 NOTE — PROGRESS NOTES
"The patient has been notified of the following:      \"We have found that certain health care needs can be provided without the need for a face to face visit.  This service lets us provide the care you need with a phone conversation.       I will have full access to your [facility] medical record during this entire phone call.   I will be taking notes for your medical record.      Since this is like an office visit, we will bill your insurance company for this service.       There are potential benefits and risks of telephone visits (e.g. limits to patient confidentiality) that differ from in-person visits.?  Confidentiality still applies for telephone services, and nobody will record the visit.  It is important to be in a quiet, private space that is free of distractions (including cell phone or other devices) during the visit.??      If during the course of the call I believe a telephone visit is not appropriate, you will not be charged for this service\"     Consent has been obtained for this service by care team member: Yes       SUBJECTIVE:   Gloria Guzman is a 55 year old female presenting with a chief complaint of   Chief Complaint   Patient presents with     Derm Problem     Bilateral knees, bilateral elbows, back, left glut       She is an established patient of [facility].    Rash    Onset of rash was 3 month(s) ago.   Course of illness is gradual onset and worsening.  Severity moderate  Current and Associated symptoms: itching, burning, dry and red   Location of the rash: (Listed above).  Previous history of a similar rash? Yes (Treated successfully for psoriasis in the past)  Recent exposure history: none known  Denies exposure to: none known  Associated symptoms include: nothing.  Treatment measures tried include: none      Review of Systems   Constitutional: Negative.    HENT: Negative.    Respiratory: Negative.    Cardiovascular: Negative.    Gastrointestinal: Negative.    Genitourinary: Negative.  "   Musculoskeletal: Negative.    Neurological: Negative.    Psychiatric/Behavioral: Negative.        Past Medical History:   Diagnosis Date     Anemia      Arthritis      Bipolar affective disorder, current episode moderate (H)      Chronic back pain      Chronic systolic CHF (congestive heart failure) (H)      COPD (chronic obstructive pulmonary disease) (H)      COPD (chronic obstructive pulmonary disease) (H)      ETOH abuse      GERD (gastroesophageal reflux disease)      H/O heart failure      History of posttraumatic stress disorder (PTSD)      Homeless      HTN (hypertension)      Marijuana abuse      Nonischemic cardiomyopathy (H)     EF 19% by CMRI     Obesity      Pacemaker      Polysubstance abuse (H)     tobacco, previous cocaine, meth, and alcohol, and marijuana     Sleep apnea      Tobacco abuse      Family History   Problem Relation Age of Onset     Breast Cancer Maternal Grandmother      Bipolar Disorder Maternal Grandmother      Substance Abuse Maternal Grandmother      Breast Cancer Maternal Aunt      Cancer Father 42        lung      Substance Abuse Father      Cancer Maternal Grandfather         lung      Bipolar Disorder Maternal Grandfather      Substance Abuse Maternal Grandfather      Cancer Other         maternal cousin lung      Gallbladder Disease Mother      Depression Mother      Bipolar Disorder Mother      Substance Abuse Mother      Skin Cancer Mother      Gallbladder Disease Sister      Substance Abuse Sister      Substance Abuse Brother      Melanoma No family hx of      Current Outpatient Medications   Medication Sig Dispense Refill     acetaminophen (TYLENOL) 500 MG tablet Take 2 tablets (1,000 mg) by mouth 3 times daily 90 tablet 0     bumetanide (BUMEX) 2 MG tablet Take 1 tablet (2 mg) by mouth 2 times daily Please call to schedule an appointment before we can give you further refills. [phone] option 1 60 tablet 0     hydrocortisone (CORTAID) 1 % external cream Apply  topically 2 times daily as needed       lisinopril (ZESTRIL) 20 MG tablet Take 1 tablet (20 mg) by mouth daily 90 tablet 3     metolazone (ZAROXOLYN) 2.5 MG tablet Take 1 tablet (2.5 mg) by mouth every 72 hours as needed (for fluid retention) 30 tablet 1     metolazone (ZAROXOLYN) 2.5 MG tablet Take 1 tablet (2.5 mg) by mouth See Admin Instructions Take ONLY as directed by the [facility] clinic/HF team 5 tablet 0     metolazone 2.5 MG PO tablet Take one tablet of 2.5 mg today 2/20 then only as directed by [facility] clinic 5 tablet 0     metoprolol succinate ER (TOPROL-XL) 25 MG 24 hr tablet Take 3 tablets (75 mg) by mouth daily Please schedule appointment for further refills. [phone] option 1 270 tablet 0     ondansetron (ZOFRAN-ODT) 4 MG ODT tab DISSOLVE ONE TABLET IN MOUTH DAILY AS NEEDED FOR NAUSEA       oxyCODONE IR 15 MG PO tablet Take 5 mg by mouth every 4 hours as needed for severe pain       potassium chloride ER (MICRO-K) 10 MEQ CR capsule Take 2 capsules (20 mEq) by mouth daily 60 capsule 3     PROAIR  (90 Base) MCG/ACT inhaler INHALE 2 PUFFS BY MOUTH EVERY 4 HOURS AS NEEDED FOR SHORTNESS OF BREATH AND WHEEZING  0     sodium chloride (OCEAN) 0.65 % nasal spray Spray 1 spray into both nostrils daily as needed for congestion       spironolactone (ALDACTONE) 25 MG tablet Take 1 tablet (25 mg) by mouth daily Please schedule appt for any further refills. [phone] option1 30 tablet 1     Social History     Tobacco Use     Smoking status: Current Every Day Smoker     Packs/day: 0.25     Types: Cigarettes     Smokeless tobacco: Never Used   Substance Use Topics     Alcohol use: No       OBJECTIVE  Vital Signs and PE were not done as this was a telephone visit.       ASSESSMENT/PLAN:    Our Telephone visit was 15 min in duration.     (L40.9) Psoriasis  (primary encounter diagnosis)  Plan: clobetasol (TEMOVATE) 0.05 % external cream    Warm compresses 3-5 times a day for itch relief.     RTC in 2 weeks if no  improvement.     Trung Chang PA-C on 3/3/2021 at 1:13 PM

## 2021-04-06 ENCOUNTER — PATIENT OUTREACH (OUTPATIENT)
Dept: CARE COORDINATION | Facility: CLINIC | Age: 56
End: 2021-04-06

## 2021-04-06 DIAGNOSIS — I50.22 CHRONIC SYSTOLIC CHF (CONGESTIVE HEART FAILURE) (H): Primary | ICD-10-CM

## 2021-04-06 NOTE — PROGRESS NOTES
Clinic Care Coordination Contact  [facility]/Voicemail       Clinical Data: Care Coordinator Outreach  Outreach attempted x 1. Unable to leave a message on patient's voicemail with call back information and requested return call.  Plan:  Care Coordinator will try to reach patient again in 1-2 business days.

## 2021-04-06 NOTE — LETTER
[facility] CARE COORDINATION  [facility]  April 7, 2021    Gloria Guzman  [address] [facility] [address]      Dear Gloria,    I am a clinic community health worker who works with ELLI Noriega CNP at [facility]. I have been trying to reach you recently to introduce Clinic Care Coordination and to see if there was anything I could assist you with.  Below is a description of clinic care coordination and how I can further assist you.      The clinic care coordination team is made up of a registered nurse,  and community health worker who understand the health care system. The goal of clinic care coordination is to help you manage your health and improve access to the health care system in the most efficient manner. The team can assist you in meeting your health care goals by providing education, coordinating services, strengthening the communication among your providers and supporting you with any resource needs.    Please feel free to contact me at [phone] with any questions or concerns. We are focused on providing you with the highest-quality healthcare experience possible and that all starts with you.     Sincerely,     ZOILA Lovelace

## 2021-04-07 NOTE — PROGRESS NOTES
Clinic Care Coordination Contact  [facility]/Voicemail       Clinical Data: Care Coordinator Outreach  Outreach attempted x 2. Unable to leave a message on patient's voicemail with call back information and requested return call.  Plan: Care Coordinator will send unable to contact letter with care coordinator contact information via [employer]. Care Coordinator will do no further outreaches at this time.

## 2021-04-14 NOTE — PROGRESS NOTES
"Social Work Services Progress Note     Hospital Day: 4  Date of Initial Social Work Evaluation:  2/3/19  Collaborated with:  [facility] team - Mobile  Don     Data: Pt is a 53 year old female being followed by [facility] for discharge planning to inpatient chemical dependency treatment.     Intervention: SW received a call from [facility]  stating that the pt is \"too medically complex\" for a chemical health assessment.   states that pt's \"IV antibiotics\" need to be discontinued prior to having a mobile assessment and requests that pt remains inpatient until this is completed.   states that in pt's current medical condition \"she would not be medically ready for treatment and her assessment is only good for 30-45 days\".       also has concerns that pt does not have \"well documented drug abuse\" and would not qualify for an assessment given her limited description of her use of methamphetamine.    SW attempted to provide education on pt's condition that she is medically stable (on diuretic IV, not antibiotic) and that she admits to regular use of methamphetamines as a coping mechanism.  Pt is very motivated for treatment and wanted to go from [facility] to treatment to help herself get better.      SW was told that the assessment cannot be started until pt is more medically stable.  [facility] will follow up with [facility] tomorrow to request a new  as the assessment determination was based on incorrect interpretation of her progress notes included in her referral.     - Referrals in Process:    [facility]/Chemical Health assessment  PH: [phone]  F: [phone]     Assessment: Pt in agreement with a chemical health assessment.     Plan:    Anticipated Disposition: Facility:  Rehabilitation facility for chemical health as indicated by .    Barriers to d/c plan: medical stability, chemical health assessment.    Follow Up: SW to follow for discharge planning.     Jade " MARVA Mar, APSW  [address]   Phone: [phone]  Pager: [phone]  Unit: [phone]       No

## 2021-05-11 DIAGNOSIS — I50.22 CHRONIC SYSTOLIC CHF (CONGESTIVE HEART FAILURE) (H): ICD-10-CM

## 2021-05-11 LAB
ALBUMIN SERPL-MCNC: 3.7 G/DL (ref 3.4–5)
ALP SERPL-CCNC: 88 U/L (ref 40–150)
ALT SERPL W P-5'-P-CCNC: 21 U/L (ref 0–50)
ANION GAP SERPL CALCULATED.3IONS-SCNC: 7 MMOL/L (ref 3–14)
AST SERPL W P-5'-P-CCNC: 14 U/L (ref 0–45)
BILIRUB SERPL-MCNC: 0.4 MG/DL (ref 0.2–1.3)
BUN SERPL-MCNC: 46 MG/DL (ref 7–30)
CALCIUM SERPL-MCNC: 9 MG/DL (ref 8.5–10.1)
CHLORIDE SERPL-SCNC: 109 MMOL/L (ref 94–109)
CO2 SERPL-SCNC: 25 MMOL/L (ref 20–32)
CREAT SERPL-MCNC: 1.29 MG/DL (ref 0.52–1.04)
ERYTHROCYTE [DISTWIDTH] IN BLOOD BY AUTOMATED COUNT: 16 % (ref 10–15)
GFR SERPL CREATININE-BSD FRML MDRD: 46 ML/MIN/{1.73_M2}
GLUCOSE SERPL-MCNC: 123 MG/DL (ref 70–99)
HCT VFR BLD AUTO: 40.8 % (ref 35–47)
HGB BLD-MCNC: 12.9 G/DL (ref 11.7–15.7)
MCH RBC QN AUTO: 27.8 PG (ref 26.5–33)
MCHC RBC AUTO-ENTMCNC: 31.6 G/DL (ref 31.5–36.5)
MCV RBC AUTO: 88 FL (ref 78–100)
NT-PROBNP SERPL-MCNC: 2831 PG/ML (ref 0–125)
PLATELET # BLD AUTO: 286 10E9/L (ref 150–450)
POTASSIUM SERPL-SCNC: 4.2 MMOL/L (ref 3.4–5.3)
PROT SERPL-MCNC: 7.5 G/DL (ref 6.8–8.8)
RBC # BLD AUTO: 4.64 10E12/L (ref 3.8–5.2)
SODIUM SERPL-SCNC: 141 MMOL/L (ref 133–144)
WBC # BLD AUTO: 8.1 10E9/L (ref 4–11)

## 2021-05-11 PROCEDURE — 36415 COLL VENOUS BLD VENIPUNCTURE: CPT | Performed by: PATHOLOGY

## 2021-05-11 PROCEDURE — 80053 COMPREHEN METABOLIC PANEL: CPT | Performed by: PATHOLOGY

## 2021-05-11 PROCEDURE — 85027 COMPLETE CBC AUTOMATED: CPT | Performed by: PATHOLOGY

## 2021-05-11 PROCEDURE — 83880 ASSAY OF NATRIURETIC PEPTIDE: CPT | Performed by: PATHOLOGY

## 2021-05-30 DIAGNOSIS — I10 ESSENTIAL HYPERTENSION: ICD-10-CM

## 2021-05-30 DIAGNOSIS — I50.21 ACUTE SYSTOLIC HEART FAILURE (H): ICD-10-CM

## 2021-06-03 NOTE — TELEPHONE ENCOUNTER
bumex 2 MG  Last Written Prescription Date:  3/3/21  Last Fill Quantity: 60,   # refills: 0    TOPROL-XL 25 MG   Last Written Prescription Date:  3/3/21  Last Fill Quantity: 270,   # refills: 0    Last Office Visit : 7/8/20  Future Office visit:  NONE  RTC 3 MOS   Refill pended and routed to the provider for review/determination due to  OVER DUE RTC ( OCT 2020) Did RF note come from card? )   Please call to schedule an appointment before we can give you further refills.

## 2021-06-04 RX ORDER — METOPROLOL SUCCINATE 25 MG/1
75 TABLET, EXTENDED RELEASE ORAL DAILY
Qty: 270 TABLET | Refills: 0 | Status: SHIPPED | OUTPATIENT
Start: 2021-06-04 | End: 2022-01-01

## 2021-06-04 RX ORDER — BUMETANIDE 2 MG/1
2 TABLET ORAL 2 TIMES DAILY
Qty: 180 TABLET | Refills: 0 | Status: SHIPPED | OUTPATIENT
Start: 2021-06-04 | End: 2022-01-01

## 2021-06-27 ENCOUNTER — MYC REFILL (OUTPATIENT)
Dept: FAMILY MEDICINE | Facility: CLINIC | Age: 56
End: 2021-06-27

## 2021-06-27 ENCOUNTER — MYC REFILL (OUTPATIENT)
Dept: CARDIOLOGY | Facility: CLINIC | Age: 56
End: 2021-06-27

## 2021-06-27 DIAGNOSIS — M54.42 CHRONIC MIDLINE LOW BACK PAIN WITH LEFT-SIDED SCIATICA: ICD-10-CM

## 2021-06-27 DIAGNOSIS — G89.29 CHRONIC MIDLINE LOW BACK PAIN WITH LEFT-SIDED SCIATICA: ICD-10-CM

## 2021-06-27 DIAGNOSIS — I50.21 ACUTE SYSTOLIC HEART FAILURE (H): ICD-10-CM

## 2021-06-27 DIAGNOSIS — I10 ESSENTIAL HYPERTENSION: ICD-10-CM

## 2021-06-27 DIAGNOSIS — I50.22 CHRONIC SYSTOLIC CHF (CONGESTIVE HEART FAILURE) (H): ICD-10-CM

## 2021-06-27 DIAGNOSIS — M51.369 DDD (DEGENERATIVE DISC DISEASE), LUMBAR: ICD-10-CM

## 2021-06-27 RX ORDER — METOPROLOL SUCCINATE 25 MG/1
75 TABLET, EXTENDED RELEASE ORAL DAILY
Qty: 270 TABLET | Refills: 0 | Status: CANCELLED | OUTPATIENT
Start: 2021-06-27

## 2021-06-27 RX ORDER — METOLAZONE 2.5 MG/1
2.5 TABLET ORAL SEE ADMIN INSTRUCTIONS
Qty: 5 TABLET | Refills: 0 | Status: CANCELLED | OUTPATIENT
Start: 2021-06-27

## 2021-06-27 RX ORDER — BUMETANIDE 2 MG/1
2 TABLET ORAL 2 TIMES DAILY
Qty: 180 TABLET | Refills: 0 | Status: CANCELLED | OUTPATIENT
Start: 2021-06-27

## 2021-06-30 RX ORDER — ACETAMINOPHEN 500 MG
1000 TABLET ORAL 3 TIMES DAILY
Qty: 90 TABLET | Refills: 1 | Status: SHIPPED | OUTPATIENT
Start: 2021-06-30

## 2021-06-30 NOTE — TELEPHONE ENCOUNTER
"Requested Prescriptions   Signed Prescriptions Disp Refills    acetaminophen (TYLENOL) 500 MG tablet 90 tablet 1     Sig: Take 2 tablets (1,000 mg) by mouth 3 times daily       Analgesics (Non-Narcotic Tylenol and ASA Only) Passed - 6/27/2021  7:42 AM        Passed - Recent (12 mo) or future (30 days) visit within the authorizing provider's specialty     Patient has had an office visit with the authorizing provider or a provider within the authorizing providers department within the previous 12 mos or has a future within next 30 days. See \"Patient Info\" tab in inbasket, or \"Choose Columns\" in Meds & Orders section of the refill encounter.              Passed - Patient is 7 months old or older     If patient is a peds patient of the age 7 mos -12 years, ok to refill using weight-based dosing.     If >3g daily and/or sig is not \"prn\", check for liver enzymes. If normal in the last year, ok to refill.  If not, refer to the provider.          Passed - Medication is active on med list           Deidre Sue RN  [facility]    "

## 2021-08-03 ENCOUNTER — TRANSFERRED RECORDS (OUTPATIENT)
Dept: HEALTH INFORMATION MANAGEMENT | Facility: CLINIC | Age: 56
End: 2021-08-03

## 2021-09-02 ENCOUNTER — ANCILLARY PROCEDURE (OUTPATIENT)
Dept: ULTRASOUND IMAGING | Facility: CLINIC | Age: 56
End: 2021-09-02
Attending: EMERGENCY MEDICINE
Payer: COMMERCIAL

## 2021-09-02 ENCOUNTER — HOSPITAL ENCOUNTER (EMERGENCY)
Facility: CLINIC | Age: 56
Discharge: HOME | End: 2021-09-02
Attending: EMERGENCY MEDICINE | Admitting: EMERGENCY MEDICINE
Payer: COMMERCIAL

## 2021-09-02 ENCOUNTER — APPOINTMENT (OUTPATIENT)
Dept: GENERAL RADIOLOGY | Facility: CLINIC | Age: 56
End: 2021-09-02
Attending: EMERGENCY MEDICINE
Payer: COMMERCIAL

## 2021-09-02 VITALS
BODY MASS INDEX: 36.49 KG/M2 | RESPIRATION RATE: 27 BRPM | DIASTOLIC BLOOD PRESSURE: 92 MMHG | OXYGEN SATURATION: 100 % | TEMPERATURE: 98.3 F | WEIGHT: 240 LBS | SYSTOLIC BLOOD PRESSURE: 131 MMHG | HEART RATE: 83 BPM

## 2021-09-02 DIAGNOSIS — R06.02 SHORTNESS OF BREATH: ICD-10-CM

## 2021-09-02 DIAGNOSIS — I50.22 CHRONIC SYSTOLIC HEART FAILURE (H): ICD-10-CM

## 2021-09-02 DIAGNOSIS — J44.9 CHRONIC OBSTRUCTIVE PULMONARY DISEASE, UNSPECIFIED COPD TYPE (H): ICD-10-CM

## 2021-09-02 DIAGNOSIS — R07.9 CHEST PAIN, UNSPECIFIED TYPE: ICD-10-CM

## 2021-09-02 LAB
ALBUMIN SERPL-MCNC: 3.7 G/DL (ref 3.4–5)
ALP SERPL-CCNC: 104 U/L (ref 40–150)
ALT SERPL W P-5'-P-CCNC: 67 U/L (ref 0–50)
ANION GAP SERPL CALCULATED.3IONS-SCNC: 7 MMOL/L (ref 3–14)
AST SERPL W P-5'-P-CCNC: 42 U/L (ref 0–45)
ATRIAL RATE - MUSE: 63 BPM
BASOPHILS # BLD AUTO: 0.1 10E3/UL (ref 0–0.2)
BASOPHILS NFR BLD AUTO: 1 %
BILIRUB SERPL-MCNC: 0.6 MG/DL (ref 0.2–1.3)
BUN SERPL-MCNC: 41 MG/DL (ref 7–30)
CALCIUM SERPL-MCNC: 9 MG/DL (ref 8.5–10.1)
CHLORIDE BLD-SCNC: 108 MMOL/L (ref 94–109)
CO2 SERPL-SCNC: 24 MMOL/L (ref 20–32)
CREAT SERPL-MCNC: 1.82 MG/DL (ref 0.52–1.04)
D DIMER PPP FEU-MCNC: 0.57 UG/ML FEU (ref 0–0.5)
DIASTOLIC BLOOD PRESSURE - MUSE: NORMAL MMHG
EOSINOPHIL # BLD AUTO: 0.3 10E3/UL (ref 0–0.7)
EOSINOPHIL NFR BLD AUTO: 3 %
ERYTHROCYTE [DISTWIDTH] IN BLOOD BY AUTOMATED COUNT: 14.7 % (ref 10–15)
GFR SERPL CREATININE-BSD FRML MDRD: 30 ML/MIN/1.73M2
GLUCOSE BLD-MCNC: 103 MG/DL (ref 70–99)
HCT VFR BLD AUTO: 45 % (ref 35–47)
HGB BLD-MCNC: 14 G/DL (ref 11.7–15.7)
IMM GRANULOCYTES # BLD: 0.1 10E3/UL
IMM GRANULOCYTES NFR BLD: 1 %
INTERPRETATION ECG - MUSE: NORMAL
LACTATE SERPL-SCNC: 1.7 MMOL/L (ref 0.7–2)
LYMPHOCYTES # BLD AUTO: 2.2 10E3/UL (ref 0.8–5.3)
LYMPHOCYTES NFR BLD AUTO: 20 %
MCH RBC QN AUTO: 27.1 PG (ref 26.5–33)
MCHC RBC AUTO-ENTMCNC: 31.1 G/DL (ref 31.5–36.5)
MCV RBC AUTO: 87 FL (ref 78–100)
MONOCYTES # BLD AUTO: 0.7 10E3/UL (ref 0–1.3)
MONOCYTES NFR BLD AUTO: 7 %
NEUTROPHILS # BLD AUTO: 7.4 10E3/UL (ref 1.6–8.3)
NEUTROPHILS NFR BLD AUTO: 68 %
NRBC # BLD AUTO: 0 10E3/UL
NRBC BLD AUTO-RTO: 0 /100
NT-PROBNP SERPL-MCNC: ABNORMAL PG/ML (ref 0–900)
P AXIS - MUSE: 53 DEGREES
PLATELET # BLD AUTO: 270 10E3/UL (ref 150–450)
POTASSIUM BLD-SCNC: 3.6 MMOL/L (ref 3.4–5.3)
PR INTERVAL - MUSE: 166 MS
PROT SERPL-MCNC: 8 G/DL (ref 6.8–8.8)
QRS DURATION - MUSE: 104 MS
QT - MUSE: 440 MS
QTC - MUSE: 450 MS
R AXIS - MUSE: -39 DEGREES
RBC # BLD AUTO: 5.17 10E6/UL (ref 3.8–5.2)
SARS-COV-2 RNA RESP QL NAA+PROBE: NEGATIVE
SODIUM SERPL-SCNC: 139 MMOL/L (ref 133–144)
SYSTOLIC BLOOD PRESSURE - MUSE: NORMAL MMHG
T AXIS - MUSE: 262 DEGREES
TROPONIN I SERPL-MCNC: <0.015 UG/L (ref 0–0.04)
VENTRICULAR RATE- MUSE: 63 BPM
WBC # BLD AUTO: 10.8 10E3/UL (ref 4–11)

## 2021-09-02 PROCEDURE — 96374 THER/PROPH/DIAG INJ IV PUSH: CPT

## 2021-09-02 PROCEDURE — 85379 FIBRIN DEGRADATION QUANT: CPT | Performed by: EMERGENCY MEDICINE

## 2021-09-02 PROCEDURE — 250N000009 HC RX 250: Performed by: EMERGENCY MEDICINE

## 2021-09-02 PROCEDURE — 94640 AIRWAY INHALATION TREATMENT: CPT

## 2021-09-02 PROCEDURE — 83880 ASSAY OF NATRIURETIC PEPTIDE: CPT | Performed by: EMERGENCY MEDICINE

## 2021-09-02 PROCEDURE — 93005 ELECTROCARDIOGRAM TRACING: CPT

## 2021-09-02 PROCEDURE — 93971 EXTREMITY STUDY: CPT | Mod: 50,XS

## 2021-09-02 PROCEDURE — 93971 EXTREMITY STUDY: CPT

## 2021-09-02 PROCEDURE — 85025 COMPLETE CBC W/AUTO DIFF WBC: CPT | Performed by: EMERGENCY MEDICINE

## 2021-09-02 PROCEDURE — C9803 HOPD COVID-19 SPEC COLLECT: HCPCS

## 2021-09-02 PROCEDURE — 99285 EMERGENCY DEPT VISIT HI MDM: CPT | Mod: 25

## 2021-09-02 PROCEDURE — 99284 EMERGENCY DEPT VISIT MOD MDM: CPT | Performed by: EMERGENCY MEDICINE

## 2021-09-02 PROCEDURE — 96375 TX/PRO/DX INJ NEW DRUG ADDON: CPT

## 2021-09-02 PROCEDURE — 250N000011 HC RX IP 250 OP 636: Performed by: EMERGENCY MEDICINE

## 2021-09-02 PROCEDURE — 73560 X-RAY EXAM OF KNEE 1 OR 2: CPT | Mod: RT

## 2021-09-02 PROCEDURE — 71045 X-RAY EXAM CHEST 1 VIEW: CPT

## 2021-09-02 PROCEDURE — 36415 COLL VENOUS BLD VENIPUNCTURE: CPT | Performed by: EMERGENCY MEDICINE

## 2021-09-02 PROCEDURE — 87635 SARS-COV-2 COVID-19 AMP PRB: CPT | Performed by: EMERGENCY MEDICINE

## 2021-09-02 PROCEDURE — 82040 ASSAY OF SERUM ALBUMIN: CPT | Performed by: EMERGENCY MEDICINE

## 2021-09-02 PROCEDURE — 83605 ASSAY OF LACTIC ACID: CPT | Performed by: EMERGENCY MEDICINE

## 2021-09-02 PROCEDURE — 84484 ASSAY OF TROPONIN QUANT: CPT | Performed by: EMERGENCY MEDICINE

## 2021-09-02 PROCEDURE — 93010 ELECTROCARDIOGRAM REPORT: CPT | Performed by: EMERGENCY MEDICINE

## 2021-09-02 RX ORDER — METHYLPREDNISOLONE SODIUM SUCCINATE 125 MG/2ML
125 INJECTION, POWDER, LYOPHILIZED, FOR SOLUTION INTRAMUSCULAR; INTRAVENOUS ONCE
Status: COMPLETED | OUTPATIENT
Start: 2021-09-02 | End: 2021-09-02

## 2021-09-02 RX ORDER — IPRATROPIUM BROMIDE AND ALBUTEROL SULFATE 2.5; .5 MG/3ML; MG/3ML
3 SOLUTION RESPIRATORY (INHALATION) ONCE
Status: COMPLETED | OUTPATIENT
Start: 2021-09-02 | End: 2021-09-02

## 2021-09-02 RX ORDER — LEVOFLOXACIN 500 MG/1
500 TABLET, FILM COATED ORAL DAILY
Qty: 5 TABLET | Refills: 0 | Status: SHIPPED | OUTPATIENT
Start: 2021-09-02 | End: 2021-09-09

## 2021-09-02 RX ORDER — PREDNISONE 20 MG/1
20 TABLET ORAL 2 TIMES DAILY
Qty: 10 TABLET | Refills: 0 | Status: SHIPPED | OUTPATIENT
Start: 2021-09-02 | End: 2021-09-07

## 2021-09-02 RX ORDER — ALBUTEROL SULFATE 0.83 MG/ML
2.5 SOLUTION RESPIRATORY (INHALATION) EVERY 4 HOURS PRN
Qty: 100 ML | Refills: 1 | Status: SHIPPED | OUTPATIENT
Start: 2021-09-02 | End: 2022-01-01

## 2021-09-02 RX ORDER — LEVOFLOXACIN 5 MG/ML
750 INJECTION, SOLUTION INTRAVENOUS ONCE
Status: DISCONTINUED | OUTPATIENT
Start: 2021-09-02 | End: 2021-09-02 | Stop reason: HOSPADM

## 2021-09-02 RX ORDER — ALBUTEROL SULFATE 90 UG/1
2 AEROSOL, METERED RESPIRATORY (INHALATION) EVERY 4 HOURS PRN
Qty: 8.5 G | Refills: 0 | Status: SHIPPED | OUTPATIENT
Start: 2021-09-02 | End: 2022-01-01

## 2021-09-02 RX ORDER — BUMETANIDE 0.25 MG/ML
2 INJECTION INTRAMUSCULAR; INTRAVENOUS ONCE
Status: COMPLETED | OUTPATIENT
Start: 2021-09-02 | End: 2021-09-02

## 2021-09-02 RX ADMIN — IPRATROPIUM BROMIDE AND ALBUTEROL SULFATE 3 ML: .5; 3 SOLUTION RESPIRATORY (INHALATION) at 09:14

## 2021-09-02 RX ADMIN — BUMETANIDE 2 MG: 0.25 INJECTION, SOLUTION INTRAMUSCULAR; INTRAVENOUS at 07:31

## 2021-09-02 RX ADMIN — METHYLPREDNISOLONE SODIUM SUCCINATE 125 MG: 125 INJECTION, POWDER, FOR SOLUTION INTRAMUSCULAR; INTRAVENOUS at 07:42

## 2021-09-02 RX ADMIN — IPRATROPIUM BROMIDE AND ALBUTEROL SULFATE 3 ML: .5; 3 SOLUTION RESPIRATORY (INHALATION) at 05:59

## 2021-09-02 NOTE — ED TRIAGE NOTES
Been sick for 4 days with  right leg weakness that has progressed to shortness of breath, chest pain and cvough Recent covid exposure.  Pt. states is vaccinated  .

## 2021-09-02 NOTE — ED NOTES
Assumed care from Dr. Pearson overnight physician.  Patient was initially set to be admitted.  At this point she is requesting discharge.  She states she has a  she has to attend.  She states she is feeling much better after the breathing treatments.  She does not want to stay.  We will send her home with refills for albuterol in addition to steroids and Levaquin.  She is strongly encouraged to return to us should she worsen or if anything changes.     Kennedy Gracia,   21 0929

## 2021-09-02 NOTE — ED NOTES
Pt c/o feeling sick x 4 days. PT c/o SOB and cough, but not coughing anything up.Pt c/o feeling achy and tired. Pt notes she was with someone last week who had covid.

## 2021-09-02 NOTE — ED PROVIDER NOTES
ED Provider Note  [facility]      History     Chief Complaint   Patient presents with     Shortness of Breath     Chest Pain     HPI  Gloria Guzman is a 55 year old female who has a past medical history of systolic heart failure with an ejection fraction of 19%, polysubstance abuse, marijuana abuse, hypertension, COPD, status post pacemaker presenting with 4 days of shortness of breath.  Patient also has had right knee pain after a fall and is having difficulty ambulating.  She is vaccinated but says she has had a recent Covid exposure.  No fevers or chills.  No nausea, vomiting, David pain or diarrhea.  Patient denies headaches or body aches.  She says that she is taking her medications including her diuretics.  To myself, she is denies significant chest pain at this point.    Past Medical History  Past Medical History:   Diagnosis Date     Anemia      Arthritis      Bipolar affective disorder, current episode moderate (H)      Chronic back pain      Chronic systolic CHF (congestive heart failure) (H)      COPD (chronic obstructive pulmonary disease) (H)      COPD (chronic obstructive pulmonary disease) (H)      ETOH abuse      GERD (gastroesophageal reflux disease)      H/O heart failure      History of posttraumatic stress disorder (PTSD)      Homeless      HTN (hypertension)      Marijuana abuse      Nonischemic cardiomyopathy (H)     EF 19% by CMRI     Obesity      Pacemaker      Polysubstance abuse (H)     tobacco, previous cocaine, meth, and alcohol, and marijuana     Sleep apnea      Tobacco abuse      Past Surgical History:   Procedure Laterality Date     BACK SURGERY       CARDIAC SURGERY      AICD placement       SECTION       GALLBLADDER SURGERY       HERNIA REPAIR       JOINT REPLACEMTN, KNEE RT/LT  11/10    Joint Replacement knee LT     SURGICAL PATHOLOGY EXAM       TONSILLECTOMY       bumetanide (BUMEX) 2 MG tablet  lisinopril (ZESTRIL) 20 MG  tablet  metolazone (ZAROXOLYN) 2.5 MG tablet  metolazone (ZAROXOLYN) 2.5 MG tablet  metolazone 2.5 MG PO tablet  metoprolol succinate ER (TOPROL-XL) 25 MG 24 hr tablet  oxyCODONE IR 15 MG PO tablet  potassium chloride ER (MICRO-K) 10 MEQ CR capsule  PROAIR  (90 Base) MCG/ACT inhaler  sodium chloride (OCEAN) 0.65 % nasal spray  spironolactone (ALDACTONE) 25 MG tablet  acetaminophen (TYLENOL) 500 MG tablet  clobetasol (TEMOVATE) 0.05 % external cream  hydrocortisone (CORTAID) 1 % external cream  ondansetron (ZOFRAN-ODT) 4 MG ODT tab      Allergies   Allergen Reactions     Cefaclor Rash     Other reaction(s): *Unknown     Morphine Hives and Itching     Ibuprofen      Morphine Sulfate Itching     Mushrooms [Mushroom] Hives     Olanzapine Other (See Comments)     Varenicline Other (See Comments)     Family History  Family History   Problem Relation Age of Onset     Breast Cancer Maternal Grandmother      Bipolar Disorder Maternal Grandmother      Substance Abuse Maternal Grandmother      Breast Cancer Maternal Aunt      Cancer Father 42        lung      Substance Abuse Father      Cancer Maternal Grandfather         lung      Bipolar Disorder Maternal Grandfather      Substance Abuse Maternal Grandfather      Cancer Other         maternal cousin lung      Gallbladder Disease Mother      Depression Mother      Bipolar Disorder Mother      Substance Abuse Mother      Skin Cancer Mother      Gallbladder Disease Sister      Substance Abuse Sister      Substance Abuse Brother      Melanoma No family hx of      Social History   Social History     Tobacco Use     Smoking status: Current Every Day Smoker     Packs/day: 0.25     Types: Cigarettes     Smokeless tobacco: Never Used   Substance Use Topics     Alcohol use: No     Drug use: Yes     Types: Marijuana      Past medical history, past surgical history, medications, allergies, family history, and social history were reviewed with the patient. No additional pertinent  items.       Review of Systems  A complete review of systems was performed with pertinent positives and negatives noted in the HPI, and all other systems negative.    Physical Exam   BP: (!) 158/116  Pulse: 71  Temp: 98.4  F (36.9  C)  Resp: (!) 36  Weight: 108.9 kg (240 lb)  SpO2: 100 %  Physical Exam  Physical Exam   Constitutional: oriented to person, place, and time. appears well-developed and well-nourished.   HENT:   Head: Normocephalic and atraumatic.   Neck: Normal range of motion.   Pulmonary/Chest: Mildly increased respiratory effort, mild expiratory wheezes.  Cardiac: No murmurs, rubs, gallops. RRR.  Mildly increased respiratory effort, mild expiratory  Abdominal: Abdomen soft, nontender, nondistended. No rebound tenderness.  MSK: Right knee without tenderness palpation throughout, range of motion full but with pain.  No evidence of trauma.  Lower extremity pulses symmetric.  Bilateral feet with normal temperature.  Neurological: alert and oriented to person, place, and time. Will be all extremities.  Skin: Skin is warm and dry.   Psychiatric:  normal mood and affect.  behavior is normal. Thought content normal.     ED Course      Procedures            EKG Interpretation:      Interpreted by Dimitrios Pearson MD  Time reviewed: 0525  Symptoms at time of EKG: SOB   Rhythm: normal sinus   Rate: Normal  Axis: Left Axis Deviation  Ectopy: premature ventricular contractions (multifocal)  Conduction: normal  ST Segments/ T Waves: Nonspecific inferior ST changes in 2 inversions in the lateral leads similar to prior EKG  Q Waves: nonspecific  Comparison to prior: No significant change from prior EKG    Clinical Impression: no acute changes                   Results for orders placed or performed during the hospital encounter of 09/02/21   CBC with platelets differential     Status: None (In process)    Narrative    The following orders were created for panel order CBC with platelets differential.  Procedure                                Abnormality         Status                     ---------                               -----------         ------                     CBC with platelets and d...[[id number]]                      In process                   Please view results for these tests on the individual orders.     Medications   ipratropium - albuterol 0.5 mg/2.5 mg/3 mL (DUONEB) neb solution 3 mL (has no administration in time range)        Assessments & Plan (with Medical Decision Making)   MDM  Patient presented with shortness of breath in the setting of significant heart failure and COPD history.  She is tachypneic with an increased respiratory rate on examination.  She did get some improvement with DuoNeb's.  BNP is quite elevated.  Chest x-ray does not show significant fluid, ultrasound also does not show fluid overload.  I do favor COPD exacerbation is likely diagnosis of shortness of breath.  She is given steroids and duo nebs here in the emergency department.  D-dimer is mildly elevated, less likely pulmonary embolism as it is only mildly elevated, will get ultrasounds as she does have creatinine elevation.  She is given Bumex at her home dose.  EKG shows chronic changes with some PVCs.  Troponin is negative.  This does not seem to represent ACS at this point.  She also has a sore leg.  X-ray is negative.  She is describing some weakness for several weeks, unclear etiology but may need further evaluation if MRI if not improving.  Patient be admitted to medicine for further care for likely COPD exacerbation versus component of heart failure exacerbation.    I have reviewed the nursing notes. I have reviewed the findings, diagnosis, plan and need for follow up with the patient.    New Prescriptions    No medications on file       Final diagnoses:   Shortness of breath   Chronic obstructive pulmonary disease, unspecified COPD type (H)   Chronic systolic heart failure (H)       --  Dimitrios Pearson  [facility]  [facility] EMERGENCY DEPARTMENT  9/2/2021     Dimitrios Pearson MD  09/02/21 0706

## 2021-09-03 ENCOUNTER — PATIENT OUTREACH (OUTPATIENT)
Dept: CARE COORDINATION | Facility: CLINIC | Age: 56
End: 2021-09-03

## 2021-09-03 DIAGNOSIS — Z71.89 OTHER SPECIFIED COUNSELING: ICD-10-CM

## 2021-09-04 NOTE — PROGRESS NOTES
Clinic Care Coordination Contact  [facility]       Clinical Data: Care Coordinator Outreach  Outreach attempted x 2 not in service recording.   Care Coordinator will do no further outreaches at this time.      Christina Alvarez

## 2021-10-19 NOTE — PLAN OF CARE
ED APC Note      Patient : Emily House Age: 5 year old Sex: female   MRN: [id number] Encounter Date: 10/19/2021      History     Chief Complaint   Patient presents with   • Dog Bite     The history is provided by the patient. Mother present. No  was used.     HPI      CC: Dog bite.    Emily House is a 5 year old female with no pertinent PMH presents with injuries after a dog bite. Patient was petting the neighbor's pitbull when it bit her on the right face. The patient started to run away, the dog then chased her and bit her on the back of the right thigh. No LOC. Patient reports mild pain to the areas but is otherwise acing appropriately per Mother. Patient denies any headaches, neck or back pain, chest pain, abdominal pain, vomiting, or other complaints. UTD on immunizations. Patient's mother believes the dog is utd on immunizations but not 100% sure, states she can easily follow up on this.    PCP: Rubi   _____________________________________________________    PMHx:  GERD  PSHx:  None   PFHx:  Not significant   PSocHx:  Social History     Tobacco Use   • Smoking status: Never Smoker   • Smokeless tobacco: Never Used   Substance Use Topics   • Alcohol use: Never   • Drug use: Never     Allergies:  No Known Allergies    ROS:  Review of Systems   Constitutional: Negative for activity change.   Eyes: Negative for visual disturbance.   Respiratory: Negative for shortness of breath.    Cardiovascular: Negative for chest pain.   Gastrointestinal: Negative for abdominal pain.   Musculoskeletal: Negative for back pain and neck pain.   Skin: Positive for color change and wound.   Neurological: Negative for headaches.   Psychiatric/Behavioral: Negative for behavioral problems.   All other systems reviewed and are negative.      Physical Exam     ED Triage Vitals [10/19/21 2325]   ED Triage Vitals Group      Temp 98.4 °F (36.9 °C)      Heart Rate 107      Resp 20      BP (!) 119/81      SpO2 100 %       Pt mood very labile this shift, refused labs this AM. Up independently ambulating off the unit x 1. VSS on RA. Will continue to monitor.    EtCO2 mmHg       Height 4' 6\" (1.372 m)      Weight (!) 80 lb 11 oz (36.6 kg)      Weight Scale Used Standing scale      BMI (Calculated) 19.45      IBW/kg (Calculated) 31.7     Physical Exam  Vitals and nursing note reviewed.   Constitutional:       General: She is active. She is not in acute distress.     Appearance: Normal appearance.   HENT:      Head: Normocephalic.        Comments: Several small lacerations to the right cheek/face, largest one approximately 3 cm. Other 3 are 1.5 cm, 0.8 cm, and 0.5 cm respectively. Surrounding skin mildly swollen, ecchymotic.     Right Ear: Tympanic membrane, ear canal and external ear normal.      Left Ear: Tympanic membrane, ear canal and external ear normal.      Nose: Nose normal.      Mouth/Throat:      Mouth: Mucous membranes are moist.      Pharynx: Oropharynx is clear.      Comments: No oral through-and-through lacerations. Normal dentition.   Eyes:      Extraocular Movements: Extraocular movements intact.      Conjunctiva/sclera: Conjunctivae normal.   Cardiovascular:      Rate and Rhythm: Normal rate and regular rhythm.      Heart sounds: Normal heart sounds. No murmur heard.     Pulmonary:      Effort: Pulmonary effort is normal.   Abdominal:      General: Abdomen is flat.      Palpations: Abdomen is soft.   Musculoskeletal:         General: Normal range of motion.      Cervical back: Normal range of motion and neck supple. No tenderness.      Comments: Right posterior thigh with superficial puncture wound with overlaying ecchymosis, minor swelling no tenderness. No foreign bodies visualized.  Normal ROM BUE and BLE with 5/5 strength, no sensation changes   Skin:     General: Skin is warm and dry.      Capillary Refill: Capillary refill takes less than 2 seconds.      Findings: No rash.   Neurological:      General: No focal deficit present.      Mental Status: She is alert and oriented for age.      Motor: No weakness.   Psychiatric:         Mood and Affect: Mood  normal.         Behavior: Behavior normal.         Judgment: Judgment normal.       Medical Decision Making   Emily House is a 5 year old female who presents for injuries after a dog bite. Patient is stable at this time, non-toxic appearing. Patient with dog bites to the right face and posterior right thigh. No LOC with normal examination besides wounds. Explained nature of dog bite wounds, which will be thoroughly cleaned in the ED and patient to be started on antibiotics for infection prophylaxis. The thigh wound does not require sutures. The facial ones will be closed for cosmetic purposes, noting there is increased risk of infection with this. Mother is agreeable to this.    Laceration Repair    Date/Time: 10/19/2021 7:30 PM  Performed by: Zoila GARCIA PA-C  Authorized by: Zoila GARCIA PA-C     Consent:     Consent obtained:  Verbal    Consent given by:  Parent    Risks discussed:  Pain, poor cosmetic result, nerve damage and poor wound healing    Alternatives discussed:  Observation  Anesthesia (see MAR for exact dosages):     Anesthesia method:  Local infiltration and topical application    Topical anesthetic:  LET    Local anesthetic:  Lidocaine 1% w/o epi  Laceration details:     Location:  Face    Face location:  R cheek    Length (cm):  5.8 (4 separate lacerations - 3 cm, 1.5 cm, 0.8 cm, 0.5 cm)  Repair type:     Repair type:  Simple  Pre-procedure details:     Preparation:  Patient was prepped and draped in usual sterile fashion  Exploration:     Hemostasis achieved with:  Direct pressure and LET    Wound exploration: entire depth of wound probed and visualized    Treatment:     Area cleansed with:  Soap and water, Shur-Clens and saline    Amount of cleaning:  Extensive    Irrigation solution:  Sterile saline    Irrigation method:  Pressure wash    Visualized foreign bodies/material removed: no    Skin repair:     Repair method:  Sutures    Suture size:  6-0    Suture material:  Prolene     Suture technique:  Simple interrupted    Number of sutures:  9 (5, 2, 1, 1)  Approximation:     Approximation:  Close  Post-procedure details:     Dressing:  Antibiotic ointment    Patient tolerance of procedure:  Tolerated well, no immediate complications      Re-Examination/ED Course   Blood pressure (!) 119/81, pulse 107, temperature 98.4 °F (36.9 °C), temperature source Oral, resp. rate 20, height 4' 6\" (1.372 m), weight (!) 36.6 kg (80 lb 11 oz), SpO2 100 %.    ED Course as of Oct 19 2004   Tue Oct 19, 2021   2001 Patient tolerated laceration repair well. Wound care and animal bite instructions verbalized, advised wound check by pediatrician in 2-3 days then follow up for suture removal. Augmentin sent to pharmacy. Supportive measures verbalized. All questions answered. Strict return precautions advised. Mother agrees to plan, patient is smiling and talkative at discharge    [OS]      ED Course User Index  [OS] Zoila GARCIA PA-C     Discussed with Supervising Physician regarding patient presentation, physical exam, lab results. Physician is in agreement with the patient's work-up and plan of action.    PPE: Patient did have mask on entire stay in the emergency department.  Additionally, I did wear recommended personal protective equipment, including face mask, eye protection, and gloves when initially assessing patient and when re-examining.    Clinical Impression     ED Diagnosis   1. Dog bite of face, initial encounter     2. Dog bite of right thigh, initial encounter       Disposition        Discharge 10/19/2021  7:44 PM  Ada House discharge to home/self care.         Summary of your Discharge Medications      Take these Medications      Details   amoxicillin-clavulanate 400-57 MG/5ML suspension  Commonly known as: AUGMENTIN   Take 10.5 mLs by mouth 2 times daily for 7 days.           Follow-up:  Heide Howard MD  [address] [facility] [address] [phone]    Schedule an appointment as  soon as possible for a visit   For suture removal (5-7 days)    [address] [address]  [phone]  Go to   For suture removal (5-7 days)      Patient Instructions:  1. No head/facial imaging indicated at this time.  2. Dog bite injury to the thigh does not require stitches.  3.  You have 9 stitches to the face (4 separate lacerations). These will be in place for 5-7 days. These can be removed by your PCP or a local [facility].  4. Keep the wounds clean and dry.  Clean with warm water and mild soap. After drying, cover with triple antibiotic ointment or Vaseline and bandage if needed.  Do not submerge in water.  5. After suture removal, please avoid direct sun exposure for the next 6 to 12 months.  Either wear hat or wear sunscreen over the area.  You may also use over-the-counter treatments such as vitamin E ointment for reduction of the scar appearance.  6. Take Motrin or Tylenol as needed for pain. Ice areas of swelling.  7. Start antibiotics to prevent infection from the dog bite.  8. Come back to the emergency department for any worsening pain/symptoms, headaches, vision changes, vomiting, behavioral changes, or signs of infection including fevers or swelling/redness/pus from the wound.    Signature     Zoila GARCIA PA-C  10/19/21 2004

## 2021-10-24 ENCOUNTER — HEALTH MAINTENANCE LETTER (OUTPATIENT)
Age: 56
End: 2021-10-24

## 2021-11-29 ENCOUNTER — NURSE TRIAGE (OUTPATIENT)
Dept: NURSING | Facility: CLINIC | Age: 56
End: 2021-11-29
Payer: COMMERCIAL

## 2021-11-29 NOTE — TELEPHONE ENCOUNTER
Triage call:     Patient is calling with back pain and swelling in her right leg.   She states she has a history of CHF. She is SOB at her baseline but the swelling in her legs is worse than normal. It is just the right leg that is swollen through her foot and calf. The swelling goes down with elevation and massage.     According to the protocol, patient should be seen in ER/[facility] or to office with PCP approval. She is agreeable to be seen in [facility]. Patient verbalizes understanding and agrees with plan of care.    Yana Valle RN   11/29/21 3:02 PM  [facility] Nurse Advisor       Reason for Disposition    Thigh, calf, or ankle swelling in only one leg    Additional Information    Negative: Sounds like a life-threatening emergency to the triager    Negative: Difficulty breathing at rest    Negative: Entire foot is cool or blue in comparison to other side    Negative: SEVERE swelling (e.g., swelling extends above knee, entire leg is swollen, weeping fluid)    Negative: Thigh or calf pain and only 1 side and present > 1 hour    Negative: Pain radiates into groin, scrotum    Negative: Blood in urine (red, pink, or tea-colored)    Negative: Vomiting and pain over lower ribs of back (i.e., flank - kidney area)    Negative: Weakness of a leg or foot (e.g., unable to bear weight, dragging foot)    Negative: Patient sounds very sick or weak to the triager    Negative: Fever > 100.4 F (38.0 C) and flank pain    Negative: Pain or burning with passing urine (urination)    Negative: Passed out (i.e., fainted, collapsed and was not responding)    Negative: Shock suspected (e.g., cold/pale/clammy skin, too weak to stand, low BP, rapid pulse)    Negative: Sounds like a life-threatening emergency to the triager    Negative: Major injury to the back (e.g., MVA, fall > 10 feet or 3 meters, penetrating injury, etc.)    Negative: Pain in the upper back over the ribs (rib cage) that radiates (travels) into the chest    Negative: Pain  in the upper back over the ribs (rib cage) and worsened by coughing (or clearly increases with breathing)    Negative: SEVERE back pain of sudden onset and age > 60    Negative: SEVERE abdominal pain (e.g., excruciating)    Negative: Abdominal pain and age > 60    Negative: Unable to urinate (or only a few drops) and bladder feels very full    Negative: Loss of bladder or bowel control (urine or bowel incontinence; wetting self, leaking stool) of new onset    Negative: Numbness (loss of sensation) in groin or rectal area    Protocols used: LEG SWELLING AND EDEMA-A-OH, BACK PAIN-A-OH    COVID 19 Nurse Triage Plan/Patient Instructions    Please be aware that novel coronavirus (COVID-19) may be circulating in the community. If you develop symptoms such as fever, cough, or SOB or if you have concerns about the presence of another infection including coronavirus (COVID-19), please contact your health care provider or visit [email or web address].     Disposition/Instructions    In-Person Visit with provider recommended. Reference Visit Selection Guide.    Thank you for taking steps to prevent the spread of this virus.  o Limit your contact with others.  o Wear a simple mask to cover your cough.  o Wash your hands well and often.    Resources    [facility]: About COVID-19: [email or web address]    CDC: What to Do If You're Sick: www.cdc.gov/coronavirus/2019-ncov/about/steps-when-sick.html    CDC: Ending Home Isolation: www.cdc.gov/coronavirus/2019-ncov/hcp/disposition-in-home-patients.html     CDC: Caring for Someone: www.cdc.gov/coronavirus/2019-ncov/if-you-are-sick/care-for-someone.html     [facility]: Interim Guidance for Hospital Discharge to Home: www.[email or web address].[email or web address].[email or web address]/diseases/coronavirus/hcp/hospdischarge.pdf    [facility] clinical trials (COVID-19 research studies): [email or web address].[email or web address]/umn-clinical-trials     Below are the COVID-19 hotlines at the [place] [facility]  ([facility]). Interpreters are available.   o For health questions: Call 582-731-4698 or 1-316.126.7416 (7 a.m. to 7 p.m.)  o For questions about schools and childcare: Call 383-555-2971 or 1-477.695.5393 (7 a.m. to 7 p.m.)

## 2022-01-01 ENCOUNTER — OFFICE VISIT (OUTPATIENT)
Dept: CARDIOLOGY | Facility: CLINIC | Age: 57
End: 2022-01-01
Attending: NURSE PRACTITIONER
Payer: COMMERCIAL

## 2022-01-01 ENCOUNTER — TELEPHONE (OUTPATIENT)
Dept: BEHAVIORAL HEALTH | Facility: CLINIC | Age: 57
End: 2022-01-01

## 2022-01-01 ENCOUNTER — APPOINTMENT (OUTPATIENT)
Dept: RADIOLOGY | Facility: HOSPITAL | Age: 57
End: 2022-01-01
Payer: COMMERCIAL

## 2022-01-01 ENCOUNTER — ANCILLARY PROCEDURE (OUTPATIENT)
Dept: CARDIOLOGY | Facility: CLINIC | Age: 57
End: 2022-01-01
Attending: INTERNAL MEDICINE
Payer: COMMERCIAL

## 2022-01-01 ENCOUNTER — ANCILLARY PROCEDURE (OUTPATIENT)
Dept: CARDIOLOGY | Facility: CLINIC | Age: 57
End: 2022-01-01
Attending: STUDENT IN AN ORGANIZED HEALTH CARE EDUCATION/TRAINING PROGRAM
Payer: COMMERCIAL

## 2022-01-01 ENCOUNTER — PRE VISIT (OUTPATIENT)
Dept: ORTHOPEDICS | Facility: CLINIC | Age: 57
End: 2022-01-01

## 2022-01-01 ENCOUNTER — OFFICE VISIT (OUTPATIENT)
Dept: FAMILY MEDICINE | Facility: CLINIC | Age: 57
End: 2022-01-01
Payer: COMMERCIAL

## 2022-01-01 ENCOUNTER — HEALTH MAINTENANCE LETTER (OUTPATIENT)
Age: 57
End: 2022-01-01

## 2022-01-01 ENCOUNTER — TELEPHONE (OUTPATIENT)
Dept: ORTHOPEDICS | Facility: CLINIC | Age: 57
End: 2022-01-01

## 2022-01-01 ENCOUNTER — LAB (OUTPATIENT)
Dept: LAB | Facility: CLINIC | Age: 57
End: 2022-01-01

## 2022-01-01 ENCOUNTER — APPOINTMENT (OUTPATIENT)
Dept: GENERAL RADIOLOGY | Facility: CLINIC | Age: 57
End: 2022-01-01
Attending: EMERGENCY MEDICINE
Payer: COMMERCIAL

## 2022-01-01 ENCOUNTER — VIRTUAL VISIT (OUTPATIENT)
Dept: FAMILY MEDICINE | Facility: CLINIC | Age: 57
End: 2022-01-01
Payer: COMMERCIAL

## 2022-01-01 ENCOUNTER — TELEPHONE (OUTPATIENT)
Dept: LAB | Facility: CLINIC | Age: 57
End: 2022-01-01
Payer: COMMERCIAL

## 2022-01-01 ENCOUNTER — APPOINTMENT (OUTPATIENT)
Dept: GENERAL RADIOLOGY | Facility: CLINIC | Age: 57
End: 2022-01-01
Attending: FAMILY MEDICINE
Payer: COMMERCIAL

## 2022-01-01 ENCOUNTER — HOSPITAL ENCOUNTER (EMERGENCY)
Facility: CLINIC | Age: 57
Discharge: HOME | End: 2022-07-28
Payer: COMMERCIAL

## 2022-01-01 ENCOUNTER — LAB (OUTPATIENT)
Dept: LAB | Facility: CLINIC | Age: 57
End: 2022-01-01
Attending: NURSE PRACTITIONER
Payer: COMMERCIAL

## 2022-01-01 ENCOUNTER — OFFICE VISIT (OUTPATIENT)
Dept: ORTHOPEDICS | Facility: CLINIC | Age: 57
End: 2022-01-01
Payer: COMMERCIAL

## 2022-01-01 ENCOUNTER — E-VISIT (OUTPATIENT)
Dept: FAMILY MEDICINE | Facility: CLINIC | Age: 57
End: 2022-01-01
Payer: COMMERCIAL

## 2022-01-01 ENCOUNTER — PRE VISIT (OUTPATIENT)
Dept: CARDIOLOGY | Facility: CLINIC | Age: 57
End: 2022-01-01
Payer: COMMERCIAL

## 2022-01-01 ENCOUNTER — APPOINTMENT (OUTPATIENT)
Dept: ULTRASOUND IMAGING | Facility: HOSPITAL | Age: 57
End: 2022-01-01
Payer: COMMERCIAL

## 2022-01-01 ENCOUNTER — TELEPHONE (OUTPATIENT)
Dept: EMERGENCY MEDICINE | Facility: CLINIC | Age: 57
End: 2022-01-01
Payer: COMMERCIAL

## 2022-01-01 ENCOUNTER — PATIENT OUTREACH (OUTPATIENT)
Dept: CARE COORDINATION | Facility: CLINIC | Age: 57
End: 2022-01-01
Payer: COMMERCIAL

## 2022-01-01 ENCOUNTER — HOSPITAL ENCOUNTER (EMERGENCY)
Facility: HOSPITAL | Age: 57
Discharge: HOME | End: 2022-04-21
Admitting: PHYSICIAN ASSISTANT
Payer: COMMERCIAL

## 2022-01-01 ENCOUNTER — HOSPITAL ENCOUNTER (INPATIENT)
Facility: CLINIC | Age: 57
LOS: 1 days | Discharge: LEFT BEFORE DISCHARGE | End: 2022-10-07
Attending: EMERGENCY MEDICINE | Admitting: INTERNAL MEDICINE
Payer: COMMERCIAL

## 2022-01-01 ENCOUNTER — HOSPITAL ENCOUNTER (INPATIENT)
Facility: CLINIC | Age: 57
LOS: 1 days | Discharge: HOME | End: 2022-01-20
Attending: FAMILY MEDICINE | Admitting: INTERNAL MEDICINE
Payer: COMMERCIAL

## 2022-01-01 VITALS
OXYGEN SATURATION: 97 % | WEIGHT: 280 LBS | HEART RATE: 63 BPM | BODY MASS INDEX: 41.47 KG/M2 | DIASTOLIC BLOOD PRESSURE: 89 MMHG | HEIGHT: 69 IN | TEMPERATURE: 98.2 F | RESPIRATION RATE: 19 BRPM | SYSTOLIC BLOOD PRESSURE: 182 MMHG

## 2022-01-01 VITALS
WEIGHT: 273 LBS | DIASTOLIC BLOOD PRESSURE: 90 MMHG | HEART RATE: 69 BPM | BODY MASS INDEX: 41.51 KG/M2 | OXYGEN SATURATION: 94 % | TEMPERATURE: 98.2 F | RESPIRATION RATE: 20 BRPM | SYSTOLIC BLOOD PRESSURE: 157 MMHG

## 2022-01-01 VITALS
RESPIRATION RATE: 20 BRPM | BODY MASS INDEX: 41.51 KG/M2 | OXYGEN SATURATION: 97 % | DIASTOLIC BLOOD PRESSURE: 95 MMHG | SYSTOLIC BLOOD PRESSURE: 145 MMHG | HEART RATE: 98 BPM | TEMPERATURE: 97.7 F | WEIGHT: 273 LBS

## 2022-01-01 VITALS
WEIGHT: 283.5 LBS | TEMPERATURE: 98.5 F | HEIGHT: 69 IN | RESPIRATION RATE: 14 BRPM | SYSTOLIC BLOOD PRESSURE: 150 MMHG | HEART RATE: 56 BPM | BODY MASS INDEX: 41.99 KG/M2 | DIASTOLIC BLOOD PRESSURE: 86 MMHG | OXYGEN SATURATION: 98 %

## 2022-01-01 VITALS
OXYGEN SATURATION: 98 % | RESPIRATION RATE: 16 BRPM | SYSTOLIC BLOOD PRESSURE: 131 MMHG | HEART RATE: 80 BPM | TEMPERATURE: 98.3 F | DIASTOLIC BLOOD PRESSURE: 84 MMHG

## 2022-01-01 VITALS
BODY MASS INDEX: 43.64 KG/M2 | OXYGEN SATURATION: 99 % | HEART RATE: 86 BPM | DIASTOLIC BLOOD PRESSURE: 82 MMHG | WEIGHT: 287 LBS | SYSTOLIC BLOOD PRESSURE: 141 MMHG

## 2022-01-01 VITALS
RESPIRATION RATE: 18 BRPM | DIASTOLIC BLOOD PRESSURE: 79 MMHG | HEART RATE: 89 BPM | SYSTOLIC BLOOD PRESSURE: 126 MMHG | TEMPERATURE: 98.1 F | OXYGEN SATURATION: 97 %

## 2022-01-01 DIAGNOSIS — I10 ESSENTIAL HYPERTENSION: ICD-10-CM

## 2022-01-01 DIAGNOSIS — L03.115 CELLULITIS OF RIGHT LEG: ICD-10-CM

## 2022-01-01 DIAGNOSIS — I50.9 CONGESTIVE HEART FAILURE (H): ICD-10-CM

## 2022-01-01 DIAGNOSIS — R06.2 WHEEZING: ICD-10-CM

## 2022-01-01 DIAGNOSIS — Z20.822 LAB TEST NEGATIVE FOR COVID-19 VIRUS: ICD-10-CM

## 2022-01-01 DIAGNOSIS — I50.9 CONGESTIVE HEART FAILURE, UNSPECIFIED HF CHRONICITY, UNSPECIFIED HEART FAILURE TYPE (H): Primary | ICD-10-CM

## 2022-01-01 DIAGNOSIS — I50.22 CHRONIC SYSTOLIC CHF (CONGESTIVE HEART FAILURE) (H): ICD-10-CM

## 2022-01-01 DIAGNOSIS — Z95.810 ICD (IMPLANTABLE CARDIOVERTER-DEFIBRILLATOR) IN PLACE: ICD-10-CM

## 2022-01-01 DIAGNOSIS — Z71.89 OTHER SPECIFIED COUNSELING: ICD-10-CM

## 2022-01-01 DIAGNOSIS — I42.9 CARDIOMYOPATHY, UNSPECIFIED TYPE (H): ICD-10-CM

## 2022-01-01 DIAGNOSIS — N18.30 STAGE 3 CHRONIC KIDNEY DISEASE, UNSPECIFIED WHETHER STAGE 3A OR 3B CKD (H): ICD-10-CM

## 2022-01-01 DIAGNOSIS — G89.29 CHRONIC BILATERAL LOW BACK PAIN, UNSPECIFIED WHETHER SCIATICA PRESENT: Primary | ICD-10-CM

## 2022-01-01 DIAGNOSIS — F19.20 CHEMICAL DEPENDENCY (H): ICD-10-CM

## 2022-01-01 DIAGNOSIS — I50.22 CHRONIC SYSTOLIC HEART FAILURE (H): Primary | ICD-10-CM

## 2022-01-01 DIAGNOSIS — I50.22 CHRONIC SYSTOLIC HEART FAILURE (H): ICD-10-CM

## 2022-01-01 DIAGNOSIS — Z86.59 HISTORY OF PSYCHIATRIC DISORDER: ICD-10-CM

## 2022-01-01 DIAGNOSIS — I50.22 CHRONIC SYSTOLIC CHF (CONGESTIVE HEART FAILURE) (H): Primary | ICD-10-CM

## 2022-01-01 DIAGNOSIS — F31.9 BIPOLAR AFFECTIVE DISORDER, REMISSION STATUS UNSPECIFIED (H): ICD-10-CM

## 2022-01-01 DIAGNOSIS — I50.23 ACUTE ON CHRONIC SYSTOLIC HEART FAILURE (H): ICD-10-CM

## 2022-01-01 DIAGNOSIS — Z91.199 NO-SHOW FOR APPOINTMENT: Primary | ICD-10-CM

## 2022-01-01 DIAGNOSIS — E66.01 MORBID OBESITY (H): ICD-10-CM

## 2022-01-01 DIAGNOSIS — M54.50 CHRONIC BILATERAL LOW BACK PAIN, UNSPECIFIED WHETHER SCIATICA PRESENT: Primary | ICD-10-CM

## 2022-01-01 DIAGNOSIS — I42.9 CARDIOMYOPATHY (H): ICD-10-CM

## 2022-01-01 DIAGNOSIS — J06.9 VIRAL URI: Primary | ICD-10-CM

## 2022-01-01 DIAGNOSIS — R45.4 OUTBURSTS OF ANGER: ICD-10-CM

## 2022-01-01 DIAGNOSIS — R06.2 WHEEZING: Primary | ICD-10-CM

## 2022-01-01 DIAGNOSIS — G89.4 CHRONIC PAIN SYNDROME: ICD-10-CM

## 2022-01-01 DIAGNOSIS — R06.02 SOB (SHORTNESS OF BREATH): ICD-10-CM

## 2022-01-01 DIAGNOSIS — J44.9 CHRONIC OBSTRUCTIVE PULMONARY DISEASE, UNSPECIFIED COPD TYPE (H): ICD-10-CM

## 2022-01-01 DIAGNOSIS — I42.9 CARDIOMYOPATHY, UNSPECIFIED TYPE (H): Primary | ICD-10-CM

## 2022-01-01 DIAGNOSIS — R07.89 CHEST TIGHTNESS: ICD-10-CM

## 2022-01-01 DIAGNOSIS — M71.21 BAKER CYST, RIGHT: ICD-10-CM

## 2022-01-01 DIAGNOSIS — I50.21 ACUTE SYSTOLIC HEART FAILURE (H): ICD-10-CM

## 2022-01-01 DIAGNOSIS — I50.23 ACUTE ON CHRONIC SYSTOLIC CONGESTIVE HEART FAILURE (H): ICD-10-CM

## 2022-01-01 DIAGNOSIS — M25.561 RIGHT KNEE PAIN, UNSPECIFIED CHRONICITY: Primary | ICD-10-CM

## 2022-01-01 DIAGNOSIS — R60.9 EDEMA, UNSPECIFIED TYPE: ICD-10-CM

## 2022-01-01 LAB
ALBUMIN SERPL BCG-MCNC: 4 G/DL (ref 3.5–5.2)
ALBUMIN SERPL-MCNC: 3 G/DL (ref 3.4–5)
ALBUMIN SERPL-MCNC: 3.7 G/DL (ref 3.5–5)
ALBUMIN UR-MCNC: 70 MG/DL
ALBUMIN UR-MCNC: NEGATIVE MG/DL
ALP SERPL-CCNC: 105 U/L (ref 40–150)
ALP SERPL-CCNC: 93 U/L (ref 45–120)
ALP SERPL-CCNC: 94 U/L (ref 35–104)
ALT SERPL W P-5'-P-CCNC: 13 U/L (ref 0–45)
ALT SERPL W P-5'-P-CCNC: 29 U/L (ref 10–35)
ALT SERPL W P-5'-P-CCNC: 56 U/L (ref 0–50)
ANION GAP SERPL CALCULATED.3IONS-SCNC: 12 MMOL/L (ref 7–15)
ANION GAP SERPL CALCULATED.3IONS-SCNC: 13 MMOL/L (ref 5–18)
ANION GAP SERPL CALCULATED.3IONS-SCNC: 6 MMOL/L (ref 3–14)
ANION GAP SERPL CALCULATED.3IONS-SCNC: 9 MMOL/L (ref 3–14)
APPEARANCE UR: CLEAR
APPEARANCE UR: CLEAR
APTT PPP: 28 SECONDS (ref 22–38)
AST SERPL W P-5'-P-CCNC: 16 U/L (ref 0–40)
AST SERPL W P-5'-P-CCNC: 29 U/L (ref 10–35)
AST SERPL W P-5'-P-CCNC: 49 U/L (ref 0–45)
ATRIAL RATE - MUSE: 63 BPM
ATRIAL RATE - MUSE: 72 BPM
ATRIAL RATE - MUSE: 94 BPM
BACTERIA #/AREA URNS HPF: ABNORMAL /HPF
BACTERIA BLD CULT: NO GROWTH
BACTERIA UR CULT: ABNORMAL
BACTERIA UR CULT: ABNORMAL
BASOPHILS # BLD AUTO: 0.1 10E3/UL (ref 0–0.2)
BASOPHILS # BLD AUTO: 0.1 10E3/UL (ref 0–0.2)
BASOPHILS NFR BLD AUTO: 1 %
BASOPHILS NFR BLD AUTO: 1 %
BILIRUB SERPL-MCNC: 0.3 MG/DL (ref 0–1)
BILIRUB SERPL-MCNC: 0.6 MG/DL
BILIRUB SERPL-MCNC: 0.7 MG/DL (ref 0.2–1.3)
BILIRUB UR QL STRIP: NEGATIVE
BILIRUB UR QL STRIP: NEGATIVE
BNP SERPL-MCNC: 275 PG/ML (ref 0–89)
BUN SERPL-MCNC: 51.3 MG/DL (ref 6–20)
BUN SERPL-MCNC: 56 MG/DL (ref 7–30)
BUN SERPL-MCNC: 65 MG/DL (ref 8–22)
BUN SERPL-MCNC: 66 MG/DL (ref 7–30)
CALCIUM SERPL-MCNC: 8.8 MG/DL (ref 8.5–10.1)
CALCIUM SERPL-MCNC: 9.6 MG/DL (ref 8.5–10.1)
CALCIUM SERPL-MCNC: 9.7 MG/DL (ref 8.6–10)
CALCIUM SERPL-MCNC: 9.9 MG/DL (ref 8.5–10.5)
CHLORIDE BLD-SCNC: 105 MMOL/L (ref 94–109)
CHLORIDE BLD-SCNC: 112 MMOL/L (ref 94–109)
CHLORIDE BLD-SCNC: 99 MMOL/L (ref 98–107)
CHLORIDE SERPL-SCNC: 102 MMOL/L (ref 98–107)
CO2 SERPL-SCNC: 22 MMOL/L (ref 20–32)
CO2 SERPL-SCNC: 27 MMOL/L (ref 22–31)
CO2 SERPL-SCNC: 28 MMOL/L (ref 20–32)
COLOR UR AUTO: ABNORMAL
COLOR UR AUTO: NORMAL
CREAT SERPL-MCNC: 1.63 MG/DL (ref 0.52–1.04)
CREAT SERPL-MCNC: 1.64 MG/DL (ref 0.52–1.04)
CREAT SERPL-MCNC: 1.86 MG/DL (ref 0.51–0.95)
CREAT SERPL-MCNC: 1.94 MG/DL (ref 0.6–1.1)
DEPRECATED HCO3 PLAS-SCNC: 23 MMOL/L (ref 22–29)
DIASTOLIC BLOOD PRESSURE - MUSE: NORMAL MMHG
EOSINOPHIL # BLD AUTO: 0.1 10E3/UL (ref 0–0.7)
EOSINOPHIL # BLD AUTO: 0.3 10E3/UL (ref 0–0.7)
EOSINOPHIL NFR BLD AUTO: 1 %
EOSINOPHIL NFR BLD AUTO: 3 %
ERYTHROCYTE [DISTWIDTH] IN BLOOD BY AUTOMATED COUNT: 14.5 % (ref 10–15)
ERYTHROCYTE [DISTWIDTH] IN BLOOD BY AUTOMATED COUNT: 15.1 % (ref 10–15)
ERYTHROCYTE [DISTWIDTH] IN BLOOD BY AUTOMATED COUNT: 16.5 % (ref 10–15)
FLUAV RNA SPEC QL NAA+PROBE: NEGATIVE
FLUBV RNA RESP QL NAA+PROBE: NEGATIVE
GFR SERPL CREATININE-BSD FRML MDRD: 29 ML/MIN/1.73M2
GFR SERPL CREATININE-BSD FRML MDRD: 31 ML/MIN/1.73M2
GFR SERPL CREATININE-BSD FRML MDRD: 36 ML/MIN/1.73M2
GFR SERPL CREATININE-BSD FRML MDRD: 36 ML/MIN/1.73M2
GLUCOSE BLD-MCNC: 112 MG/DL (ref 70–99)
GLUCOSE BLD-MCNC: 89 MG/DL (ref 70–125)
GLUCOSE BLD-MCNC: 99 MG/DL (ref 70–99)
GLUCOSE SERPL-MCNC: 146 MG/DL (ref 70–99)
GLUCOSE UR STRIP-MCNC: NEGATIVE MG/DL
GLUCOSE UR STRIP-MCNC: NEGATIVE MG/DL
HCT VFR BLD AUTO: 38.8 % (ref 35–47)
HCT VFR BLD AUTO: 45.3 % (ref 35–47)
HCT VFR BLD AUTO: 51.4 % (ref 35–47)
HGB BLD-MCNC: 12.3 G/DL (ref 11.7–15.7)
HGB BLD-MCNC: 14.1 G/DL (ref 11.7–15.7)
HGB BLD-MCNC: 16.4 G/DL (ref 11.7–15.7)
HGB UR QL STRIP: NEGATIVE
HGB UR QL STRIP: NEGATIVE
HOLD SPECIMEN: NORMAL
IMM GRANULOCYTES # BLD: 0 10E3/UL
IMM GRANULOCYTES # BLD: 0.1 10E3/UL
IMM GRANULOCYTES NFR BLD: 0 %
IMM GRANULOCYTES NFR BLD: 1 %
INR PPP: 1.17 (ref 0.85–1.15)
INTERPRETATION ECG - MUSE: NORMAL
KETONES UR STRIP-MCNC: NEGATIVE MG/DL
KETONES UR STRIP-MCNC: NEGATIVE MG/DL
LACTATE SERPL-SCNC: 1.5 MMOL/L (ref 0.7–2)
LEUKOCYTE ESTERASE UR QL STRIP: ABNORMAL
LEUKOCYTE ESTERASE UR QL STRIP: NEGATIVE
LYMPHOCYTES # BLD AUTO: 1.7 10E3/UL (ref 0.8–5.3)
LYMPHOCYTES # BLD AUTO: 2.2 10E3/UL (ref 0.8–5.3)
LYMPHOCYTES NFR BLD AUTO: 16 %
LYMPHOCYTES NFR BLD AUTO: 20 %
MAGNESIUM SERPL-MCNC: 2.3 MG/DL (ref 1.6–2.3)
MCH RBC QN AUTO: 25.7 PG (ref 26.5–33)
MCH RBC QN AUTO: 27.2 PG (ref 26.5–33)
MCH RBC QN AUTO: 28 PG (ref 26.5–33)
MCHC RBC AUTO-ENTMCNC: 31.1 G/DL (ref 31.5–36.5)
MCHC RBC AUTO-ENTMCNC: 31.7 G/DL (ref 31.5–36.5)
MCHC RBC AUTO-ENTMCNC: 31.9 G/DL (ref 31.5–36.5)
MCV RBC AUTO: 83 FL (ref 78–100)
MCV RBC AUTO: 85 FL (ref 78–100)
MCV RBC AUTO: 88 FL (ref 78–100)
MDC_IDC_EPISODE_DTM: NORMAL
MDC_IDC_EPISODE_DURATION: 0 S
MDC_IDC_EPISODE_DURATION: 1 S
MDC_IDC_EPISODE_DURATION: 2 S
MDC_IDC_EPISODE_DURATION: 3 S
MDC_IDC_EPISODE_DURATION: 4 S
MDC_IDC_EPISODE_DURATION: 604 S
MDC_IDC_EPISODE_DURATION: 668 S
MDC_IDC_EPISODE_DURATION: 7 S
MDC_IDC_EPISODE_ID: 553
MDC_IDC_EPISODE_ID: 569
MDC_IDC_EPISODE_ID: 570
MDC_IDC_EPISODE_ID: 634
MDC_IDC_EPISODE_ID: 635
MDC_IDC_EPISODE_ID: 636
MDC_IDC_EPISODE_ID: 637
MDC_IDC_EPISODE_ID: 638
MDC_IDC_EPISODE_ID: 639
MDC_IDC_EPISODE_ID: 640
MDC_IDC_EPISODE_ID: 641
MDC_IDC_EPISODE_ID: 642
MDC_IDC_EPISODE_ID: 643
MDC_IDC_EPISODE_ID: 644
MDC_IDC_EPISODE_ID: 645
MDC_IDC_EPISODE_ID: 646
MDC_IDC_EPISODE_ID: 647
MDC_IDC_EPISODE_ID: 648
MDC_IDC_EPISODE_ID: 658
MDC_IDC_EPISODE_ID: 668
MDC_IDC_EPISODE_ID: 669
MDC_IDC_EPISODE_ID: 670
MDC_IDC_EPISODE_ID: 671
MDC_IDC_EPISODE_ID: 672
MDC_IDC_EPISODE_ID: 673
MDC_IDC_EPISODE_ID: 674
MDC_IDC_EPISODE_ID: 675
MDC_IDC_EPISODE_ID: 676
MDC_IDC_EPISODE_ID: 677
MDC_IDC_EPISODE_ID: 678
MDC_IDC_EPISODE_ID: 679
MDC_IDC_EPISODE_ID: 680
MDC_IDC_EPISODE_ID: 681
MDC_IDC_EPISODE_ID: 682
MDC_IDC_EPISODE_TYPE: NORMAL
MDC_IDC_LEAD_IMPLANT_DT: NORMAL
MDC_IDC_LEAD_LOCATION: NORMAL
MDC_IDC_LEAD_LOCATION_DETAIL_1: NORMAL
MDC_IDC_LEAD_MFG: NORMAL
MDC_IDC_LEAD_MODEL: NORMAL
MDC_IDC_LEAD_POLARITY_TYPE: NORMAL
MDC_IDC_LEAD_SERIAL: NORMAL
MDC_IDC_MSMT_BATTERY_DTM: NORMAL
MDC_IDC_MSMT_BATTERY_REMAINING_LONGEVITY: 60 MO
MDC_IDC_MSMT_BATTERY_REMAINING_LONGEVITY: 62 MO
MDC_IDC_MSMT_BATTERY_REMAINING_LONGEVITY: 66 MO
MDC_IDC_MSMT_BATTERY_RRT_TRIGGER: 2.73
MDC_IDC_MSMT_BATTERY_STATUS: NORMAL
MDC_IDC_MSMT_BATTERY_VOLTAGE: 2.96 V
MDC_IDC_MSMT_BATTERY_VOLTAGE: 3 V
MDC_IDC_MSMT_BATTERY_VOLTAGE: 3 V
MDC_IDC_MSMT_CAP_CHARGE_DTM: NORMAL
MDC_IDC_MSMT_CAP_CHARGE_ENERGY: 18 J
MDC_IDC_MSMT_CAP_CHARGE_TIME: 3.92
MDC_IDC_MSMT_CAP_CHARGE_TIME: 3.92
MDC_IDC_MSMT_CAP_CHARGE_TIME: 3.95
MDC_IDC_MSMT_CAP_CHARGE_TYPE: NORMAL
MDC_IDC_MSMT_LEADCHNL_RV_IMPEDANCE_VALUE: 304 OHM
MDC_IDC_MSMT_LEADCHNL_RV_IMPEDANCE_VALUE: 304 OHM
MDC_IDC_MSMT_LEADCHNL_RV_IMPEDANCE_VALUE: 342 OHM
MDC_IDC_MSMT_LEADCHNL_RV_IMPEDANCE_VALUE: 399 OHM
MDC_IDC_MSMT_LEADCHNL_RV_IMPEDANCE_VALUE: 418 OHM
MDC_IDC_MSMT_LEADCHNL_RV_IMPEDANCE_VALUE: 418 OHM
MDC_IDC_MSMT_LEADCHNL_RV_PACING_THRESHOLD_AMPLITUDE: 0.75 V
MDC_IDC_MSMT_LEADCHNL_RV_PACING_THRESHOLD_PULSEWIDTH: 0.4 MS
MDC_IDC_MSMT_LEADCHNL_RV_SENSING_INTR_AMPL: 10 MV
MDC_IDC_MSMT_LEADCHNL_RV_SENSING_INTR_AMPL: 9.25 MV
MDC_IDC_PG_IMPLANT_DTM: NORMAL
MDC_IDC_PG_MFG: NORMAL
MDC_IDC_PG_MODEL: NORMAL
MDC_IDC_PG_SERIAL: NORMAL
MDC_IDC_PG_TYPE: NORMAL
MDC_IDC_SESS_CLINIC_NAME: NORMAL
MDC_IDC_SESS_DTM: NORMAL
MDC_IDC_SESS_TYPE: NORMAL
MDC_IDC_SET_BRADY_HYSTRATE: NORMAL
MDC_IDC_SET_BRADY_LOWRATE: 40 {BEATS}/MIN
MDC_IDC_SET_BRADY_MODE: NORMAL
MDC_IDC_SET_LEADCHNL_RV_PACING_AMPLITUDE: 1.5 V
MDC_IDC_SET_LEADCHNL_RV_PACING_AMPLITUDE: 1.5 V
MDC_IDC_SET_LEADCHNL_RV_PACING_AMPLITUDE: 1.75 V
MDC_IDC_SET_LEADCHNL_RV_PACING_ANODE_ELECTRODE_1: NORMAL
MDC_IDC_SET_LEADCHNL_RV_PACING_ANODE_LOCATION_1: NORMAL
MDC_IDC_SET_LEADCHNL_RV_PACING_CAPTURE_MODE: NORMAL
MDC_IDC_SET_LEADCHNL_RV_PACING_CATHODE_ELECTRODE_1: NORMAL
MDC_IDC_SET_LEADCHNL_RV_PACING_CATHODE_LOCATION_1: NORMAL
MDC_IDC_SET_LEADCHNL_RV_PACING_POLARITY: NORMAL
MDC_IDC_SET_LEADCHNL_RV_PACING_PULSEWIDTH: 0.4 MS
MDC_IDC_SET_LEADCHNL_RV_SENSING_ANODE_ELECTRODE_1: NORMAL
MDC_IDC_SET_LEADCHNL_RV_SENSING_ANODE_LOCATION_1: NORMAL
MDC_IDC_SET_LEADCHNL_RV_SENSING_CATHODE_ELECTRODE_1: NORMAL
MDC_IDC_SET_LEADCHNL_RV_SENSING_CATHODE_LOCATION_1: NORMAL
MDC_IDC_SET_LEADCHNL_RV_SENSING_POLARITY: NORMAL
MDC_IDC_SET_LEADCHNL_RV_SENSING_SENSITIVITY: 0.3 MV
MDC_IDC_SET_ZONE_DETECTION_BEATS_DENOMINATOR: 32 {BEATS}
MDC_IDC_SET_ZONE_DETECTION_BEATS_NUMERATOR: 24 {BEATS}
MDC_IDC_SET_ZONE_DETECTION_INTERVAL: 240 MS
MDC_IDC_SET_ZONE_DETECTION_INTERVAL: 320 MS
MDC_IDC_SET_ZONE_DETECTION_INTERVAL: 360 MS
MDC_IDC_SET_ZONE_DETECTION_INTERVAL: 400 MS
MDC_IDC_SET_ZONE_TYPE: NORMAL
MDC_IDC_STAT_BRADY_DTM_END: NORMAL
MDC_IDC_STAT_BRADY_DTM_START: NORMAL
MDC_IDC_STAT_BRADY_RV_PERCENT_PACED: 0.01 %
MDC_IDC_STAT_BRADY_RV_PERCENT_PACED: 0.01 %
MDC_IDC_STAT_BRADY_RV_PERCENT_PACED: 0.04 %
MDC_IDC_STAT_EPISODE_RECENT_COUNT: 0
MDC_IDC_STAT_EPISODE_RECENT_COUNT: 1
MDC_IDC_STAT_EPISODE_RECENT_COUNT: 125
MDC_IDC_STAT_EPISODE_RECENT_COUNT: 21
MDC_IDC_STAT_EPISODE_RECENT_COUNT_DTM_END: NORMAL
MDC_IDC_STAT_EPISODE_RECENT_COUNT_DTM_START: NORMAL
MDC_IDC_STAT_EPISODE_TOTAL_COUNT: 0
MDC_IDC_STAT_EPISODE_TOTAL_COUNT: 1
MDC_IDC_STAT_EPISODE_TOTAL_COUNT: 580
MDC_IDC_STAT_EPISODE_TOTAL_COUNT: 590
MDC_IDC_STAT_EPISODE_TOTAL_COUNT: 614
MDC_IDC_STAT_EPISODE_TOTAL_COUNT_DTM_END: NORMAL
MDC_IDC_STAT_EPISODE_TOTAL_COUNT_DTM_START: NORMAL
MDC_IDC_STAT_EPISODE_TYPE: NORMAL
MDC_IDC_STAT_TACHYTHERAPY_ATP_DELIVERED_RECENT: 0
MDC_IDC_STAT_TACHYTHERAPY_ATP_DELIVERED_TOTAL: 0
MDC_IDC_STAT_TACHYTHERAPY_RECENT_DTM_END: NORMAL
MDC_IDC_STAT_TACHYTHERAPY_RECENT_DTM_START: NORMAL
MDC_IDC_STAT_TACHYTHERAPY_SHOCKS_ABORTED_RECENT: 0
MDC_IDC_STAT_TACHYTHERAPY_SHOCKS_ABORTED_TOTAL: 0
MDC_IDC_STAT_TACHYTHERAPY_SHOCKS_DELIVERED_RECENT: 0
MDC_IDC_STAT_TACHYTHERAPY_SHOCKS_DELIVERED_TOTAL: 0
MDC_IDC_STAT_TACHYTHERAPY_TOTAL_DTM_END: NORMAL
MDC_IDC_STAT_TACHYTHERAPY_TOTAL_DTM_START: NORMAL
MONOCYTES # BLD AUTO: 0.6 10E3/UL (ref 0–1.3)
MONOCYTES # BLD AUTO: 0.7 10E3/UL (ref 0–1.3)
MONOCYTES NFR BLD AUTO: 5 %
MONOCYTES NFR BLD AUTO: 6 %
NEUTROPHILS # BLD AUTO: 7.8 10E3/UL (ref 1.6–8.3)
NEUTROPHILS # BLD AUTO: 8.4 10E3/UL (ref 1.6–8.3)
NEUTROPHILS NFR BLD AUTO: 69 %
NEUTROPHILS NFR BLD AUTO: 77 %
NITRATE UR QL: NEGATIVE
NITRATE UR QL: NEGATIVE
NRBC # BLD AUTO: 0 10E3/UL
NRBC # BLD AUTO: 0 10E3/UL
NRBC BLD AUTO-RTO: 0 /100
NRBC BLD AUTO-RTO: 0 /100
NT-PROBNP SERPL-MCNC: ABNORMAL PG/ML (ref 0–900)
NT-PROBNP SERPL-MCNC: ABNORMAL PG/ML (ref 0–900)
P AXIS - MUSE: 58 DEGREES
P AXIS - MUSE: 60 DEGREES
P AXIS - MUSE: 69 DEGREES
PH UR STRIP: 6 [PH] (ref 5–7)
PH UR STRIP: 7 [PH] (ref 5–7)
PLATELET # BLD AUTO: 266 10E3/UL (ref 150–450)
PLATELET # BLD AUTO: 281 10E3/UL (ref 150–450)
PLATELET # BLD AUTO: 292 10E3/UL (ref 150–450)
POTASSIUM BLD-SCNC: 3.7 MMOL/L (ref 3.5–5)
POTASSIUM BLD-SCNC: 3.8 MMOL/L (ref 3.4–5.3)
POTASSIUM BLD-SCNC: 4.7 MMOL/L (ref 3.4–5.3)
POTASSIUM SERPL-SCNC: 4.3 MMOL/L (ref 3.4–5.3)
PR INTERVAL - MUSE: 166 MS
PR INTERVAL - MUSE: 168 MS
PR INTERVAL - MUSE: 172 MS
PROT SERPL-MCNC: 7.5 G/DL (ref 6.4–8.3)
PROT SERPL-MCNC: 7.8 G/DL (ref 6.8–8.8)
PROT SERPL-MCNC: 8.9 G/DL (ref 6–8)
QRS DURATION - MUSE: 100 MS
QRS DURATION - MUSE: 110 MS
QRS DURATION - MUSE: 116 MS
QT - MUSE: 404 MS
QT - MUSE: 458 MS
QT - MUSE: 472 MS
QTC - MUSE: 483 MS
QTC - MUSE: 501 MS
QTC - MUSE: 505 MS
R AXIS - MUSE: -33 DEGREES
R AXIS - MUSE: -35 DEGREES
R AXIS - MUSE: -39 DEGREES
RBC # BLD AUTO: 4.4 10E6/UL (ref 3.8–5.2)
RBC # BLD AUTO: 5.48 10E6/UL (ref 3.8–5.2)
RBC # BLD AUTO: 6.02 10E6/UL (ref 3.8–5.2)
RBC URINE: 1 /HPF
RBC URINE: <1 /HPF
RSV RNA SPEC NAA+PROBE: NEGATIVE
SARS-COV-2 RNA RESP QL NAA+PROBE: NEGATIVE
SARS-COV-2 RNA RESP QL NAA+PROBE: NEGATIVE
SODIUM SERPL-SCNC: 137 MMOL/L (ref 136–145)
SODIUM SERPL-SCNC: 139 MMOL/L (ref 136–145)
SODIUM SERPL-SCNC: 140 MMOL/L (ref 133–144)
SODIUM SERPL-SCNC: 142 MMOL/L (ref 133–144)
SP GR UR STRIP: 1.01 (ref 1–1.03)
SP GR UR STRIP: 1.02 (ref 1–1.03)
SQUAMOUS EPITHELIAL: 1 /HPF
SQUAMOUS EPITHELIAL: <1 /HPF
SYSTOLIC BLOOD PRESSURE - MUSE: NORMAL MMHG
T AXIS - MUSE: 124 DEGREES
T AXIS - MUSE: 133 DEGREES
T AXIS - MUSE: 146 DEGREES
TROPONIN I SERPL HS-MCNC: 20 NG/L
TROPONIN I SERPL-MCNC: <0.01 NG/ML (ref 0–0.29)
TROPONIN T SERPL HS-MCNC: 40 NG/L
UROBILINOGEN UR STRIP-MCNC: NORMAL MG/DL
UROBILINOGEN UR STRIP-MCNC: NORMAL MG/DL
VENTRICULAR RATE- MUSE: 63 BPM
VENTRICULAR RATE- MUSE: 72 BPM
VENTRICULAR RATE- MUSE: 94 BPM
WBC # BLD AUTO: 10.8 10E3/UL (ref 4–11)
WBC # BLD AUTO: 10.8 10E3/UL (ref 4–11)
WBC # BLD AUTO: 11.1 10E3/UL (ref 4–11)
WBC URINE: 1 /HPF
WBC URINE: 22 /HPF

## 2022-01-01 PROCEDURE — 80053 COMPREHEN METABOLIC PANEL: CPT | Performed by: EMERGENCY MEDICINE

## 2022-01-01 PROCEDURE — 250N000009 HC RX 250: Performed by: EMERGENCY MEDICINE

## 2022-01-01 PROCEDURE — 93308 TTE F-UP OR LMTD: CPT | Mod: 26 | Performed by: EMERGENCY MEDICINE

## 2022-01-01 PROCEDURE — C9803 HOPD COVID-19 SPEC COLLECT: HCPCS | Performed by: EMERGENCY MEDICINE

## 2022-01-01 PROCEDURE — 84484 ASSAY OF TROPONIN QUANT: CPT | Performed by: EMERGENCY MEDICINE

## 2022-01-01 PROCEDURE — 71046 X-RAY EXAM CHEST 2 VIEWS: CPT

## 2022-01-01 PROCEDURE — 250N000011 HC RX IP 250 OP 636: Performed by: FAMILY MEDICINE

## 2022-01-01 PROCEDURE — 80053 COMPREHEN METABOLIC PANEL: CPT | Performed by: PHYSICIAN ASSISTANT

## 2022-01-01 PROCEDURE — 96365 THER/PROPH/DIAG IV INF INIT: CPT | Performed by: FAMILY MEDICINE

## 2022-01-01 PROCEDURE — 83880 ASSAY OF NATRIURETIC PEPTIDE: CPT | Performed by: PHYSICIAN ASSISTANT

## 2022-01-01 PROCEDURE — 84484 ASSAY OF TROPONIN QUANT: CPT | Performed by: FAMILY MEDICINE

## 2022-01-01 PROCEDURE — 71045 X-RAY EXAM CHEST 1 VIEW: CPT | Mod: 26

## 2022-01-01 PROCEDURE — 99207 PR NO CHARGE LOS: CPT | Performed by: NURSE PRACTITIONER

## 2022-01-01 PROCEDURE — 250N000009 HC RX 250: Performed by: FAMILY MEDICINE

## 2022-01-01 PROCEDURE — 36415 COLL VENOUS BLD VENIPUNCTURE: CPT | Performed by: FAMILY MEDICINE

## 2022-01-01 PROCEDURE — 99000 SPECIMEN HANDLING OFFICE-LAB: CPT | Performed by: PATHOLOGY

## 2022-01-01 PROCEDURE — 120N000002 HC R&B MED SURG/OB UMMC

## 2022-01-01 PROCEDURE — 87086 URINE CULTURE/COLONY COUNT: CPT | Performed by: FAMILY MEDICINE

## 2022-01-01 PROCEDURE — 93005 ELECTROCARDIOGRAM TRACING: CPT | Performed by: FAMILY MEDICINE

## 2022-01-01 PROCEDURE — 250N000013 HC RX MED GY IP 250 OP 250 PS 637: Performed by: PHYSICIAN ASSISTANT

## 2022-01-01 PROCEDURE — 250N000011 HC RX IP 250 OP 636: Performed by: EMERGENCY MEDICINE

## 2022-01-01 PROCEDURE — 250N000013 HC RX MED GY IP 250 OP 250 PS 637: Performed by: FAMILY MEDICINE

## 2022-01-01 PROCEDURE — 93010 ELECTROCARDIOGRAM REPORT: CPT | Performed by: FAMILY MEDICINE

## 2022-01-01 PROCEDURE — 71046 X-RAY EXAM CHEST 2 VIEWS: CPT | Mod: 26 | Performed by: RADIOLOGY

## 2022-01-01 PROCEDURE — 96366 THER/PROPH/DIAG IV INF ADDON: CPT | Performed by: FAMILY MEDICINE

## 2022-01-01 PROCEDURE — 99214 OFFICE O/P EST MOD 30 MIN: CPT | Mod: 95 | Performed by: NURSE PRACTITIONER

## 2022-01-01 PROCEDURE — 85025 COMPLETE CBC W/AUTO DIFF WBC: CPT | Performed by: EMERGENCY MEDICINE

## 2022-01-01 PROCEDURE — 85610 PROTHROMBIN TIME: CPT | Performed by: FAMILY MEDICINE

## 2022-01-01 PROCEDURE — 85730 THROMBOPLASTIN TIME PARTIAL: CPT | Performed by: FAMILY MEDICINE

## 2022-01-01 PROCEDURE — 99285 EMERGENCY DEPT VISIT HI MDM: CPT | Mod: 25

## 2022-01-01 PROCEDURE — 99285 EMERGENCY DEPT VISIT HI MDM: CPT | Mod: 25 | Performed by: EMERGENCY MEDICINE

## 2022-01-01 PROCEDURE — 93971 EXTREMITY STUDY: CPT | Mod: RT

## 2022-01-01 PROCEDURE — 93295 DEV INTERROG REMOTE 1/2/MLT: CPT | Performed by: INTERNAL MEDICINE

## 2022-01-01 PROCEDURE — 83880 ASSAY OF NATRIURETIC PEPTIDE: CPT | Performed by: EMERGENCY MEDICINE

## 2022-01-01 PROCEDURE — 93282 PRGRMG EVAL IMPLANTABLE DFB: CPT | Mod: 26 | Performed by: INTERNAL MEDICINE

## 2022-01-01 PROCEDURE — 93005 ELECTROCARDIOGRAM TRACING: CPT | Performed by: EMERGENCY MEDICINE

## 2022-01-01 PROCEDURE — 93296 REM INTERROG EVL PM/IDS: CPT

## 2022-01-01 PROCEDURE — U0005 INFEC AGEN DETEC AMPLI PROBE: HCPCS | Performed by: EMERGENCY MEDICINE

## 2022-01-01 PROCEDURE — 250N000009 HC RX 250: Performed by: STUDENT IN AN ORGANIZED HEALTH CARE EDUCATION/TRAINING PROGRAM

## 2022-01-01 PROCEDURE — 99207 PR NO CHARGE LOS: CPT | Performed by: INTERNAL MEDICINE

## 2022-01-01 PROCEDURE — 250N000011 HC RX IP 250 OP 636: Performed by: STUDENT IN AN ORGANIZED HEALTH CARE EDUCATION/TRAINING PROGRAM

## 2022-01-01 PROCEDURE — 99285 EMERGENCY DEPT VISIT HI MDM: CPT | Mod: 25 | Performed by: FAMILY MEDICINE

## 2022-01-01 PROCEDURE — 120N000001 HC R&B MED SURG/OB

## 2022-01-01 PROCEDURE — C9803 HOPD COVID-19 SPEC COLLECT: HCPCS | Performed by: FAMILY MEDICINE

## 2022-01-01 PROCEDURE — 81001 URINALYSIS AUTO W/SCOPE: CPT | Performed by: PATHOLOGY

## 2022-01-01 PROCEDURE — 83735 ASSAY OF MAGNESIUM: CPT | Performed by: PATHOLOGY

## 2022-01-01 PROCEDURE — 36415 COLL VENOUS BLD VENIPUNCTURE: CPT | Performed by: PATHOLOGY

## 2022-01-01 PROCEDURE — 83880 ASSAY OF NATRIURETIC PEPTIDE: CPT | Performed by: FAMILY MEDICINE

## 2022-01-01 PROCEDURE — 93308 TTE F-UP OR LMTD: CPT | Performed by: EMERGENCY MEDICINE

## 2022-01-01 PROCEDURE — 87637 SARSCOV2&INF A&B&RSV AMP PRB: CPT | Performed by: FAMILY MEDICINE

## 2022-01-01 PROCEDURE — 85025 COMPLETE CBC W/AUTO DIFF WBC: CPT | Performed by: FAMILY MEDICINE

## 2022-01-01 PROCEDURE — 94640 AIRWAY INHALATION TREATMENT: CPT | Performed by: EMERGENCY MEDICINE

## 2022-01-01 PROCEDURE — 36415 COLL VENOUS BLD VENIPUNCTURE: CPT | Performed by: PHYSICIAN ASSISTANT

## 2022-01-01 PROCEDURE — 99214 OFFICE O/P EST MOD 30 MIN: CPT | Performed by: NURSE PRACTITIONER

## 2022-01-01 PROCEDURE — 99207 PR NO CHARGE LOS: CPT | Performed by: FAMILY MEDICINE

## 2022-01-01 PROCEDURE — 99213 OFFICE O/P EST LOW 20 MIN: CPT | Performed by: PHYSICIAN ASSISTANT

## 2022-01-01 PROCEDURE — 99215 OFFICE O/P EST HI 40 MIN: CPT | Performed by: NURSE PRACTITIONER

## 2022-01-01 PROCEDURE — 71045 X-RAY EXAM CHEST 1 VIEW: CPT

## 2022-01-01 PROCEDURE — 80053 COMPREHEN METABOLIC PANEL: CPT | Performed by: FAMILY MEDICINE

## 2022-01-01 PROCEDURE — 87040 BLOOD CULTURE FOR BACTERIA: CPT | Mod: 90 | Performed by: PATHOLOGY

## 2022-01-01 PROCEDURE — 93282 PRGRMG EVAL IMPLANTABLE DFB: CPT

## 2022-01-01 PROCEDURE — 99207 PR NON-BILLABLE SERV PER CHARTING: CPT | Performed by: FAMILY MEDICINE

## 2022-01-01 PROCEDURE — 83605 ASSAY OF LACTIC ACID: CPT | Performed by: EMERGENCY MEDICINE

## 2022-01-01 PROCEDURE — 96374 THER/PROPH/DIAG INJ IV PUSH: CPT | Performed by: EMERGENCY MEDICINE

## 2022-01-01 PROCEDURE — 36415 COLL VENOUS BLD VENIPUNCTURE: CPT | Performed by: EMERGENCY MEDICINE

## 2022-01-01 PROCEDURE — 84484 ASSAY OF TROPONIN QUANT: CPT | Performed by: PHYSICIAN ASSISTANT

## 2022-01-01 PROCEDURE — 96375 TX/PRO/DX INJ NEW DRUG ADDON: CPT | Performed by: FAMILY MEDICINE

## 2022-01-01 PROCEDURE — 93010 ELECTROCARDIOGRAM REPORT: CPT | Mod: 59 | Performed by: EMERGENCY MEDICINE

## 2022-01-01 PROCEDURE — G0463 HOSPITAL OUTPT CLINIC VISIT: HCPCS

## 2022-01-01 PROCEDURE — 80048 BASIC METABOLIC PNL TOTAL CA: CPT | Performed by: PATHOLOGY

## 2022-01-01 PROCEDURE — 85027 COMPLETE CBC AUTOMATED: CPT | Performed by: PHYSICIAN ASSISTANT

## 2022-01-01 PROCEDURE — 93005 ELECTROCARDIOGRAM TRACING: CPT | Performed by: PHYSICIAN ASSISTANT

## 2022-01-01 PROCEDURE — 81001 URINALYSIS AUTO W/SCOPE: CPT | Performed by: FAMILY MEDICINE

## 2022-01-01 RX ORDER — ISOSORBIDE MONONITRATE 30 MG/1
30 TABLET, EXTENDED RELEASE ORAL DAILY
Status: DISCONTINUED | OUTPATIENT
Start: 2022-01-01 | End: 2022-01-01 | Stop reason: HOSPADM

## 2022-01-01 RX ORDER — OXYCODONE HYDROCHLORIDE 15 MG/1
TABLET ORAL
COMMUNITY
Start: 2022-01-01

## 2022-01-01 RX ORDER — IPRATROPIUM BROMIDE AND ALBUTEROL SULFATE 2.5; .5 MG/3ML; MG/3ML
3 SOLUTION RESPIRATORY (INHALATION) ONCE
Status: COMPLETED | OUTPATIENT
Start: 2022-01-01 | End: 2022-01-01

## 2022-01-01 RX ORDER — IPRATROPIUM BROMIDE AND ALBUTEROL SULFATE 2.5; .5 MG/3ML; MG/3ML
3 SOLUTION RESPIRATORY (INHALATION)
Status: DISCONTINUED | OUTPATIENT
Start: 2022-01-01 | End: 2022-01-01 | Stop reason: HOSPADM

## 2022-01-01 RX ORDER — BUMETANIDE 2 MG/1
4 TABLET ORAL 2 TIMES DAILY
Qty: 360 TABLET | Refills: 3 | Status: SHIPPED | OUTPATIENT
Start: 2022-01-01

## 2022-01-01 RX ORDER — BUMETANIDE 0.25 MG/ML
2 INJECTION INTRAMUSCULAR; INTRAVENOUS ONCE
Status: COMPLETED | OUTPATIENT
Start: 2022-01-01 | End: 2022-01-01

## 2022-01-01 RX ORDER — POTASSIUM CHLORIDE 750 MG/1
40 CAPSULE, EXTENDED RELEASE ORAL 2 TIMES DAILY
Status: DISCONTINUED | OUTPATIENT
Start: 2022-01-01 | End: 2022-01-01 | Stop reason: HOSPADM

## 2022-01-01 RX ORDER — CARVEDILOL 3.12 MG/1
3.12 TABLET ORAL 2 TIMES DAILY WITH MEALS
Status: DISCONTINUED | OUTPATIENT
Start: 2022-01-01 | End: 2022-01-01 | Stop reason: HOSPADM

## 2022-01-01 RX ORDER — BUMETANIDE 2 MG/1
2 TABLET ORAL DAILY
Status: DISCONTINUED | OUTPATIENT
Start: 2022-01-01 | End: 2022-01-01

## 2022-01-01 RX ORDER — AMOXICILLIN 250 MG
1 CAPSULE ORAL 2 TIMES DAILY
Status: DISCONTINUED | OUTPATIENT
Start: 2022-01-01 | End: 2022-01-01 | Stop reason: HOSPADM

## 2022-01-01 RX ORDER — SPIRONOLACTONE 25 MG/1
25 TABLET ORAL DAILY
Status: DISCONTINUED | OUTPATIENT
Start: 2022-01-01 | End: 2022-01-01 | Stop reason: HOSPADM

## 2022-01-01 RX ORDER — SENNOSIDES 8.6 MG
8.6 TABLET ORAL 2 TIMES DAILY PRN
Status: DISCONTINUED | OUTPATIENT
Start: 2022-01-01 | End: 2022-01-01 | Stop reason: HOSPADM

## 2022-01-01 RX ORDER — METOPROLOL SUCCINATE 25 MG/1
75 TABLET, EXTENDED RELEASE ORAL DAILY
Qty: 270 TABLET | Refills: 3 | Status: SHIPPED | OUTPATIENT
Start: 2022-01-01

## 2022-01-01 RX ORDER — CEFUROXIME AXETIL 500 MG/1
500 TABLET ORAL DAILY
COMMUNITY
Start: 2021-11-16

## 2022-01-01 RX ORDER — ALBUTEROL SULFATE 90 UG/1
1-2 AEROSOL, METERED RESPIRATORY (INHALATION) EVERY 4 HOURS PRN
Status: DISCONTINUED | OUTPATIENT
Start: 2022-01-01 | End: 2022-01-01 | Stop reason: HOSPADM

## 2022-01-01 RX ORDER — LIDOCAINE 40 MG/G
CREAM TOPICAL
Status: DISCONTINUED | OUTPATIENT
Start: 2022-01-01 | End: 2022-01-01 | Stop reason: HOSPADM

## 2022-01-01 RX ORDER — BUMETANIDE 0.25 MG/ML
4 INJECTION INTRAMUSCULAR; INTRAVENOUS 2 TIMES DAILY
Status: DISCONTINUED | OUTPATIENT
Start: 2022-01-01 | End: 2022-01-01 | Stop reason: HOSPADM

## 2022-01-01 RX ORDER — CALCIUM CARBONATE 500 MG/1
1000 TABLET, CHEWABLE ORAL 4 TIMES DAILY PRN
Status: DISCONTINUED | OUTPATIENT
Start: 2022-01-01 | End: 2022-01-01 | Stop reason: HOSPADM

## 2022-01-01 RX ORDER — BUMETANIDE 0.25 MG/ML
2 INJECTION INTRAMUSCULAR; INTRAVENOUS ONCE
Status: DISCONTINUED | OUTPATIENT
Start: 2022-01-01 | End: 2022-01-01

## 2022-01-01 RX ORDER — NITROFURANTOIN 25; 75 MG/1; MG/1
100 CAPSULE ORAL 2 TIMES DAILY
Qty: 10 CAPSULE | Refills: 0 | Status: CANCELLED | OUTPATIENT
Start: 2022-01-01 | End: 2022-01-01

## 2022-01-01 RX ORDER — HEPARIN SODIUM 5000 [USP'U]/.5ML
5000 INJECTION, SOLUTION INTRAVENOUS; SUBCUTANEOUS EVERY 12 HOURS
Status: DISCONTINUED | OUTPATIENT
Start: 2022-01-01 | End: 2022-01-01 | Stop reason: HOSPADM

## 2022-01-01 RX ORDER — POTASSIUM CHLORIDE 750 MG/1
20 CAPSULE, EXTENDED RELEASE ORAL DAILY
Status: DISCONTINUED | OUTPATIENT
Start: 2022-01-01 | End: 2022-01-01 | Stop reason: HOSPADM

## 2022-01-01 RX ORDER — ONDANSETRON 2 MG/ML
4 INJECTION INTRAMUSCULAR; INTRAVENOUS EVERY 6 HOURS PRN
Status: DISCONTINUED | OUTPATIENT
Start: 2022-01-01 | End: 2022-01-01 | Stop reason: HOSPADM

## 2022-01-01 RX ORDER — SULFAMETHOXAZOLE/TRIMETHOPRIM 800-160 MG
1 TABLET ORAL 2 TIMES DAILY
Qty: 14 TABLET | Refills: 0 | Status: SHIPPED | OUTPATIENT
Start: 2022-01-01 | End: 2022-01-01

## 2022-01-01 RX ORDER — AMOXICILLIN 250 MG
2 CAPSULE ORAL 2 TIMES DAILY
Status: DISCONTINUED | OUTPATIENT
Start: 2022-01-01 | End: 2022-01-01

## 2022-01-01 RX ORDER — ACETAMINOPHEN 500 MG
500 TABLET ORAL EVERY 6 HOURS PRN
Status: DISCONTINUED | OUTPATIENT
Start: 2022-01-01 | End: 2022-01-01 | Stop reason: HOSPADM

## 2022-01-01 RX ORDER — ALBUTEROL SULFATE 90 UG/1
2 AEROSOL, METERED RESPIRATORY (INHALATION) EVERY 4 HOURS PRN
Status: DISCONTINUED | OUTPATIENT
Start: 2022-01-01 | End: 2022-01-01 | Stop reason: HOSPADM

## 2022-01-01 RX ORDER — SPIRONOLACTONE 25 MG/1
25 TABLET ORAL DAILY
Status: DISCONTINUED | OUTPATIENT
Start: 2022-01-01 | End: 2022-01-01

## 2022-01-01 RX ORDER — LISINOPRIL 5 MG/1
20 TABLET ORAL DAILY
Status: DISCONTINUED | OUTPATIENT
Start: 2022-01-01 | End: 2022-01-01

## 2022-01-01 RX ORDER — LISINOPRIL 5 MG/1
20 TABLET ORAL DAILY
Status: DISCONTINUED | OUTPATIENT
Start: 2022-01-01 | End: 2022-01-01 | Stop reason: HOSPADM

## 2022-01-01 RX ORDER — OXYCODONE HYDROCHLORIDE 5 MG/1
5 TABLET ORAL ONCE
Status: COMPLETED | OUTPATIENT
Start: 2022-01-01 | End: 2022-01-01

## 2022-01-01 RX ORDER — ACETAMINOPHEN 325 MG/1
975 TABLET ORAL EVERY 8 HOURS PRN
Status: DISCONTINUED | OUTPATIENT
Start: 2022-01-01 | End: 2022-01-01 | Stop reason: HOSPADM

## 2022-01-01 RX ORDER — LANOLIN ALCOHOL/MO/W.PET/CERES
3 CREAM (GRAM) TOPICAL
Status: DISCONTINUED | OUTPATIENT
Start: 2022-01-01 | End: 2022-01-01 | Stop reason: HOSPADM

## 2022-01-01 RX ORDER — POLYETHYLENE GLYCOL 3350 17 G/17G
17 POWDER, FOR SOLUTION ORAL DAILY PRN
Status: DISCONTINUED | OUTPATIENT
Start: 2022-01-01 | End: 2022-01-01 | Stop reason: HOSPADM

## 2022-01-01 RX ORDER — POTASSIUM CHLORIDE 750 MG/1
40 CAPSULE, EXTENDED RELEASE ORAL 2 TIMES DAILY
Qty: 240 CAPSULE | Refills: 3 | Status: SHIPPED | OUTPATIENT
Start: 2022-01-01

## 2022-01-01 RX ORDER — METOLAZONE 2.5 MG/1
TABLET ORAL
Qty: 30 TABLET | Refills: 1 | Status: SHIPPED | OUTPATIENT
Start: 2022-01-01 | End: 2022-01-01

## 2022-01-01 RX ORDER — CLONAZEPAM 0.5 MG/1
0.5 TABLET ORAL ONCE
Status: COMPLETED | OUTPATIENT
Start: 2022-01-01 | End: 2022-01-01

## 2022-01-01 RX ORDER — HYDRALAZINE HYDROCHLORIDE 25 MG/1
25 TABLET, FILM COATED ORAL 2 TIMES DAILY
Status: DISCONTINUED | OUTPATIENT
Start: 2022-01-01 | End: 2022-01-01 | Stop reason: HOSPADM

## 2022-01-01 RX ORDER — HYDROXYZINE HYDROCHLORIDE 25 MG/1
25 TABLET, FILM COATED ORAL EVERY 8 HOURS PRN
Status: DISCONTINUED | OUTPATIENT
Start: 2022-01-01 | End: 2022-01-01 | Stop reason: HOSPADM

## 2022-01-01 RX ORDER — ALBUTEROL SULFATE 90 UG/1
1-2 AEROSOL, METERED RESPIRATORY (INHALATION) EVERY 4 HOURS PRN
Qty: 18 G | Refills: 1 | Status: SHIPPED | OUTPATIENT
Start: 2022-01-01

## 2022-01-01 RX ORDER — METOLAZONE 2.5 MG/1
TABLET ORAL
Qty: 30 TABLET | Refills: 1 | Status: SHIPPED | OUTPATIENT
Start: 2022-01-01

## 2022-01-01 RX ADMIN — BUMETANIDE 2 MG: 0.25 INJECTION, SOLUTION INTRAMUSCULAR; INTRAVENOUS at 02:55

## 2022-01-01 RX ADMIN — BUMETANIDE 2 MG: 0.25 INJECTION INTRAMUSCULAR; INTRAVENOUS at 23:09

## 2022-01-01 RX ADMIN — HEPARIN SODIUM 5000 UNITS: 10000 INJECTION, SOLUTION INTRAVENOUS; SUBCUTANEOUS at 02:08

## 2022-01-01 RX ADMIN — CLONAZEPAM 0.5 MG: 0.5 TABLET ORAL at 22:38

## 2022-01-01 RX ADMIN — IPRATROPIUM BROMIDE AND ALBUTEROL SULFATE 3 ML: .5; 3 SOLUTION RESPIRATORY (INHALATION) at 02:56

## 2022-01-01 RX ADMIN — OXYCODONE HYDROCHLORIDE 5 MG: 5 TABLET ORAL at 12:10

## 2022-01-01 RX ADMIN — BUMETANIDE 1 MG/HR: 0.25 INJECTION, SOLUTION INTRAMUSCULAR; INTRAVENOUS at 03:52

## 2022-01-01 RX ADMIN — IPRATROPIUM BROMIDE AND ALBUTEROL SULFATE 3 ML: 2.5; .5 SOLUTION RESPIRATORY (INHALATION) at 23:32

## 2022-01-01 ASSESSMENT — ENCOUNTER SYMPTOMS
NAUSEA: 0
AGITATION: 0
CONFUSION: 0
DYSPHORIC MOOD: 0
ABDOMINAL PAIN: 0
RHINORRHEA: 0
SHORTNESS OF BREATH: 1
EYE REDNESS: 0
APPETITE CHANGE: 1
CONSTIPATION: 1
ABDOMINAL PAIN: 0
DIFFICULTY URINATING: 0
FEVER: 0
HEADACHES: 0
JOINT SWELLING: 0
CHEST TIGHTNESS: 1
NECK PAIN: 0
SINUS PAIN: 0
FATIGUE: 1
SHORTNESS OF BREATH: 1
TROUBLE SWALLOWING: 0
VOICE CHANGE: 0
VOMITING: 0
NECK STIFFNESS: 0
CHILLS: 0
WHEEZING: 1
LIGHT-HEADEDNESS: 0
APPETITE CHANGE: 0
ACTIVITY CHANGE: 1
BRUISES/BLEEDS EASILY: 0
COUGH: 1
DYSURIA: 0
ABDOMINAL DISTENTION: 1
FEVER: 0
ARTHRALGIAS: 1
CHILLS: 0
DIZZINESS: 0
ARTHRALGIAS: 0
DECREASED CONCENTRATION: 0
ABDOMINAL DISTENTION: 1
BACK PAIN: 1
WEAKNESS: 1
COLOR CHANGE: 0
FATIGUE: 1
DIARRHEA: 0
DIFFICULTY URINATING: 0
NERVOUS/ANXIOUS: 1
FREQUENCY: 0

## 2022-01-01 ASSESSMENT — ACTIVITIES OF DAILY LIVING (ADL)
ADLS_ACUITY_SCORE: 8
ADLS_ACUITY_SCORE: 8
ADLS_ACUITY_SCORE: 35
ADLS_ACUITY_SCORE: 8
ADLS_ACUITY_SCORE: 33
ADLS_ACUITY_SCORE: 8
ADLS_ACUITY_SCORE: 8
ADLS_ACUITY_SCORE: 35
ADLS_ACUITY_SCORE: 8
ADLS_ACUITY_SCORE: 8
ADLS_ACUITY_SCORE: 35
ADLS_ACUITY_SCORE: 8

## 2022-01-01 ASSESSMENT — ASTHMA QUESTIONNAIRES: ACT_TOTALSCORE: 18

## 2022-01-01 ASSESSMENT — PAIN SCALES - GENERAL
PAINLEVEL: SEVERE PAIN (7)
PAINLEVEL: NO PAIN (0)

## 2022-01-18 NOTE — PROGRESS NOTES
Assessment & Plan:      Problem List Items Addressed This Visit        Circulatory    CHF (congestive heart failure) (H) - Primary        Medical Decision Making  Patient with history of heart failure presents requesting refills on her cardiac medications including metolazone, metoprolol, and bumetanide.  Patient has follow-up appointment with PCP in 2 days.  Recommend she wait for follow-up before continuing these medications so that she can properly have labs performed.  Patient otherwise has stable vital signs and no significant pitting edema in the lower extremities.  Discussed signs of worsening symptoms and when to present to the emergency room if needed.     Subjective:      Gloria Guzman is a 56 year old female with history of chemical dependency, bipolar, obesity, sleep apnea, COPD, heart failure, and hypertension here here requesting medication refills on her cardiac medications.  Patient has sent several [employer] messages to her cardiology team as well as her primary care and has not received a response.  She was informed previously that walk-in care clinic does not routinely do refills for these medicines.  However, she still presented to the walk-in care clinic.  She notes running out of her medications 5 days ago and that she has gained 20 pounds in that time.    The following portions of the patient's history were reviewed and updated as appropriate: allergies, current medications, and problem list.     Review of Systems  Pertinent items are noted in HPI.    Allergies  Allergies   Allergen Reactions     Cefaclor Rash     Other reaction(s): *Unknown     Morphine Hives and Itching     Ibuprofen      Morphine Sulfate Itching     Mushrooms [Mushroom] Hives     Olanzapine Other (See Comments)     Varenicline Other (See Comments)       Family History   Problem Relation Age of Onset     Breast Cancer Maternal Grandmother      Bipolar Disorder Maternal Grandmother      Substance Abuse Maternal Grandmother       Breast Cancer Maternal Aunt      Cancer Father 42        lung      Substance Abuse Father      Cancer Maternal Grandfather         lung      Bipolar Disorder Maternal Grandfather      Substance Abuse Maternal Grandfather      Cancer Other         maternal cousin lung      Gallbladder Disease Mother      Depression Mother      Bipolar Disorder Mother      Substance Abuse Mother      Skin Cancer Mother      Gallbladder Disease Sister      Substance Abuse Sister      Substance Abuse Brother      Melanoma No family hx of        Social History     Tobacco Use     Smoking status: Current Every Day Smoker     Packs/day: 0.25     Types: Cigarettes     Smokeless tobacco: Never Used   Substance Use Topics     Alcohol use: No        Objective:      BP (!) 145/95   Pulse 98   Temp 97.7  F (36.5  C) (Oral)   Resp 20   Wt 123.8 kg (273 lb)   SpO2 97%   BMI 41.51 kg/m    General appearance - alert, well appearing, and in no distress and non-toxic  Extremities - Mild pitting edema in the lower extremities     Lab & Imaging Results    No results found for this or any previous visit (from the past 24 hour(s)).    I personally reviewed these results and discussed findings with the patient.    The use of Dragon/[employer] dictation services was used to construct the content of this note; any grammatical errors are non-intentional. Please contact the author directly if you are in need of any clarification.

## 2022-01-19 PROBLEM — R06.02 SOB (SHORTNESS OF BREATH): Status: ACTIVE | Noted: 2022-01-01

## 2022-01-19 PROBLEM — R60.9 EDEMA, UNSPECIFIED TYPE: Status: ACTIVE | Noted: 2022-01-01

## 2022-01-19 PROBLEM — R06.2 WHEEZING: Status: ACTIVE | Noted: 2022-01-01

## 2022-01-19 PROBLEM — I50.9 HEART FAILURE (H): Status: ACTIVE | Noted: 2020-01-31

## 2022-01-19 PROBLEM — R07.89 CHEST TIGHTNESS: Status: ACTIVE | Noted: 2022-01-01

## 2022-01-20 NOTE — ED NOTES
"Pt. Refused lab work from phlebotomist at this time. MD notified pt states \" IM READY TO LEAVE NOW \"  "

## 2022-01-20 NOTE — ED TRIAGE NOTES
Patient ran out of her lasik 5 days ago and feels as though she is fluid overloaded. She feels pressure in her abdomen and feels as though her body is swollen.    She is also concerned about possibly having a kidney infection due to her urinating frequently. She is unsure if she has flank pain or if its her chronic back pain.     Pt reports feeling SOB due to fluid overload. She also reports having a headache.   Pt took tylenol around 1800.
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
## 2022-01-21 NOTE — RESULT ENCOUNTER NOTE
Preliminary urine culture report on 1/21/22 shows the presence of bacteria(s):  >100,000 colonies/mL gram negative bacilli   Emergency Dept/Urgent Care discharge antibiotic: None  Recommendations per [facility] ED Lab result Urine culture protocol.

## 2022-01-21 NOTE — LETTER
January 22, 2022        Gloria Guzman  [id number] [id number]          Dear Gloria Guzman:    You were seen in the [facility] Emergency Department on January 19, 2022 and we have been unable to reach you by phone, so we are sending you this letter.     It is important that you call [facility] Emergency Department lab result nurse at [phone], as we have information to relay to you AND/OR we MAY have to make some changes in your treatment.    Best time to call back is between 9 a.m. and 5:30 p.m, 7 days a week.        Sincerely,     [facility] Emergency Department Lab Result RN  [phone]

## 2022-01-21 NOTE — PROGRESS NOTES
Clinic Care Coordination Contact  [facility]/Voicemail       Clinical Data: Care Coordinator Outreach  Outreach attempted x 1.  Unable to leave message on patient's either of patient's contact numbers listed.  Plan: CC SW will attempt to reach patient again in 1-2 business days.    BRE Benavides   Social Work Clinic Care Coordinator   [facility]  PH: [phone]  [email or web address]

## 2022-01-21 NOTE — RESULT ENCOUNTER NOTE
Both numbers in Patients demographics attempted but not success in reaching Patient or leaving a voicemail.

## 2022-01-22 NOTE — RESULT ENCOUNTER NOTE
Unable to reach patient via phone so letter sent requesting a call back to [facility] ED Lab Result RN at [phone].  RN is available every day between 9 a.m. and 5:30 p.m.  See Telephone encounter.

## 2022-01-22 NOTE — TELEPHONE ENCOUNTER
[facility] Emergency Department Lab result notification:    Reason for Letter being mailed out:      Lab result (Urine culture) is positive and Patient is untreated.    Unable to reach via telephone so letter sent with a message requesting a call back to [phone] between 9 a.m. and 5:30 p.m., 7 days a week for patient's ED/UC lab results.   Lab result (if applicable):  Final urine culture on 1/21/22 shows the presence of bacteria(s): >100,000 CFU/mL Escherichia coli  [facility] Emergency Dept discharge antibiotic: None  Recommendations in treatment per [facility] ED lab result Urine Culture protocol.    Miscellaneous information: VIVIEN Whelan RN  [facility] [place]  Emergency Dept Lab Result RN  Ph# [phone]     Copy of Lab result   Urine Culture  Order: [id number]   Collected 1/19/2022 11:28 PM     Status: Final result     Visible to patient: Yes (not seen)    Specimen Information: Urine, Clean Catch         2 Result Notes    Culture >100,000 CFU/mL Escherichia coli Abnormal        10,000-50,000 CFU/mL Urogenital patel            Resulting Agency: [facility]       Susceptibility     Escherichia coli     RENE     Ampicillin >=32.0 ug/mL Resistant     Ampicillin/ Sulbactam >=32.0 ug/mL Resistant     Cefazolin <=4.0 ug/mL Susceptible 1     Cefepime <=1.0 ug/mL Susceptible     Cefoxitin <=4.0 ug/mL Susceptible     Ceftazidime <=1.0 ug/mL Susceptible     Ceftriaxone <=1.0 ug/mL Susceptible     Ciprofloxacin >=4.0 ug/mL Resistant     Gentamicin <=1.0 ug/mL Susceptible     Levofloxacin >=8.0 ug/mL Resistant     Nitrofurantoin <=16.0 ug/mL Susceptible     Piperacillin/Tazobactam <=4.0 ug/mL Susceptible     Tobramycin <=1.0 ug/mL Susceptible     Trimethoprim/Sulfamethoxazole >16/304 ug/mL Resistant              1 Cefazolin RENE breakpoints are for the treatment of uncomplicated urinary tract infections. For the treatment of systemic infections, please contact the  laboratory for additional testing.            Specimen Collected: 01/19/22 11:28 PM Last Resulted: 01/21/22  9:41 PM

## 2022-01-24 NOTE — PROGRESS NOTES
Clinic Care Coordination Contact  [facility]/Voicemail       Clinical Data: Care Coordinator Outreach  Outreach attempted x 2.  Still unable to leave a message as voicemail box is full.  Plan: CC SW will make no further outreaches at this time.    BRE Benavides   Social Work Clinic Care Coordinator   [facility]  PH: [phone]  [email or web address]

## 2022-03-16 NOTE — TELEPHONE ENCOUNTER
"[facility] Emergency Department/Urgent Care Lab result notification     Patient/parent Name  Gloria    RN Assessment (Patient s current Symptoms), include time called.  [Insert Left message here if message left]  \"I've had back pain for the last 1 1/2 months.    Lab result (if applicable):  Final urine culture on 1/21/22 shows the presence of bacteria(s): >100,000 CFU/mL Escherichia coli  [facility] Emergency Dept discharge antibiotic: None  Recommendations in treatment per [facility] ED lab result Urine Culture protocol.  Left the ED on 1/19/22 AMA    RN Recommendations/Instructions per [facility] ED lab result protocol  Patient notified of lab result and encouraged to followup with her PCP to have urine retested.      Corbin Whelan RN  [facility]  Emergency Dept Lab Result RN  Ph# [phone]       "

## 2022-03-16 NOTE — PROGRESS NOTES
Left message for patient to call back.     Patient has a lab appointment tomorrow and there are no orders in her chart. We do not know what labs she is looking to have done. Please have patient to reach out the ordering provider to place an order.  If there is no order in her chart by the time of the appointment we will not be able to do the blood draw.  Thanks!

## 2022-03-31 NOTE — NURSING NOTE
Chief Complaint   Patient presents with     Follow Up     3/30/22: Reason for visit: Return CORE: 56yr old female presents with NICM, EF 55-60% for heart failure follow-up with labs prior.     Vitals were taken and medications reconciled.    Jarad Frias, EMT  7:25 AM

## 2022-03-31 NOTE — PATIENT INSTRUCTIONS
Take your medicines every day, as directed    Changes made today:  o Please schedule an appointment with Primary Care for this week.  o Please go back down to lab for UA and culture today before you go.  o Please increase your Bumex to 4mg twice daily  o Please Start taking Potassium 40mEqs twice daily  o    Monitor Your Weight and Symptoms    Contact us if you:      Gain 2 pounds in one day or 5 pounds in one week    Feel more short of breath    Notice more leg swelling    Feel lightheadeded   Change your lifestyle    Limit Salt or Sodium:    2000 mg  Limit Fluids:    2000 mL or approximately 64 ounces  Eat a Heart Healthy Diet    Low in saturated fats  Stay Active:    Aim to move at least 150 minutes every  week         To Contact us    During Business Hours:  [phone], option # 1      After hours, weekends or holidays:   [phone], Option #4  Ask to speak to the On-Call Cardiologist. Inform them you are a CORE/heart failure patient at the [facility].     Use [employer] allows you to communicate directly with your heart team through secure messaging.    [facility] can be accessed any time on your phone, computer, or tablet.    If you need assistance, we'd be happy to help!         Keep your Heart Appointments:    Please follow-up with CORE next week AND the following week with [facility]    Please have an echo, next available

## 2022-03-31 NOTE — PROGRESS NOTES
"HPI:   Ms. Guzman is a 56 year old female with prior history of systolic heart failure with improved EF (most recently 50-55%) due to NICM, HTN, COPD, bipolar disorder, and substance abuse history who presents to [facility] for follow-up. Patient was last seen in cardiology clinic 2020. She was last admitted to the hospital 1/2022 for hypervolemia in the setting of missed diuretics. Unfortunately she left AMA that admission and has not been seen in clinic since.     Today in clinic she reports feeling very unwell.  She has multiple complaints mostly due to her chronic pain.  She notes weight gain, \"horrible breathing \", and she is very concerned about an infection she had in October that has not been followed up on.  She was admitted to [facility] October for staph epi bacteremia with sepsis.  Today she tells me she was supposed to have repeated blood cultures and urine cultures and \"cannot find anyone to order these \".  She does not have a primary care provider.  Today she is concerned she has another infection.  She denies any fever, chills, low back pain, but she states she had no symptoms of this with her prior infection.  She feels fluid in her abdomen.  Feels shortness of breath after a few steps.  Denies any lightheadedness or syncope, no chest pain or palpitations.  She states she is taking Bumex 2 mg twice a day with metolazone about every 3 days most recently this morning.  She does not take any potassium.  She states she is taking metoprolol XL 75 mg daily but is not taking spironolactone, lisinopril, potassium.  These were last prescribed in 2020.      PAST MEDICAL HISTORY:  Past Medical History:   Diagnosis Date     Anemia      Arthritis      Bipolar affective disorder, current episode moderate (H)      Chronic back pain      Chronic systolic CHF (congestive heart failure) (H)      COPD (chronic obstructive pulmonary disease) (H)      COPD (chronic obstructive pulmonary disease) (H)      ETOH abuse      " GERD (gastroesophageal reflux disease)      H/O heart failure      History of posttraumatic stress disorder (PTSD)      Homeless      HTN (hypertension)      Marijuana abuse      Nonischemic cardiomyopathy (H)     EF 19% by CMRI     Obesity      Pacemaker      Polysubstance abuse (H)     tobacco, previous cocaine, meth, and alcohol, and marijuana     Sleep apnea      Tobacco abuse        FAMILY HISTORY:  Family History   Problem Relation Age of Onset     Breast Cancer Maternal Grandmother      Bipolar Disorder Maternal Grandmother      Substance Abuse Maternal Grandmother      Breast Cancer Maternal Aunt      Cancer Father 42        lung      Substance Abuse Father      Cancer Maternal Grandfather         lung      Bipolar Disorder Maternal Grandfather      Substance Abuse Maternal Grandfather      Cancer Other         maternal cousin lung      Gallbladder Disease Mother      Depression Mother      Bipolar Disorder Mother      Substance Abuse Mother      Skin Cancer Mother      Gallbladder Disease Sister      Substance Abuse Sister      Substance Abuse Brother      Melanoma No family hx of        SOCIAL HISTORY:  Socioeconomic History     Marital status: Legally      Spouse name: Not on file     Number of children: Not on file     Years of education: Not on file     Highest education level: Not on file   Occupational History     Not on file   Tobacco Use     Smoking status: Current Every Day Smoker     Packs/day: 0.25     Types: Cigarettes     Smokeless tobacco: Never Used   Substance and Sexual Activity     Alcohol use: No     Drug use: Yes     Types: Marijuana     Sexual activity: Never   Other Topics Concern     Parent/sibling w/ CABG, MI or angioplasty before 65F 55M? Not Asked   Social History Narrative     Not on file     Social Determinants of Health     Financial Resource Strain: Not on file   Food Insecurity: Not on file   Transportation Needs: Not on file   Physical Activity: Not on file   Stress:  Not on file   Social Connections: Not on file   Intimate Partner Violence: Not on file   Housing Stability: Not on file       CURRENT MEDICATIONS:  acetaminophen (TYLENOL) 500 MG tablet, Take 2 tablets (1,000 mg) by mouth 3 times daily (Patient not taking: Reported on 1/18/2022)  albuterol (PROAIR HFA) 108 (90 Base) MCG/ACT inhaler, Inhale 2 puffs into the lungs every 4 hours as needed for shortness of breath / dyspnea  albuterol (PROVENTIL) (2.5 MG/3ML) 0.083% neb solution, Take 1 vial (2.5 mg) by nebulization every 4 hours as needed for shortness of breath / dyspnea  bumetanide (BUMEX) 2 MG tablet, Take 1 tablet (2 mg) by mouth 2 times daily Please call to schedule an appointment before we can give you further refills. [phone] option 1  cefuroxime (CEFTIN) 500 MG tablet, Take 500 mg by mouth daily  clobetasol (TEMOVATE) 0.05 % external cream, Apply topically 2 times daily  hydrocortisone (CORTAID) 1 % external cream, Apply topically 2 times daily as needed  lisinopril (ZESTRIL) 20 MG tablet, Take 1 tablet (20 mg) by mouth daily  metolazone (ZAROXOLYN) 2.5 MG tablet, Take 1 tablet (2.5 mg) by mouth every 72 hours as needed (for fluid retention)  metoprolol succinate ER (TOPROL-XL) 25 MG 24 hr tablet, Take 3 tablets (75 mg) by mouth daily Please schedule appointment for further refills. [phone] option 1  potassium chloride ER (MICRO-K) 10 MEQ CR capsule, Take 2 capsules (20 mEq) by mouth daily  sodium chloride (OCEAN) 0.65 % nasal spray, Spray 1 spray into both nostrils daily as needed for congestion (Patient not taking: Reported on 1/18/2022)  spironolactone (ALDACTONE) 25 MG tablet, Take 1 tablet (25 mg) by mouth daily Please schedule appt for any further refills. [phone] option1    No current facility-administered medications on file prior to visit.      ROS:   Refer to HPI    EXAM:  BP (!) 141/82 (BP Location: Right arm, Patient Position: Chair, Cuff Size: Adult Large)   Pulse 86   Wt 130.2 kg  (287 lb)   SpO2 99%   BMI 43.64 kg/m       GENERAL: Appears comfortable, in no acute distress.   HEENT: Eye symmetrical, no discharge or icterus bilaterally. Mucous membranes moist and without lesions.  CV: RRR with ectopy, +S1S2, no murmur, rub, or gallop. JVP difficult to appreciate but elevated.   RESPIRATORY: Respirations regular, even, and unlabored. Lungs CTA throughout.   GI: Soft, obese and non distended with normoactive bowel sounds present in all quadrants. No tenderness, rebound, guarding. No hepatomegaly.   EXTREMITIES: Trace peripheral edema. 2+ bilateral pedal pulses.   NEUROLOGIC: Alert and oriented x 3. No focal deficits.   MUSCULOSKELETAL: No joint swelling or tenderness.   SKIN: No jaundice. No rashes or lesions.     Labs, reviewed with patient in clinic today:  CBC RESULTS:  Lab Results   Component Value Date    WBC 10.8 01/19/2022    WBC 8.1 05/11/2021    RBC 4.40 01/19/2022    RBC 4.64 05/11/2021    HGB 12.3 01/19/2022    HGB 12.9 05/11/2021    HCT 38.8 01/19/2022    HCT 40.8 05/11/2021    MCV 88 01/19/2022    MCV 88 05/11/2021    MCH 28.0 01/19/2022    MCH 27.8 05/11/2021    MCHC 31.7 01/19/2022    MCHC 31.6 05/11/2021    RDW 15.1 (H) 01/19/2022    RDW 16.0 (H) 05/11/2021     01/19/2022     05/11/2021       CMP RESULTS:  Lab Results   Component Value Date     01/19/2022     05/11/2021    POTASSIUM 4.7 01/19/2022    POTASSIUM 4.2 05/11/2021    CHLORIDE 112 (H) 01/19/2022    CHLORIDE 109 05/11/2021    CO2 22 01/19/2022    CO2 25 05/11/2021    ANIONGAP 6 01/19/2022    ANIONGAP 7 05/11/2021     (H) 01/19/2022     (H) 05/11/2021    BUN 56 (H) 01/19/2022    BUN 46 (H) 05/11/2021    CR 1.63 (H) 01/19/2022    CR 1.29 (H) 05/11/2021    GFRESTIMATED 36 (L) 01/19/2022    GFRESTIMATED 46 (L) 05/11/2021    GFRESTBLACK 53 (L) 05/11/2021    MADDISON 8.8 01/19/2022    MADDISON 9.0 05/11/2021    BILITOTAL 0.7 01/19/2022    BILITOTAL 0.4 05/11/2021    ALBUMIN 3.0 (L) 01/19/2022     ALBUMIN 3.7 05/11/2021    ALKPHOS 105 01/19/2022    ALKPHOS 88 05/11/2021    ALT 56 (H) 01/19/2022    ALT 21 05/11/2021    AST 49 (H) 01/19/2022    AST 14 05/11/2021        INR RESULTS:  Lab Results   Component Value Date    INR 1.17 (H) 01/19/2022    INR 1.11 02/01/2020       Lab Results   Component Value Date    MAG 2.2 02/01/2020     Lab Results   Component Value Date    NTBNPI 42,244 (H) 01/19/2022    NTBNPI 469 09/04/2020     Lab Results   Component Value Date    NTBNP 2,831 (H) 05/11/2021       Diagnostics:  TTE 1/31/20  Global and regional left ventricular function is normal with an EF of 55-60%.  Right ventricular function, chamber size, wall motion, and thickness are normal.  No significant valvular disease.  IVC diameter <2.1 cm collapsing >50% with sniff suggests a normal RA pressure  of 3 mmHg.  No pericardial effusion is present.     There is no prior study for direct comparison.    ICD interrogation 1/2022  Device: [email or web address] [id number] Evera XT VR  Normal device function.   Mode: VVI 40 bpm  : <0.1%  Intrinsic rhythm: SR 78 bpm  Thoracic Impedance: Suggests possible intrathoracic fluid accumulation.  Short V-V intervals: 0  Lead Trends Appear Stable.  Estimated battery longevity to RRT = 5.4 years. Battery voltage = 2.97 V.   Ventricular Arrhythmia: 125 NSVT over the last 16 months, lasting 1-3 seconds 182- 217 bpm.  Setting Changes: None    Cardiac MRI   1/16/15  1.  Severely decreased left ventricular systolic function with a  calculated ejection fraction of  19 %.  The left ventricle is severely  dilated with end-diastolic diameter of 7.4 cm, with severe diffuse  global hypokinesis.  2.  Severely decreased right ventricular systolic function with a  calculated ejection fraction of 24%.  The right ventricle is  moderately dilated.  3.  There is no evidence of myocardial edema on T2-weighted imaging.  4.  There is no evidence of cardiac siderosis. T2* myocardium: 45 ms  (myocardial T2* < 20 msec, in  the setting of LV dysfunction is  suggestive of iron-overload of the myocardium as the cause of the  dysfunction).  5.  On delayed enhancement imaging, there is a focal area of  hyperenhancement in the inferior septum at the RV insertion point.   This is sometimes observed in the setting of dilated cardiomyopathy.   6.  There are bilateral pleural effusions.  7.  There is a small circumferential pericardial effusion.  8.  Overall, these findings are consistent with a dilated nonischemic  cardiomyopathy.  The MRI sequences and imaging planes in this study were tailored for  cardiac imaging and are suboptimal for evaluation of non-cardiac  structures.    Assessment and Plan:   Ms. Guzman is a 56 year old female with prior history of systolic heart failure with improved EF (most recently 50-55%) due to NICM, HTN, COPD, bipolar disorder, and substance abuse history who presents to [facility] for follow-up.     # HFimpEF secondary to NICM    Stage C. NYHA Class IIIB.    Fluid status: hypervolemic, increase Bumex to 4 mg bid, start kcl 40 meq bid, continue metolazone q72hr asneed  ACEi/ARB/ARNi/afterload reduction: deferred today  BB: Toprol XL 75 mg daily  Aldosterone antagonist: restart when patient demonstrates compliance  SGLT2i: deferred  SCD prophylaxis: ICD  NSAID use: contraindicated  Sleep apnea evaluation: has MARV, not treated  Remote monitoring: deferred until compliance established'  Other: TTE ordered (last 1/2020), ECG today given ectopy heard on exam    # HTN  -above goal in clinic  -resume ACEi versus pedro next visit    # Hx e.coli bacteremia, urinary source  In October 2021 at [facility]. No symptoms but patient insists on rechecking today and has been unable to establish primary care. UA and blood cx ordered. RN to assist with PCP appt.     # Fragmented care  # Hx non adherence  CORE RN to assist with appt with establish care with PCP.  Reiterated the importance of close follow-up with primary care and also core  clinic to prevent hospitalizations.  She agrees. He has frequent admissions at multiple hospitals.  She should attempt to stick to one healthcare system if possible for continuity.  Refilled medications today.    Follow up in CORE weekly for now     25 minutes spent face-to-face with patient, >50% in counseling and/or coordination of care as described above    Miranda Yang DNP, NP-C  3/31/2022          TATY ADKINS

## 2022-03-31 NOTE — LETTER
"3/31/2022      RE: Gloria Guzman  [id number] [facility] [id number]       Dear Colleague,    Thank you for the opportunity to participate in the care of your patient, Gloria Guzman, at the [facility] at [facility]. Please see a copy of my visit note below.    HPI:   Ms. Guzman is a 56 year old female with prior history of systolic heart failure with improved EF (most recently 50-55%) due to NICM, HTN, COPD, bipolar disorder, and substance abuse history who presents to [facility] [place] [facility] for follow-up. Patient was last seen in cardiology clinic 2020. She was last admitted to the hospital 1/2022 for hypervolemia in the setting of missed diuretics. Unfortunately she left AMA that admission and has not been seen in clinic since.     Today in clinic she reports feeling very unwell.  She has multiple complaints mostly due to her chronic pain.  She notes weight gain, \"horrible breathing \", and she is very concerned about an infection she had in October that has not been followed up on.  She was admitted to [facility] October for staph epi bacteremia with sepsis.  Today she tells me she was supposed to have repeated blood cultures and urine cultures and \"cannot find anyone to order these \".  She does not have a primary care provider.  Today she is concerned she has another infection.  She denies any fever, chills, low back pain, but she states she had no symptoms of this with her prior infection.  She feels fluid in her abdomen.  Feels shortness of breath after a few steps.  Denies any lightheadedness or syncope, no chest pain or palpitations.  She states she is taking Bumex 2 mg twice a day with metolazone about every 3 days most recently this morning.  She does not take any potassium.  She states she is taking metoprolol XL 75 mg daily but is not taking spironolactone, lisinopril, potassium.  These were last prescribed in 2020.      PAST MEDICAL " HISTORY:  Past Medical History:   Diagnosis Date     Anemia      Arthritis      Bipolar affective disorder, current episode moderate (H)      Chronic back pain      Chronic systolic CHF (congestive heart failure) (H)      COPD (chronic obstructive pulmonary disease) (H)      COPD (chronic obstructive pulmonary disease) (H)      ETOH abuse      GERD (gastroesophageal reflux disease)      H/O heart failure      History of posttraumatic stress disorder (PTSD)      Homeless      HTN (hypertension)      Marijuana abuse      Nonischemic cardiomyopathy (H)     EF 19% by CMRI     Obesity      Pacemaker      Polysubstance abuse (H)     tobacco, previous cocaine, meth, and alcohol, and marijuana     Sleep apnea      Tobacco abuse        FAMILY HISTORY:  Family History   Problem Relation Age of Onset     Breast Cancer Maternal Grandmother      Bipolar Disorder Maternal Grandmother      Substance Abuse Maternal Grandmother      Breast Cancer Maternal Aunt      Cancer Father 42        lung      Substance Abuse Father      Cancer Maternal Grandfather         lung      Bipolar Disorder Maternal Grandfather      Substance Abuse Maternal Grandfather      Cancer Other         maternal cousin lung      Gallbladder Disease Mother      Depression Mother      Bipolar Disorder Mother      Substance Abuse Mother      Skin Cancer Mother      Gallbladder Disease Sister      Substance Abuse Sister      Substance Abuse Brother      Melanoma No family hx of        SOCIAL HISTORY:  Socioeconomic History     Marital status: Legally      Spouse name: Not on file     Number of children: Not on file     Years of education: Not on file     Highest education level: Not on file   Occupational History     Not on file   Tobacco Use     Smoking status: Current Every Day Smoker     Packs/day: 0.25     Types: Cigarettes     Smokeless tobacco: Never Used   Substance and Sexual Activity     Alcohol use: No     Drug use: Yes     Types: Marijuana      Sexual activity: Never   Other Topics Concern     Parent/sibling w/ CABG, MI or angioplasty before 65F 55M? Not Asked   Social History Narrative     Not on file     Social Determinants of Health     Financial Resource Strain: Not on file   Food Insecurity: Not on file   Transportation Needs: Not on file   Physical Activity: Not on file   Stress: Not on file   Social Connections: Not on file   Intimate Partner Violence: Not on file   Housing Stability: Not on file       CURRENT MEDICATIONS:  acetaminophen (TYLENOL) 500 MG tablet, Take 2 tablets (1,000 mg) by mouth 3 times daily (Patient not taking: Reported on 1/18/2022)  albuterol (PROAIR HFA) 108 (90 Base) MCG/ACT inhaler, Inhale 2 puffs into the lungs every 4 hours as needed for shortness of breath / dyspnea  albuterol (PROVENTIL) (2.5 MG/3ML) 0.083% neb solution, Take 1 vial (2.5 mg) by nebulization every 4 hours as needed for shortness of breath / dyspnea  bumetanide (BUMEX) 2 MG tablet, Take 1 tablet (2 mg) by mouth 2 times daily Please call to schedule an appointment before we can give you further refills. [phone] option 1  cefuroxime (CEFTIN) 500 MG tablet, Take 500 mg by mouth daily  clobetasol (TEMOVATE) 0.05 % external cream, Apply topically 2 times daily  hydrocortisone (CORTAID) 1 % external cream, Apply topically 2 times daily as needed  lisinopril (ZESTRIL) 20 MG tablet, Take 1 tablet (20 mg) by mouth daily  metolazone (ZAROXOLYN) 2.5 MG tablet, Take 1 tablet (2.5 mg) by mouth every 72 hours as needed (for fluid retention)  metoprolol succinate ER (TOPROL-XL) 25 MG 24 hr tablet, Take 3 tablets (75 mg) by mouth daily Please schedule appointment for further refills. [phone] option 1  potassium chloride ER (MICRO-K) 10 MEQ CR capsule, Take 2 capsules (20 mEq) by mouth daily  sodium chloride (OCEAN) 0.65 % nasal spray, Spray 1 spray into both nostrils daily as needed for congestion (Patient not taking: Reported on 1/18/2022)  spironolactone  (ALDACTONE) 25 MG tablet, Take 1 tablet (25 mg) by mouth daily Please schedule appt for any further refills. [phone] option1    No current facility-administered medications on file prior to visit.      ROS:   Refer to HPI    EXAM:  BP (!) 141/82 (BP Location: Right arm, Patient Position: Chair, Cuff Size: Adult Large)   Pulse 86   Wt 130.2 kg (287 lb)   SpO2 99%   BMI 43.64 kg/m       GENERAL: Appears comfortable, in no acute distress.   HEENT: Eye symmetrical, no discharge or icterus bilaterally. Mucous membranes moist and without lesions.  CV: RRR with ectopy, +S1S2, no murmur, rub, or gallop. JVP difficult to appreciate but elevated.   RESPIRATORY: Respirations regular, even, and unlabored. Lungs CTA throughout.   GI: Soft, obese and non distended with normoactive bowel sounds present in all quadrants. No tenderness, rebound, guarding. No hepatomegaly.   EXTREMITIES: Trace peripheral edema. 2+ bilateral pedal pulses.   NEUROLOGIC: Alert and oriented x 3. No focal deficits.   MUSCULOSKELETAL: No joint swelling or tenderness.   SKIN: No jaundice. No rashes or lesions.     Labs, reviewed with patient in clinic today:  CBC RESULTS:  Lab Results   Component Value Date    WBC 10.8 01/19/2022    WBC 8.1 05/11/2021    RBC 4.40 01/19/2022    RBC 4.64 05/11/2021    HGB 12.3 01/19/2022    HGB 12.9 05/11/2021    HCT 38.8 01/19/2022    HCT 40.8 05/11/2021    MCV 88 01/19/2022    MCV 88 05/11/2021    MCH 28.0 01/19/2022    MCH 27.8 05/11/2021    MCHC 31.7 01/19/2022    MCHC 31.6 05/11/2021    RDW 15.1 (H) 01/19/2022    RDW 16.0 (H) 05/11/2021     01/19/2022     05/11/2021       CMP RESULTS:  Lab Results   Component Value Date     01/19/2022     05/11/2021    POTASSIUM 4.7 01/19/2022    POTASSIUM 4.2 05/11/2021    CHLORIDE 112 (H) 01/19/2022    CHLORIDE 109 05/11/2021    CO2 22 01/19/2022    CO2 25 05/11/2021    ANIONGAP 6 01/19/2022    ANIONGAP 7 05/11/2021     (H) 01/19/2022    GLC  123 (H) 05/11/2021    BUN 56 (H) 01/19/2022    BUN 46 (H) 05/11/2021    CR 1.63 (H) 01/19/2022    CR 1.29 (H) 05/11/2021    GFRESTIMATED 36 (L) 01/19/2022    GFRESTIMATED 46 (L) 05/11/2021    GFRESTBLACK 53 (L) 05/11/2021    MADDISON 8.8 01/19/2022    MADDISON 9.0 05/11/2021    BILITOTAL 0.7 01/19/2022    BILITOTAL 0.4 05/11/2021    ALBUMIN 3.0 (L) 01/19/2022    ALBUMIN 3.7 05/11/2021    ALKPHOS 105 01/19/2022    ALKPHOS 88 05/11/2021    ALT 56 (H) 01/19/2022    ALT 21 05/11/2021    AST 49 (H) 01/19/2022    AST 14 05/11/2021        INR RESULTS:  Lab Results   Component Value Date    INR 1.17 (H) 01/19/2022    INR 1.11 02/01/2020       Lab Results   Component Value Date    MAG 2.2 02/01/2020     Lab Results   Component Value Date    NTBNPI 42,244 (H) 01/19/2022    NTBNPI 469 09/04/2020     Lab Results   Component Value Date    NTBNP 2,831 (H) 05/11/2021       Diagnostics:  TTE 1/31/20  Global and regional left ventricular function is normal with an EF of 55-60%.  Right ventricular function, chamber size, wall motion, and thickness are normal.  No significant valvular disease.  IVC diameter <2.1 cm collapsing >50% with sniff suggests a normal RA pressure  of 3 mmHg.  No pericardial effusion is present.     There is no prior study for direct comparison.    ICD interrogation 1/2022  Device: Medtronic [id number] Evera XT VR  Normal device function.   Mode: VVI 40 bpm  : <0.1%  Intrinsic rhythm: SR 78 bpm  Thoracic Impedance: Suggests possible intrathoracic fluid accumulation.  Short V-V intervals: 0  Lead Trends Appear Stable.  Estimated battery longevity to RRT = 5.4 years. Battery voltage = 2.97 V.   Ventricular Arrhythmia: 125 NSVT over the last 16 months, lasting 1-3 seconds 182- 217 bpm.  Setting Changes: None    Cardiac MRI   1/16/15  1.  Severely decreased left ventricular systolic function with a  calculated ejection fraction of  19 %.  The left ventricle is severely  dilated with end-diastolic diameter of 7.4 cm, with  severe diffuse  global hypokinesis.  2.  Severely decreased right ventricular systolic function with a  calculated ejection fraction of 24%.  The right ventricle is  moderately dilated.  3.  There is no evidence of myocardial edema on T2-weighted imaging.  4.  There is no evidence of cardiac siderosis. T2* myocardium: 45 ms  (myocardial T2* < 20 msec, in the setting of LV dysfunction is  suggestive of iron-overload of the myocardium as the cause of the  dysfunction).  5.  On delayed enhancement imaging, there is a focal area of  hyperenhancement in the inferior septum at the RV insertion point.   This is sometimes observed in the setting of dilated cardiomyopathy.   6.  There are bilateral pleural effusions.  7.  There is a small circumferential pericardial effusion.  8.  Overall, these findings are consistent with a dilated nonischemic  cardiomyopathy.  The MRI sequences and imaging planes in this study were tailored for  cardiac imaging and are suboptimal for evaluation of non-cardiac  structures.    Assessment and Plan:   Ms. Guzman is a 56 year old female with prior history of systolic heart failure with improved EF (most recently 50-55%) due to NICM, HTN, COPD, bipolar disorder, and substance abuse history who presents to [facility] for follow-up.     # HFimpEF secondary to NICM    Stage C. NYHA Class IIIB.    Fluid status: hypervolemic, increase Bumex to 4 mg bid, start kcl 40 meq bid, continue metolazone q72hr asneed  ACEi/ARB/ARNi/afterload reduction: deferred today  BB: Toprol XL 75 mg daily  Aldosterone antagonist: restart when patient demonstrates compliance  SGLT2i: deferred  SCD prophylaxis: ICD  NSAID use: contraindicated  Sleep apnea evaluation: has MARV, not treated  Remote monitoring: deferred until compliance established'  Other: TTE ordered (last 1/2020), ECG today given ectopy heard on exam    # HTN  -above goal in clinic  -resume ACEi versus pedro next visit    # Hx e.coli bacteremia, urinary  source  In October 2021 at [facility]. No symptoms but patient insists on rechecking today and has been unable to establish primary care. UA and blood cx ordered. RN to assist with PCP appt.     # Fragmented care  # Hx non adherence  [facility] RN to assist with appt with establish care with PCP.  Reiterated the importance of close follow-up with primary care and also [facility] clinic to prevent hospitalizations.  She agrees. He has frequent admissions at multiple hospitals.  She should attempt to stick to one healthcare system if possible for continuity.  Refilled medications today.    Follow up in [facility] weekly for now     25 minutes spent face-to-face with patient, >50% in counseling and/or coordination of care as described above    Miranda Yang DNP, NP-C  3/31/2022          TATY ADKINS

## 2022-04-21 PROBLEM — R91.8 PULMONARY NODULES: Status: ACTIVE | Noted: 2021-03-24

## 2022-04-21 NOTE — ED TRIAGE NOTES
Pt reports bid lasix and  q72 hr additoinal lasix, pt compliant with dosing schedule 20 lb wt gain in past 5 days.  Tired and short of breath gradual over 5 days.

## 2022-04-21 NOTE — DISCHARGE INSTRUCTIONS
As we discussed, please follow-up in your primary care clinic in 2 to 3 days for close recheck.  Please continue your medications as prescribed, as it does appear to be reducing your fluid.  You do have a Baker's cyst on your right leg which may be the source of your pain.  Please continue to elevate, ice, and use a compression wrap.  There is some evidence that you may also have a skin infection of your right leg called a cellulitis.  We will start you on the antibiotic Bactrim which you should take and finish as prescribed.  If at anytime you develop a fever, worsening shortness of breath or chest tightness, worsening pain or redness in your leg, swelling in your legs, or any new or concerning symptoms please return to the ED for further evaluation.    Your blood pressure was elevated in the emergencydepartment today and requires recheck and close follow-up in your primary care clinic. Untreated blood pressure can cause serious complications including, but not limited to stroke, heart attack/failure, and kidney disease.  Please make a close follow-up appointment to have this recheck performed. Please return to the emergency department immediately if you develop a severe headache, vision changes, chest pain, shortness of breath, orabdominal pain.
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
## 2022-04-21 NOTE — ED NOTES
Pt requesting something for pain states her feet hurt really bad and she doesn't know what is going  on with them.  Bilateral redness noted to feet.

## 2022-04-21 NOTE — ED NOTES
Patient Update: Patient found to have left the facility before receiving discharge instructions with prescription or have IV catheter removed from her arm. I did leave a message on her voicemail to return to the ED to  her prescription and to assure that her IV catheter is removed from her arm.

## 2022-05-10 NOTE — TELEPHONE ENCOUNTER
Patient have a video appointment today at 1pm with [facility]. Writer placed a call this afternoon to check in patient. Writer got a hold of patient. During check in patient asked writer how long appointment takes. Writer informed patient that appt takes up to 1hr- 1hr 30 min. Patient informed writer that patient does not have time. Patient would like to reschedule. Patient will reschedule appointment with Intake. Patient have Intake's number and will call Intake when available to reschedule. Writer informed patient's .

## 2022-05-17 NOTE — PROGRESS NOTES
Gloria is a 56 year old who is being evaluated via a billable video visit.      How would you like to obtain your AVS? MyChart  If the video visit is dropped, the invitation should be resent by: Text to cell phone: [phone]  Will anyone else be joining your video visit? No      Video Start Time: 3:38 PM      ICD-10-CM    1. Wheezing  R06.2 albuterol (PROAIR HFA/PROVENTIL HFA/VENTOLIN HFA) 108 (90 Base) MCG/ACT inhaler   2. SOB (shortness of breath)  R06.02 albuterol (PROAIR HFA/PROVENTIL HFA/VENTOLIN HFA) 108 (90 Base) MCG/ACT inhaler   3. Chronic obstructive pulmonary disease, unspecified COPD type (H)  J44.9    4. Chronic systolic CHF (congestive heart failure) (H)  I50.22        We discussed COPD exacerbation versus CHF exacerbation.  Patient states she does have underlying asthma.  I do not see this on her problem list.  Provided prescription for albuterol to use as needed.  I encouraged her to monitor her weight closely.  She is to follow-up with her PCP if symptoms persist or worsen.  She is content with the plan.    Subjective   Gloria is a 56 year old who presents for the following health issues   Patient has multiple comorbidities including bipolar disorder, COPD, hypertension, MARV, nonischemic cardiomyopathy, CHF, CKD stage III.  Patient states she has a history of seasonal asthma which flares during this time year.  Over the past 2 weeks, she has been experiencing shortness of breath and wheezing.  She has not seen any weight changes.  She has not had any changes to her lower extremity edema.  She denies recent cold symptoms or fever.  Patient does not feel as though this is a CHF exacerbation.  She uses albuterol as needed for wheezing.  She was unable to see her PCP and requests refill today.      Review of Systems   Constitutional, HEENT, cardiovascular, pulmonary, gi and gu systems are negative, except as otherwise noted.      Objective           Vitals:  No vitals were obtained today due to virtual  visit.    Physical Exam   GENERAL: Healthy, alert and no distress  EYES: Eyes grossly normal to inspection.  No discharge or erythema, or obvious scleral/conjunctival abnormalities.  HENT: Normal cephalic/atraumatic.  External ears, nose and mouth without ulcers or lesions.  No nasal drainage visible.  NECK: No asymmetry, visible masses or scars  RESP: No audible wheeze, cough, or visible cyanosis.  No visible retractions or increased work of breathing.    MS: No gross musculoskeletal defects noted.  Normal range of motion.  No visible edema.  SKIN: Visible skin clear. No significant rash, abnormal pigmentation or lesions.  NEURO: Cranial nerves grossly intact.  Mentation and speech appropriate for age.  PSYCH: Mentation appears normal, affect normal/bright, judgement and insight intact, normal speech and appearance well-groomed.        Video-Visit Details    Type of service:  Video Visit    Video End Time:3:42 PM    Originating Location (pt. Location): Home    Distant Location (provider location):  [facility]     Platform used for Video Visit: Linda

## 2022-06-03 NOTE — PROGRESS NOTES
Gloria is a 56 year old who is being evaluated via a billable video visit.        Video Start Time: attempted and sent links  Attempted by phone and was unable to LM, tried again 1200, closing this      This patient was a no show for this scheduled appointment.

## 2022-07-12 PROBLEM — R45.4 OUTBURSTS OF ANGER: Status: ACTIVE | Noted: 2022-01-01

## 2022-07-12 PROBLEM — M25.561 RIGHT KNEE PAIN, UNSPECIFIED CHRONICITY: Status: ACTIVE | Noted: 2022-01-01

## 2022-07-12 PROBLEM — Z86.59 HISTORY OF PSYCHIATRIC DISORDER: Status: ACTIVE | Noted: 2022-01-01

## 2022-07-12 NOTE — PROGRESS NOTES
"  Assessment & Plan     ICD-10-CM    1. Right knee pain, unspecified chronicity  M25.561    2. Outbursts of anger  R45.4    3. History of psychiatric disorder  Z86.59    4. Chemical dependency (H)  F19.20    5. Morbid obesity (H)  E66.01    6. Chronic pain syndrome  G89.4    Complex pt here who chose to not establish care with me today when I told her I would not write a letter recommending she remain on opioids and she declines any care at all, did not want referrals placed through [place]     We discussed there are other treatments available for better pain control and opioids not recommended for chronic pain, she continues to have pain despite being on high doses, pt became upset and was demanding to stay on the pain medications she has been on for 20 years and \"if she would stop these she would be dead\".   I disucssed tapering off possibly, doing a referral for a different pain clinic and ortho for a work-up of knee pain. However, pt became upset and started swearing at my student and I when I discussed we would not be the one to write a letter to continue her opioids at the current dosage.  Pt left and said she would find a different doctor to be her Primary Care Provider     Pt overdue for much care that will need to be addressed by new Primary Care Provider we did not get a chance to discuss today, unknown if under Psych care or any substance use at this time due to difficult visit pt abruptly left     1. Right knee pain, unspecified chronicity  - Overall good stability on imaging and now new symptoms. Previous xray and US with arthritis and bakers cyst, which are likely the cause of pain. Recommend ortho consult. Patient declined, see details below.       Johan Castro is a 56 year old, presenting for the following health issues:  Chief Complaint   Patient presents with     Pain     Legs and lower back     History of back surgery about 10 years ago. She was told that the screws and rods were not " "working right. Leg symptoms started about 8 months ago. 4/21 was in ER for right leg pain-baker cyst was found but negative for DVT. Takes oxycodone 15mg four times a day that barely takes the edge off, tylenol, and aspirin. She does have left knee replacement. Previous right knee xray with arthritis. Has never received a steroid injection. She denies numbness/tingling, and bowel/bladder incontinence.     Patient goes to pain clinic and the doctor is under investigation so she needs note to stay on current oxycodone dose. She states this is the main reason she is here today. She did say that doctor has a partner at the pain clinic, although she was told she needs to get a Primary Care Provider and have her primary write this letter for her     review appears had been weaning the dose off the 90 MME    History of Present Illness       Reason for visit:  Pain  Symptom onset:  More than a month  Symptoms include:  Psin in legs back now right arm  Symptom intensity:  Severe  Symptom progression:  Worsening  Had these symptoms before:  No  What makes it worse:  Walking  What makes it better:  Lieing in bed    She eats 2-3 servings of fruits and vegetables daily.She consumes 2 sweetened beverage(s) daily.She exercises with enough effort to increase her heart rate 10 to 19 minutes per day.  She exercises with enough effort to increase her heart rate 3 or less days per week. She is missing 1 dose(s) of medications per week.  She is not taking prescribed medications regularly due to side effects.             Review of Systems   Gastrointestinal: Positive for constipation. Negative for diarrhea.   Genitourinary: Negative for difficulty urinating and frequency.   Musculoskeletal: Positive for back pain.          Objective    BP (!) 150/86   Pulse 56   Temp 98.5  F (36.9  C) (Temporal)   Resp 14   Ht 1.753 m (5' 9\")   Wt 128.6 kg (283 lb 8 oz)   SpO2 98%   BMI 41.87 kg/m    Body mass index is 41.87 kg/m .  Physical " "Exam   GENERAL: obese, alert and no distress  MS: Back: Pain with palpitation of lower spine. Right sided pain. Right knee: good stability, mildly swollen posteriorly. Pain with palpatation laterally and posteriorly. CMS intact. Strength good. Gait with limp and slow but steady-using cane.  Good stability of ligaments. Unable to do [facility] due to pain. Straight leg test negative.    PSYCH: Labile mood. Patient crying at times and laughing at times. Tangential thoughts and moving from one topic to another. Appears mildly anxious. When discussing opioids and options for pain treatment, pt because agitated and screamed \" I am out of here! You are all retarded!\" nearly left without her purse, we handed to her and she grabbed it and walked out yelling while walking down the hallway.                This patient was seen along with, Charlotte Alarcon-student, history and review of systems obtained by student and confirmed by attending. Objective, exams, assessment and plan reviewed with attending.     YARY Gómez Student     .  ..     40 minutes spent on the date of the encounter doing chart review, history and exam, documentation and further activities per the note   Sofia CALLOWAY CNP   "

## 2022-07-15 NOTE — TELEPHONE ENCOUNTER
DIAGNOSIS: Knee replacement   APPOINTMENT DATE: 07/20/2022   NOTES STATUS DETAILS   OFFICE NOTE from referring provider N/A    OFFICE NOTE from other specialist N/A    DISCHARGE SUMMARY from hospital N/A    DISCHARGE REPORT from the ER Internal 09/02/2021 [facility] ED    OPERATIVE REPORT Internal 12/13/2010 Left total knee arthroplasty, minimally invasive midvastus technique, [employer] triathlon components, posterior stabilized   EMG report N/A    MEDICATION LIST N/A    MRI N/A    DEXA (osteoporosis/bone health) N/A    CT SCAN N/A    XRAYS (IMAGES & REPORTS) Internal 09/02/2021 RT knee

## 2022-07-20 NOTE — PROGRESS NOTES
Patient desired appointment with knee surgeon for discussion of arthroplasty. Not interested in nonsurgical options. She will be assisted with rescheduling. She additionally has a chronic history of LBP after after previous surgery. A spine referral was provided to get her to the appropriate provider for this.

## 2022-07-20 NOTE — LETTER
7/20/2022         RE: Gloria Guzman  [id number] [address] [id number]        Dear Colleague,    Thank you for referring your patient, Gloria Guzman, to the [facility] [place]. Please see a copy of my visit note below.    Patient desired appointment with knee surgeon for discussion of arthroplasty. Not interested in nonsurgical options. She will be assisted with rescheduling. She additionally has a chronic history of LBP after after previous surgery. A spine referral was provided to get her to the appropriate provider for this.       Again, thank you for allowing me to participate in the care of your patient.        Sincerely,        Neo Leon MD

## 2022-07-20 NOTE — PATIENT INSTRUCTIONS
Thanks for coming today.  Ortho/Sports Medicine Clinic  [address] [place] [address]    To schedule future appointments in Ortho Clinic, you may call [phone].    To schedule ordered imaging or an injection ordered by your provider:  Call Central Imaging Injection scheduling line: [phone]    MyChart available online at:  [email or web address]    Please call if any further questions or concerns ([phone]).  Clinic hours 8 am to 5 pm.    Return to clinic (call) if symptoms worsen or fail to improve.

## 2022-07-20 NOTE — TELEPHONE ENCOUNTER
Left Voicemail (1st Attempt) for the patient to call back and schedule the following:    Specialty phone number: [phone]     Additonal Notes:   Please assist patient in scheduling patient with surgeon for right knee arthroplasty at the [facility] as well as with nonsurgical spine (PMR or Ortho/neurosurg spine)    Tricia mejia Procedure   Orthopedics, Podiatry, Sports Medicine, ENT/Eye Specialties  [facility]   [phone]

## 2022-07-26 NOTE — TELEPHONE ENCOUNTER
Left Voicemail (2nd Attempt) for the patient to call back and schedule the following:    Specialty phone number: [phone]     Additonal Notes:   Please assist patient in scheduling patient with surgeon for right knee arthroplasty at the [facility] as well as with nonsurgical spine (PMR or Ortho/neurosurg spine)    Tricia mejia Procedure   Orthopedics, Podiatry, Sports Medicine, ENT/Eye Specialties  [facility]   [phone]

## 2022-07-28 NOTE — TELEPHONE ENCOUNTER
L[facility] for [facility] or Dr. Hammond. Next available ok

## 2022-07-28 NOTE — ED NOTES
Went to move Pt to a room and Pt had left the [facility].  Pt told Registration she was leaving, Registration did not tell nursing staff Pt was leaving, no AMA form was signed.

## 2022-07-28 NOTE — ED TRIAGE NOTES
"Reports having a heart failure exacerbation. \"I have about 30 pounds of fluid on me\". Devin swollen LE and \"throwing up water\". SOB at rest.      "

## 2022-07-29 NOTE — TELEPHONE ENCOUNTER
Unable to LVM for patient to notify her that 8/1 appointment with Dr. Ramey will be cancelled and she needs to reschedule with Dr. Hammond or Dr. Polanco.

## 2022-07-29 NOTE — TELEPHONE ENCOUNTER
Attempted to call and LVM for patient to reschedule a NEW KNEE appointment with Dr. Polanco or Manish.  The phone continued to ring and CC was unable to LVM for the patient.

## 2022-10-07 NOTE — PROGRESS NOTES
Patient requested to leave AMA. Cards 1 paged. Dr. Gracia requested to have writer page day team at 0800. Patient unwilling to wait until 0800. Patient signed AMA form prior to leaving ED with sister.

## 2022-10-07 NOTE — ED TRIAGE NOTES
Triage Assessment     Row Name 10/07/22 0047       Triage Assessment (Adult)    Airway WDL X       Respiratory WDL    Respiratory WDL X       Skin Circulation/Temperature WDL    Skin Circulation/Temperature WDL X       Cardiac WDL    Cardiac WDL X       Peripheral/Neurovascular WDL    Peripheral Neurovascular WDL X       Cognitive/Neuro/Behavioral WDL    Cognitive/Neuro/Behavioral WDL WDL

## 2022-10-07 NOTE — ED PROVIDER NOTES
ED Provider Note  [facility]      History     Chief Complaint   Patient presents with     Shortness of Breath     Patient presents to ED with c/o cough and shortness of breath o/s yesterday. PMH: HTN, CKD, CHF, COPD.      HPI  Gloria Guzman is a 56 year old female with history of COPD, NICM with HFrEF (EF 21% on echo 2022) s/p ICD placement, CKD stage 3, renal cell carcinoma, polysubstance abuse, and HTN who presents to the Emergency Department with shortness of breath. Patient has multiple recent admissions for similar, often leaves AMA, most recent admission on .    Presents today noting several days of worsening abdominal distention and shortness of breath consistent with fluid overload as she has had in the past.  She feels that she has difficulty staying awake.    No fevers or chills.  Ongoing chest pressure.  No vomiting no abdominal pain    Past Medical History  Past Medical History:   Diagnosis Date     Anemia      Arthritis      Bipolar affective disorder, current episode moderate (H)      Chronic back pain      Chronic kidney disease      Chronic systolic CHF (congestive heart failure) (H)      COPD (chronic obstructive pulmonary disease) (H)      COPD (chronic obstructive pulmonary disease) (H)      ETOH abuse      GERD (gastroesophageal reflux disease)      H/O heart failure      History of posttraumatic stress disorder (PTSD)      Homeless      HTN (hypertension)      Marijuana abuse      Nonischemic cardiomyopathy (H)     EF 19% by CMRI     Obesity      Pacemaker      Polysubstance abuse (H)     tobacco, previous cocaine, meth, and alcohol, and marijuana     Sleep apnea      Tobacco abuse      Past Surgical History:   Procedure Laterality Date     BACK SURGERY       CARDIAC SURGERY      AICD placement       SECTION       GALLBLADDER SURGERY       HERNIA REPAIR       JOINT REPLACEMTN, KNEE RT/LT  11/10    Joint Replacement knee LT     SURGICAL PATHOLOGY  EXAM       TONSILLECTOMY       acetaminophen (TYLENOL) 500 MG tablet  albuterol (PROAIR HFA/PROVENTIL HFA/VENTOLIN HFA) 108 (90 Base) MCG/ACT inhaler  bumetanide (BUMEX) 2 MG tablet  cefuroxime (CEFTIN) 500 MG tablet  clobetasol (TEMOVATE) 0.05 % external cream  hydrocortisone (CORTAID) 1 % external cream  lisinopril (ZESTRIL) 20 MG tablet  metolazone (ZAROXOLYN) 2.5 MG tablet  metoprolol succinate ER (TOPROL-XL) 25 MG 24 hr tablet  oxyCODONE IR (ROXICODONE) 15 MG tablet  potassium chloride ER (MICRO-K) 10 MEQ CR capsule  sodium chloride (OCEAN) 0.65 % nasal spray      Allergies   Allergen Reactions     Cefaclor Rash     Other reaction(s): *Unknown     Morphine Hives and Itching     Ibuprofen      Morphine Sulfate Itching     Mushrooms [Mushroom] Hives     Olanzapine Other (See Comments)     Varenicline Other (See Comments)     Family History  Family History   Problem Relation Age of Onset     Breast Cancer Maternal Grandmother      Bipolar Disorder Maternal Grandmother      Substance Abuse Maternal Grandmother      Breast Cancer Maternal Aunt      Cancer Father 42        lung      Substance Abuse Father      Cancer Maternal Grandfather         lung      Bipolar Disorder Maternal Grandfather      Substance Abuse Maternal Grandfather      Cancer Other         maternal cousin lung      Gallbladder Disease Mother      Depression Mother      Bipolar Disorder Mother      Substance Abuse Mother      Skin Cancer Mother      Gallbladder Disease Sister      Substance Abuse Sister      Substance Abuse Brother      Melanoma No family hx of      Social History   Social History     Tobacco Use     Smoking status: Current Every Day Smoker     Packs/day: 0.25     Types: Cigarettes     Smokeless tobacco: Never Used   Vaping Use     Vaping Use: Never used   Substance Use Topics     Alcohol use: No     Drug use: Yes     Types: Marijuana      Past medical history, past surgical history, medications, allergies, family history, and  social history were reviewed with the patient. No additional pertinent items.       Review of Systems   10 point review of symptoms was performed and is negative except as noted above.   A complete review of systems was performed with pertinent positives and negatives noted in the HPI, and all other systems negative.    Physical Exam   BP: 126/84  Pulse: 95  Temp: 98.1  F (36.7  C)  Resp: 26  SpO2: 97 %  Physical Exam  GEN: chronically il appearing, non toxic, cooperative and conversant.   HEENT: The head is normocephalic and atraumatic. Pupils are equal round and reactive to light. Extraocular motions are intact. There is no facial swelling. The neck is nontender and supple.   CV: Regular rate and rhythm without murmurs rubs or gallops. 2+ radial pulses bilaterally.  PULM: Clear to auscultation bilaterally,   ABD: Soft, nontender, nondistended.   EXT: Full range of motion.  Bilateral lower extremity ankle edema  NEURO: Cranial nerves II through XII are intact and symmetric. Bilateral upper and lower extremities grossly show full range of motion without any focal deficits.   SKIN: No rashes, ecchymosis, or lacerations  PSYCH: Anxious  ED Course      Procedures  Results for orders placed during the hospital encounter of 10/07/22    POC US ECHO LIMITED    Impression  Limited Bedside Cardiac Ultrasound, performed and interpreted by me.  Indication: Shortness of Breath.  Parasternal long axis, parasternal short axis and apical 4 chamber views were acquired.  Image quality was satisfactory.    Findings:  Severely decreased global function  LV and LA dilatation  There is no evidence of free fluid within the pericardium.    IMPRESSION: Severely decreased global function likely near patient's baseline of 20% EF.  LV dilated.  LA dilated.  No pericardial effusion             EKG at 0106.    sob at time of ECG.  ECG interpretted by me within 10 minutes of production  NSR at 92 bpm, frequent PVCs.  Leftward axis deviation.   QRS prolonged to 104 intervals.  Delayed R-wave progress. No ST-T elevations or depressions. Pathologic T-wave inversion are absent     Interpretation: Abnormal ECG, overall QRS complex morphology and progression similar to prior ECG from April 21, 2022         Results for orders placed or performed during the hospital encounter of 10/07/22   XR Chest 2 Views     Status: None    Narrative    EXAM: XR CHEST 2 VIEWS  LOCATION: [facility]  DATE/TIME: 10/7/2022 4:04 AM    INDICATION: cp, sob  COMPARISON: None.      Impression    IMPRESSION: The cardiac silhouette is enlarged. There is central pulmonary venous congestion. Interstitial edema seen in the mid and lower lung zone. The dual-lead pacing device is unchanged in position.   POC US ECHO LIMITED     Status: None    Impression    Limited Bedside Cardiac Ultrasound, performed and interpreted by me.   Indication: Shortness of Breath.  Parasternal long axis, parasternal short axis and apical 4 chamber views were acquired.   Image quality was satisfactory.    Findings:    Severely decreased global function  LV and LA dilatation  There is no evidence of free fluid within the pericardium.    IMPRESSION: Severely decreased global function likely near patient's baseline of 20% EF.  LV dilated.  LA dilated.  No pericardial effusion   Comprehensive metabolic panel     Status: Abnormal   Result Value Ref Range    Sodium 137 136 - 145 mmol/L    Potassium 4.3 3.4 - 5.3 mmol/L    Chloride 102 98 - 107 mmol/L    Carbon Dioxide (CO2) 23 22 - 29 mmol/L    Anion Gap 12 7 - 15 mmol/L    Urea Nitrogen 51.3 (H) 6.0 - 20.0 mg/dL    Creatinine 1.86 (H) 0.51 - 0.95 mg/dL    Calcium 9.7 8.6 - 10.0 mg/dL    Glucose 146 (H) 70 - 99 mg/dL    Alkaline Phosphatase 94 35 - 104 U/L    AST 29 10 - 35 U/L    ALT 29 10 - 35 U/L    Protein Total 7.5 6.4 - 8.3 g/dL    Albumin 4.0 3.5 - 5.2 g/dL    Bilirubin Total 0.6 <=1.2 mg/dL    GFR Estimate 31 (L) >60  mL/min/1.73m2   Lactic acid whole blood     Status: Normal   Result Value Ref Range    Lactic Acid 1.5 0.7 - 2.0 mmol/L   BNP     Status: Abnormal   Result Value Ref Range    N terminal Pro BNP Inpatient 27,509 (H) 0 - 900 pg/mL   Troponin T, High Sensitivity     Status: Abnormal   Result Value Ref Range    Troponin T, High Sensitivity 40 (H) <=14 ng/L   [facility] Draw     Status: None    Narrative    The following orders were created for panel order [facility] Draw.  Procedure                               Abnormality         Status                     ---------                               -----------         ------                     Extra Blue Top Tube[[id number]]                              Final result               Extra Red Top Tube[[id number]]                               Final result               Extra Green Top (Lithium...[[id number]]                      Final result               Extra Purple Top Tube[[id number]]                            Final result                 Please view results for these tests on the individual orders.   CBC with platelets and differential     Status: Abnormal   Result Value Ref Range    WBC Count 11.1 (H) 4.0 - 11.0 10e3/uL    RBC Count 5.48 (H) 3.80 - 5.20 10e6/uL    Hemoglobin 14.1 11.7 - 15.7 g/dL    Hematocrit 45.3 35.0 - 47.0 %    MCV 83 78 - 100 fL    MCH 25.7 (L) 26.5 - 33.0 pg    MCHC 31.1 (L) 31.5 - 36.5 g/dL    RDW 16.5 (H) 10.0 - 15.0 %    Platelet Count 266 150 - 450 10e3/uL    % Neutrophils 69 %    % Lymphocytes 20 %    % Monocytes 6 %    % Eosinophils 3 %    % Basophils 1 %    % Immature Granulocytes 1 %    NRBCs per 100 WBC 0 <1 /100    Absolute Neutrophils 7.8 1.6 - 8.3 10e3/uL    Absolute Lymphocytes 2.2 0.8 - 5.3 10e3/uL    Absolute Monocytes 0.7 0.0 - 1.3 10e3/uL    Absolute Eosinophils 0.3 0.0 - 0.7 10e3/uL    Absolute Basophils 0.1 0.0 - 0.2 10e3/uL    Absolute Immature Granulocytes 0.1 <=0.4 10e3/uL    Absolute NRBCs 0.0 10e3/uL   Extra Blue Top Tube      Status: None   Result Value Ref Range    Hold Specimen JIC    Extra Red Top Tube     Status: None   Result Value Ref Range    Hold Specimen JIC    Extra Green Top (Lithium Heparin) Tube     Status: None   Result Value Ref Range    Hold Specimen JIC    Extra Purple Top Tube     Status: None   Result Value Ref Range    Hold Specimen JIC    Asymptomatic COVID-19 Virus (Coronavirus) by PCR Nasopharyngeal     Status: Normal    Specimen: Nasopharyngeal; Swab   Result Value Ref Range    SARS CoV2 PCR Negative Negative    Narrative    Testing was performed using the Xpert Xpress SARS-CoV-2 Assay on the  Cepheid Gene-Xpert Instrument Systems. Additional information about  this Emergency Use Authorization (EUA) assay can be found via the Lab  Guide. This test should be ordered for the detection of SARS-CoV-2 in  individuals who meet SARS-CoV-2 clinical and/or epidemiological  criteria. Test performance is unknown in asymptomatic patients. This  test is for in vitro diagnostic use under the FDA EUA for  laboratories certified under CLIA to perform high complexity testing.  This test has not been FDA cleared or approved. A negative result  does not rule out the presence of PCR inhibitors in the specimen or  target RNA in concentration below the limit of detection for the  assay. The possibility of a false negative should be considered if  the patient's recent exposure or clinical presentation suggests  COVID-19. This test was validated by the [facility]. This laboratory is certified under  the Clinical Laboratory Improvement Amendments of 1988 (CLIA-88) as  qualified to perform high complexity laboratory testing.     EKG 12-lead, tracing only     Status: None   Result Value Ref Range    Systolic Blood Pressure  mmHg    Diastolic Blood Pressure  mmHg    Ventricular Rate 94 BPM    Atrial Rate 94 BPM    KS Interval 172 ms    QRS Duration 110 ms     ms    QTc 505 ms    P Axis  69 degrees    R AXIS -33 degrees    T Axis 124 degrees    Interpretation ECG       Sinus rhythm with Premature atrial complexes  Left axis deviation  Minimal voltage criteria for LVH, may be normal variant  Cannot rule out Anterior infarct , age undetermined  T wave abnormality, consider lateral ischemia  Prolonged QT  Abnormal ECG  Unconfirmed report - interpretation of this ECG is computer generated - see medical record for final interpretation    Confirmed by - EMERGENCY ROOM, PHYSICIAN ([id number]),  KRYS JOY ([id number]) on 10/7/2022 7:14:49 AM     CBC with platelets differential     Status: Abnormal    Narrative    The following orders were created for panel order CBC with platelets differential.  Procedure                               Abnormality         Status                     ---------                               -----------         ------                     CBC with platelets and d...[[id number]]  Abnormal            Final result                 Please view results for these tests on the individual orders.     Medications   bumetanide (BUMEX) injection 2 mg (2 mg Intravenous Given 10/7/22 0255)   ipratropium - albuterol 0.5 mg/2.5 mg/3 mL (DUONEB) neb solution 3 mL (3 mLs Nebulization Given 10/7/22 0256)   ipratropium - albuterol 0.5 mg/2.5 mg/3 mL (DUONEB) neb solution 3 mL (3 mLs Nebulization Given 10/7/22 0256)        Assessments & Plan (with Medical Decision Making)   56-year-old female with COPD, chronic ischemic systolic heart failure among other medical problems as noted above presenting with weight gain abdominal distention and shortness of breath    DDx is broad including CHF exacerbation, fluid overload, COPD exacerbation, ACS, pulmonary embolism, pericardial effusion, tamponade, pneumonia, among other causes    Bedside cardiac ultrasound demonstrates severely decreased global function, but likely consistent with her prior baseline.  B-lines are seen in lower lung fields but less so in the  anterior apical lung fields.  BNP 27,000, troponin T 40 renal function near baseline.  Lactate 1.5  Chest x-ray consistent with pulmonary vascular congestion and interstitial edema.  The patient's clinical appearance and work-up and history of decreased diuresis consistent with CHF exacerbation  Bumex 2 mg IV x1  Discussed with cardiology and admitted to their service for diuresis and further management      I have reviewed the nursing notes. I have reviewed the findings, diagnosis, plan and need for follow up with the patient.    Discharge Medication List as of 10/7/2022  7:58 AM          Final diagnoses:   Acute on chronic systolic congestive heart failure (H)       --  Randy Velazquez MD    [facility] EMERGENCY DEPARTMENT  10/7/2022     Randy Velazquez MD  10/08/22 0619

## 2022-10-07 NOTE — H&P
[facility]    Cardiology History and Physical - Cardiology 1         Date of Admission:  10/7/2022    Assessment & Plan: HVSL    Gloria Guzman is a 56 year old female with a history of homelessness, NICM with HFrEF (8/18/2022 LVEF 21%), s/p ICD, NSVT and SVT, CKD, RCC in remission, HTN, MARV, COPD, biopolar, polysubstance use who presented to the ED 10/7/2022 with shortness of breath.    #Dyspnea  #Acute-on-chronic HFrEF (8/18/2022 LVEF 21%)  #NICM   Admitted 10/7/2022 with 2 days of gradually worsening dyspnea without chest pain. CXR suggestive of pulmonary edema, BNP elevated at 23,509, likely heart failure exacerbation. She has been taking her medications regularly she reports, no recent infections. She very well could have a component of COPD exacerbation as well so will schedule Duonebs. Hesitant to give steroids at this time because per chart review she has been difficult to diurese in the past. Vitals stable, on 2L O2 but satting 100%, not tachypneic. Situation complicated by tenuous social situation (lives in an bus, has multiple recent hospital stays for shortness of breath where she leaves AMA, history of not taking medications consistently).     Stage C, NYHA Class III  Primary cardiologist: previously saw Dr. Demarcus Laguerre, she says she is looking for a new Cardiologist either at [facility] or [facility]  Estimated dry weight: unclear, was 286 lbs when discharged from [facility] 9/18/2022  Weight on admission: not measured yet  Fluid status: hypervolemic  Diuretics:    - s/p 2mg IV Bumex x1 in the ED. To be re-dosed by primary team in AM pending response   - hold pta Bumex po 4mg bid   - continue pta potassium chloride 40mEq bid  -ACEi/ARB/ARNI: per records was previously on lisinopril, but was not her most recent discharge med list from [facility]  -BB: continue pta carvedilol 3.125mg bid  -Aldosterone antagonist: not on pta  -SGLT2I: not on pta  -Afterload:    - continue pta  hydralazine 25mg bid   - continue pta Imdur 30mg daily  -SCD prophylaxis: ICD     - daily standing weights  - strict I&Os  - telemetry  - TTE ordered  - VBG ordered  - scheduled Duonebs QID    #Elevated troponin  Troponin 40 on admission. Likely in the setting of acute-on-chronic heart failure and CKD. ACS less likely.   - trend troponin to peak    #CKD  Recent baseline Cr ~2. Cr on admission 1.86.    #COPD  - scheduled Duonebs as above  - continue pta albuterol prn    #Anxiety  - continue pta hydroxyzine 25mg q8h prn    #Pain  - continue pta acetaminophen 1000mg tid prn       Diet: 2g Na diet, 2L fluid restriction  DVT Prophylaxis: Heparin SQ  Knott Catheter: Not present  Code Status: full code  Fluids: n/a  Lines: PIV         Disposition Plan   Expected discharge: 2 - 3 days, recommended to prior living arrangement once fluid volume status optimized on oral medication.    Entered: Trung Simms MD 10/07/2022, 4:13 AM     Patient will be formally staffed in the AM.    Trung Simms MD  [facility]    Addendum:  I evaluated the patient and agree with the fellow s finding and plans as written.  Cristofer Gracia MD    ______________________________________________________________________    Chief Complaint   Difficulty breathing    History is obtained from the patient    History of Present Illness   Gloria Guzman is a 56 year old female with a history of homelessness, NICM with HFrEF (8/18/2022 LVEF 21%), s/p ICD, NSVT and SVT, CKD, RCC in remission, HTN, MARV, COPD, biopolar, polysubstance use who presented to the ED 10/7/2022 with shortness of breath.    Briefly she has had numerous admissions for shortness of breath lately and often leaves AMA. She was admitted 9/8-9/9/2022 with shortness of breath at [facility] and left AMA. Was most recently admitted 9/16-9/18/2022 at [facility] with shortness of breath, was started on prednisone, nebs, and IV Lasix infusion with symptom  improvement and left AMA before creatinine and electrolyte monitoring was complete.     Now presenting to the ED 10/7/2022 with shortness of breath. Initial vitals in the ED were /84, HR 95, afebrile, RR 26, room air. Had CXR that was suggestive of pulmonary edema. EKG w/ NSR. Lactate wnl. BNP 27,509. Troponin 40. COVID negative. On 2L of O2 when I saw her in the ED but was satting 100%. Breathing improved with Duonebs in the ED.    Talking with her in the ED, she says that she presented with 2 days of gradual shortness of breath. She describes the shortness of breath as not being able to get a full breath in. Got a ride to the hospital because the breathing was getting bad. She says she has been gaining weight over the last month, doesn't know how much. No recent infections. Reports that she has been taking her medications consistently. Reports that she does not have an inhaler.    No fevers, chills, headaches, vision changes, chest pain, chest pressure. Has an occasional dry cough which is not new. No abdominal pain, nausea, or vomiting, diarrhea.    Has been diagnosed with MARV in the past, but does not use CPAP or oxygen.     Last TTE 8/18/2022 at [facility]:   Final Impressions:    1. Mild to moderately increased left ventricular size, normal wall thickness, severely reduced global systolic function, calculated EF of 21 %.    2. There is severe global left ventricular hypokinesis.    3. Echo contrast was administrered to enhance visualization of all left ventricular segments.    4. Right ventricular cavity size is normal, global systolic RV function is borderline reduced.    5. Severely enlarged left atrium.    6. The aortic valve is normal, no stenosis and trivial regurgitation.    7. The mitral valve is tethered, mild to moderate mitral regurgitation.    8. Tricuspid valve is normal and S/P RV lead - interaction/impingement with septal leaflet.    9. Moderate tricuspid regurgitation.   10. Moderately increased  estimated pulmonary pressures by tricuspid regurgitation velocity and right atrial pressure (52 mmHg plus RAP).   11. The aortic sinus is normal sized with a maximal diameter of 3.2 cm.   12. The inferior vena cava is not well visualized, respiratory size variation not well visualized.   13. No pericardial effusion.       Social hx:   - tobacco- smokes 3 cigarettes per day  - alcohol- no  - illicit drugs- uses marijuana a couple times per week  - living- lives in a bus that's converted into an RV. Lives with her dog.   - occupation- not employed  - code status- full code, but does not want to be a vegetable  - surrogate decision maker- friend Giuliano Chavarria (number in chart)      Review of Systems    The 10 point Review of Systems is negative other than noted in the HPI or here.     Past Medical History    I have reviewed this patient's medical history and updated it with pertinent information if needed.   Past Medical History:   Diagnosis Date     Anemia      Arthritis      Bipolar affective disorder, current episode moderate (H)      Chronic back pain      Chronic kidney disease      Chronic systolic CHF (congestive heart failure) (H)      COPD (chronic obstructive pulmonary disease) (H)      COPD (chronic obstructive pulmonary disease) (H)      ETOH abuse      GERD (gastroesophageal reflux disease)      H/O heart failure      History of posttraumatic stress disorder (PTSD)      Homeless      HTN (hypertension)      Marijuana abuse      Nonischemic cardiomyopathy (H)     EF 19% by CMRI     Obesity      Pacemaker      Polysubstance abuse (H)     tobacco, previous cocaine, meth, and alcohol, and marijuana     Sleep apnea      Tobacco abuse        Past Surgical History   I have reviewed this patient's surgical history and updated it with pertinent information if needed.  Past Surgical History:   Procedure Laterality Date     BACK SURGERY       CARDIAC SURGERY      AICD placement       SECTION        GALLBLADDER SURGERY       HERNIA REPAIR       JOINT REPLACEMTN, KNEE RT/LT  11/10    Joint Replacement knee LT     SURGICAL PATHOLOGY EXAM       TONSILLECTOMY         Social History   I have reviewed this patient's social history and updated it with pertinent information if needed.  Social History     Tobacco Use     Smoking status: Current Every Day Smoker     Packs/day: 0.25     Types: Cigarettes     Smokeless tobacco: Never Used   Vaping Use     Vaping Use: Never used   Substance Use Topics     Alcohol use: No     Drug use: Yes     Types: Marijuana     Family History   I have reviewed this patient's family history and updated it with pertinent information if needed.   I have reviewed this patient's family history and updated it with pertinent information if needed.  Family History   Problem Relation Age of Onset     Breast Cancer Maternal Grandmother      Bipolar Disorder Maternal Grandmother      Substance Abuse Maternal Grandmother      Breast Cancer Maternal Aunt      Cancer Father 42        lung      Substance Abuse Father      Cancer Maternal Grandfather         lung      Bipolar Disorder Maternal Grandfather      Substance Abuse Maternal Grandfather      Cancer Other         maternal cousin lung      Gallbladder Disease Mother      Depression Mother      Bipolar Disorder Mother      Substance Abuse Mother      Skin Cancer Mother      Gallbladder Disease Sister      Substance Abuse Sister      Substance Abuse Brother      Melanoma No family hx of        Prior to Admission Medications   Prior to Admission Medications   Prescriptions Last Dose Informant Patient Reported? Taking?   acetaminophen (TYLENOL) 500 MG tablet   No No   Sig: Take 2 tablets (1,000 mg) by mouth 3 times daily   albuterol (PROAIR HFA/PROVENTIL HFA/VENTOLIN HFA) 108 (90 Base) MCG/ACT inhaler   No No   Sig: Inhale 1-2 puffs into the lungs every 4 hours as needed for shortness of breath / dyspnea or wheezing   bumetanide (BUMEX) 2 MG tablet    No No   Sig: Take 2 tablets (4 mg) by mouth 2 times daily   cefuroxime (CEFTIN) 500 MG tablet   Yes No   Sig: Take 500 mg by mouth daily   clobetasol (TEMOVATE) 0.05 % external cream   No No   Sig: Apply topically 2 times daily   hydrocortisone (CORTAID) 1 % external cream   Yes No   Sig: Apply topically 2 times daily as needed   lisinopril (ZESTRIL) 20 MG tablet   No No   Sig: Take 1 tablet (20 mg) by mouth daily   metolazone (ZAROXOLYN) 2.5 MG tablet   No No   Sig: Take 1 tablet (2.5 mg) by mouth every 72 hours as needed (for fluid retention)   metoprolol succinate ER (TOPROL-XL) 25 MG 24 hr tablet   No No   Sig: Take 3 tablets (75 mg) by mouth daily   oxyCODONE IR (ROXICODONE) 15 MG tablet   Yes No   Sig: TAKE 1 TABLET BY MOUTH FOUR TIMES DAILY AS NEEDED FOR PAIN.   potassium chloride ER (MICRO-K) 10 MEQ CR capsule   No No   Sig: Take 4 capsules (40 mEq) by mouth 2 times daily   sodium chloride (OCEAN) 0.65 % nasal spray   Yes No   Sig: Spray 1 spray into both nostrils daily as needed for congestion      Facility-Administered Medications: None     Allergies   Allergies   Allergen Reactions     Cefaclor Rash     Other reaction(s): *Unknown     Morphine Hives and Itching     Ibuprofen      Morphine Sulfate Itching     Mushrooms [Mushroom] Hives     Olanzapine Other (See Comments)     Varenicline Other (See Comments)       Physical Exam   Vital Signs: Temp: 98.1  F (36.7  C) Temp src: Oral BP: 124/82 Pulse: 89   Resp: 18 SpO2: 97 % O2 Device: None (Room air)    Weight: 0 lbs 0 oz    General: lying in bed, NAD, sleepy  HEENT: no scleral icterus or conjunctival injection, oropharynx clear  Resp: audible wheezing during interview though not tachypneic. Crackles at bases bilaterally, no wheezing on auscultation. On 2L O2 NC satting 100%.   Cardiac: regular rate and rhythm, no murmurs. Not able to appreciate JVD secondary to body habitus   Abdomen: soft, non-tender, non-distended. No rebound or guarding  Extremities:  warm, 2-3+ lower extremity edema bilaterally  MSK: normal muscle bulk  Skin: no lesions on exposed skin  Neuro: alert, answers questions appropriately. Moves all extremities spontaneously  Psych: intermittently sleepy through exam      Data   Data reviewed today: I reviewed all medications, new labs and imaging results over the last 24 hours.

## 2022-10-10 NOTE — DISCHARGE SUMMARY
Addendum:  I was Cards1 on call and was consulted by the ED.  I evaluated the patient and agree with the fellow s finding and plans as written.  The patient will follow up with the team.  Cristofer Gracia MD

## 2023-01-01 NOTE — TELEPHONE ENCOUNTER
Refills sent on 6/1/17 to the [facility] pharm, [facility] pharmacy informed and told to contact the  pharmacy.    Agatha Tuttle RN.  Nurse Triage     0.24

## 2024-10-07 NOTE — PROGRESS NOTES
Clinic Care Coordination Contact  [facility]/Voicemail       Clinical Data: Care Coordinator Outreach    Outreach attempted x 1.  Unable to leave message on patient's voicemail, receive subscriber not in service message.    Plan: Care Coordinator will try to reach patient again in 1-2 business days.    Mary Albright  [facility] Care Resource [facility]     Yes

## 2024-12-30 NOTE — PLAN OF CARE
Temp: 97.4  F (36.3  C) Temp src: Oral(i still can't get a reading on pt temp oral or axillary) /68 Heart Rate: 69 Resp: 18 SpO2: 98 % O2 Device: None (Room air)      Neuro: A&Ox4.   Cardiac: SR/SB. VSS ex occasional hypotension (85/69), resolved on its own to 124/68.   Respiratory: Sating high 90s on RA. Has intermittent wheezing w/coughing spells.  GI/: Large urine output 2/2 increase diuresis. No BM today.  Diet/appetite: Tolerating 2G NA 2L FR diet. Had small emesis, given zofran x1.  Activity:  Assist of 1, up to chair and up to bathroom  Pain: At acceptable level on current regimen.   Skin: Scabs to LE, other intact  LDA's: PIV SL    Plan: Pt has been having coughing fits this morning and c/o mouth and throat pain. Pt given PRN inhaler x2 and started on PRN benzocaine lozenge, pt reports this effective. Lasix increased today, pt voiding large amounts. K+ replaced this morning w/20meq, recheck w/BMP and mag at 1700. Continue with POC. Notify primary team with changes.     Detail Level: Zone
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
## (undated) RX ORDER — ALBUTEROL SULFATE 0.83 MG/ML
SOLUTION RESPIRATORY (INHALATION)
Status: DISPENSED
Start: 2019-07-26